# Patient Record
Sex: FEMALE | Race: WHITE | NOT HISPANIC OR LATINO | Employment: FULL TIME | URBAN - METROPOLITAN AREA
[De-identification: names, ages, dates, MRNs, and addresses within clinical notes are randomized per-mention and may not be internally consistent; named-entity substitution may affect disease eponyms.]

---

## 2017-01-20 DIAGNOSIS — I10 ESSENTIAL HYPERTENSION: Chronic | ICD-10-CM

## 2017-01-20 RX ORDER — HYDROCHLOROTHIAZIDE 25 MG/1
25 TABLET ORAL
Qty: 90 TAB | Refills: 0 | OUTPATIENT
Start: 2017-01-20

## 2017-01-23 DIAGNOSIS — I10 ESSENTIAL HYPERTENSION: Chronic | ICD-10-CM

## 2017-01-23 RX ORDER — HYDROCHLOROTHIAZIDE 25 MG/1
25 TABLET ORAL
Qty: 90 TAB | Refills: 1 | Status: SHIPPED | OUTPATIENT
Start: 2017-01-23 | End: 2017-03-21 | Stop reason: SDUPTHER

## 2017-01-24 NOTE — TELEPHONE ENCOUNTER
Received a refill request for hydrochlorothiazide. Patient is taking his medication for hypertension and is tolerating medication well. Refill sent to pharmacy  Dr. Mota

## 2017-02-23 ENCOUNTER — OFFICE VISIT (OUTPATIENT)
Dept: MEDICAL GROUP | Facility: PHYSICIAN GROUP | Age: 62
End: 2017-02-23
Payer: COMMERCIAL

## 2017-02-23 VITALS
BODY MASS INDEX: 29.82 KG/M2 | DIASTOLIC BLOOD PRESSURE: 60 MMHG | RESPIRATION RATE: 14 BRPM | HEART RATE: 86 BPM | OXYGEN SATURATION: 95 % | HEIGHT: 65 IN | SYSTOLIC BLOOD PRESSURE: 112 MMHG | TEMPERATURE: 97.2 F | WEIGHT: 179 LBS

## 2017-02-23 DIAGNOSIS — I10 ESSENTIAL HYPERTENSION: Chronic | ICD-10-CM

## 2017-02-23 DIAGNOSIS — Z01.419 ENCOUNTER FOR GYNECOLOGICAL EXAMINATION: ICD-10-CM

## 2017-02-23 LAB
HBA1C MFR BLD: NORMAL % (ref ?–5.8)
INT CON NEG: NEGATIVE
INT CON POS: POSITIVE

## 2017-02-23 PROCEDURE — 83036 HEMOGLOBIN GLYCOSYLATED A1C: CPT | Performed by: INTERNAL MEDICINE

## 2017-02-23 PROCEDURE — 99214 OFFICE O/P EST MOD 30 MIN: CPT | Performed by: INTERNAL MEDICINE

## 2017-02-23 NOTE — ASSESSMENT & PLAN NOTE
Pt has long standing diabetes mellitus. He is currently on glipizide 5 mg BID, januvia 50 mg daily, and lantus 34 units QHS. Pt checks blood sugars at home and they have been in the 145-278 range.     Pt denies neuropathy, blurry vision, increased urinary frequency, or dysuria. Denies symptoms indicative of hypoglycemic episodes (sweating, shaking, loss of consciousness).     Pt is following with ophthalmologist but does not remember his name. Pt is on baby aspirin, statin, and enalapril.

## 2017-02-23 NOTE — ASSESSMENT & PLAN NOTE
Pt in on HCTZ 25 mg daily, spironolactone 50 mg daily and enalapril 5 mg daily for HTN. She reports she is compliant with medication. BP is at goal per JNC 8 critieria today.     Denies chest pain, shortness of breath, headache, blurry vision

## 2017-02-24 NOTE — PROGRESS NOTES
Subjective:   Tara Cueva is a 61 y.o. female here today for uncontrolled diabetes mellitus, hypertension    Uncontrolled type 2 diabetes mellitus without complication, with long-term current use of insulin (CMS-HCC)  Pt has long standing diabetes mellitus. He is currently on glipizide 5 mg BID, januvia 50 mg daily, and lantus 34 units QHS. Pt checks blood sugars at home and they have been in the 145-278 range.     Pt denies neuropathy, blurry vision, increased urinary frequency, or dysuria. Denies symptoms indicative of hypoglycemic episodes (sweating, shaking, loss of consciousness).     Pt is following with ophthalmologist but does not remember his name. Pt is on baby aspirin, statin, and enalapril.    Hypertension  Pt in on HCTZ 25 mg daily, spironolactone 50 mg daily and enalapril 5 mg daily for HTN. She reports she is compliant with medication. BP is at goal per JNC 8 critieria today.     Denies chest pain, shortness of breath, headache, blurry vision       Current medicines (including changes today)  Current Outpatient Prescriptions   Medication Sig Dispense Refill   • sitagliptin (JANUVIA) 50 MG Tab Take 2 Tabs by mouth every day. 180 Tab 1   • hydrochlorothiazide (HYDRODIURIL) 25 MG Tab Take 1 Tab by mouth every day. 90 Tab 1   • glipiZIDE (GLUCOTROL) 5 MG Tab Take 1 Tab by mouth 2 times a day. 60 Tab 2   • insulin glargine (LANTUS SOLOSTAR) 100 UNIT/ML Solution Pen-injector injection Inject 10 Units as instructed every evening. 15 mL 3   • Blood Glucose Monitoring Suppl SUPPLIES Misc Test strips order: one touch ultra strips. Sig: use dialy and prn ssx high or low sugar #100 RF x 3 100 Each 3   • enalapril (VASOTEC) 5 MG Tab Take 1 Tab by mouth every day. 90 Tab 2   • spironolactone (ALDACTONE) 50 MG Tab Take 1 Tab by mouth every day. 90 Tab 1   • omeprazole (PRILOSEC) 20 MG delayed-release capsule Take 1 Cap by mouth every day. 90 Cap 1   • atorvastatin (LIPITOR) 80 MG tablet Take 1 Tab by mouth every  "evening. 30 Tab 3   • Insulin Pen Needle 31G X 5 MM Misc 1 Application by Does not apply route 4 times a day as needed (for glucose checks). 120 Each 5   • Cholecalciferol (VITAMIN D) 400 UNIT TABS Take 400 Units by mouth every day.     • Blood Glucose Monitoring Suppl SUPPLIES MISC Test strips order: Test strips for glucometer. Sig: use it once daily and prn ssx high or low sugar #100 RF x 3 100 Each 3   • aspirin EC (ECOTRIN) 81 MG TBEC Take 81 mg by mouth every day.       No current facility-administered medications for this visit.     She  has a past medical history of Diabetes (5/12/2011); Hyperlipidemia (5/12/2011); Vitamin d deficiency (5/12/2011); Elevated liver enzymes (5/12/2011); Edema (5/12/2011); Hypertension (5/12/2011); GERD (gastroesophageal reflux disease) (5/12/2011); Skin lesion of face (5/12/2011); and Gall bladder polyp (8/12/2011).    ROS   No chest pain, no shortness of breath, no headache, no blurred vision       Objective:     Blood pressure 112/60, pulse 86, temperature 36.2 °C (97.2 °F), resp. rate 14, height 1.651 m (5' 5\"), weight 81.194 kg (179 lb), last menstrual period 12/16/2006, SpO2 95 %. Body mass index is 29.79 kg/(m^2).   Physical Exam:  Constitutional: Alert & oriented, no acute distress  Eye: Conjunctiva clear, lids normal, no discharge  ENMT: Lips without lesions, normal external nose and ears  Neck: Trachea midline, no masses, no thyromegaly. No cervical or supraclavicular lymphadenopathy  Respiratory: Unlabored respiratory effort, lungs clear to auscultation, no wheezes, no ronchi  Cardiovascular: Normal S1, S2, no murmur, no edema  Skin: Warm, dry, good turgor, no rashes in visible areas  Neuro: No overt focal neurologic deficits, normal gait  Psych: Normal mood and affect    POCT A1C: 10.7      Assessment and Plan:   The following treatment plan was discussed    1. Uncontrolled type 2 diabetes mellitus without complication, with long-term current use of insulin " (CMS-[facility])  Patient's hemoglobin A1c is 10.7 indicating poor control. Given patient has no CPD will increase Januvia from 50 mg to 100 mg daily. Patient counseled that she should continue checking glucose levels and a fasting levels remain above 140 consistently after being on Januvia for 2 weeks she can increase this dose by 2 units at nighttime. She can continue to check glucose levels and if they remain consistently above 140 fasting she can continue to increase Lantus by 2 units every 2 weeks (36 units, 2 weeks later if fasting glucose elevated above 140 increased to 38 units, etc.). Patient counseled about risks of hypoglycemia and that if symptoms develop counseled to let us know immediately. Will have patient follow up in 2 months and assess response to medication change and recheck A1C  - POCT Hemoglobin A1C  - sitagliptin (JANUVIA) 50 MG Tab; Take 2 Tabs by mouth every day.  Dispense: 180 Tab; Refill: 1    2. Essential hypertension  Well-controlled on current regimen of enalapril, spironolactone, hydrochlorothiazide. Continue current medications    3. Encounter for gynecological examination  - REFERRAL TO OB/GYN      Followup: Return in about 2 months (around 4/23/2017).    Please note that this dictation was created using voice recognition software. I have made every reasonable attempt to correct obvious errors, but I expect that there are errors of grammar and possibly content that I did not discover before finalizing the note.

## 2017-03-01 ENCOUNTER — TELEPHONE (OUTPATIENT)
Dept: MEDICAL GROUP | Facility: PHYSICIAN GROUP | Age: 62
End: 2017-03-01

## 2017-03-01 ENCOUNTER — HOSPITAL ENCOUNTER (OUTPATIENT)
Dept: RADIOLOGY | Facility: MEDICAL CENTER | Age: 62
End: 2017-03-01
Attending: INTERNAL MEDICINE
Payer: COMMERCIAL

## 2017-03-01 DIAGNOSIS — Z12.31 ENCOUNTER FOR SCREENING MAMMOGRAM FOR BREAST CANCER: ICD-10-CM

## 2017-03-01 PROCEDURE — G0202 SCR MAMMO BI INCL CAD: HCPCS

## 2017-03-02 RX ORDER — OMEPRAZOLE 20 MG/1
CAPSULE, DELAYED RELEASE ORAL
Qty: 90 CAP | Refills: 3 | Status: SHIPPED | OUTPATIENT
Start: 2017-03-02 | End: 2017-03-21 | Stop reason: SDUPTHER

## 2017-03-02 NOTE — TELEPHONE ENCOUNTER
----- Message from Christian Mota M.D. sent at 3/1/2017  3:20 PM PST -----  Mammogram results reviewed and there is no evidence of malignancy (cancer). It is recommended to repeat testing one year.  Please call patient and inform them of results.Thank you    - Dr. Mota

## 2017-03-02 NOTE — TELEPHONE ENCOUNTER
Received refill request for omeprazole.  Pt was seen in clinic recently and is taking this medication for GERD. Refill sent to pharmacy    Christian Mota M.D.

## 2017-03-14 ENCOUNTER — TELEPHONE (OUTPATIENT)
Dept: MEDICAL GROUP | Facility: PHYSICIAN GROUP | Age: 62
End: 2017-03-14

## 2017-03-14 NOTE — TELEPHONE ENCOUNTER
1. Caller Name: Tara                                          Call Back Number:       Patient approves a detailed voicemail message:     All appropriate forms were filled out for prior auth and supporting notes faxed on Januvia . Medication was still denied through insurance . Patient is out of medication . Can we change to something else ,Please advise

## 2017-03-21 DIAGNOSIS — E78.5 HYPERLIPIDEMIA, UNSPECIFIED HYPERLIPIDEMIA TYPE: Chronic | ICD-10-CM

## 2017-03-21 DIAGNOSIS — I10 ESSENTIAL HYPERTENSION: Chronic | ICD-10-CM

## 2017-03-21 DIAGNOSIS — E13.9 DIABETES 1.5, MANAGED AS TYPE 2 (HCC): ICD-10-CM

## 2017-03-21 RX ORDER — GLIPIZIDE 5 MG/1
5 TABLET ORAL 2 TIMES DAILY
Qty: 180 TAB | Refills: 3 | Status: SHIPPED | OUTPATIENT
Start: 2017-03-21 | End: 2017-04-20

## 2017-03-21 RX ORDER — HYDROCHLOROTHIAZIDE 25 MG/1
25 TABLET ORAL
Qty: 90 TAB | Refills: 3 | Status: SHIPPED | OUTPATIENT
Start: 2017-03-21 | End: 2017-10-24

## 2017-03-21 RX ORDER — ATORVASTATIN CALCIUM 80 MG/1
80 TABLET, FILM COATED ORAL EVERY EVENING
Qty: 90 TAB | Refills: 3 | Status: SHIPPED | OUTPATIENT
Start: 2017-03-21 | End: 2018-04-03 | Stop reason: SDUPTHER

## 2017-03-21 RX ORDER — ENALAPRIL MALEATE 5 MG/1
5 TABLET ORAL DAILY
Qty: 90 TAB | Refills: 3 | Status: SHIPPED | OUTPATIENT
Start: 2017-03-21 | End: 2018-04-03 | Stop reason: SDUPTHER

## 2017-03-21 RX ORDER — SPIRONOLACTONE 50 MG/1
50 TABLET, FILM COATED ORAL
Qty: 90 TAB | Refills: 3 | Status: SHIPPED | OUTPATIENT
Start: 2017-03-21 | End: 2017-07-20

## 2017-03-21 RX ORDER — OMEPRAZOLE 20 MG/1
20 CAPSULE, DELAYED RELEASE ORAL
Qty: 90 CAP | Refills: 3 | Status: SHIPPED | OUTPATIENT
Start: 2017-03-21 | End: 2018-04-03 | Stop reason: SDUPTHER

## 2017-03-21 NOTE — TELEPHONE ENCOUNTER
All refill [facility]     Was the patient seen in the last year in this department? Yes     Does patient have an active prescription for medications requested? Yes     Received Request Via: Pharmacy

## 2017-03-21 NOTE — TELEPHONE ENCOUNTER
Received refill request for glipizide, Lantus, enalapril, omeprazole, hctz, spironolactone, atorvastatin. Pt was seen in clinic recently and is taking medications for DM, HTN, DLD. Refills sent to pharmacy    Christian Mota M.D.

## 2017-03-30 ENCOUNTER — TELEPHONE (OUTPATIENT)
Dept: MEDICAL GROUP | Facility: PHYSICIAN GROUP | Age: 62
End: 2017-03-30

## 2017-04-20 ENCOUNTER — OFFICE VISIT (OUTPATIENT)
Dept: MEDICAL GROUP | Facility: PHYSICIAN GROUP | Age: 62
End: 2017-04-20
Payer: COMMERCIAL

## 2017-04-20 VITALS
WEIGHT: 179 LBS | HEART RATE: 90 BPM | DIASTOLIC BLOOD PRESSURE: 60 MMHG | SYSTOLIC BLOOD PRESSURE: 122 MMHG | TEMPERATURE: 97.9 F | HEIGHT: 65 IN | OXYGEN SATURATION: 95 % | BODY MASS INDEX: 29.82 KG/M2

## 2017-04-20 DIAGNOSIS — E13.9 DIABETES 1.5, MANAGED AS TYPE 2 (HCC): ICD-10-CM

## 2017-04-20 DIAGNOSIS — I10 ESSENTIAL HYPERTENSION: Chronic | ICD-10-CM

## 2017-04-20 PROCEDURE — 99214 OFFICE O/P EST MOD 30 MIN: CPT | Performed by: INTERNAL MEDICINE

## 2017-04-20 RX ORDER — GLIPIZIDE 10 MG/1
10 TABLET ORAL 2 TIMES DAILY
Qty: 180 TAB | Refills: 3 | Status: SHIPPED | OUTPATIENT
Start: 2017-04-20 | End: 2017-04-25

## 2017-04-20 NOTE — PROGRESS NOTES
Subjective:   Tara Cueva is a 61 y.o. female here today for T2DM, HTN    Uncontrolled type 2 diabetes mellitus without complication, with long-term current use of insulin (CMS-HCC)  Pt has long standing diabetes mellitus. She is currently on glipizide 5 mg BID and has been increasing her lantus does to try and get a fasting glucose level below 140 and is now up to 74 units QHS. Pt checks blood sugars at home and they have been in the 150 - 350 range fasting    Pt denies neuropathy, blurry vision, CKD. Denies symptoms indicative of hypoglycemic episodes (sweating, shaking, loss of consciousness).     Pt is following with ophthalmologist. Pt is on baby aspirin, statin, and enalapril.      Hypertension  Pt in on HCTZ 25 mg daily, spironolactone 50 mg daily and enalapril 5 mg daily for HTN. Pt reports compliance with medication. BP is at goal per JNC 8 critieria today.     Denies chest pain, shortness of breath, headache         Current medicines (including changes today)  Current Outpatient Prescriptions   Medication Sig Dispense Refill   • insulin glargine (LANTUS SOLOSTAR) 100 UNIT/ML Solution Pen-injector injection Inject 30 Units as instructed 2 times a day. 15 PEN 4   • glipiZIDE (GLUCOTROL) 10 MG Tab Take 1 Tab by mouth 2 times a day. 180 Tab 3   • Blood Glucose Monitoring Suppl SUPPLIES Misc Test strips order: one touch ultra strips. Sig: use dialy and prn ssx high or low sugar #100 RF x 3 100 Each 3   • Insulin Pen Needle 31G X 5 MM Misc 1 Application by Does not apply route 4 times a day as needed (for glucose checks). 120 Each 5   • Cholecalciferol (VITAMIN D) 400 UNIT TABS Take 400 Units by mouth every day.     • Blood Glucose Monitoring Suppl SUPPLIES MISC Test strips order: Test strips for glucometer. Sig: use it once daily and prn ssx high or low sugar #100 RF x 3 100 Each 3   • aspirin EC (ECOTRIN) 81 MG TBEC Take 81 mg by mouth every day.     • enalapril (VASOTEC) 5 MG Tab Take 1 Tab by mouth every  "day. 90 Tab 3   • omeprazole (PRILOSEC) 20 MG delayed-release capsule Take 1 Cap by mouth every day. TAKE 1 CAP BY MOUTH EVERY DAY. 90 Cap 3   • hydrochlorothiazide (HYDRODIURIL) 25 MG Tab Take 1 Tab by mouth every day. 90 Tab 3   • spironolactone (ALDACTONE) 50 MG Tab Take 1 Tab by mouth every day. 90 Tab 3   • atorvastatin (LIPITOR) 80 MG tablet Take 1 Tab by mouth every evening. 90 Tab 3   • sitagliptin (JANUVIA) 50 MG Tab Take 2 Tabs by mouth every day. 180 Tab 1     No current facility-administered medications for this visit.     She  has a past medical history of Diabetes (5/12/2011); Hyperlipidemia (5/12/2011); Vitamin d deficiency (5/12/2011); Elevated liver enzymes (5/12/2011); Edema (5/12/2011); Hypertension (5/12/2011); GERD (gastroesophageal reflux disease) (5/12/2011); Skin lesion of face (5/12/2011); and Gall bladder polyp (8/12/2011).    ROS   No chest pain, no shortness of breath, no headache       Objective:     Blood pressure 122/60, pulse 90, temperature 36.6 °C (97.9 °F), height 1.651 m (5' 5\"), weight 81.194 kg (179 lb), last menstrual period 12/16/2006, SpO2 95 %. Body mass index is 29.79 kg/(m^2).   Physical Exam:  Constitutional: Alert & oriented, no acute distress  Eye: Conjunctiva clear, lids normal, no discharge  ENMT: Lips without lesions, normal external nose and ears  Neck: Trachea midline, no masses, no thyromegaly. No cervical or supraclavicular lymphadenopathy  Respiratory: Unlabored respiratory effort, lungs clear to auscultation, no wheezes, no ronchi  Cardiovascular: Normal S1, S2, no murmur, no edema  Skin: Warm, dry, good turgor, no rashes in visible areas  Neuro: No overt focal neurologic deficits, normal gait  Psych: Normal mood and affect      Assessment and Plan:   The following treatment plan was discussed    1. Uncontrolled type 2 diabetes mellitus without complication, with long-term current use of insulin (CMS-[facility])  Poorly controlled with fasting glucose levels in the " 150-350 range despite increasing doses of Lantus. We'll change Lantus to 30 mg twice a day and increase glipizide to 10 mg twice a day. Will follow-up in 3 months with repeat labs to assess diabetes mellitus. Will also refer to endocrinology given patient's very high level of insulin dosing and concern for insulin resistance  - insulin glargine (LANTUS SOLOSTAR) 100 UNIT/ML Solution Pen-injector injection; Inject 30 Units as instructed 2 times a day.  Dispense: 15 PEN; Refill: 4  - glipiZIDE (GLUCOTROL) 10 MG Tab; Take 1 Tab by mouth 2 times a day.  Dispense: 180 Tab; Refill: 3  - REFERRAL TO ENDOCRINOLOGY  - LIPID PROFILE; Future  - COMP METABOLIC PANEL; Future  - HEMOGLOBIN A1C; Future    3. Essential hypertension  Well-controlled on current medications. Continue current regimen  - CBC WITH DIFFERENTIAL; Future      Followup: Return in about 3 months (around 7/20/2017).    Please note that this dictation was created using voice recognition software. I have made every reasonable attempt to correct obvious errors, but I expect that there are errors of grammar and possibly content that I did not discover before finalizing the note.

## 2017-04-20 NOTE — MR AVS SNAPSHOT
"        Tara Cueva   2017 3:40 PM   Office Visit   MRN: [id number]    Department:  [facility]   Dept Phone:  [phone]    Description:  Female : 1955   Provider:  Christian Mota M.D.           Reason for Visit     Follow-Up           Allergies as of 2017     Allergen Noted Reactions    Sulfa Drugs 2012   Vomiting      You were diagnosed with     Diabetes 1.5, managed as type 2 (CMS-HCC)   [023022]       Uncontrolled type 2 diabetes mellitus without complication, with long-term current use of insulin (CMS-HCC)   [1020773]       Essential hypertension   [2175708]         Vital Signs     Blood Pressure Pulse Temperature Height Weight Body Mass Index    122/60 mmHg 90 36.6 °C (97.9 °F) 1.651 m (5' 5\") 81.194 kg (179 lb) 29.79 kg/m2    Oxygen Saturation Last Menstrual Period Smoking Status             95% 2006 Former Smoker         Basic Information     Date Of Birth Sex Race Ethnicity Preferred Language    1955 Female White Non- English      Your appointments     2017  3:20 PM   Established Patient with Christian Mota M.D.   [facility] ([place]    [address] [place] [id number] [phone]           You will be receiving a confirmation call a few days before your appointment from our automated call confirmation system.              Problem List              ICD-10-CM Priority Class Noted - Resolved    Hyperlipidemia (Chronic) E78.5   2011 - Present    Vitamin D deficiency E55.9   2011 - Present    Hypertension (Chronic) I10   2011 - Present    GERD (gastroesophageal reflux disease) K21.9   2011 - Present    Gall bladder polyp K82.4   2011 - Present    Uncontrolled type 2 diabetes mellitus without complication, with long-term current use of insulin (CMS-HCC) E11.65, Z79.4   3/30/2012 - Present    Hypokalemia E87.6   1/3/2014 - Present    Thumb pain M79.646   2016 - Present      Health Maintenance        Date Due " Completion Dates    IMM ZOSTER VACCINE 5/9/2014 ---    RETINAL SCREENING 7/10/2015 7/10/2014 (N/S), 4/21/2012 (N/S)    Override on 7/10/2014: (N/S)    Override on 4/21/2012: (N/S)    PAP SMEAR 2/7/2017 2/7/2014, 1/17/2014    A1C SCREENING 8/23/2017 2/23/2017, 12/23/2016, 4/8/2016, 12/18/2015, 9/18/2015, 6/19/2015, 3/6/2015, 12/5/2014, 7/25/2014, 12/27/2013, 6/7/2013, 11/16/2012, 8/10/2012, 5/4/2012, 12/23/2011, 10/7/2011    FASTING LIPID PROFILE 12/23/2017 12/23/2016, 9/17/2015, 2/4/2015, 7/25/2014, 6/7/2013, 8/10/2012, 12/23/2011, 10/7/2011, 5/13/2011    URINE ACR / MICROALBUMIN 12/23/2017 12/23/2016, 2/4/2015, 7/25/2014, 6/7/2013, 12/23/2011, 5/13/2011    SERUM CREATININE 12/23/2017 12/23/2016, 9/17/2015, 2/4/2015, 12/5/2014, 7/25/2014, 2/22/2014, 12/27/2013, 6/7/2013, 11/16/2012, 8/10/2012, 5/4/2012, 12/23/2011, 10/7/2011, 5/13/2011    DIABETES MONOFILAMENT / LE EXAM 12/23/2017 12/23/2016, 7/25/2014 (N/S), 8/11/2011 (N/S)    Override on 7/25/2014: (N/S)    Override on 8/11/2011: (N/S)    MAMMOGRAM 3/1/2018 3/1/2017, 1/24/2014    COLONOSCOPY 11/6/2019 11/6/2014    IMM DTaP/Tdap/Td Vaccine (2 - Td) 7/25/2024 7/25/2014            Current Immunizations     Influenza TIV (IM) 10/4/2013, 11/4/2011    Influenza Vaccine Quad Inj (Pf) 12/23/2016  8:12 AM    Influenza Vaccine Quad Inj (Preserved) 12/18/2015    Pneumococcal polysaccharide vaccine (PPSV-23) 1/3/2014    Tdap Vaccine 7/25/2014      Below and/or attached are the medications your provider expects you to take. Review all of your home medications and newly ordered medications with your provider and/or pharmacist. Follow medication instructions as directed by your provider and/or pharmacist. Please keep your medication list with you and share with your provider. Update the information when medications are discontinued, doses are changed, or new medications (including over-the-counter products) are added; and carry medication information at all times in the event of  emergency situations     Allergies:  SULFA DRUGS - Vomiting               Medications  Valid as of: April 20, 2017 -  3:49 PM    Generic Name Brand Name Tablet Size Instructions for use    Aspirin (Tablet Delayed Response) ECOTRIN 81 MG Take 81 mg by mouth every day.        Atorvastatin Calcium (Tab) LIPITOR 80 MG Take 1 Tab by mouth every evening.        Blood Glucose Monitoring Suppl (Misc) Blood Glucose Monitoring Suppl SUPPLIES Test strips order: Test strips for glucometer. Sig: use it once daily and prn ssx high or low sugar #100 RF x 3        Blood Glucose Monitoring Suppl (Misc) Blood Glucose Monitoring Suppl SUPPLIES Test strips order: one touch ultra strips. Sig: use dialy and prn ssx high or low sugar #100 RF x 3        Cholecalciferol (Tab) VITAMIN D 400 UNIT Take 400 Units by mouth every day.        Enalapril Maleate (Tab) VASOTEC 5 MG Take 1 Tab by mouth every day.        GlipiZIDE (Tab) GLUCOTROL 10 MG Take 1 Tab by mouth 2 times a day.        HydroCHLOROthiazide (Tab) HYDRODIURIL 25 MG Take 1 Tab by mouth every day.        Insulin Glargine (Solution Pen-injector) LANTUS 100 UNIT/ML Inject 30 Units as instructed 2 times a day.        Insulin Pen Needle (Misc) Insulin Pen Needle 31G X 5 MM 1 Application by Does not apply route 4 times a day as needed (for glucose checks).        Omeprazole (CAPSULE DELAYED RELEASE) PRILOSEC 20 MG Take 1 Cap by mouth every day. TAKE 1 CAP BY MOUTH EVERY DAY.        SITagliptin Phosphate (Tab) JANUVIA 50 MG Take 2 Tabs by mouth every day.        Spironolactone (Tab) ALDACTONE 50 MG Take 1 Tab by mouth every day.        .                 Medicines prescribed today were sent to:     [id number] - [place] - [address]    [address] [place] [address]    Phone: [phone] Fax: [phone]    Open 24 Hours?: No    [facility] - [place], [place] - [address]    [address] [address]    Phone: [phone] Fax:  [phone]    Open 24 Hours?: No      Medication refill instructions:       If your prescription bottle indicates you have medication refills left, it is not necessary to call your provider’s office. Please contact your pharmacy and they will refill your medication.    If your prescription bottle indicates you do not have any refills left, you may request refills at any time through one of the following ways: The online [employer] system (except Urgent Care), by calling your provider’s office, or by asking your pharmacy to contact your provider’s office with a refill request. Medication refills are processed only during regular business hours and may not be available until the next business day. Your provider may request additional information or to have a follow-up visit with you prior to refilling your medication.   *Please Note: Medication refills are assigned a new Rx number when refilled electronically. Your pharmacy may indicate that no refills were authorized even though a new prescription for the same medication is available at the pharmacy. Please request the medicine by name with the pharmacy before contacting your provider for a refill.        Your To Do List     Future Labs/Procedures Complete By Expires    CBC WITH DIFFERENTIAL  As directed 4/20/2018    COMP METABOLIC PANEL  As directed 4/20/2018    HEMOGLOBIN A1C  As directed 4/20/2018    LIPID PROFILE  As directed 4/20/2018      Referral     A referral request has been sent to our patient care coordination department. Please allow 3-5 business days for us to process this request and contact you either by phone or mail. If you do not hear from us by the 5th business day, please call us at [phone].           [employer] Access Code: Activation code not generated  Current [employer] Status: Active

## 2017-04-20 NOTE — ASSESSMENT & PLAN NOTE
Pt has long standing diabetes mellitus. She is currently on glipizide 5 mg BID and has been increasing her lantus does to try and get a fasting glucose level below 140 and is now up to 74 units QHS. Pt checks blood sugars at home and they have been in the 150 - 350 range fasting    Pt denies neuropathy, blurry vision, CKD. Denies symptoms indicative of hypoglycemic episodes (sweating, shaking, loss of consciousness).     Pt is following with ophthalmologist. Pt is on baby aspirin, statin, and enalapril.

## 2017-04-20 NOTE — ASSESSMENT & PLAN NOTE
Pt in on HCTZ 25 mg daily, spironolactone 50 mg daily and enalapril 5 mg daily for HTN. Pt reports compliance with medication. BP is at goal per JNC 8 critieria today.     Denies chest pain, shortness of breath, headache

## 2017-04-25 ENCOUNTER — OFFICE VISIT (OUTPATIENT)
Dept: ENDOCRINOLOGY | Facility: MEDICAL CENTER | Age: 62
End: 2017-04-25
Payer: COMMERCIAL

## 2017-04-25 ENCOUNTER — HOSPITAL ENCOUNTER (OUTPATIENT)
Dept: LAB | Facility: MEDICAL CENTER | Age: 62
End: 2017-04-25
Attending: PHYSICIAN ASSISTANT
Payer: COMMERCIAL

## 2017-04-25 VITALS
HEART RATE: 78 BPM | SYSTOLIC BLOOD PRESSURE: 104 MMHG | OXYGEN SATURATION: 95 % | WEIGHT: 179 LBS | HEIGHT: 65 IN | DIASTOLIC BLOOD PRESSURE: 72 MMHG | BODY MASS INDEX: 29.82 KG/M2

## 2017-04-25 DIAGNOSIS — Z79.4 ENCOUNTER FOR LONG-TERM (CURRENT) USE OF INSULIN (HCC): ICD-10-CM

## 2017-04-25 LAB
ALBUMIN SERPL BCP-MCNC: 4.5 G/DL (ref 3.2–4.9)
ALBUMIN/GLOB SERPL: 1.2 G/DL
ALP SERPL-CCNC: 73 U/L (ref 30–99)
ALT SERPL-CCNC: 23 U/L (ref 2–50)
ANION GAP SERPL CALC-SCNC: 8 MMOL/L (ref 0–11.9)
AST SERPL-CCNC: 19 U/L (ref 12–45)
BILIRUB SERPL-MCNC: 0.5 MG/DL (ref 0.1–1.5)
BUN SERPL-MCNC: 23 MG/DL (ref 8–22)
CALCIUM SERPL-MCNC: 10.8 MG/DL (ref 8.5–10.5)
CHLORIDE SERPL-SCNC: 99 MMOL/L (ref 96–112)
CO2 SERPL-SCNC: 29 MMOL/L (ref 20–33)
CREAT SERPL-MCNC: 1.03 MG/DL (ref 0.5–1.4)
CREAT UR-MCNC: 96.3 MG/DL
GFR SERPL CREATININE-BSD FRML MDRD: 54 ML/MIN/1.73 M 2
GLOBULIN SER CALC-MCNC: 3.7 G/DL (ref 1.9–3.5)
GLUCOSE SERPL-MCNC: 277 MG/DL (ref 65–99)
MICROALBUMIN UR-MCNC: <0.7 MG/DL
MICROALBUMIN/CREAT UR: NORMAL MG/G (ref 0–30)
POTASSIUM SERPL-SCNC: 4 MMOL/L (ref 3.6–5.5)
PROT SERPL-MCNC: 8.2 G/DL (ref 6–8.2)
SODIUM SERPL-SCNC: 136 MMOL/L (ref 135–145)

## 2017-04-25 PROCEDURE — 86341 ISLET CELL ANTIBODY: CPT

## 2017-04-25 PROCEDURE — 36415 COLL VENOUS BLD VENIPUNCTURE: CPT

## 2017-04-25 PROCEDURE — 80053 COMPREHEN METABOLIC PANEL: CPT

## 2017-04-25 PROCEDURE — 99214 OFFICE O/P EST MOD 30 MIN: CPT | Performed by: PHYSICIAN ASSISTANT

## 2017-04-25 PROCEDURE — 82043 UR ALBUMIN QUANTITATIVE: CPT

## 2017-04-25 PROCEDURE — 84681 ASSAY OF C-PEPTIDE: CPT

## 2017-04-25 PROCEDURE — 83516 IMMUNOASSAY NONANTIBODY: CPT

## 2017-04-25 PROCEDURE — 86337 INSULIN ANTIBODIES: CPT

## 2017-04-25 PROCEDURE — 82570 ASSAY OF URINE CREATININE: CPT

## 2017-04-25 RX ORDER — LIRAGLUTIDE 6 MG/ML
1.8 INJECTION SUBCUTANEOUS DAILY
Qty: 3 PEN | Refills: 6 | Status: SHIPPED | OUTPATIENT
Start: 2017-04-25 | End: 2017-05-09 | Stop reason: SDUPTHER

## 2017-04-25 RX ORDER — PIOGLITAZONEHYDROCHLORIDE 15 MG/1
15 TABLET ORAL DAILY
Qty: 30 TAB | Refills: 11 | Status: SHIPPED | OUTPATIENT
Start: 2017-04-25 | End: 2017-05-02

## 2017-04-25 RX ORDER — DAPAGLIFLOZIN 10 MG/1
1 TABLET, FILM COATED ORAL DAILY
Qty: 30 TAB | Refills: 11 | Status: SHIPPED | OUTPATIENT
Start: 2017-04-25 | End: 2017-05-01 | Stop reason: CLARIF

## 2017-04-25 NOTE — PROGRESS NOTES
New Patient Consult Note  Referred by: Christian Mota M.D.    Reason for consult: Diabetes Management Type 2    HPI:  Tara Cueva is a 61 y.o. old patient who is seeing us today for diabetes care.  This is a pleasant patient with diabetes and I appreciate the opportunity to participate in the care of this patient.  This is a new patient with me today.    Labs of 2/23/17 HbA1c is 10.7, GFR >60,   In 12/2016 HbA1c was 10.2    BG Diary:4/25/2017  In the AM: 203, 226, 224, 213, 179, 205, 147, 143, 161, 357, 193  Just before meal:  Just before Bed:    Has been Diabetic over 20 years.  Has a Glucagon pen at home: no  Has a meter at home    1. Uncontrolled type 2 diabetes mellitus without complication, with long-term current use of insulin (CMS-HCC)  This is a new patient. She is on  Lantus up to 74units. (but now on Lantus 30UNITS)  Januvia 50 (does not take states it did not work)  Glipizide 10mg BID    (Metformin Makes her nausea)    Start:  Farxiga 10mg one a day  Actos 15 mg one a day.  ( at pharmacy)  Victoza 0.6 at night  Lantus at night 20units.        2. Encounter for long-term (current) use of insulin (CMS-HCC)  Is on a high risk medication Insulin and we will continue to follow no hypoglycemic events since last visit          ROS:   Constitutional: No change in weight , No fatigue, No night sweats.  HEENT: No Headache.  Eyes:  No blurred vision, No visual changes.  Cardiac: No chest pain, No palpitations.  Resp: No shortness of breath, No cough,   Gastro: No nausea or vomiting, No diarrhea.  Neuro: Denies numbness or tinging in bilateral feet or hands, and no loss of sensation.  Endo: No heat or cold intolerance.  : No polyuria, No polydipsia, No chronic UTI's.  Lower extremities: No lower leg edema bilateral.  All other systems were reviewed and were negative.    Past Medical History:  Patient Active Problem List    Diagnosis Date Noted   • Encounter for long-term (current) use of insulin (CMS-HCC)  04/25/2017   • Thumb pain 12/23/2016   • Hypokalemia 01/03/2014   • Uncontrolled type 2 diabetes mellitus without complication, with long-term current use of insulin (CMS-HCC) 03/30/2012   • Gall bladder polyp 08/12/2011   • Hyperlipidemia 05/12/2011   • Vitamin D deficiency 05/12/2011   • Hypertension 05/12/2011   • GERD (gastroesophageal reflux disease) 05/12/2011       Past Surgical History:  Past Surgical History   Procedure Laterality Date   • Primary c section     • Other orthopedic surgery       right arm ORIF   • Tubal coagulation laparoscopic bilateral         Allergies:  Sulfa drugs    Social History:  Social History     Social History   • Marital Status:      Spouse Name: N/A   • Number of Children: N/A   • Years of Education: N/A     Occupational History   • Not on file.     Social History Main Topics   • Smoking status: Former Smoker -- 1.00 packs/day for 40 years     Types: Cigarettes     Quit date: 01/01/1990   • Smokeless tobacco: Never Used   • Alcohol Use: No   • Drug Use: No   • Sexual Activity: Not on file     Other Topics Concern   • Not on file     Social History Narrative       Family History:  Family History   Problem Relation Age of Onset   • Cancer Maternal Grandmother      lung    • Cancer Mother      breast       Medications:    Current outpatient prescriptions:   •  insulin glargine (LANTUS SOLOSTAR) 100 UNIT/ML Solution Pen-injector injection, Inject 30 Units as instructed 2 times a day., Disp: 15 PEN, Rfl: 4  •  glipiZIDE (GLUCOTROL) 10 MG Tab, Take 1 Tab by mouth 2 times a day., Disp: 180 Tab, Rfl: 3  •  enalapril (VASOTEC) 5 MG Tab, Take 1 Tab by mouth every day., Disp: 90 Tab, Rfl: 3  •  omeprazole (PRILOSEC) 20 MG delayed-release capsule, Take 1 Cap by mouth every day. TAKE 1 CAP BY MOUTH EVERY DAY., Disp: 90 Cap, Rfl: 3  •  hydrochlorothiazide (HYDRODIURIL) 25 MG Tab, Take 1 Tab by mouth every day., Disp: 90 Tab, Rfl: 3  •  spironolactone (ALDACTONE) 50 MG Tab, Take 1 Tab  "by mouth every day., Disp: 90 Tab, Rfl: 3  •  atorvastatin (LIPITOR) 80 MG tablet, Take 1 Tab by mouth every evening., Disp: 90 Tab, Rfl: 3  •  Insulin Pen Needle 31G X 5 MM Misc, 1 Application by Does not apply route 4 times a day as needed (for glucose checks)., Disp: 120 Each, Rfl: 5  •  Cholecalciferol (VITAMIN D) 400 UNIT TABS, Take 400 Units by mouth every day., Disp: , Rfl:   •  Blood Glucose Monitoring Suppl SUPPLIES MISC, Test strips order: Test strips for glucometer. Sig: use it once daily and prn ssx high or low sugar #100 RF x 3, Disp: 100 Each, Rfl: 3  •  aspirin EC (ECOTRIN) 81 MG TBEC, Take 81 mg by mouth every day., Disp: , Rfl:   •  sitagliptin (JANUVIA) 50 MG Tab, Take 2 Tabs by mouth every day., Disp: 180 Tab, Rfl: 1      Physical Examination:   Vital signs: /72 mmHg  Pulse 78  Ht 1.651 m (5' 5\")  Wt 81.194 kg (179 lb)  BMI 29.79 kg/m2  SpO2 95%  LMP 12/16/2006  General: No distress, cooperative, well dressed and well nourished.   Eyes: No scleral icterus or discharge, No hyposphagma  ENMT: Normal on external inspection of nose, lips, No nasal drainage   Neck: No abnormal masses on inspection  Resp: Normal effort, Bilateral clear to auscultation, No wheezing, No rales  CVS: Regular rate and rhythm, S1 S2 normal, No murmur. No gallop  Extremities: No edema bilateral extremities  Neuro: Alert and oriented  Skin: No rash, No Ulcers  Psych: Normal mood and affect      Assessment and Plan:    1. Uncontrolled type 2 diabetes mellitus without complication, with long-term current use of insulin (CMS-HCC)  I will have her STOP  Januvia  Glipizide      Start:  Farxiga 10mg one a day  Actos 15 mg one a day.  ( at pharmacy)  Victoza 0.6 at night  Lantus at night 20units. DECREASED from 30 BID        2. Encounter for long-term (current) use of insulin (CMS-[facility])  The total time spent seeing this patient today face to face in consultation, and formulating an action plan for this visit was " greater than 25 minutes. > Than 50% of this time was spent counseling, discussing problems documented above and below, coordinating care and answering questions by the physician assistant.  We developed a diabetes care plan for this patient today.        Return in about 1 week (around 5/2/2017).    Blood glucose log: Check BG in the morning when wake up, before lunch or dinner and before bed.  So three times a day.  Always bring BG diary to the next office visit.     This patient during there office visit was started on new medication victoza, actos, Farxiga.  Side effects of new medications were discussed with the patient today in the office. The patient was supplied paperwork on this new medication.    Thank you kindly for allowing me to participate in the diabetes care plan for this patient.    Edy Lee PA-C, BC-ADM  04/25/2017    CC:   Christian Mota M.D.

## 2017-04-25 NOTE — MR AVS SNAPSHOT
"        Tara Cueva   2017 8:30 AM   Office Visit   MRN: [id number]    Department:  Endocrinology [facility]   Dept Phone:  [phone]    Description:  Female : 1955   Provider:  Anson Lee PA-C           Reason for Visit     New Patient           Allergies as of 2017     Allergen Noted Reactions    Sulfa Drugs 2012   Vomiting      You were diagnosed with     Uncontrolled type 2 diabetes mellitus without complication, with long-term current use of insulin (CMS-HCC)   [2771996]       Encounter for long-term (current) use of insulin (CMS-HCC)   [V58.67.ICD-9-CM]         Vital Signs     Blood Pressure Pulse Height Weight Body Mass Index Oxygen Saturation    104/72 mmHg 78 1.651 m (5' 5\") 81.194 kg (179 lb) 29.79 kg/m2 95%    Last Menstrual Period Smoking Status                2006 Former Smoker          Basic Information     Date Of Birth Sex Race Ethnicity Preferred Language    1955 Female White Non- English      Your appointments     May 02, 2017  8:10 AM   Established Patient with Anson Lee PA-C   [facility]    [address], [address]  [facility] [address] [phone]           You will be receiving a confirmation call a few days before your appointment from our automated call confirmation system.            2017  3:20 PM   Established Patient with Christian Mota M.D.   [facility]    [address] [address] [phone]           You will be receiving a confirmation call a few days before your appointment from our automated call confirmation system.              Problem List              ICD-10-CM Priority Class Noted - Resolved    Hyperlipidemia (Chronic) E78.5   2011 - Present    Vitamin D deficiency E55.9   2011 - Present    Hypertension (Chronic) I10   2011 - Present    GERD (gastroesophageal reflux disease) K21.9   2011 - Present    Gall " bladder polyp K82.4   8/12/2011 - Present    Uncontrolled type 2 diabetes mellitus without complication, with long-term current use of insulin (CMS-HCC) E11.65, Z79.4   3/30/2012 - Present    Hypokalemia E87.6   1/3/2014 - Present    Thumb pain M79.646   12/23/2016 - Present    Encounter for long-term (current) use of insulin (CMS-HCC) Z79.4   4/25/2017 - Present      Health Maintenance        Date Due Completion Dates    IMM ZOSTER VACCINE 5/9/2014 ---    RETINAL SCREENING 7/10/2015 7/10/2014 (N/S), 4/21/2012 (N/S)    Override on 7/10/2014: (N/S)    Override on 4/21/2012: (N/S)    PAP SMEAR 2/7/2017 2/7/2014, 1/17/2014    A1C SCREENING 8/23/2017 2/23/2017, 12/23/2016, 4/8/2016, 12/18/2015, 9/18/2015, 6/19/2015, 3/6/2015, 12/5/2014, 7/25/2014, 12/27/2013, 6/7/2013, 11/16/2012, 8/10/2012, 5/4/2012, 12/23/2011, 10/7/2011    FASTING LIPID PROFILE 12/23/2017 12/23/2016, 9/17/2015, 2/4/2015, 7/25/2014, 6/7/2013, 8/10/2012, 12/23/2011, 10/7/2011, 5/13/2011    URINE ACR / MICROALBUMIN 12/23/2017 12/23/2016, 2/4/2015, 7/25/2014, 6/7/2013, 12/23/2011, 5/13/2011    SERUM CREATININE 12/23/2017 12/23/2016, 9/17/2015, 2/4/2015, 12/5/2014, 7/25/2014, 2/22/2014, 12/27/2013, 6/7/2013, 11/16/2012, 8/10/2012, 5/4/2012, 12/23/2011, 10/7/2011, 5/13/2011    DIABETES MONOFILAMENT / LE EXAM 12/23/2017 12/23/2016, 7/25/2014 (N/S), 8/11/2011 (N/S)    Override on 7/25/2014: (N/S)    Override on 8/11/2011: (N/S)    MAMMOGRAM 3/1/2018 3/1/2017, 1/24/2014    COLONOSCOPY 11/6/2019 11/6/2014    IMM DTaP/Tdap/Td Vaccine (2 - Td) 7/25/2024 7/25/2014            Current Immunizations     Influenza TIV (IM) 10/4/2013, 11/4/2011    Influenza Vaccine Quad Inj (Pf) 12/23/2016  8:12 AM    Influenza Vaccine Quad Inj (Preserved) 12/18/2015    Pneumococcal polysaccharide vaccine (PPSV-23) 1/3/2014    Tdap Vaccine 7/25/2014      Below and/or attached are the medications your provider expects you to take. Review all of your home medications and newly ordered  medications with your provider and/or pharmacist. Follow medication instructions as directed by your provider and/or pharmacist. Please keep your medication list with you and share with your provider. Update the information when medications are discontinued, doses are changed, or new medications (including over-the-counter products) are added; and carry medication information at all times in the event of emergency situations     Allergies:  SULFA DRUGS - Vomiting               Medications  Valid as of: April 25, 2017 -  8:56 AM    Generic Name Brand Name Tablet Size Instructions for use    Aspirin (Tablet Delayed Response) ECOTRIN 81 MG Take 81 mg by mouth every day.        Atorvastatin Calcium (Tab) LIPITOR 80 MG Take 1 Tab by mouth every evening.        Blood Glucose Monitoring Suppl (Misc) Blood Glucose Monitoring Suppl SUPPLIES Test strips order: Test strips for glucometer. Sig: use it once daily and prn ssx high or low sugar #100 RF x 3        Cholecalciferol (Tab) VITAMIN D 400 UNIT Take 400 Units by mouth every day.        Enalapril Maleate (Tab) VASOTEC 5 MG Take 1 Tab by mouth every day.        GlipiZIDE (Tab) GLUCOTROL 10 MG Take 1 Tab by mouth 2 times a day.        HydroCHLOROthiazide (Tab) HYDRODIURIL 25 MG Take 1 Tab by mouth every day.        Insulin Glargine (Solution Pen-injector) LANTUS 100 UNIT/ML Inject 30 Units as instructed 2 times a day.        Insulin Pen Needle (Misc) Insulin Pen Needle 31G X 5 MM 1 Application by Does not apply route 4 times a day as needed (for glucose checks).        Omeprazole (CAPSULE DELAYED RELEASE) PRILOSEC 20 MG Take 1 Cap by mouth every day. TAKE 1 CAP BY MOUTH EVERY DAY.        SITagliptin Phosphate (Tab) JANUVIA 50 MG Take 2 Tabs by mouth every day.        Spironolactone (Tab) ALDACTONE 50 MG Take 1 Tab by mouth every day.        .                 Medicines prescribed today were sent to:     [id number] - [place], [place] - [address] [place] [address]    [address] [place] [address]  [place] [address]    Phone: [phone] Fax: [phone]    Open 24 Hours?: No    [facility] - [place], [place] - [address]    [address] [address]    Phone: [phone] Fax: [phone]    Open 24 Hours?: No      Medication refill instructions:       If your prescription bottle indicates you have medication refills left, it is not necessary to call your provider’s office. Please contact your pharmacy and they will refill your medication.    If your prescription bottle indicates you do not have any refills left, you may request refills at any time through one of the following ways: The online [employer] system (except Urgent Care), by calling your provider’s office, or by asking your pharmacy to contact your provider’s office with a refill request. Medication refills are processed only during regular business hours and may not be available until the next business day. Your provider may request additional information or to have a follow-up visit with you prior to refilling your medication.   *Please Note: Medication refills are assigned a new Rx number when refilled electronically. Your pharmacy may indicate that no refills were authorized even though a new prescription for the same medication is available at the pharmacy. Please request the medicine by name with the pharmacy before contacting your provider for a refill.        Your To Do List     Future Labs/Procedures Complete By Expires    ANTIPANCREATIC ISLET  As directed 4/25/2018    C-PEPTIDE  As directed 4/25/2018    COMP METABOLIC PANEL  As directed 4/25/2018    CALVIN-65  As directed 4/25/2018    INSULIN ANTIBODIES  As directed 4/25/2018    MICROALBUMIN CREAT RATIO URINE  As directed 4/25/2018      Instructions      Start:  Farxiga 10mg one a day  Actos 15 mg one a day.  ( at pharmacy)  Victoza 0.6 at night  Lantus at night 20units.      STOP  Glipizide  Januvia    Blood glucose log: Check BG in the morning  when wake up, before lunch or dinner and before bed.  So three times a day.  Always bring BG diary to the next office visit.             [employer] Access Code: Activation code not generated  Current [employer] Interactive Status: Active

## 2017-04-25 NOTE — PATIENT INSTRUCTIONS
Start:  Farxiga 10mg one a day  Actos 15 mg one a day.  ( at pharmacy)  Victoza 0.6 at night  Lantus at night 20units.      STOP  Glipizide  Januvia    Blood glucose log: Check BG in the morning when wake up, before lunch or dinner and before bed.  So three times a day.  Always bring BG diary to the next office visit.

## 2017-04-26 LAB
C PEPTIDE SERPL-MCNC: 8.5 NG/ML (ref 0.8–3.5)
GAD65 AB SER IA-ACNC: 9.8 IU/ML (ref 0–5)
PANC ISLET CELL AB TITR SER: NORMAL {TITER}

## 2017-04-27 LAB — INSULIN HUMAN AB SER-ACNC: <0.4 U/ML (ref 0–0.4)

## 2017-05-01 RX ORDER — EMPAGLIFLOZIN 25 MG/1
1 TABLET, FILM COATED ORAL DAILY
Qty: 30 TAB | Refills: 3 | Status: SHIPPED | OUTPATIENT
Start: 2017-05-01 | End: 2017-05-02

## 2017-05-02 ENCOUNTER — OFFICE VISIT (OUTPATIENT)
Dept: ENDOCRINOLOGY | Facility: MEDICAL CENTER | Age: 62
End: 2017-05-02
Payer: COMMERCIAL

## 2017-05-02 VITALS
WEIGHT: 175 LBS | SYSTOLIC BLOOD PRESSURE: 116 MMHG | HEART RATE: 83 BPM | DIASTOLIC BLOOD PRESSURE: 64 MMHG | OXYGEN SATURATION: 95 % | BODY MASS INDEX: 29.16 KG/M2 | HEIGHT: 65 IN

## 2017-05-02 DIAGNOSIS — Z79.4 ENCOUNTER FOR LONG-TERM (CURRENT) USE OF INSULIN (HCC): ICD-10-CM

## 2017-05-02 PROCEDURE — 99214 OFFICE O/P EST MOD 30 MIN: CPT | Performed by: PHYSICIAN ASSISTANT

## 2017-05-02 RX ORDER — PIOGLITAZONEHYDROCHLORIDE 30 MG/1
30 TABLET ORAL DAILY
Qty: 30 TAB | Refills: 11 | Status: SHIPPED | OUTPATIENT
Start: 2017-05-02 | End: 2018-02-22 | Stop reason: SDUPTHER

## 2017-05-02 RX ORDER — EMPAGLIFLOZIN 25 MG/1
1 TABLET, FILM COATED ORAL DAILY
Qty: 30 TAB | Refills: 11 | Status: SHIPPED | OUTPATIENT
Start: 2017-05-02 | End: 2017-11-21

## 2017-05-02 NOTE — PROGRESS NOTES
Return to office Patient Consult Note  Referred by: Christian Mota M.D.    Reason for consult: Diabetes Management Type 2    HPI:  Tara Cueva is a 61 y.o. old patient who is seeing us today for diabetes care.  This is a pleasant patient with diabetes and I appreciate the opportunity to participate in the care of this patient.    4/25/17 labs:  C-peptide 8.5, CALVIN-65 is at 9.8 (this is a little elevated), Islet cell and Insulin antibodies are negative. Her BMI is 29 and her C-peptide is very high.  She is a T2 diabetic and not a T1.5.      BG Diary:5/2/2017  In the AM: 221, 207, 232, 211, 176, 176, 190, 153  Before meal: 214, 336, 179, 227   Before Bed: 322, 332, 268, 241, 270, 347, 243     Labs of 2/23/17 HbA1c is 10.7, GFR >60,    In 12/2016 HbA1c was 10.2    BG Diary:4/25/2017  In the AM: 203, 226, 224, 213, 179, 205, 147, 143, 161, 357, 193  Just before meal:  Just before Bed:    Weight: on 4/25/17 her weight was 179pounds, today it is 175pounds      1. Uncontrolled type 2 diabetes mellitus without complication, with long-term current use of insulin (CMS-HCC)  This was a new patient on 4/25/17. She was on  Lantus up to 74units.   Januvia 50 (does not take states it did not work)  Glipizide 10mg BID    (Metformin Makes her nausea)    I had her start:  Farxiga 10mg one a day  Actos 15 mg one a day.   Victoza 0.6 at night  Lantus at night 20units.      2. Encounter for long-term (current) use of insulin (CMS-HCC)  Is on a high risk medication Insulin and we will continue to follow no hypoglycemic events since last visit    ROS:   Constitutional: No night sweats.  Eyes:  No visual changes.  Cardiac: No chest pain, No palpitations or racing heart rate.  Resp: No shortness of breath, No cough,   Gi: No Diarrhea    All other systems were reviewed and were/are negative.  The ROS was revised/revisited during this office visit from the patients first office visit with me on 4/25/17. Please review the full ROS during the  first office visit.    Past Medical History:  Patient Active Problem List    Diagnosis Date Noted   • Encounter for long-term (current) use of insulin (CMS-HCC) 04/25/2017   • Thumb pain 12/23/2016   • Hypokalemia 01/03/2014   • Uncontrolled type 2 diabetes mellitus without complication, with long-term current use of insulin (CMS-HCC) 03/30/2012   • Gall bladder polyp 08/12/2011   • Hyperlipidemia 05/12/2011   • Vitamin D deficiency 05/12/2011   • Hypertension 05/12/2011   • GERD (gastroesophageal reflux disease) 05/12/2011       Past Surgical History:  Past Surgical History   Procedure Laterality Date   • Primary c section     • Other orthopedic surgery       right arm ORIF   • Tubal coagulation laparoscopic bilateral         Allergies:  Sulfa drugs    Social History:  Social History     Social History   • Marital Status:      Spouse Name: N/A   • Number of Children: N/A   • Years of Education: N/A     Occupational History   • Not on file.     Social History Main Topics   • Smoking status: Former Smoker -- 1.00 packs/day for 40 years     Types: Cigarettes     Quit date: 01/01/1990   • Smokeless tobacco: Never Used   • Alcohol Use: No   • Drug Use: No   • Sexual Activity: Not on file     Other Topics Concern   • Not on file     Social History Narrative       Family History:  Family History   Problem Relation Age of Onset   • Cancer Maternal Grandmother      lung    • Cancer Mother      breast       Medications:    Current outpatient prescriptions:   •  pioglitazone (ACTOS) 30 MG Tab, Take 1 Tab by mouth every day., Disp: 30 Tab, Rfl: 11  •  Canagliflozin (INVOKANA) 300 MG Tab, Take 1 Tab by mouth every day., Disp: 30 Tab, Rfl: 11  •  VICTOZA 18 MG/3ML Solution Pen-injector injection, Inject 0.3 mL as instructed every day., Disp: 3 PEN, Rfl: 6  •  Insulin Pen Needle (NOVOFINE PLUS) 32G X 4 MM Misc, 1 Applicator by Does not apply route every day. Using one needle tip with victoza per day, Disp: 100 Each, Rfl:  "3  •  insulin glargine (LANTUS SOLOSTAR) 100 UNIT/ML Solution Pen-injector injection, Inject 30 Units as instructed 2 times a day., Disp: 15 PEN, Rfl: 4  •  enalapril (VASOTEC) 5 MG Tab, Take 1 Tab by mouth every day., Disp: 90 Tab, Rfl: 3  •  omeprazole (PRILOSEC) 20 MG delayed-release capsule, Take 1 Cap by mouth every day. TAKE 1 CAP BY MOUTH EVERY DAY., Disp: 90 Cap, Rfl: 3  •  hydrochlorothiazide (HYDRODIURIL) 25 MG Tab, Take 1 Tab by mouth every day., Disp: 90 Tab, Rfl: 3  •  spironolactone (ALDACTONE) 50 MG Tab, Take 1 Tab by mouth every day., Disp: 90 Tab, Rfl: 3  •  atorvastatin (LIPITOR) 80 MG tablet, Take 1 Tab by mouth every evening., Disp: 90 Tab, Rfl: 3  •  Cholecalciferol (VITAMIN D) 400 UNIT TABS, Take 400 Units by mouth every day., Disp: , Rfl:   •  Blood Glucose Monitoring Suppl SUPPLIES MISC, Test strips order: Test strips for glucometer. Sig: use it once daily and prn ssx high or low sugar #100 RF x 3, Disp: 100 Each, Rfl: 3  •  aspirin EC (ECOTRIN) 81 MG TBEC, Take 81 mg by mouth every day., Disp: , Rfl:         Physical Examination:   Vital signs: /64 mmHg  Pulse 83  Ht 1.651 m (5' 5\")  Wt 79.379 kg (175 lb)  BMI 29.12 kg/m2  SpO2 95%  LMP 12/16/2006  General: No distress, cooperative, well dressed and well nourished.   Eyes: No scleral icterus or discharge, No hyposphagma  ENMT: Normal on external inspection of nose, lips, No nasal drainage   Neck: No abnormal masses on inspection  Resp: Normal effort, Bilateral clear to auscultation, No wheezing, No rales  CVS: Regular rate and rhythm, S1 S2 normal, No murmur. No gallop  Extremities: No edema bilateral extremities  Neuro: Alert and oriented  Skin: No rash, No Ulcers  Psych: Normal mood and affect      Assessment and Plan:    1. Uncontrolled type 2 diabetes mellitus without complication, with long-term current use of insulin (CMS-HCC)    She has lost 5 pounds in one week.    This was a new patient on 4/25/17. She was on  Lantus up " to 74units a night.   Januvia 50 (does not take states it did not work)  Glipizide 10mg BID    (Metformin Makes her have nausea)    I had her start:  Farxiga 10mg one a day (switch to Jardiance 25 because of insurance)  Actos 15 mg one a day. INCREASE to 30mg  Victoza 0.6 at night INCREASE to 1.2  Lantus at night 20units.  DECREASE to 10    I may re-start Metformin XR 500mg once in the AM next week depending on her numbers. I will also be removing the Lantus if possible.     She is also being treated for chronic yeast infections.  I asked her to wipe with the unstented wet wipes after urination.  Since I have her on an SGLT2 this can increase yeast infections she needs to make sure she stays hydrated and clear out any urine. With better treatment of her BG numbers this should in turn decrease the yeast infections.     I am increasing the Actos because her C-peptide is 8.5 and this indicates she makes plenty of Insulin.  She is just not able to use it efficiently.  By increasing the Actos the C-peptide should come down and helph use her own Insulin better.   She does have a small amount of CALVIN-65 antibodies and this should not be a problem over time but I will recheck this number yearly to see if it increases.       2. Encounter for long-term (current) use of insulin (CMS-HCC)  Is on a high risk medication Insulin and we will continue to follow no hypoglycemic events since last visit    The total time spent seeing this patient today face to face in consultation, and formulating an action plan for this visit was greater than 25 minutes. > Than 50% of this time was spent counseling, discussing problems documented above and below, coordinating care and answering questions by the physician assistant.  We developed a diabetes care plan for this patient today.      Return in about 1 week (around 5/9/2017).    Blood glucose log: Check BG in the morning when wake up, before lunch or dinner and before bed.  So three times a  day.  Always bring BG diary to the next office visit.     Thank you kindly for allowing me to participate in the diabetes care plan for this patient.    Edy Lee PA-C, BC-ADM  05/02/2017    CC:   EDINSON Lyon PA-C at Dr. Hassan's office

## 2017-05-02 NOTE — MR AVS SNAPSHOT
"        Tara Cueva   2017 8:10 AM   Office Visit   MRN: [id number]    Department:  Endocrinology [facility]   Dept Phone:  [phone]    Description:  Female : 1955   Provider:  Anson Lee PA-C           Reason for Visit     Follow-Up           Allergies as of 2017     Allergen Noted Reactions    Sulfa Drugs 2012   Vomiting      You were diagnosed with     Uncontrolled type 2 diabetes mellitus without complication, with long-term current use of insulin (CMS-HCC)   [5292393]       Encounter for long-term (current) use of insulin (CMS-HCC)   [V58.67.ICD-9-CM]         Vital Signs     Blood Pressure Pulse Height Weight Body Mass Index Oxygen Saturation    116/64 mmHg 83 1.651 m (5' 5\") 79.379 kg (175 lb) 29.12 kg/m2 95%    Last Menstrual Period Smoking Status                2006 Former Smoker          Basic Information     Date Of Birth Sex Race Ethnicity Preferred Language    1955 Female White Non- English      Your appointments     May 09, 2017  7:50 AM   Established Patient with Anson Lee PA-C   [facility]    [address], [address]  [place] [address] [phone]           You will be receiving a confirmation call a few days before your appointment from our automated call confirmation system.            2017  3:20 PM   Established Patient with Christian Mota M.D.   [facility] ([place])    [address] [address] [phone]           You will be receiving a confirmation call a few days before your appointment from our automated call confirmation system.              Problem List              ICD-10-CM Priority Class Noted - Resolved    Hyperlipidemia (Chronic) E78.5   2011 - Present    Vitamin D deficiency E55.9   2011 - Present    Hypertension (Chronic) I10   2011 - Present    GERD (gastroesophageal reflux disease) K21.9   2011 - Present    Gall " bladder polyp K82.4   8/12/2011 - Present    Uncontrolled type 2 diabetes mellitus without complication, with long-term current use of insulin (CMS-HCC) E11.65, Z79.4   3/30/2012 - Present    Hypokalemia E87.6   1/3/2014 - Present    Thumb pain M79.646   12/23/2016 - Present    Encounter for long-term (current) use of insulin (CMS-HCC) Z79.4   4/25/2017 - Present      Health Maintenance        Date Due Completion Dates    IMM ZOSTER VACCINE 5/9/2014 ---    RETINAL SCREENING 7/10/2015 7/10/2014 (N/S), 4/21/2012 (N/S)    Override on 7/10/2014: (N/S)    Override on 4/21/2012: (N/S)    PAP SMEAR 2/7/2017 2/7/2014, 1/17/2014    A1C SCREENING 8/23/2017 2/23/2017, 12/23/2016, 4/8/2016, 12/18/2015, 9/18/2015, 6/19/2015, 3/6/2015, 12/5/2014, 7/25/2014, 12/27/2013, 6/7/2013, 11/16/2012, 8/10/2012, 5/4/2012, 12/23/2011, 10/7/2011    FASTING LIPID PROFILE 12/23/2017 12/23/2016, 9/17/2015, 2/4/2015, 7/25/2014, 6/7/2013, 8/10/2012, 12/23/2011, 10/7/2011, 5/13/2011    DIABETES MONOFILAMENT / LE EXAM 12/23/2017 12/23/2016, 7/25/2014 (N/S), 8/11/2011 (N/S)    Override on 7/25/2014: (N/S)    Override on 8/11/2011: (N/S)    MAMMOGRAM 3/1/2018 3/1/2017, 1/24/2014    URINE ACR / MICROALBUMIN 4/25/2018 4/25/2017, 12/23/2016, 2/4/2015, 7/25/2014, 6/7/2013, 12/23/2011, 5/13/2011    SERUM CREATININE 4/25/2018 4/25/2017, 12/23/2016, 9/17/2015, 2/4/2015, 12/5/2014, 7/25/2014, 2/22/2014, 12/27/2013, 6/7/2013, 11/16/2012, 8/10/2012, 5/4/2012, 12/23/2011, 10/7/2011, 5/13/2011    COLONOSCOPY 11/6/2019 11/6/2014    IMM DTaP/Tdap/Td Vaccine (2 - Td) 7/25/2024 7/25/2014            Current Immunizations     Influenza TIV (IM) 10/4/2013, 11/4/2011    Influenza Vaccine Quad Inj (Pf) 12/23/2016  8:12 AM    Influenza Vaccine Quad Inj (Preserved) 12/18/2015    Pneumococcal polysaccharide vaccine (PPSV-23) 1/3/2014    Tdap Vaccine 7/25/2014      Below and/or attached are the medications your provider expects you to take. Review all of your home medications  and newly ordered medications with your provider and/or pharmacist. Follow medication instructions as directed by your provider and/or pharmacist. Please keep your medication list with you and share with your provider. Update the information when medications are discontinued, doses are changed, or new medications (including over-the-counter products) are added; and carry medication information at all times in the event of emergency situations     Allergies:  SULFA DRUGS - Vomiting               Medications  Valid as of: May 02, 2017 -  8:29 AM    Generic Name Brand Name Tablet Size Instructions for use    Aspirin (Tablet Delayed Response) ECOTRIN 81 MG Take 81 mg by mouth every day.        Atorvastatin Calcium (Tab) LIPITOR 80 MG Take 1 Tab by mouth every evening.        Blood Glucose Monitoring Suppl (Misc) Blood Glucose Monitoring Suppl SUPPLIES Test strips order: Test strips for glucometer. Sig: use it once daily and prn ssx high or low sugar #100 RF x 3        Canagliflozin (Tab) Canagliflozin 300 MG Take 1 Tab by mouth every day.        Cholecalciferol (Tab) VITAMIN D 400 UNIT Take 400 Units by mouth every day.        Enalapril Maleate (Tab) VASOTEC 5 MG Take 1 Tab by mouth every day.        HydroCHLOROthiazide (Tab) HYDRODIURIL 25 MG Take 1 Tab by mouth every day.        Insulin Glargine (Solution Pen-injector) LANTUS 100 UNIT/ML Inject 30 Units as instructed 2 times a day.        Insulin Pen Needle (Misc) Insulin Pen Needle 32G X 4 MM 1 Applicator by Does not apply route every day. Using one needle tip with victoza per day        Liraglutide (Solution Pen-injector) VICTOZA 18 MG/3ML Inject 0.3 mL as instructed every day.        Omeprazole (CAPSULE DELAYED RELEASE) PRILOSEC 20 MG Take 1 Cap by mouth every day. TAKE 1 CAP BY MOUTH EVERY DAY.        Pioglitazone HCl (Tab) ACTOS 30 MG Take 1 Tab by mouth every day.        Spironolactone (Tab) ALDACTONE 50 MG Take 1 Tab by mouth every day.        .                    Medicines prescribed today were sent to:     [id number] - [place], [place] - [address] [place] [address]    [address] [place] [address] [place] [address]    Phone: [phone] Fax: [phone]    Open 24 Hours?: No    [facility] - [place], [place] - [address]    [address] [address]    Phone: [phone] Fax: [phone]    Open 24 Hours?: No      Medication refill instructions:       If your prescription bottle indicates you have medication refills left, it is not necessary to call your provider’s office. Please contact your pharmacy and they will refill your medication.    If your prescription bottle indicates you do not have any refills left, you may request refills at any time through one of the following ways: The online [employer] system (except Urgent Care), by calling your provider’s office, or by asking your pharmacy to contact your provider’s office with a refill request. Medication refills are processed only during regular business hours and may not be available until the next business day. Your provider may request additional information or to have a follow-up visit with you prior to refilling your medication.   *Please Note: Medication refills are assigned a new Rx number when refilled electronically. Your pharmacy may indicate that no refills were authorized even though a new prescription for the same medication is available at the pharmacy. Please request the medicine by name with the pharmacy before contacting your provider for a refill.        Instructions      I had her start:  Farxiga 10mg one a day(switch to Invokana 300 because of insurance)  Actos 15 mg one a day. INCREASE to 30mg  Victoza 0.6 at night INCREASE to 1.2  Lantus at night 20units.  DECREASE to 10              MyChart Access Code: Activation code not generated  Current [employer] Status: Active

## 2017-05-02 NOTE — PATIENT INSTRUCTIONS
I had her start:  Farxiga 10mg one a day(switch to Invokana 300 because of insurance)  Actos 15 mg one a day. INCREASE to 30mg  Victoza 0.6 at night INCREASE to 1.2  Lantus at night 20units.  DECREASE to 10

## 2017-05-09 ENCOUNTER — OFFICE VISIT (OUTPATIENT)
Dept: ENDOCRINOLOGY | Facility: MEDICAL CENTER | Age: 62
End: 2017-05-09
Payer: COMMERCIAL

## 2017-05-09 VITALS
SYSTOLIC BLOOD PRESSURE: 126 MMHG | DIASTOLIC BLOOD PRESSURE: 80 MMHG | OXYGEN SATURATION: 93 % | HEART RATE: 82 BPM | BODY MASS INDEX: 28.66 KG/M2 | WEIGHT: 172 LBS | HEIGHT: 65 IN

## 2017-05-09 DIAGNOSIS — Z79.4 ENCOUNTER FOR LONG-TERM (CURRENT) USE OF INSULIN (HCC): ICD-10-CM

## 2017-05-09 PROCEDURE — 99214 OFFICE O/P EST MOD 30 MIN: CPT | Performed by: PHYSICIAN ASSISTANT

## 2017-05-09 RX ORDER — LIRAGLUTIDE 6 MG/ML
1.8 INJECTION SUBCUTANEOUS DAILY
Qty: 3 PEN | Refills: 6 | Status: SHIPPED | OUTPATIENT
Start: 2017-05-09 | End: 2018-02-22 | Stop reason: SDUPTHER

## 2017-05-09 NOTE — PROGRESS NOTES
Return to office Patient Consult Note  Referred by: Christian Mota M.D.    Reason for consult: Diabetes Management Type 2    HPI:  Tara Cueva is a 62 y.o. old patient who is seeing us today for diabetes care.  This is a pleasant patient with diabetes and I appreciate the opportunity to participate in the care of this patient.    BG Diary:5/9/2017  In the AM: 153, 162, 199, 150, 142, 122, 150, 139  Before meal:  Before Bed: 185, 242, 174, 206, 158, 162     4/25/17 labs:  C-peptide 8.5, CALVIN-65 is at 9.8 (this is a little elevated), Islet cell and Insulin antibodies are negative. Her BMI is 29 and her C-peptide is very high.  She is a T2 diabetic and not a T1.5.      BG Diary:5/2/2017  In the AM: 221, 207, 232, 211, 176, 176, 190, 153  Before meal: 214, 336, 179, 227    Before Bed: 322, 332, 268, 241, 270, 347, 243     Labs of 2/23/17 HbA1c is 10.7, GFR >60,    In 12/2016 HbA1c was 10.2    BG Diary:4/25/2017  In the AM: 203, 226, 224, 213, 179, 205, 147, 143, 161, 357, 193  Just before meal:  Just before Bed:    Weight: on 4/25/17 her weight was 179pounds, today it is 172pounds      1. Uncontrolled type 2 diabetes mellitus without complication, with long-term current use of insulin (CMS-HCC)    This was a new patient on 4/25/17. She was on  Lantus up to 74units.    Januvia 50 (does not take states it did not work)  Glipizide 10mg BID    (Metformin Makes her nausea)    I had her start:  Jardiance 25mg one a day  Actos 30 mg one a day.   Victoza 1.2 at night  Lantus at night 10units.      2. Encounter for long-term (current) use of insulin (CMS-HCC)  Is on a high risk medication Insulin and we will continue to follow no hypoglycemic events since last visit    ROS:   Constitutional: No night sweats.  Eyes:  No visual changes.  Cardiac: No chest pain, No palpitations or racing heart rate.  Resp: No shortness of breath, No cough,   Gi: No Diarrhea    All other systems were reviewed and were/are negative.  The ROS was  revised/revisited during this office visit from the patients first office visit with me on 5/2/17 Please review the full ROS during the first office visit.    Past Medical History:  Patient Active Problem List    Diagnosis Date Noted   • Encounter for long-term (current) use of insulin (CMS-HCC) 04/25/2017   • Thumb pain 12/23/2016   • Hypokalemia 01/03/2014   • Uncontrolled type 2 diabetes mellitus without complication, with long-term current use of insulin (CMS-HCC) 03/30/2012   • Gall bladder polyp 08/12/2011   • Hyperlipidemia 05/12/2011   • Vitamin D deficiency 05/12/2011   • Hypertension 05/12/2011   • GERD (gastroesophageal reflux disease) 05/12/2011       Past Surgical History:  Past Surgical History   Procedure Laterality Date   • Primary c section     • Other orthopedic surgery       right arm ORIF   • Tubal coagulation laparoscopic bilateral         Allergies:  Sulfa drugs    Social History:  Social History     Social History   • Marital Status:      Spouse Name: N/A   • Number of Children: N/A   • Years of Education: N/A     Occupational History   • Not on file.     Social History Main Topics   • Smoking status: Former Smoker -- 1.00 packs/day for 40 years     Types: Cigarettes     Quit date: 01/01/1990   • Smokeless tobacco: Never Used   • Alcohol Use: No   • Drug Use: No   • Sexual Activity: Not on file     Other Topics Concern   • Not on file     Social History Narrative       Family History:  Family History   Problem Relation Age of Onset   • Cancer Maternal Grandmother      lung    • Cancer Mother      breast       Medications:    Current outpatient prescriptions:   •  VICTOZA 18 MG/3ML Solution Pen-injector injection, Inject 0.3 mL as instructed every day., Disp: 3 PEN, Rfl: 6  •  pioglitazone (ACTOS) 30 MG Tab, Take 1 Tab by mouth every day., Disp: 30 Tab, Rfl: 11  •  JARDIANCE 25 MG Tab, Take 1 tablet by mouth every day., Disp: 30 Tab, Rfl: 11  •  Insulin Pen Needle (NOVOFINE PLUS) 32G X 4  "MM Misc, 1 Applicator by Does not apply route every day. Using one needle tip with victoza per day, Disp: 100 Each, Rfl: 3  •  insulin glargine (LANTUS SOLOSTAR) 100 UNIT/ML Solution Pen-injector injection, Inject 30 Units as instructed 2 times a day., Disp: 15 PEN, Rfl: 4  •  enalapril (VASOTEC) 5 MG Tab, Take 1 Tab by mouth every day., Disp: 90 Tab, Rfl: 3  •  omeprazole (PRILOSEC) 20 MG delayed-release capsule, Take 1 Cap by mouth every day. TAKE 1 CAP BY MOUTH EVERY DAY., Disp: 90 Cap, Rfl: 3  •  hydrochlorothiazide (HYDRODIURIL) 25 MG Tab, Take 1 Tab by mouth every day., Disp: 90 Tab, Rfl: 3  •  spironolactone (ALDACTONE) 50 MG Tab, Take 1 Tab by mouth every day., Disp: 90 Tab, Rfl: 3  •  atorvastatin (LIPITOR) 80 MG tablet, Take 1 Tab by mouth every evening., Disp: 90 Tab, Rfl: 3  •  Cholecalciferol (VITAMIN D) 400 UNIT TABS, Take 400 Units by mouth every day., Disp: , Rfl:   •  Blood Glucose Monitoring Suppl SUPPLIES MISC, Test strips order: Test strips for glucometer. Sig: use it once daily and prn ssx high or low sugar #100 RF x 3, Disp: 100 Each, Rfl: 3  •  aspirin EC (ECOTRIN) 81 MG TBEC, Take 81 mg by mouth every day., Disp: , Rfl:         Physical Examination:   Vital signs: /80 mmHg  Pulse 82  Ht 1.651 m (5' 5\")  Wt 78.019 kg (172 lb)  BMI 28.62 kg/m2  SpO2 93%  LMP 12/16/2006  General: No distress, cooperative, well dressed and well nourished.   Eyes: No scleral icterus or discharge, No hyposphagma  ENMT: Normal on external inspection of nose, lips, No nasal drainage   Neck: No abnormal masses on inspection  Resp: Normal effort, Bilateral clear to auscultation, No wheezing, No rales  CVS: Regular rate and rhythm, S1 S2 normal, No murmur. No gallop  Extremities: No edema bilateral extremities  Neuro: Alert and oriented  Skin: No rash, No Ulcers  Psych: Normal mood and affect      Assessment and Plan:    1. Uncontrolled type 2 diabetes mellitus without complication, with long-term current " use of insulin (CMS-HCC)  This was a new patient on 4/25/17. She was on  Lantus up to 74units.    Januvia 50 (does not take states it did not work)  Glipizide 10mg BID    (Metformin Makes her nausea)    I had her start:  Jardiance 25mg one a day  Actos 30 mg one a day.   Victoza 1.2 at night (INCREASE to 1.8)  Lantus at night 10units.        2. Encounter for long-term (current) use of insulin (CMS-HCC)  The total time spent seeing this patient today face to face in consultation, and formulating an action plan for this visit was greater than 25 minutes. > Than 50% of this time was spent counseling, discussing problems documented above and below, coordinating care and answering questions by the physician assistant.  We developed a diabetes care plan for this patient today.        Return in about 2 weeks (around 5/23/2017).    Blood glucose log: Check BG in the morning when wake up, before lunch or dinner and before bed.  So three times a day.  Always bring BG diary to the next office visit.     Thank you kindly for allowing me to participate in the diabetes care plan for this patient.    Edy Lee PA-C, BC-ADM  05/09/2017    CC:   Christian Mota M.D.

## 2017-05-09 NOTE — PATIENT INSTRUCTIONS
I had her start:  Jardiance 25mg one a day  Actos 30 mg one a day.   Victoza 1.2 at night (INCREASE to 1.8)  Lantus at night 10units.    If morning numbers are  in the morning stop Lantus

## 2017-05-23 ENCOUNTER — OFFICE VISIT (OUTPATIENT)
Dept: ENDOCRINOLOGY | Facility: MEDICAL CENTER | Age: 62
End: 2017-05-23
Payer: COMMERCIAL

## 2017-05-23 VITALS
WEIGHT: 169 LBS | HEART RATE: 99 BPM | SYSTOLIC BLOOD PRESSURE: 100 MMHG | OXYGEN SATURATION: 92 % | BODY MASS INDEX: 28.16 KG/M2 | DIASTOLIC BLOOD PRESSURE: 62 MMHG | HEIGHT: 65 IN

## 2017-05-23 DIAGNOSIS — Z79.4 ENCOUNTER FOR LONG-TERM (CURRENT) USE OF INSULIN (HCC): ICD-10-CM

## 2017-05-23 DIAGNOSIS — I10 ESSENTIAL HYPERTENSION: Chronic | ICD-10-CM

## 2017-05-23 PROCEDURE — 99214 OFFICE O/P EST MOD 30 MIN: CPT | Performed by: PHYSICIAN ASSISTANT

## 2017-05-23 NOTE — PROGRESS NOTES
Return to office Patient Consult Note  Referred by: Christian Mota M.D.    Reason for consult: Diabetes Management Type 2    HPI:  Tara Cueva is a 62 y.o. old patient who is seeing us today for diabetes care.  This is a pleasant patient with diabetes and I appreciate the opportunity to participate in the care of this patient.    BG Diary:5/23/2017  In the AM: 169, 156, 153, 187, 135, 128, 150  Before meal:  Before Bed: 180, 187, 136, 181, 193, 180, 165     BG Diary:5/9/2017  In the AM: 153, 162, 199, 150, 142, 122, 150, 139  Before meal:  Before Bed: 185, 242, 174, 206, 158, 162     4/25/17 labs:  C-peptide 8.5, CALVIN-65 is at 9.8 (this is a little elevated), Islet cell and Insulin antibodies are negative. Her BMI is 29 and her C-peptide is very high.  She is a T2 diabetic and not a T1.5.      BG Diary:5/2/2017  In the AM: 221, 207, 232, 211, 176, 176, 190, 153  Before meal: 214, 336, 179, 227    Before Bed: 322, 332, 268, 241, 270, 347, 243     Labs of 2/23/17 HbA1c is 10.7, GFR >60,    In 12/2016 HbA1c was 10.2    BG Diary:4/25/2017  In the AM: 203, 226, 224, 213, 179, 205, 147, 143, 161, 357, 193  Just before meal:  Just before Bed:    Weight: on 4/25/17 her weight was 179pounds, today it is 169pounds      1. Uncontrolled type 2 diabetes mellitus without complication, with long-term current use of insulin (CMS-HCC)  This was a new patient on 4/25/17. She was on  Lantus up to 74units.    Januvia 50 (does not take states it did not work)  Glipizide 10mg BID    (Metformin Makes her nausea)    I had her start:  Jardiance 25mg one a day  Actos 30 mg one a day.   Victoza 1.8 at night  Lantus at night 10units.      2. Encounter for long-term (current) use of insulin (CMS-HCC)      3. Essential hypertension  This is stable        ROS:   Constitutional: No night sweats.  Eyes:  No visual changes.  Cardiac: No chest pain, No palpitations or racing heart rate.  Resp: No shortness of breath, No cough,   Gi: No Diarrhea    All  other systems were reviewed and were/are negative.  The ROS was revised/revisited during this office visit from the patients first office visit with me on 4/25/17 Please review the full ROS during the first office visit.    Past Medical History:  Patient Active Problem List    Diagnosis Date Noted   • Encounter for long-term (current) use of insulin (CMS-HCC) 04/25/2017   • Thumb pain 12/23/2016   • Hypokalemia 01/03/2014   • Uncontrolled type 2 diabetes mellitus without complication, with long-term current use of insulin (CMS-HCC) 03/30/2012   • Gall bladder polyp 08/12/2011   • Hyperlipidemia 05/12/2011   • Vitamin D deficiency 05/12/2011   • Hypertension 05/12/2011   • GERD (gastroesophageal reflux disease) 05/12/2011       Past Surgical History:  Past Surgical History   Procedure Laterality Date   • Primary c section     • Other orthopedic surgery       right arm ORIF   • Tubal coagulation laparoscopic bilateral         Allergies:  Sulfa drugs    Social History:  Social History     Social History   • Marital Status:      Spouse Name: N/A   • Number of Children: N/A   • Years of Education: N/A     Occupational History   • Not on file.     Social History Main Topics   • Smoking status: Former Smoker -- 1.00 packs/day for 40 years     Types: Cigarettes     Quit date: 01/01/1990   • Smokeless tobacco: Never Used   • Alcohol Use: No   • Drug Use: No   • Sexual Activity: Not on file     Other Topics Concern   • Not on file     Social History Narrative       Family History:  Family History   Problem Relation Age of Onset   • Cancer Maternal Grandmother      lung    • Cancer Mother      breast       Medications:    Current outpatient prescriptions:   •  VICTOZA 18 MG/3ML Solution Pen-injector injection, Inject 0.3 mL as instructed every day., Disp: 3 PEN, Rfl: 6  •  pioglitazone (ACTOS) 30 MG Tab, Take 1 Tab by mouth every day., Disp: 30 Tab, Rfl: 11  •  JARDIANCE 25 MG Tab, Take 1 tablet by mouth every day.,  "Disp: 30 Tab, Rfl: 11  •  Insulin Pen Needle (NOVOFINE PLUS) 32G X 4 MM Misc, 1 Applicator by Does not apply route every day. Using one needle tip with victoza per day, Disp: 100 Each, Rfl: 3  •  insulin glargine (LANTUS SOLOSTAR) 100 UNIT/ML Solution Pen-injector injection, Inject 30 Units as instructed 2 times a day., Disp: 15 PEN, Rfl: 4  •  enalapril (VASOTEC) 5 MG Tab, Take 1 Tab by mouth every day., Disp: 90 Tab, Rfl: 3  •  omeprazole (PRILOSEC) 20 MG delayed-release capsule, Take 1 Cap by mouth every day. TAKE 1 CAP BY MOUTH EVERY DAY., Disp: 90 Cap, Rfl: 3  •  hydrochlorothiazide (HYDRODIURIL) 25 MG Tab, Take 1 Tab by mouth every day., Disp: 90 Tab, Rfl: 3  •  spironolactone (ALDACTONE) 50 MG Tab, Take 1 Tab by mouth every day., Disp: 90 Tab, Rfl: 3  •  atorvastatin (LIPITOR) 80 MG tablet, Take 1 Tab by mouth every evening., Disp: 90 Tab, Rfl: 3  •  Cholecalciferol (VITAMIN D) 400 UNIT TABS, Take 400 Units by mouth every day., Disp: , Rfl:   •  Blood Glucose Monitoring Suppl SUPPLIES MISC, Test strips order: Test strips for glucometer. Sig: use it once daily and prn ssx high or low sugar #100 RF x 3, Disp: 100 Each, Rfl: 3  •  aspirin EC (ECOTRIN) 81 MG TBEC, Take 81 mg by mouth every day., Disp: , Rfl:         Physical Examination:   Vital signs: /62 mmHg  Pulse 99  Ht 1.651 m (5' 5\")  Wt 76.658 kg (169 lb)  BMI 28.12 kg/m2  SpO2 92%  LMP 12/16/2006  General: No distress, cooperative, well dressed and well nourished.   Eyes: No scleral icterus or discharge, No hyposphagma  ENMT: Normal on external inspection of nose, lips, No nasal drainage   Neck: No abnormal masses on inspection  Resp: Normal effort, Bilateral clear to auscultation, No wheezing, No rales  CVS: Regular rate and rhythm, S1 S2 normal, No murmur. No gallop  Extremities: No edema bilateral extremities  Neuro: Alert and oriented  Skin: No rash, No Ulcers  Psych: Normal mood and affect      Assessment and Plan:    1. Uncontrolled " type 2 diabetes mellitus without complication, with long-term current use of insulin (CMS-HCC)    This was a new patient on 4/25/17. She was on  Lantus up to 74units.    Januvia 50 (does not take states it did not work)  Glipizide 10mg BID    (Metformin Makes her nausea)    I had her start:  Jardiance 25mg one a day  Actos 30 mg one a day.   Victoza 1.8 at night  Lantus at night 10units.  (Increase to 14 units)    I'd rather not start her on Novolog with dinner.  But this is a consideration    2. Encounter for long-term (current) use of insulin (CMS-HCC)  Is on a high risk medication Insulin and we will continue to follow no hypoglycemic events since last visit      3. Essential hypertension  This is stable no changes made    The total time spent seeing this patient today face to face in consultation, and formulating an action plan for this visit was greater than 25 minutes. > Than 50% of this time was spent counseling, discussing problems documented above and below, coordinating care and answering questions by the physician assistant.  We developed a diabetes care plan for this patient today.        Return in about 1 month (around 6/23/2017).    Blood glucose log: Check BG in the morning when wake up, before lunch or dinner and before bed.  So three times a day.  Always bring BG diary to the next office visit.     Thank you kindly for allowing me to participate in the diabetes care plan for this patient.    Edy Lee PA-C, BC-ADM  05/23/2017    CC:   Christian Mota M.D.

## 2017-05-23 NOTE — PATIENT INSTRUCTIONS
Jardiance 25mg one a day  Actos 30 mg one a day.   Victoza 1.8 at night  Lantus at night 10units.  (Increase to 14 units)

## 2017-05-23 NOTE — MR AVS SNAPSHOT
"        Tara Cueva   2017 8:10 AM   Office Visit   MRN: [id number]    Department:  Endocrinology [facility]   Dept Phone:  [phone]    Description:  Female : 1955   Provider:  Anson Lee PA-C           Reason for Visit     Follow-Up           Allergies as of 2017     Allergen Noted Reactions    Sulfa Drugs 2012   Vomiting      You were diagnosed with     Uncontrolled type 2 diabetes mellitus without complication, with long-term current use of insulin (CMS-HCC)   [9040487]       Encounter for long-term (current) use of insulin (CMS-HCC)   [V58.67.ICD-9-CM]       Essential hypertension   [8811781]         Vital Signs     Blood Pressure Pulse Height Weight Body Mass Index Oxygen Saturation    100/62 mmHg 99 1.651 m (5' 5\") 76.658 kg (169 lb) 28.12 kg/m2 92%    Last Menstrual Period Smoking Status                2006 Former Smoker          Basic Information     Date Of Birth Sex Race Ethnicity Preferred Language    1955 Female White Non- English      Your appointments     May 23, 2017  8:10 AM   Established Patient with Anson Lee PA-C   [facility] ([facility])    [address], [address]  [place] [address] [phone]           You will be receiving a confirmation call a few days before your appointment from our automated call confirmation system.            2017  4:00 PM   Established Patient with Anson Lee PA-C   [facility] ([facility])    [address]  [place] [address] [phone]           You will be receiving a confirmation call a few days before your appointment from our automated call confirmation system.            2017  3:20 PM   Established Patient with Christian Mota M.D.   [facility] [place]    [address] [address] [phone]           You will be receiving a confirmation call a few days before " your appointment from our automated call confirmation system.              Problem List              ICD-10-CM Priority Class Noted - Resolved    Hyperlipidemia (Chronic) E78.5   5/12/2011 - Present    Vitamin D deficiency E55.9   5/12/2011 - Present    Hypertension (Chronic) I10   5/12/2011 - Present    GERD (gastroesophageal reflux disease) K21.9   5/12/2011 - Present    Gall bladder polyp K82.4   8/12/2011 - Present    Uncontrolled type 2 diabetes mellitus without complication, with long-term current use of insulin (CMS-HCC) E11.65, Z79.4   3/30/2012 - Present    Hypokalemia E87.6   1/3/2014 - Present    Thumb pain M79.646   12/23/2016 - Present    Encounter for long-term (current) use of insulin (CMS-HCC) Z79.4   4/25/2017 - Present      Health Maintenance        Date Due Completion Dates    IMM ZOSTER VACCINE 5/9/2014 ---    RETINAL SCREENING 7/10/2015 7/10/2014 (N/S), 4/21/2012 (N/S)    Override on 7/10/2014: (N/S)    Override on 4/21/2012: (N/S)    PAP SMEAR 2/7/2017 2/7/2014, 1/17/2014    A1C SCREENING 8/23/2017 2/23/2017, 12/23/2016, 4/8/2016, 12/18/2015, 9/18/2015, 6/19/2015, 3/6/2015, 12/5/2014, 7/25/2014, 12/27/2013, 6/7/2013, 11/16/2012, 8/10/2012, 5/4/2012, 12/23/2011, 10/7/2011    FASTING LIPID PROFILE 12/23/2017 12/23/2016, 9/17/2015, 2/4/2015, 7/25/2014, 6/7/2013, 8/10/2012, 12/23/2011, 10/7/2011, 5/13/2011    DIABETES MONOFILAMENT / LE EXAM 12/23/2017 12/23/2016, 7/25/2014 (N/S), 8/11/2011 (N/S)    Override on 7/25/2014: (N/S)    Override on 8/11/2011: (N/S)    MAMMOGRAM 3/1/2018 3/1/2017, 1/24/2014    URINE ACR / MICROALBUMIN 4/25/2018 4/25/2017, 12/23/2016, 2/4/2015, 7/25/2014, 6/7/2013, 12/23/2011, 5/13/2011    SERUM CREATININE 4/25/2018 4/25/2017, 12/23/2016, 9/17/2015, 2/4/2015, 12/5/2014, 7/25/2014, 2/22/2014, 12/27/2013, 6/7/2013, 11/16/2012, 8/10/2012, 5/4/2012, 12/23/2011, 10/7/2011, 5/13/2011    COLONOSCOPY 11/6/2019 11/6/2014    IMM DTaP/Tdap/Td Vaccine (2 - Td) 7/25/2024 7/25/2014             Current Immunizations     Influenza TIV (IM) 10/4/2013, 11/4/2011    Influenza Vaccine Quad Inj (Pf) 12/23/2016  8:12 AM    Influenza Vaccine Quad Inj (Preserved) 12/18/2015    Pneumococcal polysaccharide vaccine (PPSV-23) 1/3/2014    Tdap Vaccine 7/25/2014      Below and/or attached are the medications your provider expects you to take. Review all of your home medications and newly ordered medications with your provider and/or pharmacist. Follow medication instructions as directed by your provider and/or pharmacist. Please keep your medication list with you and share with your provider. Update the information when medications are discontinued, doses are changed, or new medications (including over-the-counter products) are added; and carry medication information at all times in the event of emergency situations     Allergies:  SULFA DRUGS - Vomiting               Medications  Valid as of: May 23, 2017 -  8:00 AM    Generic Name Brand Name Tablet Size Instructions for use    Aspirin (Tablet Delayed Response) ECOTRIN 81 MG Take 81 mg by mouth every day.        Atorvastatin Calcium (Tab) LIPITOR 80 MG Take 1 Tab by mouth every evening.        Blood Glucose Monitoring Suppl (Misc) Blood Glucose Monitoring Suppl SUPPLIES Test strips order: Test strips for glucometer. Sig: use it once daily and prn ssx high or low sugar #100 RF x 3        Cholecalciferol (Tab) VITAMIN D 400 UNIT Take 400 Units by mouth every day.        Empagliflozin (Tab) JARDIANCE 25 MG Take 1 tablet by mouth every day.        Enalapril Maleate (Tab) VASOTEC 5 MG Take 1 Tab by mouth every day.        HydroCHLOROthiazide (Tab) HYDRODIURIL 25 MG Take 1 Tab by mouth every day.        Insulin Glargine (Solution Pen-injector) LANTUS 100 UNIT/ML Inject 30 Units as instructed 2 times a day.        Insulin Pen Needle (Misc) Insulin Pen Needle 32G X 4 MM 1 Applicator by Does not apply route every day. Using one needle tip with victoza per day         Liraglutide (Solution Pen-injector) VICTOZA 18 MG/3ML Inject 0.3 mL as instructed every day.        Omeprazole (CAPSULE DELAYED RELEASE) PRILOSEC 20 MG Take 1 Cap by mouth every day. TAKE 1 CAP BY MOUTH EVERY DAY.        Pioglitazone HCl (Tab) ACTOS 30 MG Take 1 Tab by mouth every day.        Spironolactone (Tab) ALDACTONE 50 MG Take 1 Tab by mouth every day.        .                 Medicines prescribed today were sent to:     [facility]/PHARMACY #[id number] - [place], [place] - [address]    [address] [place] [address]    Phone: [phone] Fax: [phone]    Open 24 Hours?: No    [facility] - [place], [place] - [address] [place] [address]    Phone: [phone] Fax: [phone]    Open 24 Hours?: No      Medication refill instructions:       If your prescription bottle indicates you have medication refills left, it is not necessary to call your provider’s office. Please contact your pharmacy and they will refill your medication.    If your prescription bottle indicates you do not have any refills left, you may request refills at any time through one of the following ways: The online [employer] system (except Urgent Care), by calling your provider’s office, or by asking your pharmacy to contact your provider’s office with a refill request. Medication refills are processed only during regular business hours and may not be available until the next business day. Your provider may request additional information or to have a follow-up visit with you prior to refilling your medication.   *Please Note: Medication refills are assigned a new Rx number when refilled electronically. Your pharmacy may indicate that no refills were authorized even though a new prescription for the same medication is available at the pharmacy. Please request the medicine by name with the pharmacy before contacting your provider for a refill.        Instructions    Jardiance 25mg one a day  Actos  30 mg one a day.   Victoza 1.8 at night  Lantus at night 10units.  (Increase to 14 units)            MyChart Access Code: Activation code not generated  Current [employer] Status: Active

## 2017-06-26 ENCOUNTER — OFFICE VISIT (OUTPATIENT)
Dept: ENDOCRINOLOGY | Facility: MEDICAL CENTER | Age: 62
End: 2017-06-26
Payer: COMMERCIAL

## 2017-06-26 VITALS
HEIGHT: 66 IN | DIASTOLIC BLOOD PRESSURE: 62 MMHG | OXYGEN SATURATION: 94 % | BODY MASS INDEX: 26.52 KG/M2 | HEART RATE: 90 BPM | SYSTOLIC BLOOD PRESSURE: 104 MMHG | WEIGHT: 165 LBS

## 2017-06-26 DIAGNOSIS — Z79.4 ENCOUNTER FOR LONG-TERM (CURRENT) USE OF INSULIN (HCC): ICD-10-CM

## 2017-06-26 PROCEDURE — 99214 OFFICE O/P EST MOD 30 MIN: CPT | Performed by: PHYSICIAN ASSISTANT

## 2017-06-26 NOTE — MR AVS SNAPSHOT
"        Tara Cueva   2017 4:00 PM   Office Visit   MRN: [id number]    Department:  Endocrinology Med [facility]   Dept Phone:  [phone]    Description:  Female : 1955   Provider:  Anson Lee PA-C           Allergies as of 2017     Allergen Noted Reactions    Sulfa Drugs 2012   Vomiting      You were diagnosed with     Uncontrolled type 2 diabetes mellitus without complication, with long-term current use of insulin (CMS-HCC)   [1190572]       Encounter for long-term (current) use of insulin (CMS-HCC)   [V58.67.ICD-9-CM]         Vital Signs     Blood Pressure Pulse Height Weight Body Mass Index Oxygen Saturation    104/62 mmHg 90 1.676 m (5' 5.98\") 74.844 kg (165 lb) 26.64 kg/m2 94%    Last Menstrual Period Smoking Status                2006 Former Smoker          Basic Information     Date Of Birth Sex Race Ethnicity Preferred Language    1955 Female White Non- English      Your appointments     2017  3:20 PM   Established Patient with Christian Mota M.D.   [facility] [place]    [address] [address] [phone]           You will be receiving a confirmation call a few days before your appointment from our automated call confirmation system.            2017  4:00 PM   Established Patient with Anson Lee PA-C   [facility]    [address], [address]  [facility] [address] [phone]           You will be receiving a confirmation call a few days before your appointment from our automated call confirmation system.              Problem List              ICD-10-CM Priority Class Noted - Resolved    Hyperlipidemia (Chronic) E78.5   2011 - Present    Vitamin D deficiency E55.9   2011 - Present    Hypertension (Chronic) I10   2011 - Present    GERD (gastroesophageal reflux disease) K21.9   2011 - Present    Gall bladder polyp K82.4   2011 - Present  "    Uncontrolled type 2 diabetes mellitus without complication, with long-term current use of insulin (CMS-HCC) E11.65, Z79.4   3/30/2012 - Present    Hypokalemia E87.6   1/3/2014 - Present    Thumb pain M79.646   12/23/2016 - Present    Encounter for long-term (current) use of insulin (CMS-HCC) Z79.4   4/25/2017 - Present      Health Maintenance        Date Due Completion Dates    IMM ZOSTER VACCINE 5/9/2014 ---    RETINAL SCREENING 7/10/2015 7/10/2014 (N/S), 4/21/2012 (N/S)    Override on 7/10/2014: (N/S)    Override on 4/21/2012: (N/S)    PAP SMEAR 2/7/2017 2/7/2014, 1/17/2014    A1C SCREENING 8/23/2017 2/23/2017, 12/23/2016, 4/8/2016, 12/18/2015, 9/18/2015, 6/19/2015, 3/6/2015, 12/5/2014, 7/25/2014, 12/27/2013, 6/7/2013, 11/16/2012, 8/10/2012, 5/4/2012, 12/23/2011, 10/7/2011    FASTING LIPID PROFILE 12/23/2017 12/23/2016, 9/17/2015, 2/4/2015, 7/25/2014, 6/7/2013, 8/10/2012, 12/23/2011, 10/7/2011, 5/13/2011    DIABETES MONOFILAMENT / LE EXAM 12/23/2017 12/23/2016, 7/25/2014 (N/S), 8/11/2011 (N/S)    Override on 7/25/2014: (N/S)    Override on 8/11/2011: (N/S)    MAMMOGRAM 3/1/2018 3/1/2017, 1/24/2014    URINE ACR / MICROALBUMIN 4/25/2018 4/25/2017, 12/23/2016, 2/4/2015, 7/25/2014, 6/7/2013, 12/23/2011, 5/13/2011    SERUM CREATININE 4/25/2018 4/25/2017, 12/23/2016, 9/17/2015, 2/4/2015, 12/5/2014, 7/25/2014, 2/22/2014, 12/27/2013, 6/7/2013, 11/16/2012, 8/10/2012, 5/4/2012, 12/23/2011, 10/7/2011, 5/13/2011    COLONOSCOPY 11/6/2019 11/6/2014    IMM DTaP/Tdap/Td Vaccine (2 - Td) 7/25/2024 7/25/2014            Current Immunizations     Influenza TIV (IM) 10/4/2013, 11/4/2011    Influenza Vaccine Quad Inj (Pf) 12/23/2016  8:12 AM    Influenza Vaccine Quad Inj (Preserved) 12/18/2015    Pneumococcal polysaccharide vaccine (PPSV-23) 1/3/2014    Tdap Vaccine 7/25/2014      Below and/or attached are the medications your provider expects you to take. Review all of your home medications and newly ordered medications with your  provider and/or pharmacist. Follow medication instructions as directed by your provider and/or pharmacist. Please keep your medication list with you and share with your provider. Update the information when medications are discontinued, doses are changed, or new medications (including over-the-counter products) are added; and carry medication information at all times in the event of emergency situations     Allergies:  SULFA DRUGS - Vomiting               Medications  Valid as of: June 26, 2017 -  4:03 PM    Generic Name Brand Name Tablet Size Instructions for use    Aspirin (Tablet Delayed Response) ECOTRIN 81 MG Take 81 mg by mouth every day.        Atorvastatin Calcium (Tab) LIPITOR 80 MG Take 1 Tab by mouth every evening.        Blood Glucose Monitoring Suppl (Misc) Blood Glucose Monitoring Suppl SUPPLIES Test strips order: Test strips for glucometer. Sig: use it once daily and prn ssx high or low sugar #100 RF x 3        Cholecalciferol (Tab) VITAMIN D 400 UNIT Take 400 Units by mouth every day.        Empagliflozin (Tab) JARDIANCE 25 MG Take 1 tablet by mouth every day.        Enalapril Maleate (Tab) VASOTEC 5 MG Take 1 Tab by mouth every day.        HydroCHLOROthiazide (Tab) HYDRODIURIL 25 MG Take 1 Tab by mouth every day.        Insulin Glargine (Solution Pen-injector) LANTUS 100 UNIT/ML Inject 30 Units as instructed 2 times a day.        Insulin Pen Needle (Misc) Insulin Pen Needle 32G X 4 MM 1 Applicator by Does not apply route every day. Using one needle tip with victoza per day        Liraglutide (Solution Pen-injector) VICTOZA 18 MG/3ML Inject 0.3 mL as instructed every day.        Omeprazole (CAPSULE DELAYED RELEASE) PRILOSEC 20 MG Take 1 Cap by mouth every day. TAKE 1 CAP BY MOUTH EVERY DAY.        Pioglitazone HCl (Tab) ACTOS 30 MG Take 1 Tab by mouth every day.        Spironolactone (Tab) ALDACTONE 50 MG Take 1 Tab by mouth every day.        .                 Medicines prescribed today were sent to:      [facility]/PHARMACY #[id number] - [place], [place] - [address] [address]    [address] [place] [address] [place] [address]    Phone: [phone] Fax: [phone]    Open 24 Hours?: No    [facility] - [place], [place] - [address]    [address] [address]    Phone: [phone] Fax: [phone]    Open 24 Hours?: No      Medication refill instructions:       If your prescription bottle indicates you have medication refills left, it is not necessary to call your provider’s office. Please contact your pharmacy and they will refill your medication.    If your prescription bottle indicates you do not have any refills left, you may request refills at any time through one of the following ways: The online [employer] PBC system (except Urgent Care), by calling your provider’s office, or by asking your pharmacy to contact your provider’s office with a refill request. Medication refills are processed only during regular business hours and may not be available until the next business day. Your provider may request additional information or to have a follow-up visit with you prior to refilling your medication.   *Please Note: Medication refills are assigned a new Rx number when refilled electronically. Your pharmacy may indicate that no refills were authorized even though a new prescription for the same medication is available at the pharmacy. Please request the medicine by name with the pharmacy before contacting your provider for a refill.           HandUp PBC Access Code: Activation code not generated  Current HandUp PBC Status: Active

## 2017-06-26 NOTE — PROGRESS NOTES
Return to office Patient Consult Note  Referred by: Christian Mota M.D.    Reason for consult: Diabetes Management Type 2    HPI:  Tara Cueva is a 62 y.o. old patient who is seeing us today for diabetes care.  This is a pleasant patient with diabetes and I appreciate the opportunity to participate in the care of this patient.    BG Diary:6/26/2017  In the AM: 142, 139, 137, 134, 141, 138, 138, 125  Before meal:  Before Bed: 256, 202, 164, 202, 178, 253, 184,   (she is eating snacks between dinner and bed sugar)      BG Diary:5/9/2017  In the AM: 153, 162, 199, 150, 142, 122, 150, 139  Before meal:  Before Bed: 185, 242, 174, 206, 158, 162     4/25/17 labs:  C-peptide 8.5, CALVIN-65 is at 9.8 (this is a little elevated), Islet cell and Insulin antibodies are negative. Her BMI is 29 and her C-peptide is very high.  She is a T2 diabetic and not a T1.5.      BG Diary:5/2/2017  In the AM: 221, 207, 232, 211, 176, 176, 190, 153  Before meal: 214, 336, 179, 227    Before Bed: 322, 332, 268, 241, 270, 347, 243     Labs of 2/23/17 HbA1c is 10.7, GFR >60,    In 12/2016 HbA1c was 10.2    BG Diary:4/25/2017  In the AM: 203, 226, 224, 213, 179, 205, 147, 143, 161, 357, 193  Just before meal:  Just before Bed:    Weight: on 4/25/17 her weight was 179pounds, today it is 172pounds      1. Uncontrolled type 2 diabetes mellitus without complication, with long-term current use of insulin (CMS-HCC)  This was a new patient on 4/25/17. She was on  Lantus up to 74units.    Januvia 50 (does not take states it did not work)  Glipizide 10mg BID    (Metformin Makes her nausea)    I had her start:  Jardiance 25mg one a day  Actos 30 mg one a day.   Victoza 1.8  Lantus at night 14 units.      2. Encounter for long-term (current) use of insulin (CMS-HCC)  Is on a high risk medication Insulin and we will continue to follow no hypoglycemic events since last visit      ROS:   Constitutional: No night sweats.  Eyes:  No visual changes.  Cardiac: No  chest pain, No palpitations or racing heart rate.  Resp: No shortness of breath, No cough,   Gi: No Diarrhea    All other systems were reviewed and were/are negative.  The ROS was revised/revisited during this office visit from the patients first office visit with me on 4/25/17  Please review the full ROS during the first office visit.    Past Medical History:  Patient Active Problem List    Diagnosis Date Noted   • Encounter for long-term (current) use of insulin (CMS-HCC) 04/25/2017   • Thumb pain 12/23/2016   • Hypokalemia 01/03/2014   • Uncontrolled type 2 diabetes mellitus without complication, with long-term current use of insulin (CMS-HCC) 03/30/2012   • Gall bladder polyp 08/12/2011   • Hyperlipidemia 05/12/2011   • Vitamin D deficiency 05/12/2011   • Hypertension 05/12/2011   • GERD (gastroesophageal reflux disease) 05/12/2011       Past Surgical History:  Past Surgical History   Procedure Laterality Date   • Primary c section     • Other orthopedic surgery       right arm ORIF   • Tubal coagulation laparoscopic bilateral         Allergies:  Sulfa drugs    Social History:  Social History     Social History   • Marital Status:      Spouse Name: N/A   • Number of Children: N/A   • Years of Education: N/A     Occupational History   • Not on file.     Social History Main Topics   • Smoking status: Former Smoker -- 1.00 packs/day for 40 years     Types: Cigarettes     Quit date: 01/01/1990   • Smokeless tobacco: Never Used   • Alcohol Use: No   • Drug Use: No   • Sexual Activity: Not on file     Other Topics Concern   • Not on file     Social History Narrative       Family History:  Family History   Problem Relation Age of Onset   • Cancer Maternal Grandmother      lung    • Cancer Mother      breast       Medications:    Current outpatient prescriptions:   •  VICTOZA 18 MG/3ML Solution Pen-injector injection, Inject 0.3 mL as instructed every day., Disp: 3 PEN, Rfl: 6  •  pioglitazone (ACTOS) 30 MG Tab,  "Take 1 Tab by mouth every day., Disp: 30 Tab, Rfl: 11  •  JARDIANCE 25 MG Tab, Take 1 tablet by mouth every day., Disp: 30 Tab, Rfl: 11  •  Insulin Pen Needle (NOVOFINE PLUS) 32G X 4 MM Misc, 1 Applicator by Does not apply route every day. Using one needle tip with victoza per day, Disp: 100 Each, Rfl: 3  •  insulin glargine (LANTUS SOLOSTAR) 100 UNIT/ML Solution Pen-injector injection, Inject 30 Units as instructed 2 times a day., Disp: 15 PEN, Rfl: 4  •  enalapril (VASOTEC) 5 MG Tab, Take 1 Tab by mouth every day., Disp: 90 Tab, Rfl: 3  •  omeprazole (PRILOSEC) 20 MG delayed-release capsule, Take 1 Cap by mouth every day. TAKE 1 CAP BY MOUTH EVERY DAY., Disp: 90 Cap, Rfl: 3  •  hydrochlorothiazide (HYDRODIURIL) 25 MG Tab, Take 1 Tab by mouth every day., Disp: 90 Tab, Rfl: 3  •  spironolactone (ALDACTONE) 50 MG Tab, Take 1 Tab by mouth every day., Disp: 90 Tab, Rfl: 3  •  atorvastatin (LIPITOR) 80 MG tablet, Take 1 Tab by mouth every evening., Disp: 90 Tab, Rfl: 3  •  Cholecalciferol (VITAMIN D) 400 UNIT TABS, Take 400 Units by mouth every day., Disp: , Rfl:   •  Blood Glucose Monitoring Suppl SUPPLIES MISC, Test strips order: Test strips for glucometer. Sig: use it once daily and prn ssx high or low sugar #100 RF x 3, Disp: 100 Each, Rfl: 3  •  aspirin EC (ECOTRIN) 81 MG TBEC, Take 81 mg by mouth every day., Disp: , Rfl:         Physical Examination:   Vital signs: /62 mmHg  Pulse 90  Ht 1.676 m (5' 5.98\")  Wt 74.844 kg (165 lb)  BMI 26.64 kg/m2  SpO2 94%  LMP 12/16/2006  General: No distress, cooperative, well dressed and well nourished.   Eyes: No scleral icterus or discharge, No hyposphagma  ENMT: Normal on external inspection of nose, lips, No nasal drainage   Neck: No abnormal masses on inspection  Resp: Normal effort, Bilateral clear to auscultation, No wheezing, No rales  CVS: Regular rate and rhythm, S1 S2 normal, No murmur. No gallop  Extremities: No edema bilateral extremities  Neuro: Alert " and oriented  Skin: No rash, No Ulcers  Psych: Normal mood and affect      Assessment and Plan:    1. Uncontrolled type 2 diabetes mellitus without complication, with long-term current use of insulin (CMS-HCC)  This was a new patient on 4/25/17. She was on  Lantus up to 74units.    Januvia 50 (does not take states it did not work)  Glipizide 10mg BID    (Metformin Makes her nausea)    I had her start:  Jardiance 25mg one a day  Actos 30 mg one a day.   Victoza 1.8  Lantus at night 14 units.      2. Encounter for long-term (current) use of insulin (CMS-HCC)  Is on a high risk medication Insulin and we will continue to follow no hypoglycemic events since last visit    Return in about 4 weeks (around 7/24/2017).    Blood glucose log: Check BG in the morning when wake up, before lunch or dinner and before bed.  So three times a day.  Always bring BG diary to the next office visit.     Thank you kindly for allowing me to participate in the diabetes care plan for this patient.    Edy Lee PA-C, BC-ADM  06/26/2017    CC:   Christian Mota M.D.

## 2017-07-20 ENCOUNTER — OFFICE VISIT (OUTPATIENT)
Dept: MEDICAL GROUP | Facility: PHYSICIAN GROUP | Age: 62
End: 2017-07-20
Payer: COMMERCIAL

## 2017-07-20 VITALS
BODY MASS INDEX: 27.49 KG/M2 | SYSTOLIC BLOOD PRESSURE: 100 MMHG | WEIGHT: 165 LBS | TEMPERATURE: 97.9 F | HEIGHT: 65 IN | HEART RATE: 76 BPM | DIASTOLIC BLOOD PRESSURE: 68 MMHG | OXYGEN SATURATION: 98 %

## 2017-07-20 DIAGNOSIS — R53.83 FATIGUE, UNSPECIFIED TYPE: ICD-10-CM

## 2017-07-20 DIAGNOSIS — I10 ESSENTIAL HYPERTENSION: Chronic | ICD-10-CM

## 2017-07-20 PROCEDURE — 99214 OFFICE O/P EST MOD 30 MIN: CPT | Performed by: INTERNAL MEDICINE

## 2017-07-20 NOTE — MR AVS SNAPSHOT
"        Tara Cueva   2017 3:20 PM   Office Visit   MRN: [id number]    Department:  [facility]   Dept Phone:  [phone]    Description:  Female : 1955   Provider:  Christian Mota M.D.           Reason for Visit     Follow-Up           Allergies as of 2017     Allergen Noted Reactions    Sulfa Drugs 2012   Vomiting      You were diagnosed with     Uncontrolled type 2 diabetes mellitus without complication, with long-term current use of insulin (CMS-[facility])   [5855667]       Essential hypertension   [4115660]       Fatigue, unspecified type   [6698310]         Vital Signs     Blood Pressure Pulse Temperature Height Weight Body Mass Index    100/68 mmHg 76 36.6 °C (97.9 °F) 1.651 m (5' 5\") 74.844 kg (165 lb) 27.46 kg/m2    Oxygen Saturation Last Menstrual Period Smoking Status             98% 2006 Former Smoker         Basic Information     Date Of Birth Sex Race Ethnicity Preferred Language    1955 Female White Non- English      Your appointments     2017  4:00 PM   Established Patient with Anson Lee PA-C   [facility] Endocrin[facility])    [address], [address]  [place] [address] [phone]           You will be receiving a confirmation call a few days before your appointment from our automated call confirmation system.            Aug 24, 2017  3:20 PM   Established Patient with Christian Mota M.D.   [facility] ([place])    [address] [address] [phone]           You will be receiving a confirmation call a few days before your appointment from our automated call confirmation system.              Problem List              ICD-10-CM Priority Class Noted - Resolved    Hyperlipidemia (Chronic) E78.5   2011 - Present    Vitamin D deficiency E55.9   2011 - Present    Hypertension (Chronic) I10   2011 - Present    GERD (gastroesophageal reflux disease) K21.9   2011 - " Present    Gall bladder polyp K82.4   8/12/2011 - Present    Uncontrolled type 2 diabetes mellitus without complication, with long-term current use of insulin (CMS-HCC) E11.65, Z79.4   3/30/2012 - Present    Hypokalemia E87.6   1/3/2014 - Present    Thumb pain M79.646   12/23/2016 - Present    Encounter for long-term (current) use of insulin (CMS-HCC) Z79.4   4/25/2017 - Present    Fatigue R53.83   7/20/2017 - Present      Health Maintenance        Date Due Completion Dates    IMM ZOSTER VACCINE 5/9/2014 ---    RETINAL SCREENING 7/10/2015 7/10/2014 (N/S), 4/21/2012 (N/S)    Override on 7/10/2014: (N/S)    Override on 4/21/2012: (N/S)    PAP SMEAR 2/7/2017 2/7/2014, 1/17/2014    A1C SCREENING 8/23/2017 2/23/2017, 12/23/2016, 4/8/2016, 12/18/2015, 9/18/2015, 6/19/2015, 3/6/2015, 12/5/2014, 7/25/2014, 12/27/2013, 6/7/2013, 11/16/2012, 8/10/2012, 5/4/2012, 12/23/2011, 10/7/2011    IMM INFLUENZA (1) 9/1/2017 12/23/2016, 12/18/2015, 10/4/2013, 11/4/2011    FASTING LIPID PROFILE 12/23/2017 12/23/2016, 9/17/2015, 2/4/2015, 7/25/2014, 6/7/2013, 8/10/2012, 12/23/2011, 10/7/2011, 5/13/2011    DIABETES MONOFILAMENT / LE EXAM 12/23/2017 12/23/2016, 7/25/2014 (N/S), 8/11/2011 (N/S)    Override on 7/25/2014: (N/S)    Override on 8/11/2011: (N/S)    MAMMOGRAM 3/1/2018 3/1/2017, 1/24/2014    URINE ACR / MICROALBUMIN 4/25/2018 4/25/2017, 12/23/2016, 2/4/2015, 7/25/2014, 6/7/2013, 12/23/2011, 5/13/2011    SERUM CREATININE 4/25/2018 4/25/2017, 12/23/2016, 9/17/2015, 2/4/2015, 12/5/2014, 7/25/2014, 2/22/2014, 12/27/2013, 6/7/2013, 11/16/2012, 8/10/2012, 5/4/2012, 12/23/2011, 10/7/2011, 5/13/2011    COLONOSCOPY 11/6/2019 11/6/2014    IMM DTaP/Tdap/Td Vaccine (2 - Td) 7/25/2024 7/25/2014            Current Immunizations     Influenza TIV (IM) 10/4/2013, 11/4/2011    Influenza Vaccine Quad Inj (Pf) 12/23/2016  8:12 AM    Influenza Vaccine Quad Inj (Preserved) 12/18/2015    Pneumococcal polysaccharide vaccine (PPSV-23) 1/3/2014    Tdap  Vaccine 7/25/2014      Below and/or attached are the medications your provider expects you to take. Review all of your home medications and newly ordered medications with your provider and/or pharmacist. Follow medication instructions as directed by your provider and/or pharmacist. Please keep your medication list with you and share with your provider. Update the information when medications are discontinued, doses are changed, or new medications (including over-the-counter products) are added; and carry medication information at all times in the event of emergency situations     Allergies:  SULFA DRUGS - Vomiting               Medications  Valid as of: July 20, 2017 -  3:41 PM    Generic Name Brand Name Tablet Size Instructions for use    Aspirin (Tablet Delayed Response) ECOTRIN 81 MG Take 81 mg by mouth every day.        Atorvastatin Calcium (Tab) LIPITOR 80 MG Take 1 Tab by mouth every evening.        Blood Glucose Monitoring Suppl (Misc) Blood Glucose Monitoring Suppl SUPPLIES Test strips order: Test strips for glucometer. Sig: use it once daily and prn ssx high or low sugar #100 RF x 3        Cholecalciferol (Tab) VITAMIN D 400 UNIT Take 400 Units by mouth every day.        Empagliflozin (Tab) JARDIANCE 25 MG Take 1 tablet by mouth every day.        Enalapril Maleate (Tab) VASOTEC 5 MG Take 1 Tab by mouth every day.        HydroCHLOROthiazide (Tab) HYDRODIURIL 25 MG Take 1 Tab by mouth every day.        Insulin Glargine (Solution Pen-injector) LANTUS 100 UNIT/ML Inject 30 Units as instructed 2 times a day.        Insulin Pen Needle (Misc) Insulin Pen Needle 32G X 4 MM 1 Applicator by Does not apply route every day. Using one needle tip with victoza per day        Liraglutide (Solution Pen-injector) VICTOZA 18 MG/3ML Inject 0.3 mL as instructed every day.        Omeprazole (CAPSULE DELAYED RELEASE) PRILOSEC 20 MG Take 1 Cap by mouth every day. TAKE 1 CAP BY MOUTH EVERY DAY.        Pioglitazone HCl (Tab) ACTOS  30 MG Take 1 Tab by mouth every day.        .                 Medicines prescribed today were sent to:     [facility]/PHARMACY #[id number] - [place], [place] - [address] [place]    [address] [place] [address] [place] [address]    Phone: [phone] Fax: [phone]    Open 24 Hours?: No    [facility] - [place], [place] - [address]    [address] [address]    Phone: [phone] Fax: [phone]    Open 24 Hours?: No      Medication refill instructions:       If your prescription bottle indicates you have medication refills left, it is not necessary to call your provider’s office. Please contact your pharmacy and they will refill your medication.    If your prescription bottle indicates you do not have any refills left, you may request refills at any time through one of the following ways: The online [employer] system (except Urgent Care), by calling your provider’s office, or by asking your pharmacy to contact your provider’s office with a refill request. Medication refills are processed only during regular business hours and may not be available until the next business day. Your provider may request additional information or to have a follow-up visit with you prior to refilling your medication.   *Please Note: Medication refills are assigned a new Rx number when refilled electronically. Your pharmacy may indicate that no refills were authorized even though a new prescription for the same medication is available at the pharmacy. Please request the medicine by name with the pharmacy before contacting your provider for a refill.        Your To Do List     Future Labs/Procedures Complete By Expires    CBC WITH DIFFERENTIAL  As directed 7/20/2018    COMP METABOLIC PANEL  As directed 7/20/2018    TSH WITH REFLEX TO FT4  As directed 7/20/2018    VITAMIN B12  As directed 7/20/2018    VITAMIN D,25 HYDROXY  As directed 7/20/2018         [employer] Access Code: Activation code not generated  Current [employer]  Status: Active

## 2017-07-20 NOTE — PROGRESS NOTES
Subjective:   Tara Cueva is a 62 y.o. female here today for T2DM, HTN, fatigue    Uncontrolled type 2 diabetes mellitus without complication, with long-term current use of insulin (CMS-HCC)  Pt is now following with endocrinology for this. She is on lantus 14 untis QHS, victoza, actos, and jardiance. Her sugars are improving with this regimen and she is being followed closely by endocrine.     Hypertension  Pt in on HCTZ 25 mg daily, spironolactone 50 mg daily, and enalapril 5 mg daily for HTN. Pt reports compliance with medication. BP is at goal per JNC 8 critieria today.     Her blood pressure has been in the low-normal range for BP consistently over the past few visits. She reports fatigue and light headedness at times. Denies chest pain, shortness of breath, headache      Fatigue  Pt reports fatigue that affects her more at the later part of the day. It has been present for a couple of months. It is unchanged in severity since onset. She feels like when she gets home from work she is very fatigued. She feels that she gets about 7-7.5 hours of sleep. She denies waking up feeling unrefreshed or having morning headaches. She has not been told that she stops breathing when she sleeps.        Current medicines (including changes today)  Current Outpatient Prescriptions   Medication Sig Dispense Refill   • VICTOZA 18 MG/3ML Solution Pen-injector injection Inject 0.3 mL as instructed every day. 3 PEN 6   • pioglitazone (ACTOS) 30 MG Tab Take 1 Tab by mouth every day. 30 Tab 11   • JARDIANCE 25 MG Tab Take 1 tablet by mouth every day. 30 Tab 11   • Insulin Pen Needle (NOVOFINE PLUS) 32G X 4 MM Misc 1 Applicator by Does not apply route every day. Using one needle tip with victoza per day 100 Each 3   • insulin glargine (LANTUS SOLOSTAR) 100 UNIT/ML Solution Pen-injector injection Inject 30 Units as instructed 2 times a day. 15 PEN 4   • enalapril (VASOTEC) 5 MG Tab Take 1 Tab by mouth every day. 90 Tab 3   • omeprazole  "(PRILOSEC) 20 MG delayed-release capsule Take 1 Cap by mouth every day. TAKE 1 CAP BY MOUTH EVERY DAY. 90 Cap 3   • hydrochlorothiazide (HYDRODIURIL) 25 MG Tab Take 1 Tab by mouth every day. 90 Tab 3   • atorvastatin (LIPITOR) 80 MG tablet Take 1 Tab by mouth every evening. 90 Tab 3   • Cholecalciferol (VITAMIN D) 400 UNIT TABS Take 400 Units by mouth every day.     • Blood Glucose Monitoring Suppl SUPPLIES MISC Test strips order: Test strips for glucometer. Sig: use it once daily and prn ssx high or low sugar #100 RF x 3 100 Each 3   • aspirin EC (ECOTRIN) 81 MG TBEC Take 81 mg by mouth every day.       No current facility-administered medications for this visit.     She  has a past medical history of Diabetes (5/12/2011); Hyperlipidemia (5/12/2011); Vitamin d deficiency (5/12/2011); Elevated liver enzymes (5/12/2011); Edema (5/12/2011); Hypertension (5/12/2011); GERD (gastroesophageal reflux disease) (5/12/2011); Skin lesion of face (5/12/2011); and Gall bladder polyp (8/12/2011).    ROS   No chest pain, no shortness of breath, no headache     Objective:     Blood pressure 100/68, pulse 76, temperature 36.6 °C (97.9 °F), height 1.651 m (5' 5\"), weight 74.844 kg (165 lb), last menstrual period 12/16/2006, SpO2 98 %. Body mass index is 27.46 kg/(m^2).   Physical Exam:  Constitutional: Alert & oriented, no acute distress  Eye: Conjunctiva clear, lids normal, no discharge  ENMT: Lips without lesions, normal external nose and ears  Neck: Trachea midline, no masses, no thyromegaly. No cervical or supraclavicular lymphadenopathy  Respiratory: Unlabored respiratory effort, lungs clear to auscultation, no wheezes, no ronchi  Cardiovascular: Normal S1, S2, no murmur, no edema  Skin: Warm, dry, good turgor, no rashes in visible areas  Neuro: No overt focal neurologic deficits, normal gait  Psych: Normal mood and affect    Assessment and Plan:   The following treatment plan was discussed    1. Uncontrolled type 2 diabetes " mellitus without complication, with long-term current use of insulin (CMS-HCC)  Continue treatment per endocrinology  - CBC WITH DIFFERENTIAL; Future  - COMP METABOLIC PANEL; Future    2. Essential hypertension  Will d/c spironolactone given low-normal BP. Follow up in 1 month to assess response to medication change    3. Fatigue, unspecified type  Will order blood tests including CBC, vitamin B12 and D, TSH to further assess. Will go over lab results at follow up in 1 month.   - VITAMIN D,25 HYDROXY; Future  - VITAMIN B12; Future  - TSH WITH REFLEX TO FT4; Future      Followup: Return in about 4 weeks (around 8/17/2017).    Please note that this dictation was created using voice recognition software. I have made every reasonable attempt to correct obvious errors, but I expect that there are errors of grammar and possibly content that I did not discover before finalizing the note.

## 2017-07-20 NOTE — ASSESSMENT & PLAN NOTE
Pt is now following with endocrinology for this. She is on lantus 14 untis QHS, victoza, actos, and jardiance. Her sugars are improving with this regimen and she is being followed closely by endocrine.

## 2017-07-20 NOTE — ASSESSMENT & PLAN NOTE
Pt reports fatigue that affects her more at the later part of the day. It has been present for a couple of months. It is unchanged in severity since onset. She feels like when she gets home from work she is very fatigued. She feels that she gets about 7-7.5 hours of sleep. She denies waking up feeling unrefreshed or having morning headaches. She has not been told that she stops breathing when she sleeps.

## 2017-07-20 NOTE — ASSESSMENT & PLAN NOTE
Pt in on HCTZ 25 mg daily, spironolactone 50 mg daily, and enalapril 5 mg daily for HTN. Pt reports compliance with medication. BP is at goal per JNC 8 critieria today.     Her blood pressure has been in the low-normal range for BP consistently over the past few visits. She reports fatigue and light headedness at times. Denies chest pain, shortness of breath, headache

## 2017-07-21 ENCOUNTER — HOSPITAL ENCOUNTER (OUTPATIENT)
Dept: LAB | Facility: MEDICAL CENTER | Age: 62
End: 2017-07-21
Attending: INTERNAL MEDICINE
Payer: COMMERCIAL

## 2017-07-21 DIAGNOSIS — E11.8 CONTROLLED TYPE 2 DIABETES MELLITUS WITH COMPLICATION, WITH LONG-TERM CURRENT USE OF INSULIN (HCC): Chronic | ICD-10-CM

## 2017-07-21 DIAGNOSIS — I10 ESSENTIAL HYPERTENSION: Chronic | ICD-10-CM

## 2017-07-21 DIAGNOSIS — R53.83 FATIGUE, UNSPECIFIED TYPE: ICD-10-CM

## 2017-07-21 DIAGNOSIS — Z79.4 CONTROLLED TYPE 2 DIABETES MELLITUS WITH COMPLICATION, WITH LONG-TERM CURRENT USE OF INSULIN (HCC): Chronic | ICD-10-CM

## 2017-07-21 DIAGNOSIS — E13.9 DIABETES 1.5, MANAGED AS TYPE 2 (HCC): ICD-10-CM

## 2017-07-21 LAB
25(OH)D3 SERPL-MCNC: 32 NG/ML (ref 30–100)
ALBUMIN SERPL BCP-MCNC: 4.4 G/DL (ref 3.2–4.9)
ALBUMIN/GLOB SERPL: 1.2 G/DL
ALP SERPL-CCNC: 65 U/L (ref 30–99)
ALT SERPL-CCNC: 21 U/L (ref 2–50)
ANION GAP SERPL CALC-SCNC: 10 MMOL/L (ref 0–11.9)
AST SERPL-CCNC: 20 U/L (ref 12–45)
BASOPHILS # BLD AUTO: 0.4 % (ref 0–1.8)
BASOPHILS # BLD: 0.03 K/UL (ref 0–0.12)
BILIRUB SERPL-MCNC: 0.6 MG/DL (ref 0.1–1.5)
BUN SERPL-MCNC: 20 MG/DL (ref 8–22)
CALCIUM SERPL-MCNC: 10.5 MG/DL (ref 8.5–10.5)
CHLORIDE SERPL-SCNC: 100 MMOL/L (ref 96–112)
CHOLEST SERPL-MCNC: 153 MG/DL (ref 100–199)
CO2 SERPL-SCNC: 28 MMOL/L (ref 20–33)
CREAT SERPL-MCNC: 1.23 MG/DL (ref 0.5–1.4)
EOSINOPHIL # BLD AUTO: 0.03 K/UL (ref 0–0.51)
EOSINOPHIL NFR BLD: 0.4 % (ref 0–6.9)
ERYTHROCYTE [DISTWIDTH] IN BLOOD BY AUTOMATED COUNT: 43.5 FL (ref 35.9–50)
EST. AVERAGE GLUCOSE BLD GHB EST-MCNC: 183 MG/DL
GFR SERPL CREATININE-BSD FRML MDRD: 44 ML/MIN/1.73 M 2
GLOBULIN SER CALC-MCNC: 3.7 G/DL (ref 1.9–3.5)
GLUCOSE SERPL-MCNC: 122 MG/DL (ref 65–99)
HBA1C MFR BLD: 8 % (ref 0–5.6)
HCT VFR BLD AUTO: 45.9 % (ref 37–47)
HDLC SERPL-MCNC: 55 MG/DL
HGB BLD-MCNC: 14.7 G/DL (ref 12–16)
IMM GRANULOCYTES # BLD AUTO: 0.03 K/UL (ref 0–0.11)
IMM GRANULOCYTES NFR BLD AUTO: 0.4 % (ref 0–0.9)
LDLC SERPL CALC-MCNC: 66 MG/DL
LYMPHOCYTES # BLD AUTO: 3.02 K/UL (ref 1–4.8)
LYMPHOCYTES NFR BLD: 36.5 % (ref 22–41)
MCH RBC QN AUTO: 27.7 PG (ref 27–33)
MCHC RBC AUTO-ENTMCNC: 32 G/DL (ref 33.6–35)
MCV RBC AUTO: 86.6 FL (ref 81.4–97.8)
MONOCYTES # BLD AUTO: 0.59 K/UL (ref 0–0.85)
MONOCYTES NFR BLD AUTO: 7.1 % (ref 0–13.4)
NEUTROPHILS # BLD AUTO: 4.58 K/UL (ref 2–7.15)
NEUTROPHILS NFR BLD: 55.2 % (ref 44–72)
NRBC # BLD AUTO: 0 K/UL
NRBC BLD AUTO-RTO: 0 /100 WBC
PLATELET # BLD AUTO: 392 K/UL (ref 164–446)
PMV BLD AUTO: 9.4 FL (ref 9–12.9)
POTASSIUM SERPL-SCNC: 4.2 MMOL/L (ref 3.6–5.5)
PROT SERPL-MCNC: 8.1 G/DL (ref 6–8.2)
RBC # BLD AUTO: 5.3 M/UL (ref 4.2–5.4)
SODIUM SERPL-SCNC: 138 MMOL/L (ref 135–145)
TRIGL SERPL-MCNC: 162 MG/DL (ref 0–149)
TSH SERPL DL<=0.005 MIU/L-ACNC: 1.21 UIU/ML (ref 0.3–3.7)
VIT B12 SERPL-MCNC: 193 PG/ML (ref 211–911)
WBC # BLD AUTO: 8.3 K/UL (ref 4.8–10.8)

## 2017-07-21 PROCEDURE — 36415 COLL VENOUS BLD VENIPUNCTURE: CPT

## 2017-07-21 PROCEDURE — 82306 VITAMIN D 25 HYDROXY: CPT

## 2017-07-21 PROCEDURE — 82607 VITAMIN B-12: CPT

## 2017-07-21 PROCEDURE — 85025 COMPLETE CBC W/AUTO DIFF WBC: CPT

## 2017-07-21 PROCEDURE — 80061 LIPID PANEL: CPT

## 2017-07-21 PROCEDURE — 84443 ASSAY THYROID STIM HORMONE: CPT

## 2017-07-21 PROCEDURE — 83036 HEMOGLOBIN GLYCOSYLATED A1C: CPT

## 2017-07-21 PROCEDURE — 80053 COMPREHEN METABOLIC PANEL: CPT

## 2017-07-24 ENCOUNTER — OFFICE VISIT (OUTPATIENT)
Dept: ENDOCRINOLOGY | Facility: MEDICAL CENTER | Age: 62
End: 2017-07-24
Payer: COMMERCIAL

## 2017-07-24 VITALS
HEIGHT: 65 IN | SYSTOLIC BLOOD PRESSURE: 122 MMHG | DIASTOLIC BLOOD PRESSURE: 76 MMHG | OXYGEN SATURATION: 99 % | WEIGHT: 165 LBS | HEART RATE: 81 BPM | BODY MASS INDEX: 27.49 KG/M2

## 2017-07-24 DIAGNOSIS — Z79.4 ENCOUNTER FOR LONG-TERM (CURRENT) USE OF INSULIN (HCC): ICD-10-CM

## 2017-07-24 PROCEDURE — 99214 OFFICE O/P EST MOD 30 MIN: CPT | Performed by: PHYSICIAN ASSISTANT

## 2017-07-24 NOTE — MR AVS SNAPSHOT
"        Tara Cueva   2017 4:00 PM   Office Visit   MRN: [id number]    Department:  Endocrinology [facility]   Dept Phone:  [phone]    Description:  Female : 1955   Provider:  Anson Lee PA-C           Reason for Visit     Follow-Up           Allergies as of 2017     Allergen Noted Reactions    Sulfa Drugs 2012   Vomiting      You were diagnosed with     Uncontrolled type 2 diabetes mellitus without complication, with long-term current use of insulin (CMS-HCC)   [2806522]       Encounter for long-term (current) use of insulin (CMS-HCC)   [V58.67.ICD-9-CM]         Vital Signs     Blood Pressure Pulse Height Weight Body Mass Index Oxygen Saturation    122/76 mmHg 81 1.651 m (5' 5\") 74.844 kg (165 lb) 27.46 kg/m2 99%    Last Menstrual Period Smoking Status                2006 Former Smoker          Basic Information     Date Of Birth Sex Race Ethnicity Preferred Language    1955 Female White Non- English      Your appointments     Aug 24, 2017  3:20 PM   Established Patient with Christian Mota M.D.   [facility] ([place])    [address] [address] [phone]           You will be receiving a confirmation call a few days before your appointment from our automated call confirmation system.            Aug 25, 2017  4:00 PM   Established Patient with Anson Lee PA-C   [facility]    [address], [address]  [place] [address] [phone]           You will be receiving a confirmation call a few days before your appointment from our automated call confirmation system.              Problem List              ICD-10-CM Priority Class Noted - Resolved    Hyperlipidemia (Chronic) E78.5   2011 - Present    Vitamin D deficiency E55.9   2011 - Present    Hypertension (Chronic) I10   2011 - Present    GERD (gastroesophageal reflux disease) K21.9   2011 - Present    Gall " bladder polyp K82.4   8/12/2011 - Present    Uncontrolled type 2 diabetes mellitus without complication, with long-term current use of insulin (CMS-HCC) E11.65, Z79.4   3/30/2012 - Present    Hypokalemia E87.6   1/3/2014 - Present    Thumb pain M79.646   12/23/2016 - Present    Encounter for long-term (current) use of insulin (CMS-HCC) Z79.4   4/25/2017 - Present    Fatigue R53.83   7/20/2017 - Present      Health Maintenance        Date Due Completion Dates    IMM ZOSTER VACCINE 5/9/2014 ---    RETINAL SCREENING 7/10/2015 7/10/2014 (N/S), 4/21/2012 (N/S)    Override on 7/10/2014: (N/S)    Override on 4/21/2012: (N/S)    PAP SMEAR 2/7/2017 2/7/2014, 1/17/2014    IMM INFLUENZA (1) 9/1/2017 12/23/2016, 12/18/2015, 10/4/2013, 11/4/2011    DIABETES MONOFILAMENT / LE EXAM 12/23/2017 12/23/2016, 7/25/2014 (N/S), 8/11/2011 (N/S)    Override on 7/25/2014: (N/S)    Override on 8/11/2011: (N/S)    A1C SCREENING 1/21/2018 7/21/2017, 2/23/2017, 12/23/2016, 4/8/2016, 12/18/2015, 9/18/2015, 6/19/2015, 3/6/2015, 12/5/2014, 7/25/2014, 12/27/2013, 6/7/2013, 11/16/2012, 8/10/2012, 5/4/2012, 12/23/2011, 10/7/2011    MAMMOGRAM 3/1/2018 3/1/2017, 1/24/2014    URINE ACR / MICROALBUMIN 4/25/2018 4/25/2017, 12/23/2016, 2/4/2015, 7/25/2014, 6/7/2013, 12/23/2011, 5/13/2011    FASTING LIPID PROFILE 7/21/2018 7/21/2017, 12/23/2016, 9/17/2015, 2/4/2015, 7/25/2014, 6/7/2013, 8/10/2012, 12/23/2011, 10/7/2011, 5/13/2011    SERUM CREATININE 7/21/2018 7/21/2017, 4/25/2017, 12/23/2016, 9/17/2015, 2/4/2015, 12/5/2014, 7/25/2014, 2/22/2014, 12/27/2013, 6/7/2013, 11/16/2012, 8/10/2012, 5/4/2012, 12/23/2011, 10/7/2011, 5/13/2011    COLONOSCOPY 11/6/2019 11/6/2014    IMM DTaP/Tdap/Td Vaccine (2 - Td) 7/25/2024 7/25/2014            Current Immunizations     Influenza TIV (IM) 10/4/2013, 11/4/2011    Influenza Vaccine Quad Inj (Pf) 12/23/2016  8:12 AM    Influenza Vaccine Quad Inj (Preserved) 12/18/2015    Pneumococcal polysaccharide vaccine (PPSV-23)  1/3/2014    Tdap Vaccine 7/25/2014      Below and/or attached are the medications your provider expects you to take. Review all of your home medications and newly ordered medications with your provider and/or pharmacist. Follow medication instructions as directed by your provider and/or pharmacist. Please keep your medication list with you and share with your provider. Update the information when medications are discontinued, doses are changed, or new medications (including over-the-counter products) are added; and carry medication information at all times in the event of emergency situations     Allergies:  SULFA DRUGS - Vomiting               Medications  Valid as of: July 24, 2017 -  4:06 PM    Generic Name Brand Name Tablet Size Instructions for use    Aspirin (Tablet Delayed Response) ECOTRIN 81 MG Take 81 mg by mouth every day.        Atorvastatin Calcium (Tab) LIPITOR 80 MG Take 1 Tab by mouth every evening.        Blood Glucose Monitoring Suppl (Misc) Blood Glucose Monitoring Suppl SUPPLIES Test strips order: Test strips for glucometer. Sig: use it once daily and prn ssx high or low sugar #100 RF x 3        Cholecalciferol (Tab) VITAMIN D 400 UNIT Take 400 Units by mouth every day.        Empagliflozin (Tab) JARDIANCE 25 MG Take 1 tablet by mouth every day.        Enalapril Maleate (Tab) VASOTEC 5 MG Take 1 Tab by mouth every day.        HydroCHLOROthiazide (Tab) HYDRODIURIL 25 MG Take 1 Tab by mouth every day.        Insulin Glargine (Solution Pen-injector) LANTUS 100 UNIT/ML Inject 30 Units as instructed 2 times a day.        Insulin Pen Needle (Misc) Insulin Pen Needle 32G X 4 MM 1 Applicator by Does not apply route every day. Using one needle tip with victoza per day        Liraglutide (Solution Pen-injector) VICTOZA 18 MG/3ML Inject 0.3 mL as instructed every day.        Omeprazole (CAPSULE DELAYED RELEASE) PRILOSEC 20 MG Take 1 Cap by mouth every day. TAKE 1 CAP BY MOUTH EVERY DAY.        Pioglitazone  HCl (Tab) ACTOS 30 MG Take 1 Tab by mouth every day.        .                 Medicines prescribed today were sent to:     [facility]/PHARMACY #[id number] - [place], [place] - [address] [place] [address]    [address] [place] [address]    Phone: [phone] Fax: [phone]    Open 24 Hours?: No    [facility] - [place], [place] - [address]    [address] [address]    Phone: [phone] Fax: [phone]    Open 24 Hours?: No      Medication refill instructions:       If your prescription bottle indicates you have medication refills left, it is not necessary to call your provider’s office. Please contact your pharmacy and they will refill your medication.    If your prescription bottle indicates you do not have any refills left, you may request refills at any time through one of the following ways: The online [employer] system (except Urgent Care), by calling your provider’s office, or by asking your pharmacy to contact your provider’s office with a refill request. Medication refills are processed only during regular business hours and may not be available until the next business day. Your provider may request additional information or to have a follow-up visit with you prior to refilling your medication.   *Please Note: Medication refills are assigned a new Rx number when refilled electronically. Your pharmacy may indicate that no refills were authorized even though a new prescription for the same medication is available at the pharmacy. Please request the medicine by name with the pharmacy before contacting your provider for a refill.        Instructions      Jardiance 25mg one a day  Actos 30 mg one a day.   Victoza 1.8  Lantus at night 14 units.  (INCREASE to 18)  (failed metformin)              [employer] Access Code: Activation code not generated  Current [employer] Status: Active

## 2017-07-24 NOTE — PROGRESS NOTES
Return to office Patient Consult Note  Referred by: Christian Mota M.D.    Reason for consult: Diabetes Management Type 2    HPI:  Tara Cueva is a 62 y.o. old patient who is seeing us today for diabetes care.  This is a pleasant patient with diabetes and I appreciate the opportunity to participate in the care of this patient.      Labs of 2/23/17 HbA1c is 10.7, GFR >60,    In 12/2016 HbA1c was 10.2  HbA1c on 7/21/17 was 8.0    4/25/17 labs:  C-peptide 8.5, CALVIN-65 is at 9.8 (this is a little elevated), Islet cell and Insulin antibodies are negative. Her BMI is 29 and her C-peptide is very high.  She is a T2 diabetic and not a T1.5.    BG Diary:7/24/2017  In the AM: 150, 179, 135, 156, 142, 120, 144  Before meal:  Before Bed: 162, 142, 179, 162, 220, 206, 213     BG Diary:6/26/2017  In the AM: 142, 139, 137, 134, 141, 138, 138, 125  Before meal:  Before Bed: 256, 202, 164, 202, 178, 253, 184,    (she is eating snacks between dinner and bed sugar)      BG Diary:5/9/2017  In the AM: 153, 162, 199, 150, 142, 122, 150, 139  Before meal:  Before Bed: 185, 242, 174, 206, 158, 162     BG Diary:5/2/2017  In the AM: 221, 207, 232, 211, 176, 176, 190, 153  Before meal: 214, 336, 179, 227    Before Bed: 322, 332, 268, 241, 270, 347, 243     BG Diary:4/25/2017  In the AM: 203, 226, 224, 213, 179, 205, 147, 143, 161, 357, 193  Just before meal:  Just before Bed:    Weight: on 4/25/17 her weight was 179pounds, today it is 165pounds      1. Uncontrolled type 2 diabetes mellitus without complication, with long-term current use of insulin (CMS-HCC)  2. Encounter for long-term (current) use of insulin (CMS-HCC)  This was a new patient on 4/25/17. She was on  Lantus up to 74units.    Januvia 50 (does not take states it did not work)  Glipizide 10mg BID    (Metformin Makes her nausea)    She is now on:  Jardiance 25mg one a day  Actos 30 mg one a day.   Victoza 1.8  Lantus at night 14 units.    (failed metformin)      ROS:    Constitutional: No night sweats.  Eyes:  No visual changes.  Cardiac: No chest pain, No palpitations or racing heart rate.  Resp: No shortness of breath, No cough,   Gi: No Diarrhea    All other systems were reviewed and were/are negative.  The ROS was revised/revisited during this office visit from the patients first office visit with me on  4/25/17 Please review the full ROS during the first office visit  Past Medical History:  Patient Active Problem List    Diagnosis Date Noted   • Fatigue 07/20/2017   • Encounter for long-term (current) use of insulin (CMS-HCC) 04/25/2017   • Thumb pain 12/23/2016   • Hypokalemia 01/03/2014   • Uncontrolled type 2 diabetes mellitus without complication, with long-term current use of insulin (CMS-HCC) 03/30/2012   • Gall bladder polyp 08/12/2011   • Hyperlipidemia 05/12/2011   • Vitamin D deficiency 05/12/2011   • Hypertension 05/12/2011   • GERD (gastroesophageal reflux disease) 05/12/2011       Past Surgical History:  Past Surgical History   Procedure Laterality Date   • Primary c section     • Other orthopedic surgery       right arm ORIF   • Tubal coagulation laparoscopic bilateral         Allergies:  Sulfa drugs    Social History:  Social History     Social History   • Marital Status:      Spouse Name: N/A   • Number of Children: N/A   • Years of Education: N/A     Occupational History   • Not on file.     Social History Main Topics   • Smoking status: Former Smoker -- 1.00 packs/day for 40 years     Types: Cigarettes     Quit date: 01/01/1990   • Smokeless tobacco: Never Used   • Alcohol Use: No   • Drug Use: No   • Sexual Activity: Not on file     Other Topics Concern   • Not on file     Social History Narrative       Family History:  Family History   Problem Relation Age of Onset   • Cancer Maternal Grandmother      lung    • Cancer Mother      breast       Medications:    Current outpatient prescriptions:   •  VICTOZA 18 MG/3ML Solution Pen-injector injection,  "Inject 0.3 mL as instructed every day., Disp: 3 PEN, Rfl: 6  •  pioglitazone (ACTOS) 30 MG Tab, Take 1 Tab by mouth every day., Disp: 30 Tab, Rfl: 11  •  JARDIANCE 25 MG Tab, Take 1 tablet by mouth every day., Disp: 30 Tab, Rfl: 11  •  Insulin Pen Needle (NOVOFINE PLUS) 32G X 4 MM Misc, 1 Applicator by Does not apply route every day. Using one needle tip with victoza per day, Disp: 100 Each, Rfl: 3  •  insulin glargine (LANTUS SOLOSTAR) 100 UNIT/ML Solution Pen-injector injection, Inject 30 Units as instructed 2 times a day., Disp: 15 PEN, Rfl: 4  •  enalapril (VASOTEC) 5 MG Tab, Take 1 Tab by mouth every day., Disp: 90 Tab, Rfl: 3  •  omeprazole (PRILOSEC) 20 MG delayed-release capsule, Take 1 Cap by mouth every day. TAKE 1 CAP BY MOUTH EVERY DAY., Disp: 90 Cap, Rfl: 3  •  hydrochlorothiazide (HYDRODIURIL) 25 MG Tab, Take 1 Tab by mouth every day., Disp: 90 Tab, Rfl: 3  •  atorvastatin (LIPITOR) 80 MG tablet, Take 1 Tab by mouth every evening., Disp: 90 Tab, Rfl: 3  •  Cholecalciferol (VITAMIN D) 400 UNIT TABS, Take 400 Units by mouth every day., Disp: , Rfl:   •  Blood Glucose Monitoring Suppl SUPPLIES MISC, Test strips order: Test strips for glucometer. Sig: use it once daily and prn ssx high or low sugar #100 RF x 3, Disp: 100 Each, Rfl: 3  •  aspirin EC (ECOTRIN) 81 MG TBEC, Take 81 mg by mouth every day., Disp: , Rfl:         Physical Examination:  Vital signs: /76 mmHg  Pulse 81  Ht 1.651 m (5' 5\")  Wt 74.844 kg (165 lb)  BMI 27.46 kg/m2  SpO2 99%  LMP 12/16/2006  General: No distress, cooperative, well dressed and well nourished.   Eyes: No scleral icterus or discharge, No hyposphagma  ENMT: Normal on external inspection of nose, lips, No nasal drainage   Neck: No abnormal masses on inspection  Resp: Normal effort, Bilateral clear to auscultation, No wheezing, No rales  CVS: Regular rate and rhythm, S1 S2 normal, No murmur. No gallop  Extremities: No edema bilateral extremities  Neuro: Alert and " oriented  Skin: No rash, No Ulcers  Psych: Normal mood and affect      Assessment and Plan:    1. Uncontrolled type 2 diabetes mellitus without complication, with long-term current use of insulin (CMS-HCC)    Jardiance 25mg one a day  Actos 30 mg one a day.   Victoza 1.8  Lantus at night 14 units.  (INCREASE to 18)  (failed metformin)    2. Encounter for long-term (current) use of insulin (CMS-HCC)  Is on a high risk medication Insulin and we will continue to follow no hypoglycemic events since last visit    Return in about 4 weeks (around 8/21/2017).    Blood glucose log: Check BG in the morning when wake up, before lunch or dinner and before bed.  So three times a day.  Always bring BG diary to the next office visit.       Thank you kindly for allowing me to participate in the diabetes care plan for this patient.    Edy Lee PA-C, BC-ADM  Board Certified - [employer] [facility]  07/24/2017    CC:   Christian Mota M.D.

## 2017-07-24 NOTE — PATIENT INSTRUCTIONS
Jardiance 25mg one a day  Actos 30 mg one a day.   Victoza 1.8  Lantus at night 14 units.  (INCREASE to 18)  (failed metformin)

## 2017-08-24 ENCOUNTER — OFFICE VISIT (OUTPATIENT)
Dept: MEDICAL GROUP | Facility: PHYSICIAN GROUP | Age: 62
End: 2017-08-24
Payer: COMMERCIAL

## 2017-08-24 VITALS
HEART RATE: 70 BPM | SYSTOLIC BLOOD PRESSURE: 108 MMHG | OXYGEN SATURATION: 94 % | DIASTOLIC BLOOD PRESSURE: 66 MMHG | TEMPERATURE: 98.1 F | WEIGHT: 165 LBS | HEIGHT: 65 IN | RESPIRATION RATE: 16 BRPM | BODY MASS INDEX: 27.49 KG/M2

## 2017-08-24 DIAGNOSIS — E53.8 VITAMIN B12 DEFICIENCY: ICD-10-CM

## 2017-08-24 DIAGNOSIS — I10 ESSENTIAL HYPERTENSION: Chronic | ICD-10-CM

## 2017-08-24 DIAGNOSIS — R79.89 ELEVATED SERUM CREATININE: ICD-10-CM

## 2017-08-24 PROCEDURE — 99214 OFFICE O/P EST MOD 30 MIN: CPT | Performed by: INTERNAL MEDICINE

## 2017-08-24 NOTE — MR AVS SNAPSHOT
"        Tara Cueva   2017 3:20 PM   Office Visit   MRN: [id number]    Department:  [facility]   Dept Phone:  [phone]    Description:  Female : 1955   Provider:  Christian Mota M.D.           Reason for Visit     Follow-Up           Allergies as of 2017     Allergen Noted Reactions    Sulfa Drugs 2012   Vomiting      You were diagnosed with     Essential hypertension   [5248279]       Vitamin B12 deficiency   [195724]       Elevated serum creatinine   [586194]         Vital Signs     Blood Pressure Pulse Temperature Respirations Height Weight    108/66 mmHg 70 36.7 °C (98.1 °F) 16 1.651 m (5' 5\") 74.844 kg (165 lb)    Body Mass Index Oxygen Saturation Last Menstrual Period Smoking Status          27.46 kg/m2 94% 2006 Former Smoker        Basic Information     Date Of Birth Sex Race Ethnicity Preferred Language    1955 Female White Non- English      Your appointments     Aug 25, 2017  4:00 PM   Established Patient with Anson Lee PA-C   [facility]    [address], [address]  [place] [address] [phone]           You will be receiving a confirmation call a few days before your appointment from our automated call confirmation system.            Oct 24, 2017  8:20 AM   Established Patient with Christian Mota M.D.   [facility]    [address] [address] [phone]           You will be receiving a confirmation call a few days before your appointment from our automated call confirmation system.              Problem List              ICD-10-CM Priority Class Noted - Resolved    Hyperlipidemia (Chronic) E78.5   2011 - Present    Vitamin D deficiency E55.9   2011 - Present    Hypertension (Chronic) I10   2011 - Present    GERD (gastroesophageal reflux disease) K21.9   2011 - Present    Gall bladder polyp K82.4   2011 - Present    Uncontrolled type 2 " diabetes mellitus without complication, with long-term current use of insulin (CMS-HCC) E11.65, Z79.4   3/30/2012 - Present    Hypokalemia E87.6   1/3/2014 - Present    Thumb pain M79.646   12/23/2016 - Present    Encounter for long-term (current) use of insulin (CMS-HCC) Z79.4   4/25/2017 - Present    Fatigue R53.83   7/20/2017 - Present    Vitamin B12 deficiency E53.8   8/24/2017 - Present    Elevated serum creatinine R79.89   8/24/2017 - Present      Health Maintenance        Date Due Completion Dates    IMM ZOSTER VACCINE 5/9/2014 ---    RETINAL SCREENING 7/10/2015 7/10/2014 (N/S), 4/21/2012 (N/S)    Override on 7/10/2014: (N/S)    Override on 4/21/2012: (N/S)    PAP SMEAR 2/7/2017 2/7/2014, 1/17/2014    IMM INFLUENZA (1) 9/1/2017 12/23/2016, 12/18/2015, 10/4/2013, 11/4/2011    DIABETES MONOFILAMENT / LE EXAM 12/23/2017 12/23/2016, 7/25/2014 (N/S), 8/11/2011 (N/S)    Override on 7/25/2014: (N/S)    Override on 8/11/2011: (N/S)    A1C SCREENING 1/21/2018 7/21/2017, 2/23/2017, 12/23/2016, 4/8/2016, 12/18/2015, 9/18/2015, 6/19/2015, 3/6/2015, 12/5/2014, 7/25/2014, 12/27/2013, 6/7/2013, 11/16/2012, 8/10/2012, 5/4/2012, 12/23/2011, 10/7/2011    MAMMOGRAM 3/1/2018 3/1/2017, 1/24/2014    URINE ACR / MICROALBUMIN 4/25/2018 4/25/2017, 12/23/2016, 2/4/2015, 7/25/2014, 6/7/2013, 12/23/2011, 5/13/2011    FASTING LIPID PROFILE 7/21/2018 7/21/2017, 12/23/2016, 9/17/2015, 2/4/2015, 7/25/2014, 6/7/2013, 8/10/2012, 12/23/2011, 10/7/2011, 5/13/2011    SERUM CREATININE 7/21/2018 7/21/2017, 4/25/2017, 12/23/2016, 9/17/2015, 2/4/2015, 12/5/2014, 7/25/2014, 2/22/2014, 12/27/2013, 6/7/2013, 11/16/2012, 8/10/2012, 5/4/2012, 12/23/2011, 10/7/2011, 5/13/2011    COLONOSCOPY 11/6/2019 11/6/2014    IMM DTaP/Tdap/Td Vaccine (2 - Td) 7/25/2024 7/25/2014            Current Immunizations     Influenza TIV (IM) 10/4/2013, 11/4/2011    Influenza Vaccine Quad Inj (Pf) 12/23/2016  8:12 AM    Influenza Vaccine Quad Inj (Preserved) 12/18/2015     Pneumococcal polysaccharide vaccine (PPSV-23) 1/3/2014    Tdap Vaccine 7/25/2014      Below and/or attached are the medications your provider expects you to take. Review all of your home medications and newly ordered medications with your provider and/or pharmacist. Follow medication instructions as directed by your provider and/or pharmacist. Please keep your medication list with you and share with your provider. Update the information when medications are discontinued, doses are changed, or new medications (including over-the-counter products) are added; and carry medication information at all times in the event of emergency situations     Allergies:  SULFA DRUGS - Vomiting               Medications  Valid as of: August 24, 2017 -  3:50 PM    Generic Name Brand Name Tablet Size Instructions for use    Aspirin (Tablet Delayed Response) ECOTRIN 81 MG Take 81 mg by mouth every day.        Atorvastatin Calcium (Tab) LIPITOR 80 MG Take 1 Tab by mouth every evening.        Blood Glucose Monitoring Suppl (Misc) Blood Glucose Monitoring Suppl Supplies Test strips order: Test strips for glucometer. Sig: use it once daily and prn ssx high or low sugar #100 RF x 3        Cholecalciferol (Tab) VITAMIN D 400 UNIT Take 400 Units by mouth every day.        Empagliflozin (Tab) JARDIANCE 25 MG Take 1 tablet by mouth every day.        Enalapril Maleate (Tab) VASOTEC 5 MG Take 1 Tab by mouth every day.        HydroCHLOROthiazide (Tab) HYDRODIURIL 25 MG Take 1 Tab by mouth every day.        Insulin Glargine (Solution Pen-injector) LANTUS 100 UNIT/ML Inject 30 Units as instructed 2 times a day.        Insulin Pen Needle (Misc) Insulin Pen Needle 32G X 4 MM 1 Applicator by Does not apply route every day. Using one needle tip with victoza per day        Liraglutide (Solution Pen-injector) VICTOZA 18 MG/3ML Inject 0.3 mL as instructed every day.        Omeprazole (CAPSULE DELAYED RELEASE) PRILOSEC 20 MG Take 1 Cap by mouth every day.  TAKE 1 CAP BY MOUTH EVERY DAY.        Pioglitazone HCl (Tab) ACTOS 30 MG Take 1 Tab by mouth every day.        .                 Medicines prescribed today were sent to:     [id number] - [place], [place] - [address] [place] [address]    [address] [place] [address]    Phone: [phone] Fax: [phone]    Open 24 Hours?: No    [facility] - [address]    [address] [address]    Phone: [phone] Fax: [phone]    Open 24 Hours?: No      Medication refill instructions:       If your prescription bottle indicates you have medication refills left, it is not necessary to call your provider’s office. Please contact your pharmacy and they will refill your medication.    If your prescription bottle indicates you do not have any refills left, you may request refills at any time through one of the following ways: The online [employer] system (except Urgent Care), by calling your provider’s office, or by asking your pharmacy to contact your provider’s office with a refill request. Medication refills are processed only during regular business hours and may not be available until the next business day. Your provider may request additional information or to have a follow-up visit with you prior to refilling your medication.   *Please Note: Medication refills are assigned a new Rx number when refilled electronically. Your pharmacy may indicate that no refills were authorized even though a new prescription for the same medication is available at the pharmacy. Please request the medicine by name with the pharmacy before contacting your provider for a refill.        Your To Do List     Future Labs/Procedures Complete By Expires    BASIC METABOLIC PANEL  As directed 8/24/2018    VITAMIN B12  As directed 8/24/2018         [employer] Access Code: Activation code not generated  Current [employer] Status: Active

## 2017-08-24 NOTE — PROGRESS NOTES
Subjective:   Tara Cueva is a 62 y.o. female here today for HTN, vitamin B12 deficiency, increased creatinine    Hypertension  Pt in on HCTZ 25 mg dialy, enalapril 5 mg daily for HTN. Spironolactone was discontinued at previous visit. Pt reports compliance with medication. BP is at goal per JNC 8 critieria today.     Denies chest pain, shortness of breath, headache, blurry vision    Vitamin B12 deficiency  Seen on labs with vitamin B12 of 193. Pt continues to have some fatigue.     Elevated serum creatinine  Pt had elevated creatinine seen on labs. Her eGFR was lower than baseline.        Current medicines (including changes today)  Current Outpatient Prescriptions   Medication Sig Dispense Refill   • VICTOZA 18 MG/3ML Solution Pen-injector injection Inject 0.3 mL as instructed every day. 3 PEN 6   • pioglitazone (ACTOS) 30 MG Tab Take 1 Tab by mouth every day. 30 Tab 11   • JARDIANCE 25 MG Tab Take 1 tablet by mouth every day. 30 Tab 11   • Insulin Pen Needle (NOVOFINE PLUS) 32G X 4 MM Misc 1 Applicator by Does not apply route every day. Using one needle tip with victoza per day 100 Each 3   • insulin glargine (LANTUS SOLOSTAR) 100 UNIT/ML Solution Pen-injector injection Inject 30 Units as instructed 2 times a day. 15 PEN 4   • enalapril (VASOTEC) 5 MG Tab Take 1 Tab by mouth every day. 90 Tab 3   • omeprazole (PRILOSEC) 20 MG delayed-release capsule Take 1 Cap by mouth every day. TAKE 1 CAP BY MOUTH EVERY DAY. 90 Cap 3   • hydrochlorothiazide (HYDRODIURIL) 25 MG Tab Take 1 Tab by mouth every day. 90 Tab 3   • atorvastatin (LIPITOR) 80 MG tablet Take 1 Tab by mouth every evening. 90 Tab 3   • Cholecalciferol (VITAMIN D) 400 UNIT TABS Take 400 Units by mouth every day.     • Blood Glucose Monitoring Suppl SUPPLIES MISC Test strips order: Test strips for glucometer. Sig: use it once daily and prn ssx high or low sugar #100 RF x 3 100 Each 3   • aspirin EC (ECOTRIN) 81 MG TBEC Take 81 mg by mouth every day.       No  "current facility-administered medications for this visit.     She  has a past medical history of Diabetes (5/12/2011); Hyperlipidemia (5/12/2011); Vitamin d deficiency (5/12/2011); Elevated liver enzymes (5/12/2011); Edema (5/12/2011); Hypertension (5/12/2011); GERD (gastroesophageal reflux disease) (5/12/2011); Skin lesion of face (5/12/2011); and Gall bladder polyp (8/12/2011).    ROS   No chest pain, no shortness of breath, no headache     Objective:     Blood pressure 108/66, pulse 70, temperature 36.7 °C (98.1 °F), resp. rate 16, height 1.651 m (5' 5\"), weight 74.844 kg (165 lb), last menstrual period 12/16/2006, SpO2 94 %. Body mass index is 27.46 kg/(m^2).   Physical Exam:  Constitutional: Alert & oriented, no acute distress  Eye: Conjunctiva clear, lids normal, no discharge  ENMT: Lips without lesions, normal external nose and ears  Neck: Trachea midline, no masses, no thyromegaly. No cervical or supraclavicular lymphadenopathy  Respiratory: Unlabored respiratory effort, lungs clear to auscultation, no wheezes, no ronchi  Cardiovascular: Normal S1, S2, no murmur,  Skin: Warm, dry, good turgor, no rashes in visible areas  Neuro: No overt focal neurologic deficits, normal gait  Psych: Normal mood and affect      Assessment and Plan:   The following treatment plan was discussed    1. Essential hypertension  Well controlled on current regimen and off of spironolactone. Pt counseled to take BP at home and bring in BP log to upcoming visit    2. Vitamin B12 deficiency  Pt will take OTC vitamin B12 at 1000 mcg daily. Will recheck lab in 2 months and follow up results  - VITAMIN B12; Future    3. Elevated serum creatinine  Seen on labs. Will recheck lab prior to 2 month follow up and if GFR remains low will consider further evaluation  - BASIC METABOLIC PANEL; Future      Followup: Return in about 3 months (around 11/24/2017).    Please note that this dictation was created using voice recognition software. I have " made every reasonable attempt to correct obvious errors, but I expect that there are errors of grammar and possibly content that I did not discover before finalizing the note.

## 2017-08-24 NOTE — ASSESSMENT & PLAN NOTE
Pt in on HCTZ 25 mg dialy, enalapril 5 mg daily for HTN. Spironolactone was discontinued at previous visit. Pt reports compliance with medication. BP is at goal per JNC 8 critieria today.     Denies chest pain, shortness of breath, headache, blurry vision

## 2017-08-25 ENCOUNTER — APPOINTMENT (OUTPATIENT)
Dept: ENDOCRINOLOGY | Facility: MEDICAL CENTER | Age: 62
End: 2017-08-25
Payer: COMMERCIAL

## 2017-09-08 ENCOUNTER — OFFICE VISIT (OUTPATIENT)
Dept: ENDOCRINOLOGY | Facility: MEDICAL CENTER | Age: 62
End: 2017-09-08
Payer: COMMERCIAL

## 2017-09-08 VITALS
HEIGHT: 65 IN | BODY MASS INDEX: 26.99 KG/M2 | HEART RATE: 77 BPM | OXYGEN SATURATION: 96 % | SYSTOLIC BLOOD PRESSURE: 100 MMHG | DIASTOLIC BLOOD PRESSURE: 72 MMHG | WEIGHT: 162 LBS

## 2017-09-08 DIAGNOSIS — Z79.4 ENCOUNTER FOR LONG-TERM (CURRENT) USE OF INSULIN (HCC): ICD-10-CM

## 2017-09-08 PROCEDURE — 99214 OFFICE O/P EST MOD 30 MIN: CPT | Performed by: PHYSICIAN ASSISTANT

## 2017-09-08 NOTE — PATIENT INSTRUCTIONS
Now on:  Jardiance 25mg one a day  Actos 30 mg one a day.   Victoza 1.8  Lantus at night 18 units. (INCREASE to 20)  (failed metformin)  Start Humalog 4 units before dinner

## 2017-09-08 NOTE — PROGRESS NOTES
Return to office Patient Consult Note  Referred by: Christian Mota M.D.    Reason for consult: Diabetes Management Type 2    HPI:  Tara Cueva is a 62 y.o. old patient who is seeing us today for diabetes care.  This is a pleasant patient with diabetes and I appreciate the opportunity to participate in the care of this patient.    BG Diary:9/8/2017  In the AM:  138, 118, 149, 138, 123  Before meal:  Before Bed: 173, 140, 153, 143, 171     Labs of 2/23/17 HbA1c is 10.7, GFR >60,    In 12/2016 HbA1c was 10.2  HbA1c on 7/21/17 was 8.0     4/25/17 labs:  C-peptide 8.5, CALVIN-65 is at 9.8 (this is a little elevated), Islet cell and Insulin antibodies are negative. Her BMI is 29 and her C-peptide is very high.  She is a T2 diabetic and not a T1.5.     BG Diary:7/24/2017  In the AM: 150, 179, 135, 156, 142, 120, 144  Before meal:  Before Bed: 162, 142, 179, 162, 220, 206, 213      BG Diary:6/26/2017  In the AM: 142, 139, 137, 134, 141, 138, 138, 125  Before meal:  Before Bed: 256, 202, 164, 202, 178, 253, 184,    (she is eating snacks between dinner and bed sugar)        BG Diary:5/9/2017  In the AM: 153, 162, 199, 150, 142, 122, 150, 139  Before meal:  Before Bed: 185, 242, 174, 206, 158, 162      BG Diary:5/2/2017  In the AM: 221, 207, 232, 211, 176, 176, 190, 153  Before meal: 214, 336, 179, 227    Before Bed: 322, 332, 268, 241, 270, 347, 243      BG Diary:4/25/2017  In the AM: 203, 226, 224, 213, 179, 205, 147, 143, 161, 357, 193  Just before meal:  Just before Bed:     Weight: on 4/25/17 her weight was 179pounds, today it is 162pounds      1. Uncontrolled type 2 diabetes mellitus without complication, with long-term current use of insulin (CMS-[facility])    Now on:  Jardiance 25mg one a day  Actos 30 mg one a day.   Victoza 1.8  Lantus at night 18 units.  (failed metformin)    2. Encounter for long-term (current) use of insulin (CMS-[facility])      ROS:   Constitutional: No night sweats.  Eyes:  No visual changes.  Cardiac: No chest  pain, No palpitations or racing heart rate.  Resp: No shortness of breath, No cough,   Gi: No Diarrhea    All other systems were reviewed and were/are negative.  The ROS was revised/revisited during this office visit from the patients first office visit with me on 4/25/17 Please review the full ROS during the first office visit.    Past Medical History:  Patient Active Problem List    Diagnosis Date Noted   • Vitamin B12 deficiency 08/24/2017   • Elevated serum creatinine 08/24/2017   • Fatigue 07/20/2017   • Encounter for long-term (current) use of insulin (CMS-HCC) 04/25/2017   • Thumb pain 12/23/2016   • Hypokalemia 01/03/2014   • Uncontrolled type 2 diabetes mellitus without complication, with long-term current use of insulin (CMS-HCC) 03/30/2012   • Gall bladder polyp 08/12/2011   • Hyperlipidemia 05/12/2011   • Vitamin D deficiency 05/12/2011   • Hypertension 05/12/2011   • GERD (gastroesophageal reflux disease) 05/12/2011       Past Surgical History:  Past Surgical History:   Procedure Laterality Date   • OTHER ORTHOPEDIC SURGERY      right arm ORIF   • PRIMARY C SECTION     • TUBAL COAGULATION LAPAROSCOPIC BILATERAL         Allergies:  Sulfa drugs    Social History:  Social History     Social History   • Marital status:      Spouse name: N/A   • Number of children: N/A   • Years of education: N/A     Occupational History   • Not on file.     Social History Main Topics   • Smoking status: Former Smoker     Packs/day: 1.00     Years: 40.00     Types: Cigarettes     Quit date: 1/1/1990   • Smokeless tobacco: Never Used   • Alcohol use No   • Drug use: No   • Sexual activity: Not on file     Other Topics Concern   • Not on file     Social History Narrative   • No narrative on file       Family History:  Family History   Problem Relation Age of Onset   • Cancer Maternal Grandmother      lung    • Cancer Mother      breast       Medications:    Current Outpatient Prescriptions:   •  VICTOZA 18 MG/3ML  "Solution Pen-injector injection, Inject 0.3 mL as instructed every day., Disp: 3 PEN, Rfl: 6  •  pioglitazone (ACTOS) 30 MG Tab, Take 1 Tab by mouth every day., Disp: 30 Tab, Rfl: 11  •  JARDIANCE 25 MG Tab, Take 1 tablet by mouth every day., Disp: 30 Tab, Rfl: 11  •  Insulin Pen Needle (NOVOFINE PLUS) 32G X 4 MM Misc, 1 Applicator by Does not apply route every day. Using one needle tip with victoza per day, Disp: 100 Each, Rfl: 3  •  insulin glargine (LANTUS SOLOSTAR) 100 UNIT/ML Solution Pen-injector injection, Inject 30 Units as instructed 2 times a day., Disp: 15 PEN, Rfl: 4  •  enalapril (VASOTEC) 5 MG Tab, Take 1 Tab by mouth every day., Disp: 90 Tab, Rfl: 3  •  omeprazole (PRILOSEC) 20 MG delayed-release capsule, Take 1 Cap by mouth every day. TAKE 1 CAP BY MOUTH EVERY DAY., Disp: 90 Cap, Rfl: 3  •  hydrochlorothiazide (HYDRODIURIL) 25 MG Tab, Take 1 Tab by mouth every day., Disp: 90 Tab, Rfl: 3  •  atorvastatin (LIPITOR) 80 MG tablet, Take 1 Tab by mouth every evening., Disp: 90 Tab, Rfl: 3  •  Cholecalciferol (VITAMIN D) 400 UNIT TABS, Take 400 Units by mouth every day., Disp: , Rfl:   •  Blood Glucose Monitoring Suppl SUPPLIES MISC, Test strips order: Test strips for glucometer. Sig: use it once daily and prn ssx high or low sugar #100 RF x 3, Disp: 100 Each, Rfl: 3  •  aspirin EC (ECOTRIN) 81 MG TBEC, Take 81 mg by mouth every day., Disp: , Rfl:       Physical Examination:   Vital signs: /72   Pulse 77   Ht 1.651 m (5' 5\")   Wt 73.5 kg (162 lb)   LMP 12/16/2006   SpO2 96%   BMI 26.96 kg/m²   General: No distress, cooperative, well dressed and well nourished.   Eyes: No scleral icterus or discharge, No hyposphagma  ENMT: Normal on external inspection of nose, lips, No nasal drainage   Neck: No abnormal masses on inspection  Resp: Normal effort, Bilateral clear to auscultation, No wheezing, No rales  CVS: Regular rate and rhythm, S1 S2 normal, No murmur. No gallop  Extremities: No edema " bilateral extremities  Neuro: Alert and oriented  Skin: No rash, No Ulcers  Psych: Normal mood and affect      Assessment and Plan:    1. Uncontrolled type 2 diabetes mellitus without complication, with long-term current use of insulin (CMS-HCC)    Now on:  Jardiance 25mg one a day  Actos 30 mg one a day.   Victoza 1.8  Lantus at night 18 units. (INCREASE to 20)  (failed metformin)  Start Humalog 4 units before dinner    2. Encounter for long-term (current) use of insulin (CMS-HCC)  Is on a high risk medication Insulin and we will continue to follow no hypoglycemic events since last visit    Return in about 2 weeks (around 9/22/2017).    Blood glucose log: Check BG in the morning when wake up, before lunch or dinner and before bed.  So three times a day.  Always bring BG diary to the next office visit.     Thank you kindly for allowing me to participate in the diabetes care plan for this patient.    Edy Lee PA-C, BC-ADM  Board Certified - Advanced Diabetes Management  09/08/17    CC:   Christian Mota M.D.

## 2017-09-21 ENCOUNTER — OFFICE VISIT (OUTPATIENT)
Dept: ENDOCRINOLOGY | Facility: MEDICAL CENTER | Age: 62
End: 2017-09-21
Payer: COMMERCIAL

## 2017-09-21 VITALS
HEIGHT: 65 IN | SYSTOLIC BLOOD PRESSURE: 100 MMHG | WEIGHT: 165 LBS | BODY MASS INDEX: 27.49 KG/M2 | OXYGEN SATURATION: 99 % | HEART RATE: 93 BPM | DIASTOLIC BLOOD PRESSURE: 76 MMHG

## 2017-09-21 DIAGNOSIS — Z79.4 ENCOUNTER FOR LONG-TERM (CURRENT) USE OF INSULIN (HCC): ICD-10-CM

## 2017-09-21 PROCEDURE — 99214 OFFICE O/P EST MOD 30 MIN: CPT | Performed by: PHYSICIAN ASSISTANT

## 2017-09-21 NOTE — PROGRESS NOTES
Return to office Patient Consult Note  Referred by: Christian Mota M.D.    Reason for consult: Diabetes Management Type 2    HPI:  Tara Cueva is a 62 y.o. old patient who is seeing us today for diabetes care.  This is a pleasant patient with diabetes and I appreciate the opportunity to participate in the care of this patient.    BG Diary:9/21/2017  In the AM: 147, 113, 101, 105, 132, 107, 114, 170  Before meal:  Before Bed: 124, 149, 161, 140, 117, 177, 151     BG Diary:9/8/2017  In the AM:  138, 118, 149, 138, 123  Before meal:  Before Bed: 173, 140, 153, 143, 171     Labs of 2/23/17 HbA1c is 10.7, GFR >60,    In 12/2016 HbA1c was 10.2  HbA1c on 7/21/17 was 8.0     4/25/17 labs:  C-peptide 8.5, CALVIN-65 is at 9.8 (this is a little elevated), Islet cell and Insulin antibodies are negative. Her BMI is 29 and her C-peptide is very high.  She is a T2 diabetic and not a T1.5.     BG Diary:7/24/2017  In the AM: 150, 179, 135, 156, 142, 120, 144  Before meal:  Before Bed: 162, 142, 179, 162, 220, 206, 213      BG Diary:6/26/2017  In the AM: 142, 139, 137, 134, 141, 138, 138, 125  Before meal:  Before Bed: 256, 202, 164, 202, 178, 253, 184,    (she is eating snacks between dinner and bed sugar)        BG Diary:5/9/2017  In the AM: 153, 162, 199, 150, 142, 122, 150, 139  Before meal:  Before Bed: 185, 242, 174, 206, 158, 162      BG Diary:5/2/2017  In the AM: 221, 207, 232, 211, 176, 176, 190, 153  Before meal: 214, 336, 179, 227    Before Bed: 322, 332, 268, 241, 270, 347, 243      BG Diary:4/25/2017  In the AM: 203, 226, 224, 213, 179, 205, 147, 143, 161, 357, 193  Just before meal:  Just before Bed:     Weight: on 4/25/17 her weight was 179pounds, today it is 162pounds      1. Uncontrolled type 2 diabetes mellitus without complication, with long-term current use of insulin (CMS-[facility])    Now on:  Jardiance 25mg one a day  Actos 30 mg one a day.   Victoza 1.8  Lantus at night 20 units.  (failed metformin)  Humalog 4 units  before dinner    2. Encounter for long-term (current) use of insulin (CMS-HCC)  Is on a high risk medication Insulin and we will continue to follow no hypoglycemic events since last visit          ROS:   Constitutional: No night sweats.  Eyes:  No visual changes.  Cardiac: No chest pain, No palpitations or racing heart rate.  Resp: No shortness of breath, No cough,   Gi: No Diarrhea    All other systems were reviewed and were/are negative.  The ROS was revised/revisited during this office visit from the patients first office visit with me on 4/25/17 Please review the full ROS during the first office visit.    Past Medical History:  Patient Active Problem List    Diagnosis Date Noted   • Vitamin B12 deficiency 08/24/2017   • Elevated serum creatinine 08/24/2017   • Fatigue 07/20/2017   • Encounter for long-term (current) use of insulin (CMS-HCC) 04/25/2017   • Thumb pain 12/23/2016   • Hypokalemia 01/03/2014   • Uncontrolled type 2 diabetes mellitus without complication, with long-term current use of insulin (CMS-HCC) 03/30/2012   • Gall bladder polyp 08/12/2011   • Hyperlipidemia 05/12/2011   • Vitamin D deficiency 05/12/2011   • Hypertension 05/12/2011   • GERD (gastroesophageal reflux disease) 05/12/2011       Past Surgical History:  Past Surgical History:   Procedure Laterality Date   • OTHER ORTHOPEDIC SURGERY      right arm ORIF   • PRIMARY C SECTION     • TUBAL COAGULATION LAPAROSCOPIC BILATERAL         Allergies:  Sulfa drugs    Social History:  Social History     Social History   • Marital status:      Spouse name: N/A   • Number of children: N/A   • Years of education: N/A     Occupational History   • Not on file.     Social History Main Topics   • Smoking status: Former Smoker     Packs/day: 1.00     Years: 40.00     Types: Cigarettes     Quit date: 1/1/1990   • Smokeless tobacco: Never Used   • Alcohol use No   • Drug use: No   • Sexual activity: Not on file     Other Topics Concern   • Not on file  "    Social History Narrative   • No narrative on file       Family History:  Family History   Problem Relation Age of Onset   • Cancer Maternal Grandmother      lung    • Cancer Mother      breast       Medications:    Current Outpatient Prescriptions:   •  VICTOZA 18 MG/3ML Solution Pen-injector injection, Inject 0.3 mL as instructed every day., Disp: 3 PEN, Rfl: 6  •  pioglitazone (ACTOS) 30 MG Tab, Take 1 Tab by mouth every day., Disp: 30 Tab, Rfl: 11  •  JARDIANCE 25 MG Tab, Take 1 tablet by mouth every day., Disp: 30 Tab, Rfl: 11  •  Insulin Pen Needle (NOVOFINE PLUS) 32G X 4 MM Misc, 1 Applicator by Does not apply route every day. Using one needle tip with victoza per day, Disp: 100 Each, Rfl: 3  •  insulin glargine (LANTUS SOLOSTAR) 100 UNIT/ML Solution Pen-injector injection, Inject 30 Units as instructed 2 times a day., Disp: 15 PEN, Rfl: 4  •  enalapril (VASOTEC) 5 MG Tab, Take 1 Tab by mouth every day., Disp: 90 Tab, Rfl: 3  •  omeprazole (PRILOSEC) 20 MG delayed-release capsule, Take 1 Cap by mouth every day. TAKE 1 CAP BY MOUTH EVERY DAY., Disp: 90 Cap, Rfl: 3  •  hydrochlorothiazide (HYDRODIURIL) 25 MG Tab, Take 1 Tab by mouth every day., Disp: 90 Tab, Rfl: 3  •  atorvastatin (LIPITOR) 80 MG tablet, Take 1 Tab by mouth every evening., Disp: 90 Tab, Rfl: 3  •  Cholecalciferol (VITAMIN D) 400 UNIT TABS, Take 400 Units by mouth every day., Disp: , Rfl:   •  Blood Glucose Monitoring Suppl SUPPLIES MISC, Test strips order: Test strips for glucometer. Sig: use it once daily and prn ssx high or low sugar #100 RF x 3, Disp: 100 Each, Rfl: 3  •  aspirin EC (ECOTRIN) 81 MG TBEC, Take 81 mg by mouth every day., Disp: , Rfl:         Physical Examination:   Vital signs: /76   Pulse 93   Ht 1.651 m (5' 5\")   Wt 74.8 kg (165 lb)   LMP 12/16/2006   SpO2 99%   BMI 27.46 kg/m²   General: No distress, cooperative, well dressed and well nourished.   Eyes: No scleral icterus or discharge, No hyposphagma  ENMT: " Normal on external inspection of nose, lips, No nasal drainage   Neck: No abnormal masses on inspection  Resp: Normal effort, Bilateral clear to auscultation, No wheezing, No rales  CVS: Regular rate and rhythm, S1 S2 normal, No murmur. No gallop  Extremities: No edema bilateral extremities  Neuro: Alert and oriented  Skin: No rash, No Ulcers  Psych: Normal mood and affect      Assessment and Plan:    1. Uncontrolled type 2 diabetes mellitus without complication, with long-term current use of insulin (CMS-HCC)    Now on:  Jardiance 25mg one a day  Actos 30 mg one a day.   Victoza 1.8  Lantus at night 20 units.  (failed metformin)  Humalog 4 units before dinner     The total time spent seeing this patient today face to face in consultation, and formulating an action plan for this visit was greater than 25 minutes. > Than 50% of this time was spent counseling, discussing problems documented above and below, coordinating care and answering questions by the physician assistant.  We developed a diabetes care plan for this patient today.    She is doing very well      2. Encounter for long-term (current) use of insulin (CMS-HCC)  Is on a high risk medication Insulin and we will continue to follow no hypoglycemic events since last visit    Return in about 2 months (around 11/21/2017).    Blood glucose log: Check BG in the morning when wake up, before lunch or dinner and before bed.  So three times a day.  Always bring BG diary to the next office visit.     Thank you kindly for allowing me to participate in the diabetes care plan for this patient.    Edy Lee PA-C, BC-ADM  Board Certified - Advanced Diabetes Management  09/21/17    CC:   Christian Mota M.D.

## 2017-10-19 ENCOUNTER — HOSPITAL ENCOUNTER (OUTPATIENT)
Dept: LAB | Facility: MEDICAL CENTER | Age: 62
End: 2017-10-19
Attending: INTERNAL MEDICINE
Payer: COMMERCIAL

## 2017-10-19 DIAGNOSIS — E53.8 VITAMIN B12 DEFICIENCY: ICD-10-CM

## 2017-10-19 DIAGNOSIS — R79.89 ELEVATED SERUM CREATININE: ICD-10-CM

## 2017-10-19 LAB
ANION GAP SERPL CALC-SCNC: 9 MMOL/L (ref 0–11.9)
BUN SERPL-MCNC: 18 MG/DL (ref 8–22)
CALCIUM SERPL-MCNC: 9.9 MG/DL (ref 8.5–10.5)
CHLORIDE SERPL-SCNC: 99 MMOL/L (ref 96–112)
CO2 SERPL-SCNC: 29 MMOL/L (ref 20–33)
CREAT SERPL-MCNC: 1.46 MG/DL (ref 0.5–1.4)
GFR SERPL CREATININE-BSD FRML MDRD: 36 ML/MIN/1.73 M 2
GLUCOSE SERPL-MCNC: 104 MG/DL (ref 65–99)
POTASSIUM SERPL-SCNC: 3.4 MMOL/L (ref 3.6–5.5)
SODIUM SERPL-SCNC: 137 MMOL/L (ref 135–145)
VIT B12 SERPL-MCNC: 746 PG/ML (ref 211–911)

## 2017-10-19 PROCEDURE — 80048 BASIC METABOLIC PNL TOTAL CA: CPT

## 2017-10-19 PROCEDURE — 82607 VITAMIN B-12: CPT

## 2017-10-19 PROCEDURE — 36415 COLL VENOUS BLD VENIPUNCTURE: CPT

## 2017-10-24 ENCOUNTER — OFFICE VISIT (OUTPATIENT)
Dept: MEDICAL GROUP | Facility: PHYSICIAN GROUP | Age: 62
End: 2017-10-24
Payer: COMMERCIAL

## 2017-10-24 VITALS
SYSTOLIC BLOOD PRESSURE: 100 MMHG | RESPIRATION RATE: 12 BRPM | BODY MASS INDEX: 27.32 KG/M2 | DIASTOLIC BLOOD PRESSURE: 80 MMHG | WEIGHT: 164 LBS | TEMPERATURE: 97.3 F | OXYGEN SATURATION: 97 % | HEART RATE: 80 BPM | HEIGHT: 65 IN

## 2017-10-24 DIAGNOSIS — Z23 NEED FOR VACCINATION: ICD-10-CM

## 2017-10-24 DIAGNOSIS — I10 ESSENTIAL HYPERTENSION: Chronic | ICD-10-CM

## 2017-10-24 DIAGNOSIS — N18.30 STAGE 3 CHRONIC KIDNEY DISEASE (HCC): ICD-10-CM

## 2017-10-24 DIAGNOSIS — E87.6 HYPOKALEMIA: ICD-10-CM

## 2017-10-24 PROCEDURE — 90471 IMMUNIZATION ADMIN: CPT | Performed by: INTERNAL MEDICINE

## 2017-10-24 PROCEDURE — 90686 IIV4 VACC NO PRSV 0.5 ML IM: CPT | Performed by: INTERNAL MEDICINE

## 2017-10-24 PROCEDURE — 99214 OFFICE O/P EST MOD 30 MIN: CPT | Mod: 25 | Performed by: INTERNAL MEDICINE

## 2017-10-24 RX ORDER — HYDROCHLOROTHIAZIDE 12.5 MG/1
12.5 TABLET ORAL DAILY
Qty: 30 TAB | Refills: 1 | Status: SHIPPED | OUTPATIENT
Start: 2017-10-24 | End: 2017-11-27 | Stop reason: SDUPTHER

## 2017-10-24 NOTE — ASSESSMENT & PLAN NOTE
Pt in on HCTZ 25 mg daily and enalapril 5 mg daily for HTN. Pt reports compliance with medication. BP is at goal per JNC 8 critieria today. She does not check her BP at home.     Denies chest pain, shortness of breath, headache, blurry vision

## 2017-10-24 NOTE — PROGRESS NOTES
Subjective:   Tara Cueva is a 62 y.o. female here today for HTN, CKD, hypokalemia    Hypertension  Pt in on HCTZ 25 mg daily and enalapril 5 mg daily for HTN. Pt reports compliance with medication. BP is at goal per JNC 8 critieria today. She does not check her BP at home.     Denies chest pain, shortness of breath, headache, blurry vision      Stage 3 chronic kidney disease  Pt has stage 3 CKD seen on labs. She denies symptoms of decreased urine output, fevers/chills, dysuria.     Hypokalemia  Seen on labs.        Current medicines (including changes today)  Current Outpatient Prescriptions   Medication Sig Dispense Refill   • hydrochlorothiazide (HYDRODIURIL) 12.5 MG tablet Take 1 Tab by mouth every day. 30 Tab 1   • VICTOZA 18 MG/3ML Solution Pen-injector injection Inject 0.3 mL as instructed every day. 3 PEN 6   • pioglitazone (ACTOS) 30 MG Tab Take 1 Tab by mouth every day. 30 Tab 11   • JARDIANCE 25 MG Tab Take 1 tablet by mouth every day. 30 Tab 11   • Insulin Pen Needle (NOVOFINE PLUS) 32G X 4 MM Misc 1 Applicator by Does not apply route every day. Using one needle tip with victoza per day 100 Each 3   • insulin glargine (LANTUS SOLOSTAR) 100 UNIT/ML Solution Pen-injector injection Inject 30 Units as instructed 2 times a day. 15 PEN 4   • enalapril (VASOTEC) 5 MG Tab Take 1 Tab by mouth every day. 90 Tab 3   • omeprazole (PRILOSEC) 20 MG delayed-release capsule Take 1 Cap by mouth every day. TAKE 1 CAP BY MOUTH EVERY DAY. 90 Cap 3   • atorvastatin (LIPITOR) 80 MG tablet Take 1 Tab by mouth every evening. 90 Tab 3   • Cholecalciferol (VITAMIN D) 400 UNIT TABS Take 400 Units by mouth every day.     • Blood Glucose Monitoring Suppl SUPPLIES MISC Test strips order: Test strips for glucometer. Sig: use it once daily and prn ssx high or low sugar #100 RF x 3 100 Each 3   • aspirin EC (ECOTRIN) 81 MG TBEC Take 81 mg by mouth every day.       No current facility-administered medications for this visit.      She   "has a past medical history of Diabetes (5/12/2011); Edema (5/12/2011); Elevated liver enzymes (5/12/2011); Gall bladder polyp (8/12/2011); GERD (gastroesophageal reflux disease) (5/12/2011); Hyperlipidemia (5/12/2011); Hypertension (5/12/2011); Skin lesion of face (5/12/2011); and Vitamin d deficiency (5/12/2011).    ROS   No chest pain, no shortness of breath, no headache       Objective:     Blood pressure 100/80, pulse 80, temperature 36.3 °C (97.3 °F), resp. rate 12, height 1.651 m (5' 5\"), weight 74.4 kg (164 lb), last menstrual period 12/16/2006, SpO2 97 %. Body mass index is 27.29 kg/m².   Physical Exam:  Constitutional: Alert & oriented, no acute distress  Eye: Conjunctiva clear, lids normal, no discharge  ENMT: Lips without lesions, normal external nose and ears  Neck: Trachea midline, no masses, no thyromegaly. No cervical or supraclavicular lymphadenopathy  Respiratory: Unlabored respiratory effort, lungs clear to auscultation, no wheezes, no ronchi  Cardiovascular: Normal S1, S2, no murmur  Skin: Warm, dry, good turgor, no rashes in visible areas  Neuro: No overt focal neurologic deficits  Psych: Normal mood and affect      Assessment and Plan:   The following treatment plan was discussed    1. Stage 3 chronic kidney disease  Seen on labs. Etiology possibly due to DM but this has been better controlled. Will refer to nephrology for further evaluation  - BASIC METABOLIC PANEL; Future  - REFERRAL TO NEPHROLOGY    2. Essential hypertension  Blood pressure remains very well controlled. Given this and the patient had potassium of 3.4 we'll decrease hydrochlorothiazide to 12.5 mg daily. Will recheck BMP in 2-4 weeks to assess potassium response to medication change and outpatient follow-up in 6-8 weeks to recheck blood pressure.  - hydrochlorothiazide (HYDRODIURIL) 12.5 MG tablet; Take 1 Tab by mouth every day.  Dispense: 30 Tab; Refill: 1    3. Hypokalemia  As above    4. Need for vaccination  - INFLUENZA " VACCINE QUAD INJ >3Y(PF)      Followup: Return in about 6 weeks (around 12/5/2017).    Please note that this dictation was created using voice recognition software. I have made every reasonable attempt to correct obvious errors, but I expect that there are errors of grammar and possibly content that I did not discover before finalizing the note.

## 2017-10-24 NOTE — ASSESSMENT & PLAN NOTE
Pt has stage 3 CKD seen on labs. She denies symptoms of decreased urine output, fevers/chills, dysuria.

## 2017-11-21 ENCOUNTER — OFFICE VISIT (OUTPATIENT)
Dept: ENDOCRINOLOGY | Facility: MEDICAL CENTER | Age: 62
End: 2017-11-21
Payer: COMMERCIAL

## 2017-11-21 VITALS
SYSTOLIC BLOOD PRESSURE: 96 MMHG | DIASTOLIC BLOOD PRESSURE: 62 MMHG | HEART RATE: 78 BPM | HEIGHT: 65 IN | BODY MASS INDEX: 27.82 KG/M2 | OXYGEN SATURATION: 97 % | WEIGHT: 167 LBS

## 2017-11-21 LAB
HBA1C MFR BLD: 6.4 % (ref ?–5.8)
INT CON NEG: NORMAL
INT CON POS: NORMAL

## 2017-11-21 PROCEDURE — 99213 OFFICE O/P EST LOW 20 MIN: CPT | Performed by: PHYSICIAN ASSISTANT

## 2017-11-21 PROCEDURE — 83036 HEMOGLOBIN GLYCOSYLATED A1C: CPT | Performed by: PHYSICIAN ASSISTANT

## 2017-11-22 ENCOUNTER — HOSPITAL ENCOUNTER (OUTPATIENT)
Dept: LAB | Facility: MEDICAL CENTER | Age: 62
End: 2017-11-22
Attending: INTERNAL MEDICINE
Payer: COMMERCIAL

## 2017-11-22 DIAGNOSIS — N18.30 STAGE 3 CHRONIC KIDNEY DISEASE (HCC): ICD-10-CM

## 2017-11-22 LAB
ANION GAP SERPL CALC-SCNC: 9 MMOL/L (ref 0–11.9)
BUN SERPL-MCNC: 18 MG/DL (ref 8–22)
CALCIUM SERPL-MCNC: 10 MG/DL (ref 8.5–10.5)
CHLORIDE SERPL-SCNC: 102 MMOL/L (ref 96–112)
CO2 SERPL-SCNC: 27 MMOL/L (ref 20–33)
CREAT SERPL-MCNC: 0.86 MG/DL (ref 0.5–1.4)
GFR SERPL CREATININE-BSD FRML MDRD: >60 ML/MIN/1.73 M 2
GLUCOSE SERPL-MCNC: 94 MG/DL (ref 65–99)
POTASSIUM SERPL-SCNC: 3.6 MMOL/L (ref 3.6–5.5)
SODIUM SERPL-SCNC: 138 MMOL/L (ref 135–145)

## 2017-11-22 PROCEDURE — 80048 BASIC METABOLIC PNL TOTAL CA: CPT

## 2017-11-22 PROCEDURE — 36415 COLL VENOUS BLD VENIPUNCTURE: CPT

## 2017-11-22 NOTE — PROGRESS NOTES
Return to office Patient Consult Note  Referred by: Christian Mota M.D.    Reason for consult: Diabetes Management Type 2    HPI:  Tara Cueva is a 62 y.o. old patient who is seeing us today for diabetes care.  This is a pleasant patient with diabetes and I appreciate the opportunity to participate in the care of this patient.    BG Diary:11/21/2017  In the AM: 110, 103, 120, 112, 106, 95, 141, 123  Before meal:  Before Bed: 125, 107, 135, 148, 112, 163, 168      BG Diary:9/21/2017  In the AM: 147, 113, 101, 105, 132, 107, 114, 170  Before meal:  Before Bed: 124, 149, 161, 140, 117, 177, 151     BG Diary:9/8/2017  In the AM:  138, 118, 149, 138, 123  Before meal:  Before Bed: 173, 140, 153, 143, 171     HbA1c on 11/21/17    GFR 36  Labs of 2/23/17 HbA1c is 10.7, GFR >60,    In 12/2016 HbA1c was 10.2  HbA1c on 7/21/17 was 8.0     4/25/17 labs:  C-peptide 8.5, CALVIN-65 is at 9.8 (this is a little elevated), Islet cell and Insulin antibodies are negative. Her BMI is 29 and her C-peptide is very high.  She is a T2 diabetic and not a T1.5.     BG Diary:7/24/2017  In the AM: 150, 179, 135, 156, 142, 120, 144  Before meal:  Before Bed: 162, 142, 179, 162, 220, 206, 213      BG Diary:6/26/2017  In the AM: 142, 139, 137, 134, 141, 138, 138, 125  Before meal:  Before Bed: 256, 202, 164, 202, 178, 253, 184,    (she is eating snacks between dinner and bed sugar)    Weight: on 4/25/17 her weight was 179pounds, today it is 162pounds      1. Uncontrolled type 2 diabetes mellitus without complication, with long-term current use of insulin (CMS-[facility])    Now on:  Jardiance 25mg one a day  Actos 30 mg one a day.   Victoza 1.8  Lantus at night 20 units.  (failed metformin)  Humalog 4 units before dinner    ROS:   Constitutional: No night sweats.  Eyes:  No visual changes.  Cardiac: No chest pain, No palpitations or racing heart rate.  Resp: No shortness of breath, No cough,   Gi: No Diarrhea    All other systems were reviewed and were/are  negative.  The ROS was revised/revisited during this office visit from the patients first office visit with me on 4/25/17Please review the full ROS during the first office visit.    Past Medical History:  Patient Active Problem List    Diagnosis Date Noted   • Stage 3 chronic kidney disease 10/24/2017   • Vitamin B12 deficiency 08/24/2017   • Elevated serum creatinine 08/24/2017   • Fatigue 07/20/2017   • Encounter for long-term (current) use of insulin (CMS-HCC) 04/25/2017   • Thumb pain 12/23/2016   • Hypokalemia 01/03/2014   • Uncontrolled type 2 diabetes mellitus without complication, with long-term current use of insulin (CMS-HCC) 03/30/2012   • Gall bladder polyp 08/12/2011   • Hyperlipidemia 05/12/2011   • Vitamin D deficiency 05/12/2011   • Hypertension 05/12/2011   • GERD (gastroesophageal reflux disease) 05/12/2011       Past Surgical History:  Past Surgical History:   Procedure Laterality Date   • OTHER ORTHOPEDIC SURGERY      right arm ORIF   • PRIMARY C SECTION     • TUBAL COAGULATION LAPAROSCOPIC BILATERAL         Allergies:  Sulfa drugs    Social History:  Social History     Social History   • Marital status:      Spouse name: N/A   • Number of children: N/A   • Years of education: N/A     Occupational History   • Not on file.     Social History Main Topics   • Smoking status: Former Smoker     Packs/day: 1.00     Years: 40.00     Types: Cigarettes     Quit date: 1/1/1990   • Smokeless tobacco: Never Used   • Alcohol use No   • Drug use: No   • Sexual activity: Not on file     Other Topics Concern   • Not on file     Social History Narrative   • No narrative on file       Family History:  Family History   Problem Relation Age of Onset   • Cancer Maternal Grandmother      lung    • Cancer Mother      breast       Medications:    Current Outpatient Prescriptions:   •  Empagliflozin 10 MG Tab, Take 1 tablet by mouth every morning with breakfast., Disp: 30 Tab, Rfl: 1  •  hydrochlorothiazide  "(HYDRODIURIL) 12.5 MG tablet, Take 1 Tab by mouth every day., Disp: 30 Tab, Rfl: 1  •  VICTOZA 18 MG/3ML Solution Pen-injector injection, Inject 0.3 mL as instructed every day., Disp: 3 PEN, Rfl: 6  •  pioglitazone (ACTOS) 30 MG Tab, Take 1 Tab by mouth every day., Disp: 30 Tab, Rfl: 11  •  Insulin Pen Needle (NOVOFINE PLUS) 32G X 4 MM Misc, 1 Applicator by Does not apply route every day. Using one needle tip with victoza per day, Disp: 100 Each, Rfl: 3  •  insulin glargine (LANTUS SOLOSTAR) 100 UNIT/ML Solution Pen-injector injection, Inject 30 Units as instructed 2 times a day., Disp: 15 PEN, Rfl: 4  •  enalapril (VASOTEC) 5 MG Tab, Take 1 Tab by mouth every day., Disp: 90 Tab, Rfl: 3  •  omeprazole (PRILOSEC) 20 MG delayed-release capsule, Take 1 Cap by mouth every day. TAKE 1 CAP BY MOUTH EVERY DAY., Disp: 90 Cap, Rfl: 3  •  atorvastatin (LIPITOR) 80 MG tablet, Take 1 Tab by mouth every evening., Disp: 90 Tab, Rfl: 3  •  Cholecalciferol (VITAMIN D) 400 UNIT TABS, Take 400 Units by mouth every day., Disp: , Rfl:   •  Blood Glucose Monitoring Suppl SUPPLIES MISC, Test strips order: Test strips for glucometer. Sig: use it once daily and prn ssx high or low sugar #100 RF x 3, Disp: 100 Each, Rfl: 3  •  aspirin EC (ECOTRIN) 81 MG TBEC, Take 81 mg by mouth every day., Disp: , Rfl:         Physical Examination:   Vital signs: BP (!) 96/62   Pulse 78   Ht 1.651 m (5' 5\")   Wt 75.8 kg (167 lb)   LMP 12/16/2006   SpO2 97%   BMI 27.79 kg/m²   General: No distress, cooperative, well dressed and well nourished.   Eyes: No scleral icterus or discharge, No hyposphagma  ENMT: Normal on external inspection of nose, lips, No nasal drainage   Neck: No abnormal masses on inspection  Resp: Normal effort, Bilateral clear to auscultation, No wheezing, No rales  CVS: Regular rate and rhythm, S1 S2 normal, No murmur. No gallop  Extremities: No edema bilateral extremities  Neuro: Alert and oriented  Skin: No rash, No Ulcers  Psych: " Normal mood and affect      Assessment and Plan:    1. Uncontrolled type 2 diabetes mellitus without complication, with long-term current use of insulin (CMS-[facility])    Now on:  Jardiance 25mg one a day (DECREASE to 10)  Actos 30 mg one a day.   Victoza 1.8  Lantus at night 20 units.  (failed metformin)  Humalog 4 units before dinner only on big meals    Return in about 3 months (around 2/21/2018).    Blood glucose log: Check BG in the morning when wake up, before lunch or dinner and before bed.  So three times a day.  Always bring BG diary to the next office visit.     Thank you kindly for allowing me to participate in the diabetes care plan for this patient.    Edy Lee PA-C, BC-ADM  Board Certified - Advanced Diabetes Management  11/21/17    CC:   Christian Mota M.D.

## 2017-11-22 NOTE — PATIENT INSTRUCTIONS
Now on:  Jardiance 25mg one a day (DECREASE to 10)  Actos 30 mg one a day.   Victoza 1.8  Lantus at night 20 units.  (failed metformin)  Humalog 4 units before dinner only on big meals a day

## 2017-11-27 DIAGNOSIS — I10 ESSENTIAL HYPERTENSION: Chronic | ICD-10-CM

## 2017-11-27 RX ORDER — HYDROCHLOROTHIAZIDE 12.5 MG/1
12.5 TABLET ORAL DAILY
Qty: 30 TAB | Refills: 4 | Status: SHIPPED | OUTPATIENT
Start: 2017-11-27 | End: 2018-02-02

## 2017-11-27 NOTE — TELEPHONE ENCOUNTER
Received refill request for HCTZ.  Pt was seen in clinic recently and is taking this medication for HTN. Refill sent to pharmacy    Christian Mota M.D.

## 2017-12-05 ENCOUNTER — OFFICE VISIT (OUTPATIENT)
Dept: NEPHROLOGY | Facility: MEDICAL CENTER | Age: 62
End: 2017-12-05
Payer: COMMERCIAL

## 2017-12-05 VITALS
WEIGHT: 168 LBS | DIASTOLIC BLOOD PRESSURE: 64 MMHG | HEIGHT: 65 IN | RESPIRATION RATE: 12 BRPM | HEART RATE: 80 BPM | BODY MASS INDEX: 27.99 KG/M2 | TEMPERATURE: 98 F | OXYGEN SATURATION: 95 % | SYSTOLIC BLOOD PRESSURE: 106 MMHG

## 2017-12-05 DIAGNOSIS — E11.9 TYPE 2 DIABETES MELLITUS WITHOUT COMPLICATION, WITH LONG-TERM CURRENT USE OF INSULIN (HCC): ICD-10-CM

## 2017-12-05 DIAGNOSIS — N17.9 AKI (ACUTE KIDNEY INJURY) (HCC): ICD-10-CM

## 2017-12-05 DIAGNOSIS — Z79.4 TYPE 2 DIABETES MELLITUS WITHOUT COMPLICATION, WITH LONG-TERM CURRENT USE OF INSULIN (HCC): ICD-10-CM

## 2017-12-05 DIAGNOSIS — I10 ESSENTIAL HYPERTENSION: ICD-10-CM

## 2017-12-05 PROCEDURE — 99203 OFFICE O/P NEW LOW 30 MIN: CPT | Performed by: INTERNAL MEDICINE

## 2017-12-05 RX ORDER — EMPAGLIFLOZIN 25 MG/1
10 TABLET, FILM COATED ORAL DAILY
COMMUNITY
Start: 2017-11-11 | End: 2018-02-22

## 2017-12-05 RX ORDER — CHOLECALCIFEROL (VITAMIN D3) 25 MCG
1000 TABLET,CHEWABLE ORAL EVERY EVENING
COMMUNITY
End: 2022-02-24

## 2017-12-05 ASSESSMENT — ENCOUNTER SYMPTOMS
VOMITING: 0
HYPERTENSION: 1
NAUSEA: 0
SHORTNESS OF BREATH: 0

## 2017-12-05 NOTE — PROGRESS NOTES
"Subjective:      Tara Cueva is a 62 y.o. female who presents with Hypertension and Chronic Kidney Disease            The patient has a history of long-standing hypertension, her blood pressure was on the lower side lately, she developed acute kidney injury, creatinine has improved after decreasing the dose of antihypertensive medication.      Hypertension   This is a chronic problem. The current episode started more than 1 year ago. The problem has been waxing and waning since onset. The problem is controlled. Pertinent negatives include no chest pain, peripheral edema or shortness of breath. There are no associated agents to hypertension. Risk factors for coronary artery disease include post-menopausal state and diabetes mellitus. Past treatments include ACE inhibitors and diuretics. The current treatment provides significant improvement. There are no compliance problems.  Hypertensive end-organ damage includes kidney disease. Identifiable causes of hypertension include chronic renal disease.   Chronic Kidney Disease   This is a new problem. The current episode started more than 1 month ago. The problem has been gradually improving. Pertinent negatives include no chest pain, nausea, urinary symptoms or vomiting.       Review of Systems   Respiratory: Negative for shortness of breath.    Cardiovascular: Negative for chest pain and leg swelling.   Gastrointestinal: Negative for nausea and vomiting.   Genitourinary: Negative for dysuria and urgency.          Objective:     /64   Pulse 80   Temp 36.7 °C (98 °F)   Resp 12   Ht 1.651 m (5' 5\")   Wt 76.2 kg (168 lb)   LMP 12/16/2006   SpO2 95%   BMI 27.96 kg/m²      Physical Exam   Constitutional: She is oriented to person, place, and time. She appears well-developed and well-nourished. No distress.   HENT:   Head: Atraumatic.   Nose: Nose normal.   Eyes: No scleral icterus.   Cardiovascular: Normal rate and regular rhythm.    Pulmonary/Chest: Effort " normal and breath sounds normal. No respiratory distress.   Musculoskeletal: She exhibits no edema.   Neurological: She is alert and oriented to person, place, and time.   Skin: Skin is warm. She is not diaphoretic.   Psychiatric: She has a normal mood and affect. Her behavior is normal.   Nursing note and vitals reviewed.              Assessment/Plan:     1. Essential hypertension  Blood pressure is better controlled  Continue low-sodium diet  Consider tapering off diuretics  Patient was advised to check her blood pressure at home regularly    2. TITA (acute kidney injury) (CMS-HCC)  Secondary to hypotension  Patient is asymptomatic  No acute need for dialysis  Creatinine is better  Renal dose all medication  Avoid nephrotoxins    3. Type 2 diabetes mellitus without complication, with long-term current use of insulin (CMS-HCC)  Continue to optimize diabetes control  Over 50% of this 30 minute visit was spent on counseling and coordination of care regarding diet, side effects, and complication.

## 2017-12-11 DIAGNOSIS — E11.8 CONTROLLED TYPE 2 DIABETES MELLITUS WITH COMPLICATION, WITH LONG-TERM CURRENT USE OF INSULIN (HCC): Chronic | ICD-10-CM

## 2017-12-11 DIAGNOSIS — Z79.4 CONTROLLED TYPE 2 DIABETES MELLITUS WITH COMPLICATION, WITH LONG-TERM CURRENT USE OF INSULIN (HCC): Chronic | ICD-10-CM

## 2017-12-11 NOTE — TELEPHONE ENCOUNTER
Received refill request for glucose test strips.  Refill sent to pharmacy    Christian Mota M.D.

## 2017-12-14 ENCOUNTER — TELEPHONE (OUTPATIENT)
Dept: ENDOCRINOLOGY | Facility: MEDICAL CENTER | Age: 62
End: 2017-12-14

## 2017-12-14 DIAGNOSIS — Z79.4 CONTROLLED TYPE 2 DIABETES MELLITUS WITH COMPLICATION, WITH LONG-TERM CURRENT USE OF INSULIN (HCC): Chronic | ICD-10-CM

## 2017-12-14 DIAGNOSIS — E11.8 CONTROLLED TYPE 2 DIABETES MELLITUS WITH COMPLICATION, WITH LONG-TERM CURRENT USE OF INSULIN (HCC): Chronic | ICD-10-CM

## 2017-12-14 NOTE — TELEPHONE ENCOUNTER
Pt requesting one touch test strips Qty 100.   She states she went to get the test strips they only gave her 25.   Please send new Rx

## 2018-02-02 ENCOUNTER — OFFICE VISIT (OUTPATIENT)
Dept: MEDICAL GROUP | Facility: PHYSICIAN GROUP | Age: 63
End: 2018-02-02
Payer: COMMERCIAL

## 2018-02-02 VITALS
DIASTOLIC BLOOD PRESSURE: 72 MMHG | HEIGHT: 65 IN | TEMPERATURE: 98.1 F | HEART RATE: 85 BPM | OXYGEN SATURATION: 95 % | SYSTOLIC BLOOD PRESSURE: 128 MMHG | BODY MASS INDEX: 28.32 KG/M2 | WEIGHT: 170 LBS

## 2018-02-02 DIAGNOSIS — I10 ESSENTIAL HYPERTENSION: Chronic | ICD-10-CM

## 2018-02-02 DIAGNOSIS — E11.9 TYPE 2 DIABETES MELLITUS WITHOUT COMPLICATION, WITH LONG-TERM CURRENT USE OF INSULIN (HCC): ICD-10-CM

## 2018-02-02 DIAGNOSIS — E78.2 MIXED HYPERLIPIDEMIA: Chronic | ICD-10-CM

## 2018-02-02 DIAGNOSIS — Z79.4 TYPE 2 DIABETES MELLITUS WITHOUT COMPLICATION, WITH LONG-TERM CURRENT USE OF INSULIN (HCC): ICD-10-CM

## 2018-02-02 PROBLEM — R79.89 ELEVATED SERUM CREATININE: Status: RESOLVED | Noted: 2017-08-24 | Resolved: 2018-02-02

## 2018-02-02 PROBLEM — N18.30 STAGE 3 CHRONIC KIDNEY DISEASE: Status: RESOLVED | Noted: 2017-10-24 | Resolved: 2018-02-02

## 2018-02-02 PROCEDURE — 99214 OFFICE O/P EST MOD 30 MIN: CPT | Performed by: FAMILY MEDICINE

## 2018-02-02 NOTE — PROGRESS NOTES
Subjective:   Tara Cueva is a 62 y.o. female here today for elevated cholesterol, hypertension, diabetes    Hyperlipidemia  Ongoing issue. Patient reports compliance with Lipitor 80 mg daily; she denies any issues with muscle cramps or weakness. Review of her chart shows that a lipid panel completed a months ago was within the recommended range.    Hypertension  Ongoing issue. Patient currently is on enalapril 5 mg daily. She currently denies any issues with headache, dizziness, chest pain. Previously she had also been on hydrochlorothiazide but was taken off due to decreasing kidney function. Blood pressure and heart rate both today are within recommended range    Type 2 diabetes mellitus without complication (CMS-HCC)  Ongoing issue. Patient reports that she is followed by endocrinology for this issue. She is currently on lantus, jardiance, actos, and victoza. She states that since being on this medication and her blood sugars are now in a normal range and review of her chart shows her last hemoglobin A1c 2 months ago was 6.4.     Patient is here today to establish care    Current medicines (including changes today)  Current Outpatient Prescriptions   Medication Sig Dispense Refill   • glucose blood (ONE TOUCH ULTRA TEST) strip 1 Strip by Other route 3 times a day. 100 Strip 3   • Cyanocobalamin (B-12) 1000 MCG Cap Take  by mouth.     • JARDIANCE 25 MG Tab Take 10 mg by mouth every day.     • VICTOZA 18 MG/3ML Solution Pen-injector injection Inject 0.3 mL as instructed every day. 3 PEN 6   • pioglitazone (ACTOS) 30 MG Tab Take 1 Tab by mouth every day. 30 Tab 11   • insulin glargine (LANTUS SOLOSTAR) 100 UNIT/ML Solution Pen-injector injection Inject 30 Units as instructed 2 times a day. 15 PEN 4   • enalapril (VASOTEC) 5 MG Tab Take 1 Tab by mouth every day. 90 Tab 3   • omeprazole (PRILOSEC) 20 MG delayed-release capsule Take 1 Cap by mouth every day. TAKE 1 CAP BY MOUTH EVERY DAY. 90 Cap 3   • atorvastatin  "(LIPITOR) 80 MG tablet Take 1 Tab by mouth every evening. 90 Tab 3   • Cholecalciferol (VITAMIN D) 400 UNIT TABS Take 400 Units by mouth every day.     • Blood Glucose Monitoring Suppl SUPPLIES MISC Test strips order: Test strips for glucometer. Sig: use it once daily and prn ssx high or low sugar #100 RF x 3 100 Each 3   • aspirin EC (ECOTRIN) 81 MG TBEC Take 81 mg by mouth every day.     • Empagliflozin 10 MG Tab Take 1 tablet by mouth every morning with breakfast. 30 Tab 1   • Insulin Pen Needle (NOVOFINE PLUS) 32G X 4 MM Misc 1 Applicator by Does not apply route every day. Using one needle tip with victoza per day 100 Each 3     No current facility-administered medications for this visit.      She  has a past medical history of Diabetes (5/12/2011); Edema (5/12/2011); Elevated liver enzymes (5/12/2011); Gall bladder polyp (8/12/2011); GERD (gastroesophageal reflux disease) (5/12/2011); Hyperlipidemia (5/12/2011); Hypertension (5/12/2011); Skin lesion of face (5/12/2011); and Vitamin d deficiency (5/12/2011).    ROS   No chest pain, no shortness of breath, no abdominal pain       Objective:     Blood pressure 128/72, pulse 85, temperature 36.7 °C (98.1 °F), height 1.651 m (5' 5\"), weight 77.1 kg (170 lb), last menstrual period 12/16/2006, SpO2 95 %. Body mass index is 28.29 kg/m².   Physical Exam:  Alert, oriented in no acute distress.  Eye contact is good, speech goal directed, affect calm  HEENT: conjunctiva non-injected, sclera non-icteric.  Pinna normal. TM pearly gray.   Oral mucous membranes pink and moist with no lesions.  Neck No adenopathy or masses in the neck or supraclavicular regions.  Lungs: clear to auscultation bilaterally with good excursion.  CV: regular rate and rhythm.  Abdomen: soft, nontender, No CVAT  Ext: no edema, color normal, vascularity normal, temperature normal        Assessment and Plan:   The following treatment plan was discussed     1. Type 2 diabetes mellitus without " complication, with long-term current use of insulin (CMS-[facility])  COMP METABOLIC PANEL    HEMOGLOBIN A1C    MICROALBUMIN CREAT RATIO URINE    VITAMIN D,25 HYDROXY    Stable. Continue current medications; continue follow-up with endocrinology as directed; labs ordered   2. Essential hypertension      Stable. Continue current medication of enalapril; continue to hold hydrochlorothiazide; monitor   3. Mixed hyperlipidemia  LIPID PROFILE    Stable. Continue current medications; labs ordered for review       Followup: Return in about 6 months (around 8/2/2018) for lab review, Short.

## 2018-02-02 NOTE — ASSESSMENT & PLAN NOTE
Ongoing issue. Patient currently is on enalapril 5 mg daily. She currently denies any issues with headache, dizziness, chest pain. Previously she had also been on hydrochlorothiazide but was taken off due to decreasing kidney function. Blood pressure and heart rate both today are within recommended range

## 2018-02-02 NOTE — ASSESSMENT & PLAN NOTE
Ongoing issue. Patient reports compliance with Lipitor 80 mg daily; she denies any issues with muscle cramps or weakness. Review of her chart shows that a lipid panel completed a months ago was within the recommended range.

## 2018-02-02 NOTE — ASSESSMENT & PLAN NOTE
Ongoing issue. Patient reports that she is followed by endocrinology for this issue. She is currently on lantus, jardiance, actos, and victoza. She states that since being on this medication and her blood sugars are now in a normal range and review of her chart shows her last hemoglobin A1c 2 months ago was 6.4.

## 2018-02-08 RX ORDER — EMPAGLIFLOZIN 10 MG/1
1 TABLET, FILM COATED ORAL
Qty: 30 TAB | Refills: 4 | Status: SHIPPED | OUTPATIENT
Start: 2018-02-08 | End: 2018-02-22 | Stop reason: SDUPTHER

## 2018-02-22 ENCOUNTER — OFFICE VISIT (OUTPATIENT)
Dept: ENDOCRINOLOGY | Facility: MEDICAL CENTER | Age: 63
End: 2018-02-22
Payer: COMMERCIAL

## 2018-02-22 VITALS
DIASTOLIC BLOOD PRESSURE: 70 MMHG | BODY MASS INDEX: 28.22 KG/M2 | SYSTOLIC BLOOD PRESSURE: 120 MMHG | WEIGHT: 175.6 LBS | OXYGEN SATURATION: 97 % | HEART RATE: 91 BPM | HEIGHT: 66 IN

## 2018-02-22 DIAGNOSIS — E13.9 DIABETES 1.5, MANAGED AS TYPE 2 (HCC): ICD-10-CM

## 2018-02-22 DIAGNOSIS — E11.9 TYPE 2 DIABETES MELLITUS WITHOUT COMPLICATION, WITHOUT LONG-TERM CURRENT USE OF INSULIN (HCC): ICD-10-CM

## 2018-02-22 LAB
HBA1C MFR BLD: 6.4 % (ref ?–5.8)
INT CON NEG: NEGATIVE
INT CON POS: POSITIVE

## 2018-02-22 PROCEDURE — 83036 HEMOGLOBIN GLYCOSYLATED A1C: CPT | Performed by: PHYSICIAN ASSISTANT

## 2018-02-22 PROCEDURE — 99213 OFFICE O/P EST LOW 20 MIN: CPT | Performed by: PHYSICIAN ASSISTANT

## 2018-02-22 RX ORDER — PIOGLITAZONEHYDROCHLORIDE 30 MG/1
30 TABLET ORAL DAILY
Qty: 30 TAB | Refills: 11 | Status: SHIPPED | OUTPATIENT
Start: 2018-02-22 | End: 2019-04-03 | Stop reason: SDUPTHER

## 2018-02-22 RX ORDER — LIRAGLUTIDE 6 MG/ML
1.8 INJECTION SUBCUTANEOUS DAILY
Qty: 3 PEN | Refills: 6 | Status: SHIPPED | OUTPATIENT
Start: 2018-02-22 | End: 2018-10-16 | Stop reason: SDUPTHER

## 2018-02-22 NOTE — PROGRESS NOTES
Return to office Patient Consult Note  Referred by: Allyson Clay D.O.    Reason for consult: Diabetes Management Type 2    HPI:  Tara Cueva is a 62 y.o. old patient who is seeing us today for diabetes care.  This is a pleasant patient with diabetes and I appreciate the opportunity to participate in the care of this patient.    BG Diary:2/22/2018  In the AM: 112, 108, 91, 124, 1:30, 110, 114, 127  Before Bed: 138, 141, 133, 219, 129, 160     BG Diary:11/21/2017  In the AM: 110, 103, 120, 112, 106, 95, 141, 123  Before Bed: 125, 107, 135, 148, 112, 163, 168       BG Diary:9/21/2017  In the AM: 147, 113, 101, 105, 132, 107, 114, 170  Before Bed: 124, 149, 161, 140, 117, 177, 151     BG Diary:9/8/2017  In the AM:  138, 118, 149, 138, 123  Before Bed: 173, 140, 153, 143, 171     Labs of 2/22/18 HbA1c 6.4  HbA1c on 11/21/17    GFR 36  Labs of 2/23/17 HbA1c is 10.7, GFR >60,    In 12/2016 HbA1c was 10.2  HbA1c on 7/21/17 was 8.0     4/25/17 labs:  C-peptide 8.5, CALVIN-65 is at 9.8 (this is a little elevated), Islet cell and Insulin antibodies are negative. Her BMI is 29 and her C-peptide is very high.  She is a T2 diabetic and not a T1.5.     BG Diary:7/24/2017  In the AM: 150, 179, 135, 156, 142, 120, 144  Before Bed: 162, 142, 179, 162, 220, 206, 213      BG Diary:6/26/2017  In the AM: 142, 139, 137, 134, 141, 138, 138, 125  Before Bed: 256, 202, 164, 202, 178, 253, 184,    (she is eating snacks between dinner and bed sugar)     Weight: on 4/25/17 her weight was 179pounds, today it is 162pounds         1. Type 2 diabetes mellitus without complication, without long-term current use of insulin (CMS-[facility])    Now on:  Jardiance 10mg one a day  Actos 30 mg one a day.   Victoza 1.8  Lantus at night 20 units.  (failed metformin)  Humalog 4 units before dinner (Stopped 3 months ago)      ROS:   Constitutional: No night sweats.  Eyes:  No visual changes.  Cardiac: No chest pain, No palpitations or racing heart rate.  Resp:  No shortness of breath, No cough,   Gi: No Diarrhea    All other systems were reviewed and were/are negative.  The ROS was revised/revisited during this office visit from the patients first office visit with me on 4/25/17. Please review the full ROS during the first office visit.    Past Medical History:  Patient Active Problem List    Diagnosis Date Noted   • Type 2 diabetes mellitus without complication (CMS-[facility]) 02/02/2018   • Vitamin B12 deficiency 08/24/2017   • Fatigue 07/20/2017   • Thumb pain 12/23/2016   • Hypokalemia 01/03/2014   • Gall bladder polyp 08/12/2011   • Hyperlipidemia 05/12/2011   • Vitamin D deficiency 05/12/2011   • Hypertension 05/12/2011   • GERD (gastroesophageal reflux disease) 05/12/2011       Past Surgical History:  Past Surgical History:   Procedure Laterality Date   • OTHER ORTHOPEDIC SURGERY      right arm ORIF   • PRIMARY C SECTION     • TUBAL COAGULATION LAPAROSCOPIC BILATERAL         Allergies:  Sulfa drugs    Social History:  Social History     Social History   • Marital status:      Spouse name: N/A   • Number of children: N/A   • Years of education: N/A     Occupational History   • Not on file.     Social History Main Topics   • Smoking status: Former Smoker     Packs/day: 1.00     Years: 40.00     Types: Cigarettes     Quit date: 1/1/1990   • Smokeless tobacco: Never Used   • Alcohol use Yes      Comment: holidays   • Drug use: No   • Sexual activity: Not Currently     Other Topics Concern   • Not on file     Social History Narrative   • No narrative on file       Family History:  Family History   Problem Relation Age of Onset   • Cancer Maternal Grandmother      lung    • Cancer Mother      breast   • Other Father      urinary issues   • GI Sister    • Heart Disease Brother        Medications:    Current Outpatient Prescriptions:   •  JARDIANCE 10 MG Tab, TAKE 1 TABLET BY MOUTH EVERY MORNING WITH BREAKFAST., Disp: 30 Tab, Rfl: 4  •  glucose blood (ONE TOUCH ULTRA TEST)  "strip, 1 Strip by Other route 3 times a day., Disp: 100 Strip, Rfl: 3  •  Cyanocobalamin (B-12) 1000 MCG Cap, Take  by mouth., Disp: , Rfl:   •  JARDIANCE 25 MG Tab, Take 10 mg by mouth every day., Disp: , Rfl:   •  VICTOZA 18 MG/3ML Solution Pen-injector injection, Inject 0.3 mL as instructed every day., Disp: 3 PEN, Rfl: 6  •  pioglitazone (ACTOS) 30 MG Tab, Take 1 Tab by mouth every day., Disp: 30 Tab, Rfl: 11  •  Insulin Pen Needle (NOVOFINE PLUS) 32G X 4 MM Misc, 1 Applicator by Does not apply route every day. Using one needle tip with victoza per day, Disp: 100 Each, Rfl: 3  •  insulin glargine (LANTUS SOLOSTAR) 100 UNIT/ML Solution Pen-injector injection, Inject 30 Units as instructed 2 times a day., Disp: 15 PEN, Rfl: 4  •  enalapril (VASOTEC) 5 MG Tab, Take 1 Tab by mouth every day., Disp: 90 Tab, Rfl: 3  •  omeprazole (PRILOSEC) 20 MG delayed-release capsule, Take 1 Cap by mouth every day. TAKE 1 CAP BY MOUTH EVERY DAY., Disp: 90 Cap, Rfl: 3  •  atorvastatin (LIPITOR) 80 MG tablet, Take 1 Tab by mouth every evening., Disp: 90 Tab, Rfl: 3  •  Cholecalciferol (VITAMIN D) 400 UNIT TABS, Take 400 Units by mouth every day., Disp: , Rfl:   •  Blood Glucose Monitoring Suppl SUPPLIES MISC, Test strips order: Test strips for glucometer. Sig: use it once daily and prn ssx high or low sugar #100 RF x 3, Disp: 100 Each, Rfl: 3  •  aspirin EC (ECOTRIN) 81 MG TBEC, Take 81 mg by mouth every day., Disp: , Rfl:         Physical Examination:   Vital signs: /70   Pulse 91   Ht 1.676 m (5' 6\")   Wt 79.7 kg (175 lb 9.6 oz)   LMP 12/16/2006   SpO2 97%   BMI 28.34 kg/m²   General: No distress, cooperative, well dressed and well nourished.   Eyes: No scleral icterus or discharge, No hyposphagma  ENMT: Normal on external inspection of nose, lips, No nasal drainage   Neck: No abnormal masses on inspection  Resp: Normal effort, Bilateral clear to auscultation, No wheezing, No rales  CVS: Regular rate and rhythm, S1 S2 " normal, No murmur. No gallop  Extremities: No edema bilateral extremities  Neuro: Alert and oriented  Skin: No rash, No Ulcers  Psych: Normal mood and affect      Assessment and Plan:    1. Type 2 diabetes mellitus without complication, without long-term current use of insulin (CMS-[facility])    Now on:  Jardiance 10mg one a day  Actos 30 mg one a day.   Victoza 1.8  Lantus at night 20 units.  (failed metformin)  Humalog 4 units before dinner (Not taking)    Return in about 6 months (around 8/22/2018).    Blood glucose log: Check BG in the morning when wake up, before lunch or dinner and before bed.  So three times a day.  Always bring BG diary to the next office visit.     Thank you kindly for allowing me to participate in the diabetes care plan for this patient.    Edy Lee PA-C, BC-ADM  Board Certified - Advanced Diabetes Management  02/22/18    CC:   Allyson Clay D.O.

## 2018-03-28 ENCOUNTER — OFFICE VISIT (OUTPATIENT)
Dept: URGENT CARE | Facility: PHYSICIAN GROUP | Age: 63
End: 2018-03-28
Payer: COMMERCIAL

## 2018-03-28 VITALS
DIASTOLIC BLOOD PRESSURE: 66 MMHG | SYSTOLIC BLOOD PRESSURE: 110 MMHG | HEART RATE: 84 BPM | TEMPERATURE: 97.8 F | WEIGHT: 172 LBS | RESPIRATION RATE: 16 BRPM | HEIGHT: 66 IN | OXYGEN SATURATION: 92 % | BODY MASS INDEX: 27.64 KG/M2

## 2018-03-28 DIAGNOSIS — L03.012 PARONYCHIA OF LEFT MIDDLE FINGER: ICD-10-CM

## 2018-03-28 DIAGNOSIS — L98.9 SKIN LESION OF HAND: ICD-10-CM

## 2018-03-28 PROCEDURE — 99214 OFFICE O/P EST MOD 30 MIN: CPT | Performed by: PHYSICIAN ASSISTANT

## 2018-03-28 RX ORDER — MUPIROCIN CALCIUM 20 MG/G
CREAM TOPICAL
Qty: 1 TUBE | Refills: 1 | Status: SHIPPED | OUTPATIENT
Start: 2018-03-28 | End: 2018-05-04

## 2018-03-28 ASSESSMENT — ENCOUNTER SYMPTOMS
TINGLING: 0
FOCAL WEAKNESS: 0
SHORTNESS OF BREATH: 0
FEVER: 0
WHEEZING: 0
DIARRHEA: 0
VOMITING: 0
SENSORY CHANGE: 0
CHILLS: 0
NAUSEA: 0

## 2018-03-28 NOTE — PROGRESS NOTES
Subjective:     Tara Cueva is a 62 y.o. female who presents for Finger Pain (Sore on thumb x 6 weeks )       Pt notes skin lesion to thumb x last ~6wks,     Patient describes last 6 weeks of waxing and waning swelling, erythema and discomfort to the proximal nail fold of left middle finger, she denies fevers, chills, nausea, vomiting. She reports normal sensation. She notes this did resolve initially after use of over-the-counter antibiotic topical ointment. She does have her nails done every other week. States she's been very careful with contamination. While doing this. She is a diabetic, insulin-dependent. States her blood sugars up and in a good range throughout. She denies history of peripheral neuropathy.    Past Medical History:   Diagnosis Date   • Diabetes 5/12/2011   • Edema 5/12/2011   • Elevated liver enzymes 5/12/2011   • Gall bladder polyp 8/12/2011   • GERD (gastroesophageal reflux disease) 5/12/2011   • Hyperlipidemia 5/12/2011   • Hypertension 5/12/2011   • Skin lesion of face 5/12/2011   • Vitamin d deficiency 5/12/2011     Past Surgical History:   Procedure Laterality Date   • OTHER ORTHOPEDIC SURGERY      right arm ORIF   • PRIMARY C SECTION     • TUBAL COAGULATION LAPAROSCOPIC BILATERAL       Social History     Social History   • Marital status:      Spouse name: N/A   • Number of children: N/A   • Years of education: N/A     Occupational History   • Not on file.     Social History Main Topics   • Smoking status: Former Smoker     Packs/day: 1.00     Years: 40.00     Types: Cigarettes     Quit date: 1/1/1990   • Smokeless tobacco: Never Used   • Alcohol use No      Comment: holidays   • Drug use: No   • Sexual activity: Not Currently     Other Topics Concern   • Not on file     Social History Narrative   • No narrative on file      Family History   Problem Relation Age of Onset   • Cancer Maternal Grandmother      lung    • Cancer Mother      breast   • Other Father      urinary  "issues   • GI Sister    • Heart Disease Brother     Review of Systems   Constitutional: Negative for chills and fever.   Respiratory: Negative for shortness of breath and wheezing.    Gastrointestinal: Negative for diarrhea, nausea and vomiting.   Skin: Negative for rash.        Pos for erythema and edema to prox nail fold, left MF     Neurological: Negative for tingling, sensory change and focal weakness.     Allergies   Allergen Reactions   • Sulfa Drugs Vomiting      Objective:   /66   Pulse 84   Temp 36.6 °C (97.8 °F)   Resp 16   Ht 1.676 m (5' 6\")   Wt 78 kg (172 lb)   LMP 12/16/2006   SpO2 92%   Breastfeeding? No   BMI 27.76 kg/m²   Physical Exam   Constitutional: She is oriented to person, place, and time. She appears well-developed and well-nourished. No distress.   HENT:   Head: Normocephalic and atraumatic.   Right Ear: External ear normal.   Left Ear: External ear normal.   Nose: Nose normal.   Eyes: Conjunctivae and lids are normal. Right eye exhibits no discharge. Left eye exhibits no discharge. No scleral icterus.   Neck: Neck supple.   Pulmonary/Chest: Effort normal. No respiratory distress.   Musculoskeletal: Normal range of motion.        Left hand: She exhibits swelling. Normal sensation noted. Normal strength noted.        Hands:  Evident paronychia with trace erythema, edema, nonfluctuant, non-pointing proximal nail fold left middle finger/third digit   Neurological: She is alert and oriented to person, place, and time. She is not disoriented.   Skin: Skin is warm and dry. She is not diaphoretic. No erythema. No pallor.   Psychiatric: Her speech is normal and behavior is normal.   Nursing note and vitals reviewed.        Assessment/Plan:   Assessment    1. Paronychia of left middle finger  Epsom salt soaks, bactroban cream  Return to clinic with lack of resolution or progression of symptoms.    - mupirocin calcium (BACTROBAN) 2 % Cream; Apply to affected area BID  Dispense: 1 Tube; " Refill: 1    2. Skin lesion of hand    Differential diagnosis, natural history, supportive care, and indications for immediate follow-up discussed.

## 2018-04-03 DIAGNOSIS — E78.5 HYPERLIPIDEMIA, UNSPECIFIED HYPERLIPIDEMIA TYPE: Chronic | ICD-10-CM

## 2018-04-03 DIAGNOSIS — I10 ESSENTIAL HYPERTENSION: Chronic | ICD-10-CM

## 2018-04-03 RX ORDER — ATORVASTATIN CALCIUM 80 MG/1
80 TABLET, FILM COATED ORAL EVERY EVENING
Qty: 90 TAB | Refills: 3 | Status: SHIPPED | OUTPATIENT
Start: 2018-04-03 | End: 2019-05-07 | Stop reason: SDUPTHER

## 2018-04-03 RX ORDER — ENALAPRIL MALEATE 5 MG/1
5 TABLET ORAL DAILY
Qty: 90 TAB | Refills: 3 | Status: SHIPPED | OUTPATIENT
Start: 2018-04-03 | End: 2019-05-07

## 2018-04-03 RX ORDER — OMEPRAZOLE 20 MG/1
20 CAPSULE, DELAYED RELEASE ORAL
Qty: 90 CAP | Refills: 3 | Status: SHIPPED | OUTPATIENT
Start: 2018-04-03 | End: 2019-05-07 | Stop reason: SDUPTHER

## 2018-05-03 ENCOUNTER — HOSPITAL ENCOUNTER (OUTPATIENT)
Dept: LAB | Facility: MEDICAL CENTER | Age: 63
End: 2018-05-03
Attending: FAMILY MEDICINE
Payer: COMMERCIAL

## 2018-05-03 DIAGNOSIS — E11.9 TYPE 2 DIABETES MELLITUS WITHOUT COMPLICATION, WITH LONG-TERM CURRENT USE OF INSULIN (HCC): ICD-10-CM

## 2018-05-03 DIAGNOSIS — E78.2 MIXED HYPERLIPIDEMIA: Chronic | ICD-10-CM

## 2018-05-03 DIAGNOSIS — Z79.4 TYPE 2 DIABETES MELLITUS WITHOUT COMPLICATION, WITH LONG-TERM CURRENT USE OF INSULIN (HCC): ICD-10-CM

## 2018-05-03 LAB
25(OH)D3 SERPL-MCNC: 36 NG/ML (ref 30–100)
ALBUMIN SERPL BCP-MCNC: 4.1 G/DL (ref 3.2–4.9)
ALBUMIN/GLOB SERPL: 1.3 G/DL
ALP SERPL-CCNC: 82 U/L (ref 30–99)
ALT SERPL-CCNC: 19 U/L (ref 2–50)
ANION GAP SERPL CALC-SCNC: 6 MMOL/L (ref 0–11.9)
AST SERPL-CCNC: 17 U/L (ref 12–45)
BILIRUB SERPL-MCNC: 0.6 MG/DL (ref 0.1–1.5)
BUN SERPL-MCNC: 16 MG/DL (ref 8–22)
CALCIUM SERPL-MCNC: 9.6 MG/DL (ref 8.5–10.5)
CHLORIDE SERPL-SCNC: 110 MMOL/L (ref 96–112)
CHOLEST SERPL-MCNC: 128 MG/DL (ref 100–199)
CO2 SERPL-SCNC: 28 MMOL/L (ref 20–33)
CREAT SERPL-MCNC: 0.83 MG/DL (ref 0.5–1.4)
CREAT UR-MCNC: 63.1 MG/DL
EST. AVERAGE GLUCOSE BLD GHB EST-MCNC: 154 MG/DL
GLOBULIN SER CALC-MCNC: 3.1 G/DL (ref 1.9–3.5)
GLUCOSE SERPL-MCNC: 86 MG/DL (ref 65–99)
HBA1C MFR BLD: 7 % (ref 0–5.6)
HDLC SERPL-MCNC: 55 MG/DL
LDLC SERPL CALC-MCNC: 47 MG/DL
MICROALBUMIN UR-MCNC: 2 MG/DL
MICROALBUMIN/CREAT UR: 32 MG/G (ref 0–30)
POTASSIUM SERPL-SCNC: 3.4 MMOL/L (ref 3.6–5.5)
PROT SERPL-MCNC: 7.2 G/DL (ref 6–8.2)
SODIUM SERPL-SCNC: 144 MMOL/L (ref 135–145)
TRIGL SERPL-MCNC: 129 MG/DL (ref 0–149)

## 2018-05-03 PROCEDURE — 36415 COLL VENOUS BLD VENIPUNCTURE: CPT

## 2018-05-03 PROCEDURE — 82043 UR ALBUMIN QUANTITATIVE: CPT

## 2018-05-03 PROCEDURE — 80053 COMPREHEN METABOLIC PANEL: CPT

## 2018-05-03 PROCEDURE — 80061 LIPID PANEL: CPT

## 2018-05-03 PROCEDURE — 82306 VITAMIN D 25 HYDROXY: CPT

## 2018-05-03 PROCEDURE — 83036 HEMOGLOBIN GLYCOSYLATED A1C: CPT

## 2018-05-03 PROCEDURE — 82570 ASSAY OF URINE CREATININE: CPT

## 2018-05-04 ENCOUNTER — OFFICE VISIT (OUTPATIENT)
Dept: MEDICAL GROUP | Facility: PHYSICIAN GROUP | Age: 63
End: 2018-05-04
Payer: COMMERCIAL

## 2018-05-04 VITALS
WEIGHT: 179 LBS | SYSTOLIC BLOOD PRESSURE: 124 MMHG | OXYGEN SATURATION: 96 % | TEMPERATURE: 98.4 F | BODY MASS INDEX: 28.77 KG/M2 | HEART RATE: 77 BPM | DIASTOLIC BLOOD PRESSURE: 76 MMHG | HEIGHT: 66 IN

## 2018-05-04 DIAGNOSIS — E11.9 TYPE 2 DIABETES MELLITUS WITHOUT COMPLICATION, WITHOUT LONG-TERM CURRENT USE OF INSULIN (HCC): ICD-10-CM

## 2018-05-04 DIAGNOSIS — E55.9 VITAMIN D DEFICIENCY: ICD-10-CM

## 2018-05-04 DIAGNOSIS — I10 ESSENTIAL HYPERTENSION: Chronic | ICD-10-CM

## 2018-05-04 PROCEDURE — 99214 OFFICE O/P EST MOD 30 MIN: CPT | Performed by: FAMILY MEDICINE

## 2018-05-04 ASSESSMENT — PATIENT HEALTH QUESTIONNAIRE - PHQ9: CLINICAL INTERPRETATION OF PHQ2 SCORE: 0

## 2018-05-04 NOTE — ASSESSMENT & PLAN NOTE
Ongoing issues; patient reports compliance with enalapril 5 mg; currently denies any headache, dizziness, chest pain; her blood pressure and heart rate both are within recommended range

## 2018-05-04 NOTE — ASSESSMENT & PLAN NOTE
Ongoing issues; patient reports she is taking supplemental vitamin D 1000 international units daily; chart review shows that her vitamin D is suboptimal at 36.

## 2018-05-04 NOTE — ASSESSMENT & PLAN NOTE
Ongoing issue; patient reports compliance with jardiance 10 mg, Lantus 30 units, Actos 30 mg; patient currently denies any side effects; recent labs of interview in detail with the patient today that shows her hemoglobin A1c is 7.0. I explained to the patient that this is borderline. She reports that she has not been very compliant with her diet over the last couple months.

## 2018-06-11 ENCOUNTER — HOSPITAL ENCOUNTER (OUTPATIENT)
Dept: LAB | Facility: MEDICAL CENTER | Age: 63
End: 2018-06-11
Attending: INTERNAL MEDICINE
Payer: COMMERCIAL

## 2018-06-11 DIAGNOSIS — Z79.4 TYPE 2 DIABETES MELLITUS WITHOUT COMPLICATION, WITH LONG-TERM CURRENT USE OF INSULIN (HCC): ICD-10-CM

## 2018-06-11 DIAGNOSIS — E11.9 TYPE 2 DIABETES MELLITUS WITHOUT COMPLICATION, WITH LONG-TERM CURRENT USE OF INSULIN (HCC): ICD-10-CM

## 2018-06-11 DIAGNOSIS — N17.9 AKI (ACUTE KIDNEY INJURY) (HCC): ICD-10-CM

## 2018-06-11 DIAGNOSIS — I10 ESSENTIAL HYPERTENSION: ICD-10-CM

## 2018-06-11 LAB
ANION GAP SERPL CALC-SCNC: 12 MMOL/L (ref 0–11.9)
BUN SERPL-MCNC: 19 MG/DL (ref 8–22)
CALCIUM SERPL-MCNC: 10.2 MG/DL (ref 8.5–10.5)
CHLORIDE SERPL-SCNC: 104 MMOL/L (ref 96–112)
CO2 SERPL-SCNC: 25 MMOL/L (ref 20–33)
CREAT SERPL-MCNC: 0.81 MG/DL (ref 0.5–1.4)
CREAT UR-MCNC: 92.7 MG/DL
GLUCOSE SERPL-MCNC: 95 MG/DL (ref 65–99)
MICROALBUMIN UR-MCNC: 1.7 MG/DL
MICROALBUMIN/CREAT UR: 18 MG/G (ref 0–30)
POTASSIUM SERPL-SCNC: 3.7 MMOL/L (ref 3.6–5.5)
SODIUM SERPL-SCNC: 141 MMOL/L (ref 135–145)

## 2018-06-11 PROCEDURE — 82570 ASSAY OF URINE CREATININE: CPT

## 2018-06-11 PROCEDURE — 80048 BASIC METABOLIC PNL TOTAL CA: CPT

## 2018-06-11 PROCEDURE — 36415 COLL VENOUS BLD VENIPUNCTURE: CPT

## 2018-06-11 PROCEDURE — 82043 UR ALBUMIN QUANTITATIVE: CPT

## 2018-06-12 ENCOUNTER — OFFICE VISIT (OUTPATIENT)
Dept: NEPHROLOGY | Facility: MEDICAL CENTER | Age: 63
End: 2018-06-12
Payer: COMMERCIAL

## 2018-06-12 VITALS
DIASTOLIC BLOOD PRESSURE: 70 MMHG | OXYGEN SATURATION: 97 % | WEIGHT: 180 LBS | BODY MASS INDEX: 28.93 KG/M2 | SYSTOLIC BLOOD PRESSURE: 124 MMHG | HEIGHT: 66 IN | RESPIRATION RATE: 14 BRPM | TEMPERATURE: 98 F | HEART RATE: 74 BPM

## 2018-06-12 DIAGNOSIS — R80.9 TYPE 2 DIABETES MELLITUS WITH MICROALBUMINURIA, WITH LONG-TERM CURRENT USE OF INSULIN (HCC): ICD-10-CM

## 2018-06-12 DIAGNOSIS — E11.29 TYPE 2 DIABETES MELLITUS WITH MICROALBUMINURIA, WITH LONG-TERM CURRENT USE OF INSULIN (HCC): ICD-10-CM

## 2018-06-12 DIAGNOSIS — Z79.4 TYPE 2 DIABETES MELLITUS WITH MICROALBUMINURIA, WITH LONG-TERM CURRENT USE OF INSULIN (HCC): ICD-10-CM

## 2018-06-12 DIAGNOSIS — I10 ESSENTIAL HYPERTENSION: ICD-10-CM

## 2018-06-12 DIAGNOSIS — N17.9 AKI (ACUTE KIDNEY INJURY) (HCC): ICD-10-CM

## 2018-06-12 DIAGNOSIS — R80.9 PROTEINURIA, UNSPECIFIED TYPE: ICD-10-CM

## 2018-06-12 PROCEDURE — 99214 OFFICE O/P EST MOD 30 MIN: CPT | Performed by: INTERNAL MEDICINE

## 2018-06-12 ASSESSMENT — ENCOUNTER SYMPTOMS
VOMITING: 0
HYPERTENSION: 1
SHORTNESS OF BREATH: 0
NAUSEA: 0

## 2018-06-12 NOTE — PROGRESS NOTES
"Subjective:      Tara Cueva is a 63 y.o. female who presents with Hypertension and Chronic Kidney Disease            Hypertension   This is a chronic problem. The current episode started more than 1 year ago. The problem is unchanged. The problem is controlled. Pertinent negatives include no chest pain, peripheral edema or shortness of breath. Risk factors for coronary artery disease include post-menopausal state, dyslipidemia and diabetes mellitus. Past treatments include ACE inhibitors. The current treatment provides significant improvement. There are no compliance problems.  Hypertensive end-organ damage includes kidney disease. Identifiable causes of hypertension include chronic renal disease.   Chronic Kidney Disease   This is a chronic problem. The current episode started more than 1 year ago. The problem occurs constantly. The problem has been rapidly improving. Pertinent negatives include no chest pain, nausea, urinary symptoms or vomiting.       Review of Systems   Respiratory: Negative for shortness of breath.    Cardiovascular: Negative for chest pain and leg swelling.   Gastrointestinal: Negative for nausea and vomiting.   Genitourinary: Negative for dysuria and urgency.          Objective:     /70   Pulse 74   Temp 36.7 °C (98 °F)   Resp 14   Ht 1.676 m (5' 6\")   Wt 81.6 kg (180 lb)   LMP 12/16/2006   SpO2 97%   BMI 29.05 kg/m²      Physical Exam   Constitutional: She is oriented to person, place, and time.   HENT:   Head: Normocephalic and atraumatic.   Nose: Nose normal.   Mouth/Throat: Oropharynx is clear and moist.   Eyes: Conjunctivae are normal. Right eye exhibits no discharge. Left eye exhibits no discharge. No scleral icterus.   Cardiovascular: Normal rate and regular rhythm.    Pulmonary/Chest: Effort normal and breath sounds normal.   Musculoskeletal: She exhibits no edema.   Neurological: She is alert and oriented to person, place, and time.   Skin: Skin is warm. "   Psychiatric: She has a normal mood and affect. Her behavior is normal.   Nursing note and vitals reviewed.              Assessment/Plan:     1. TITA (acute kidney injury) (HCC)  Secondary to hypotension  Creatinine improved    2. Essential hypertension  Controlled    3. Type 2 diabetes mellitus with microalbuminuria, with long-term current use of insulin ([facility])  Continue to optimize diabetes control    4. Proteinuria, unspecified type  Continue ACE inhibitor

## 2018-07-03 ENCOUNTER — OFFICE VISIT (OUTPATIENT)
Dept: MEDICAL GROUP | Facility: PHYSICIAN GROUP | Age: 63
End: 2018-07-03
Payer: COMMERCIAL

## 2018-07-03 VITALS
WEIGHT: 180 LBS | HEIGHT: 66 IN | SYSTOLIC BLOOD PRESSURE: 114 MMHG | OXYGEN SATURATION: 98 % | DIASTOLIC BLOOD PRESSURE: 72 MMHG | TEMPERATURE: 97 F | HEART RATE: 69 BPM | BODY MASS INDEX: 28.93 KG/M2

## 2018-07-03 DIAGNOSIS — M70.21 OLECRANON BURSITIS OF RIGHT ELBOW: ICD-10-CM

## 2018-07-03 PROCEDURE — 99213 OFFICE O/P EST LOW 20 MIN: CPT | Performed by: FAMILY MEDICINE

## 2018-07-03 RX ORDER — AMINO ACIDS/MV,IRON,MIN
1 TABLET ORAL EVERY EVENING
COMMUNITY
End: 2020-03-31

## 2018-07-03 NOTE — ASSESSMENT & PLAN NOTE
This problem is new to me.  Patient reports that she has had swelling along with pain in the right elbow for several months.  She states that over the last 2 months it is started to feel better.  She states that the pain was worse when she was bowling on a regular basis.  She states that since she stopped bowling in the last 2 months the pain is decreased along with the swelling.  She denies any numbness or tingling; denies any decreased active range of motion; denies any joint dislocation.  She has not had this evaluated previously.  She states the pain occurs primarily when she is bowling is in turning her arm to throw the ball down the megan.

## 2018-07-03 NOTE — PROGRESS NOTES
Subjective:   Tara Cueva is a 63 y.o. female here today for pain in the right elbow    Olecranon bursitis of right elbow  This problem is new to me.  Patient reports that she has had swelling along with pain in the right elbow for several months.  She states that over the last 2 months it is started to feel better.  She states that the pain was worse when she was bowling on a regular basis.  She states that since she stopped bowling in the last 2 months the pain is decreased along with the swelling.  She denies any numbness or tingling; denies any decreased active range of motion; denies any joint dislocation.  She has not had this evaluated previously.  She states the pain occurs primarily when she is bowling is in turning her arm to throw the ball down the megan.         Current medicines (including changes today)  Current Outpatient Prescriptions   Medication Sig Dispense Refill   • Multiple Vitamins-Minerals (OCUVITE EXTRA PO) Take  by mouth.     • atorvastatin (LIPITOR) 80 MG tablet Take 1 Tab by mouth every evening. 90 Tab 3   • omeprazole (PRILOSEC) 20 MG delayed-release capsule Take 1 Cap by mouth every day. TAKE 1 CAP BY MOUTH EVERY DAY. 90 Cap 3   • enalapril (VASOTEC) 5 MG Tab Take 1 Tab by mouth every day. 90 Tab 3   • insulin glargine (LANTUS SOLOSTAR) 100 UNIT/ML Solution Pen-injector injection Inject 30 Units as instructed 2 times a day. 15 PEN 4   • Insulin Pen Needle (NOVOFINE PLUS) 32G X 4 MM Misc 1 Applicator by Does not apply route every day. Using one needle tip with victoza per day 100 Each 3   • Empagliflozin (JARDIANCE) 10 MG Tab Take 1 tablet by mouth every morning with breakfast. 30 Tab 4   • pioglitazone (ACTOS) 30 MG Tab Take 1 Tab by mouth every day. 30 Tab 11   • VICTOZA 18 MG/3ML Solution Pen-injector injection Inject 0.3 mL as instructed every day. 3 PEN 6   • glucose blood (ONE TOUCH ULTRA TEST) strip 1 Strip by Other route 3 times a day. 100 Strip 3   • Cyanocobalamin (B-12) 1000  "MCG Cap Take  by mouth.     • Cholecalciferol (VITAMIN D) 400 UNIT TABS Take 400 Units by mouth every day.     • Blood Glucose Monitoring Suppl SUPPLIES MISC Test strips order: Test strips for glucometer. Sig: use it once daily and prn ssx high or low sugar #100 RF x 3 100 Each 3   • aspirin EC (ECOTRIN) 81 MG TBEC Take 81 mg by mouth every day.       No current facility-administered medications for this visit.      She  has a past medical history of Diabetes (5/12/2011); Edema (5/12/2011); Elevated liver enzymes (5/12/2011); Gall bladder polyp (8/12/2011); GERD (gastroesophageal reflux disease) (5/12/2011); Hyperlipidemia (5/12/2011); Hypertension (5/12/2011); Skin lesion of face (5/12/2011); and Vitamin d deficiency (5/12/2011).    ROS   No chest pain, no shortness of breath, no abdominal pain  + Pain over the right elbow     Objective:     Blood pressure 114/72, pulse 69, temperature 36.1 °C (97 °F), height 1.676 m (5' 6\"), weight 81.6 kg (180 lb), last menstrual period 12/16/2006, SpO2 98 %. Body mass index is 29.05 kg/m².   Physical Exam:  Alert, oriented in no acute distress.  Eye contact is good, speech goal directed, affect calm  HEENT: conjunctiva non-injected, sclera non-icteric.  Pinna normal. Oral mucous membranes pink and moist with no lesions.  Neck No adenopathy or masses in the neck or supraclavicular regions.  Lungs: clear to auscultation bilaterally with good excursion.  CV: regular rate and rhythm.  Abdomen: soft, nontender, No CVAT  Ext: no edema, color normal, vascularity normal, temperature normal  MSK: Right elbow- swelling approximately 3 cm in diameter over the olecranon, no tenderness to palpation, no erythema, no pain with flexion or extension at the elbow joint      Assessment and Plan:   The following treatment plan was discussed     1. Olecranon bursitis of right elbow      Improved.  Offered oral steroids today as a trial for inflammation the patient declined; trial of OTC ibuprofen; " reevaluate at next appointment       Followup: Return if symptoms worsen or fail to improve.

## 2018-08-23 ENCOUNTER — OFFICE VISIT (OUTPATIENT)
Dept: ENDOCRINOLOGY | Facility: MEDICAL CENTER | Age: 63
End: 2018-08-23
Payer: COMMERCIAL

## 2018-08-23 VITALS
BODY MASS INDEX: 28.83 KG/M2 | DIASTOLIC BLOOD PRESSURE: 68 MMHG | HEART RATE: 83 BPM | WEIGHT: 179.4 LBS | HEIGHT: 66 IN | SYSTOLIC BLOOD PRESSURE: 110 MMHG | OXYGEN SATURATION: 97 %

## 2018-08-23 DIAGNOSIS — E11.9 TYPE 2 DIABETES MELLITUS WITHOUT COMPLICATION, WITHOUT LONG-TERM CURRENT USE OF INSULIN (HCC): ICD-10-CM

## 2018-08-23 DIAGNOSIS — E78.00 PURE HYPERCHOLESTEROLEMIA: Chronic | ICD-10-CM

## 2018-08-23 LAB
HBA1C MFR BLD: 6.5 % (ref ?–5.8)
INT CON NEG: NORMAL
INT CON POS: NORMAL

## 2018-08-23 PROCEDURE — 99213 OFFICE O/P EST LOW 20 MIN: CPT | Performed by: PHYSICIAN ASSISTANT

## 2018-08-23 PROCEDURE — 83036 HEMOGLOBIN GLYCOSYLATED A1C: CPT | Performed by: PHYSICIAN ASSISTANT

## 2018-08-23 NOTE — PROGRESS NOTES
Return to office Patient Consult Note  Referred by: Allyson Clay D.O.    Reason for consult: Diabetes Management Type 2    HPI:  Tara Cueva is a 63 y.o. old patient who is seeing us today for diabetes care.  This is a pleasant patient with diabetes and I appreciate the opportunity to participate in the care of this patient.    Labs of 8/23/18 HbA1c is 6.5  Labs of 2/22/18 HbA1c 6.4  HbA1c on 11/21/17    GFR 36  Labs of 2/23/17 HbA1c is 10.7, GFR >60,    In 12/2016 HbA1c was 10.2  HbA1c on 7/21/17 was 8.0     4/25/17 labs:  C-peptide 8.5, CALVIN-65 is at 9.8    BG Diary:8/23/2018  In the AM: 134, 135, 126, 129, 154  Before Bed: 135, 154, 143, 184, 202     BG Diary:2/22/2018  In the AM: 112, 108, 91, 124, 1:30, 110, 114, 127  Before Bed: 138, 141, 133, 219, 129, 160     Weight: on 4/25/17 her weight was 179pounds, today it is 179pounds      1. Type 2 diabetes mellitus without complication, without long-term current use of insulin (HCC)    Now on:  Jardiance 10mg one a day  Actos 30 mg one a day.   Victoza 1.8  Lantus at night 20 units.  (failed metformin)  Humalog (no longer taking)      ROS:   Constitutional: No night sweats.  Eyes:  No visual changes.  Cardiac: No chest pain, No palpitations or racing heart rate.  Resp: No shortness of breath, No cough,   Gi: No Diarrhea    All other systems were reviewed and were/are negative.  The ROS was revised/revisited during this office visit from the patients first office visit with me on 4/24/17. Please review the full ROS during the first office visit.    Past Medical History:  Patient Active Problem List    Diagnosis Date Noted   • Olecranon bursitis of right elbow 07/03/2018   • Type 2 diabetes mellitus without complication (HCC) 02/02/2018   • Vitamin B12 deficiency 08/24/2017   • Fatigue 07/20/2017   • Thumb pain 12/23/2016   • Hypokalemia 01/03/2014   • Gall bladder polyp 08/12/2011   • Hyperlipidemia 05/12/2011   • Vitamin D deficiency 05/12/2011   •  Hypertension 05/12/2011   • GERD (gastroesophageal reflux disease) 05/12/2011       Past Surgical History:  Past Surgical History:   Procedure Laterality Date   • OTHER ORTHOPEDIC SURGERY      right arm ORIF   • PRIMARY C SECTION     • TUBAL COAGULATION LAPAROSCOPIC BILATERAL         Allergies:  Sulfa drugs    Social History:  Social History     Social History   • Marital status:      Spouse name: N/A   • Number of children: N/A   • Years of education: N/A     Occupational History   • Not on file.     Social History Main Topics   • Smoking status: Former Smoker     Packs/day: 1.00     Years: 40.00     Types: Cigarettes     Quit date: 1/1/1990   • Smokeless tobacco: Never Used   • Alcohol use No      Comment: holidays   • Drug use: No   • Sexual activity: Not Currently     Other Topics Concern   • Not on file     Social History Narrative   • No narrative on file       Family History:  Family History   Problem Relation Age of Onset   • Cancer Maternal Grandmother         lung    • Cancer Mother         breast   • Other Father         urinary issues   • GI Sister    • Heart Disease Brother        Medications:    Current Outpatient Prescriptions:   •  Multiple Vitamins-Minerals (OCUVITE EXTRA PO), Take  by mouth., Disp: , Rfl:   •  atorvastatin (LIPITOR) 80 MG tablet, Take 1 Tab by mouth every evening., Disp: 90 Tab, Rfl: 3  •  omeprazole (PRILOSEC) 20 MG delayed-release capsule, Take 1 Cap by mouth every day. TAKE 1 CAP BY MOUTH EVERY DAY., Disp: 90 Cap, Rfl: 3  •  enalapril (VASOTEC) 5 MG Tab, Take 1 Tab by mouth every day., Disp: 90 Tab, Rfl: 3  •  insulin glargine (LANTUS SOLOSTAR) 100 UNIT/ML Solution Pen-injector injection, Inject 30 Units as instructed 2 times a day., Disp: 15 PEN, Rfl: 4  •  Insulin Pen Needle (NOVOFINE PLUS) 32G X 4 MM Misc, 1 Applicator by Does not apply route every day. Using one needle tip with victoza per day, Disp: 100 Each, Rfl: 3  •  Empagliflozin (JARDIANCE) 10 MG Tab, Take 1  "tablet by mouth every morning with breakfast., Disp: 30 Tab, Rfl: 4  •  pioglitazone (ACTOS) 30 MG Tab, Take 1 Tab by mouth every day., Disp: 30 Tab, Rfl: 11  •  VICTOZA 18 MG/3ML Solution Pen-injector injection, Inject 0.3 mL as instructed every day., Disp: 3 PEN, Rfl: 6  •  glucose blood (ONE TOUCH ULTRA TEST) strip, 1 Strip by Other route 3 times a day., Disp: 100 Strip, Rfl: 3  •  Cyanocobalamin (B-12) 1000 MCG Cap, Take  by mouth., Disp: , Rfl:   •  Cholecalciferol (VITAMIN D) 400 UNIT TABS, Take 400 Units by mouth every day., Disp: , Rfl:   •  Blood Glucose Monitoring Suppl SUPPLIES MISC, Test strips order: Test strips for glucometer. Sig: use it once daily and prn ssx high or low sugar #100 RF x 3, Disp: 100 Each, Rfl: 3  •  aspirin EC (ECOTRIN) 81 MG TBEC, Take 81 mg by mouth every day., Disp: , Rfl:         Physical Examination:   Vital signs: /68   Pulse 83   Ht 1.676 m (5' 5.98\")   Wt 81.4 kg (179 lb 6.4 oz)   LMP 12/16/2006   SpO2 97%   BMI 28.97 kg/m²   General: No distress, cooperative, well dressed and well nourished.   Eyes: No scleral icterus or discharge, No hyposphagma  ENMT: Normal on external inspection of nose, lips, No nasal drainage   Neck: No abnormal masses on inspection  Resp: Normal effort, Bilateral clear to auscultation, No wheezing, No rales  CVS: Regular rate and rhythm, S1 S2 normal, No murmur. No gallop  Extremities: No edema bilateral extremities  Neuro: Alert and oriented  Skin: No rash, No Ulcers  Psych: Normal mood and affect      Assessment and Plan:    1. Type 2 diabetes mellitus without complication, without long-term current use of insulin (HCC)    Now on:  Jardiance 10mg one a day  Actos 30 mg one a day.   Victoza 1.8  Lantus at night 20 units.  (failed metformin)  Humalog (no longer taking)      Return in about 6 months (around 2/23/2019).    Blood glucose log: Check BG in the morning when wake up, before lunch or dinner and before bed.  So three times a day.  " Always bring BG diary to the next office visit.       Thank you kindly for allowing me to participate in the diabetes care plan for this patient.    Edy Lee PA-C, [facility]  Board Certified - Advanced Diabetes Management  08/23/18    CC:   Allyson Clay D.O.

## 2018-08-27 RX ORDER — EMPAGLIFLOZIN 10 MG/1
1 TABLET, FILM COATED ORAL
Qty: 30 TAB | Refills: 4 | Status: SHIPPED | OUTPATIENT
Start: 2018-08-27 | End: 2019-02-26 | Stop reason: SDUPTHER

## 2018-08-27 NOTE — TELEPHONE ENCOUNTER
Was the patient seen in the last year in this department? Yes    Does patient have an active prescription for medications requested? No     Received Request Via: Pharmacy     JARDIANCE 10 MG Tab  Take 1 tablet by mouth every morning with breakfast.

## 2018-08-28 ENCOUNTER — OFFICE VISIT (OUTPATIENT)
Dept: URGENT CARE | Facility: PHYSICIAN GROUP | Age: 63
End: 2018-08-28
Payer: COMMERCIAL

## 2018-08-28 VITALS
TEMPERATURE: 98.2 F | DIASTOLIC BLOOD PRESSURE: 62 MMHG | HEIGHT: 66 IN | HEART RATE: 80 BPM | SYSTOLIC BLOOD PRESSURE: 106 MMHG | WEIGHT: 176 LBS | BODY MASS INDEX: 28.28 KG/M2 | OXYGEN SATURATION: 92 % | RESPIRATION RATE: 12 BRPM

## 2018-08-28 DIAGNOSIS — J06.9 VIRAL URI WITH COUGH: ICD-10-CM

## 2018-08-28 PROCEDURE — 99214 OFFICE O/P EST MOD 30 MIN: CPT | Performed by: FAMILY MEDICINE

## 2018-08-28 RX ORDER — BENZONATATE 100 MG/1
100 CAPSULE ORAL 3 TIMES DAILY PRN
Qty: 30 CAP | Refills: 0 | Status: SHIPPED | OUTPATIENT
Start: 2018-08-28 | End: 2018-09-07

## 2018-08-28 ASSESSMENT — ENCOUNTER SYMPTOMS
WHEEZING: 0
MYALGIAS: 0
COUGH: 1
SPUTUM PRODUCTION: 1
ABDOMINAL PAIN: 0
NAUSEA: 0
HEMOPTYSIS: 0
DIARRHEA: 0
CHILLS: 0
SORE THROAT: 0
WEIGHT LOSS: 0
VOMITING: 0
FEVER: 0
SHORTNESS OF BREATH: 0
HEADACHES: 0

## 2018-08-28 ASSESSMENT — PAIN SCALES - GENERAL: PAINLEVEL: NO PAIN

## 2018-08-28 NOTE — PROGRESS NOTES
"Subjective:   Tara Cueva is a 63 y.o. female who presents for Cough ([id number] )     HPI   Cough x 10 days   Stable, not improving or worsening   Cough is productive with yellow sputum   Minimal rhinorrhea and congestion   Cough worse at night and keeps her awake   No wheezing/SOB  No headache   No ear pain   No sick contacts (lives with her mother)   No fevers   No N/V/D    Review of Systems   Constitutional: Negative for chills, fever and weight loss.   HENT: Positive for congestion. Negative for ear pain and sore throat.    Respiratory: Positive for cough and sputum production. Negative for hemoptysis, shortness of breath and wheezing.    Cardiovascular: Negative for chest pain.   Gastrointestinal: Negative for abdominal pain, diarrhea, nausea and vomiting.   Musculoskeletal: Negative for myalgias.   Skin: Negative for rash.   Neurological: Negative for headaches.      Objective:   /62   Pulse 80   Temp 36.8 °C (98.2 °F)   Resp 12   Ht 1.676 m (5' 6\")   Wt 79.8 kg (176 lb)   LMP 12/16/2006   SpO2 92%   BMI 28.41 kg/m²   Physical Exam   Constitutional: She appears well-developed and well-nourished. No distress.   HENT:   Head: Normocephalic and atraumatic.   Right Ear: Tympanic membrane, external ear and ear canal normal.   Left Ear: Tympanic membrane, external ear and ear canal normal.   Nose: Rhinorrhea present.   Mouth/Throat: Uvula is midline, oropharynx is clear and moist and mucous membranes are normal.   Eyes: Pupils are equal, round, and reactive to light. Conjunctivae and EOM are normal. Right eye exhibits no discharge. Left eye exhibits no discharge. No scleral icterus.   Neck: Normal range of motion.   Cardiovascular: Normal rate, regular rhythm and normal heart sounds.  Exam reveals no gallop and no friction rub.    No murmur heard.  Pulmonary/Chest: Effort normal and breath sounds normal. No respiratory distress. She has no wheezes. She has no rales.   Lymphadenopathy:     She has no " cervical adenopathy.   Neurological: She is alert.   Skin: Skin is warm and dry. No rash noted. She is not diaphoretic.   Psychiatric: She has a normal mood and affect.        Assessment/Plan:   1. Viral URI with cough  Pt with a viral URI for the past 10 days.  Advised continued conservative management and supportive care.  Will try Tessalon to see if it can help her get better sleep.    - benzonatate (TESSALON) 100 MG Cap; Take 1 Cap by mouth 3 times a day as needed for Cough.  Dispense: 30 Cap; Refill: 0    F/U PRN

## 2018-08-31 NOTE — PROGRESS NOTES
I have reviewed and agree with history, assessment and plan for office encounter on 8/28/2018.   Suggested changes to plan or follow-up: none   Franco May M.D.

## 2018-09-07 ENCOUNTER — OFFICE VISIT (OUTPATIENT)
Dept: MEDICAL GROUP | Facility: PHYSICIAN GROUP | Age: 63
End: 2018-09-07
Payer: COMMERCIAL

## 2018-09-07 VITALS
DIASTOLIC BLOOD PRESSURE: 62 MMHG | TEMPERATURE: 98.4 F | OXYGEN SATURATION: 95 % | BODY MASS INDEX: 27.97 KG/M2 | HEART RATE: 74 BPM | SYSTOLIC BLOOD PRESSURE: 118 MMHG | HEIGHT: 66 IN | WEIGHT: 174 LBS

## 2018-09-07 DIAGNOSIS — I10 ESSENTIAL HYPERTENSION: Chronic | ICD-10-CM

## 2018-09-07 DIAGNOSIS — E11.9 TYPE 2 DIABETES MELLITUS WITHOUT COMPLICATION, WITHOUT LONG-TERM CURRENT USE OF INSULIN (HCC): ICD-10-CM

## 2018-09-07 DIAGNOSIS — M70.21 OLECRANON BURSITIS OF RIGHT ELBOW: ICD-10-CM

## 2018-09-07 PROCEDURE — 99214 OFFICE O/P EST MOD 30 MIN: CPT | Performed by: FAMILY MEDICINE

## 2018-09-07 NOTE — PROGRESS NOTES
Subjective:   Tara Cueva is a 63 y.o. female here today for diabetes, hypertension, olecranon bursitis    Hypertension  Ongoing issues; patient compliant with enalapril 5 mg; denies any headache, dizziness, chest pain.  Blood pressure and heart rate both are in a recommended range.  Previous blood work showed normal kidney function.    Type 2 diabetes mellitus without complication (CMS-HCC) (HCC)  Ongoing issue.  Patient follows endocrinology for this issue.  All medications listed in the chart are up-to-date and current.  Most recent hemoglobin A1c is now improved to 6.5.  This is due to medication changes.  Patient reports that she is feeling well and not having any side effects of her medications.    Olecranon bursitis of right elbow  Ongoing issue.  At our previous appointment patient was reporting pain and swelling at the right olecranon.  Patient declined either drainage or steroid injection.  She tried over-the-counter ibuprofen which has been mildly beneficial.  She continues to have pain off and on but does not have any decrease in mobility.         Current medicines (including changes today)  Current Outpatient Prescriptions   Medication Sig Dispense Refill   • JARDIANCE 10 MG Tab TAKE 1 TABLET BY MOUTH EVERY MORNING WITH BREAKFAST. 30 Tab 4   • Multiple Vitamins-Minerals (OCUVITE EXTRA PO) Take  by mouth.     • atorvastatin (LIPITOR) 80 MG tablet Take 1 Tab by mouth every evening. 90 Tab 3   • omeprazole (PRILOSEC) 20 MG delayed-release capsule Take 1 Cap by mouth every day. TAKE 1 CAP BY MOUTH EVERY DAY. 90 Cap 3   • enalapril (VASOTEC) 5 MG Tab Take 1 Tab by mouth every day. 90 Tab 3   • insulin glargine (LANTUS SOLOSTAR) 100 UNIT/ML Solution Pen-injector injection Inject 30 Units as instructed 2 times a day. 15 PEN 4   • Insulin Pen Needle (NOVOFINE PLUS) 32G X 4 MM Misc 1 Applicator by Does not apply route every day. Using one needle tip with victoza per day 100 Each 3   • pioglitazone (ACTOS) 30 MG  "Tab Take 1 Tab by mouth every day. 30 Tab 11   • VICTOZA 18 MG/3ML Solution Pen-injector injection Inject 0.3 mL as instructed every day. 3 PEN 6   • glucose blood (ONE TOUCH ULTRA TEST) strip 1 Strip by Other route 3 times a day. 100 Strip 3   • Cyanocobalamin (B-12) 1000 MCG Cap Take  by mouth.     • Cholecalciferol (VITAMIN D) 400 UNIT TABS Take 400 Units by mouth every day.     • Blood Glucose Monitoring Suppl SUPPLIES MISC Test strips order: Test strips for glucometer. Sig: use it once daily and prn ssx high or low sugar #100 RF x 3 100 Each 3   • aspirin EC (ECOTRIN) 81 MG TBEC Take 81 mg by mouth every day.       No current facility-administered medications for this visit.      She  has a past medical history of Diabetes (5/12/2011); Edema (5/12/2011); Elevated liver enzymes (5/12/2011); Gall bladder polyp (8/12/2011); GERD (gastroesophageal reflux disease) (5/12/2011); Hyperlipidemia (5/12/2011); Hypertension (5/12/2011); Skin lesion of face (5/12/2011); and Vitamin d deficiency (5/12/2011).    ROS   No chest pain, no shortness of breath, no abdominal pain  + Pain in the right elbow     Objective:     Blood pressure 118/62, pulse 74, temperature 36.9 °C (98.4 °F), height 1.676 m (5' 6\"), weight 78.9 kg (174 lb), last menstrual period 12/16/2006, SpO2 95 %. Body mass index is 28.08 kg/m².   Physical Exam:  Alert, oriented in no acute distress.  Eye contact is good, speech goal directed, affect calm  HEENT: conjunctiva non-injected, sclera non-icteric.  Pinna normal. TM pearly gray.   Oral mucous membranes pink and moist with no lesions.  Neck No adenopathy or masses in the neck or supraclavicular regions.  Lungs: clear to auscultation bilaterally with good excursion.  CV: regular rate and rhythm.  Abdomen: soft, nontender, No CVAT  Ext: no edema, color normal, vascularity normal, temperature normal  MSK: Right elbow.  Swelling and tenderness to palpation at the olecranon process, no erythema, normal active " range of motion.      Assessment and Plan:   The following treatment plan was discussed     1. Olecranon bursitis of right elbow      Uncontrolled.  Patient declined drainage and/or steroid injection; monitor   2. Type 2 diabetes mellitus without complication, without long-term current use of insulin (HCC)      Improved.  Continue current medication; continue follow-up with endocrinology as directed   3. Essential hypertension      Stable.  Continue current medication; recheck labs for kidney function in 6 months       Followup: Return in about 6 months (around 3/7/2019) for medication review - need labs, Short.

## 2018-09-07 NOTE — ASSESSMENT & PLAN NOTE
Ongoing issue.  Patient follows endocrinology for this issue.  All medications listed in the chart are up-to-date and current.  Most recent hemoglobin A1c is now improved to 6.5.  This is due to medication changes.  Patient reports that she is feeling well and not having any side effects of her medications.

## 2018-09-07 NOTE — ASSESSMENT & PLAN NOTE
Ongoing issues; patient compliant with enalapril 5 mg; denies any headache, dizziness, chest pain.  Blood pressure and heart rate both are in a recommended range.  Previous blood work showed normal kidney function.

## 2018-09-07 NOTE — ASSESSMENT & PLAN NOTE
Ongoing issue.  At our previous appointment patient was reporting pain and swelling at the right olecranon.  Patient declined either drainage or steroid injection.  She tried over-the-counter ibuprofen which has been mildly beneficial.  She continues to have pain off and on but does not have any decrease in mobility.

## 2018-10-16 RX ORDER — LIRAGLUTIDE 6 MG/ML
1.8 INJECTION SUBCUTANEOUS DAILY
Qty: 3 PEN | Refills: 6 | Status: SHIPPED | OUTPATIENT
Start: 2018-10-16 | End: 2019-05-07 | Stop reason: SDUPTHER

## 2018-10-16 NOTE — TELEPHONE ENCOUNTER
Was the patient seen in the last year in this department? Yes    Does patient have an active prescription for medications requested? No     Received Request Via: Pharmacy   VICTOZA 18 MG/3ML Solution Pen-injector injection  Inject 0.3 mL as instructed

## 2018-12-10 ENCOUNTER — OFFICE VISIT (OUTPATIENT)
Dept: URGENT CARE | Facility: PHYSICIAN GROUP | Age: 63
End: 2018-12-10
Payer: COMMERCIAL

## 2018-12-10 VITALS
DIASTOLIC BLOOD PRESSURE: 72 MMHG | RESPIRATION RATE: 18 BRPM | WEIGHT: 175 LBS | OXYGEN SATURATION: 97 % | HEIGHT: 66 IN | TEMPERATURE: 98.2 F | BODY MASS INDEX: 28.12 KG/M2 | HEART RATE: 81 BPM | SYSTOLIC BLOOD PRESSURE: 118 MMHG

## 2018-12-10 DIAGNOSIS — J22 LRTI (LOWER RESPIRATORY TRACT INFECTION): ICD-10-CM

## 2018-12-10 PROCEDURE — 99213 OFFICE O/P EST LOW 20 MIN: CPT | Performed by: FAMILY MEDICINE

## 2018-12-10 ASSESSMENT — ENCOUNTER SYMPTOMS
SHORTNESS OF BREATH: 0
CHILLS: 0
WHEEZING: 0
FEVER: 0
COUGH: 1

## 2018-12-10 ASSESSMENT — PAIN SCALES - GENERAL: PAINLEVEL: 4=SLIGHT-MODERATE PAIN

## 2018-12-10 NOTE — PROGRESS NOTES
"Subjective:   Tara Cueva is a 63 y.o. female who presents for Cough (x 5 days, pain under right breast since wednesday night)     Cough   This is a new problem. The current episode started in the past 7 days. The problem has been gradually worsening. The problem occurs every few minutes. The cough is productive of sputum. Pertinent negatives include no chills, fever, nasal congestion, postnasal drip, shortness of breath or wheezing.     Review of Systems   Constitutional: Negative for chills and fever.   HENT: Negative for postnasal drip.    Respiratory: Positive for cough. Negative for shortness of breath and wheezing.       Objective:   /72   Pulse 81   Temp 36.8 °C (98.2 °F) (Temporal)   Resp 18   Ht 1.676 m (5' 6\")   Wt 79.4 kg (175 lb)   LMP 12/16/2006   SpO2 97%   BMI 28.25 kg/m²   Physical Exam   Constitutional: She is oriented to person, place, and time. She appears well-developed and well-nourished. No distress.   Eyes: Pupils are equal, round, and reactive to light. EOM are normal.   Cardiovascular: Normal rate, regular rhythm, normal heart sounds and intact distal pulses.    Pulmonary/Chest: Effort normal and breath sounds normal. No respiratory distress. She exhibits tenderness.   Abdominal: Soft. Bowel sounds are normal. She exhibits no distension.   Musculoskeletal: Normal range of motion.   Neurological: She is alert and oriented to person, place, and time. She has normal reflexes.   Skin: Skin is warm and dry.   Psychiatric: She has a normal mood and affect. Her behavior is normal.        Assessment/Plan:   1. LRTI (lower respiratory tract infection)  Use over-the-counter pain reliever, such as acetaminophen (Tylenol), ibuprofen (Advil, Motrin) or naproxen (Aleve) as needed; follow package directions for dosing.   Differential diagnosis, natural history, supportive care, and indications for immediate follow-up discussed.    "

## 2019-01-21 DIAGNOSIS — E13.9 DIABETES 1.5, MANAGED AS TYPE 2 (HCC): ICD-10-CM

## 2019-01-21 NOTE — TELEPHONE ENCOUNTER
Was the patient seen in the last year in this department? Yes  **LOV 08/23/18**    Does patient have an active prescription for medications requested? Yes    Received Request Via: Pharmacy  **90 day supply**

## 2019-02-26 RX ORDER — EMPAGLIFLOZIN 10 MG/1
1 TABLET, FILM COATED ORAL
Qty: 30 TAB | Refills: 4 | Status: SHIPPED | OUTPATIENT
Start: 2019-02-26 | End: 2019-05-07 | Stop reason: SDUPTHER

## 2019-02-26 NOTE — TELEPHONE ENCOUNTER
Was the patient seen in the last year in this department? Yes  LOV 8/23/18 Edy  Next appt 3/1/19 Edy    Does patient have an active prescription for medications requested? Yes    Received Request Via: Pharmacy

## 2019-03-01 ENCOUNTER — OFFICE VISIT (OUTPATIENT)
Dept: ENDOCRINOLOGY | Facility: MEDICAL CENTER | Age: 64
End: 2019-03-01
Payer: COMMERCIAL

## 2019-03-01 VITALS
OXYGEN SATURATION: 98 % | BODY MASS INDEX: 29.63 KG/M2 | SYSTOLIC BLOOD PRESSURE: 108 MMHG | HEART RATE: 85 BPM | DIASTOLIC BLOOD PRESSURE: 60 MMHG | WEIGHT: 184.4 LBS | HEIGHT: 66 IN

## 2019-03-01 DIAGNOSIS — I10 ESSENTIAL HYPERTENSION: Chronic | ICD-10-CM

## 2019-03-01 DIAGNOSIS — E11.9 TYPE 2 DIABETES MELLITUS WITHOUT COMPLICATION, UNSPECIFIED WHETHER LONG TERM INSULIN USE (HCC): ICD-10-CM

## 2019-03-01 LAB
HBA1C MFR BLD: 6.9 % (ref ?–5.8)
INT CON NEG: NEGATIVE
INT CON POS: POSITIVE

## 2019-03-01 PROCEDURE — 99214 OFFICE O/P EST MOD 30 MIN: CPT | Performed by: PHYSICIAN ASSISTANT

## 2019-03-01 PROCEDURE — 83036 HEMOGLOBIN GLYCOSYLATED A1C: CPT | Performed by: PHYSICIAN ASSISTANT

## 2019-03-01 NOTE — PROGRESS NOTES
Return to office Patient Consult Note  Referred by: Pcp Pt States None    Reason for consult: Diabetes Management Type 2    HPI:  Tara Cueva is a 63 y.o. old patient who is seeing us today for diabetes care.  This is a pleasant patient with diabetes and I appreciate the opportunity to participate in the care of this patient.    Labs of 3/1/19 HbA1c is 6.9  Labs of 8/23/18 HbA1c is 6.5  Labs of 2/22/18 HbA1c 6.4  HbA1c on 11/21/17    GFR 36  Labs of 2/23/17 HbA1c is 10.7, GFR >60,    In 12/2016 HbA1c was 10.2  HbA1c on 7/21/17 was 8.0     4/25/17 labs:  C-peptide 8.5, CALVIN-65 is at 9.8       BG Diary:3/1/2019  In the AM:  No log    Weight: on 4/25/17 her weight was 179pounds, today it is 179pounds    1. Type 2 diabetes mellitus without complication, without long-term current use of insulin ([facility])     Now on:  Jardiance 10mg one a day  Actos 30 mg one a day.   Victoza 1.8  Lantus at night 20 units.  (failed metformin)  Humalog (no longer taking)      ROS:   Constitutional: No night sweats.  Eyes:  No visual changes.  Cardiac: No chest pain, No palpitations or racing heart rate.  Resp: No shortness of breath, No cough,   Gi: No Diarrhea    All other systems were reviewed and were/are negative.  The ROS was revised/revisited during this office visit from the patients first office visit with me on 4/25/17  Please review the full ROS during the first office visit.    Past Medical History:  Patient Active Problem List    Diagnosis Date Noted   • Olecranon bursitis of right elbow 07/03/2018   • Type 2 diabetes mellitus without complication (HCC) 02/02/2018   • Vitamin B12 deficiency 08/24/2017   • Fatigue 07/20/2017   • Thumb pain 12/23/2016   • Hypokalemia 01/03/2014   • Gall bladder polyp 08/12/2011   • Hyperlipidemia 05/12/2011   • Vitamin D deficiency 05/12/2011   • Hypertension 05/12/2011   • GERD (gastroesophageal reflux disease) 05/12/2011       Past Surgical History:  Past Surgical History:   Procedure Laterality  Date   • OTHER ORTHOPEDIC SURGERY      right arm ORIF   • PRIMARY C SECTION     • TUBAL COAGULATION LAPAROSCOPIC BILATERAL         Allergies:  Sulfa drugs    Social History:  Social History     Social History   • Marital status:      Spouse name: N/A   • Number of children: N/A   • Years of education: N/A     Occupational History   • Not on file.     Social History Main Topics   • Smoking status: Former Smoker     Packs/day: 1.00     Years: 40.00     Types: Cigarettes     Quit date: 1/1/1990   • Smokeless tobacco: Never Used   • Alcohol use No      Comment: holidays   • Drug use: No   • Sexual activity: Not Currently     Other Topics Concern   • Not on file     Social History Narrative   • No narrative on file       Family History:  Family History   Problem Relation Age of Onset   • Cancer Maternal Grandmother         lung    • Cancer Mother         breast   • Other Father         urinary issues   • GI Sister    • Heart Disease Brother        Medications:    Current Outpatient Prescriptions:   •  JARDIANCE 10 MG Tab, TAKE 1 TABLET BY MOUTH EVERY MORNING WITH BREAKFAST., Disp: 30 Tab, Rfl: 4  •  insulin glargine (LANTUS SOLOSTAR) 100 UNIT/ML Solution Pen-injector injection, Inject 30 Units as instructed 2 times a day., Disp: 45 PEN, Rfl: 1  •  VICTOZA 18 MG/3ML Solution Pen-injector injection, INJECT 0.3 ML AS INSTRUCTED EVERY DAY., Disp: 3 PEN, Rfl: 6  •  Multiple Vitamins-Minerals (OCUVITE EXTRA PO), Take  by mouth., Disp: , Rfl:   •  atorvastatin (LIPITOR) 80 MG tablet, Take 1 Tab by mouth every evening., Disp: 90 Tab, Rfl: 3  •  omeprazole (PRILOSEC) 20 MG delayed-release capsule, Take 1 Cap by mouth every day. TAKE 1 CAP BY MOUTH EVERY DAY., Disp: 90 Cap, Rfl: 3  •  enalapril (VASOTEC) 5 MG Tab, Take 1 Tab by mouth every day., Disp: 90 Tab, Rfl: 3  •  Insulin Pen Needle (NOVOFINE PLUS) 32G X 4 MM Misc, 1 Applicator by Does not apply route every day. Using one needle tip with victoza per day, Disp: 100  "Each, Rfl: 3  •  pioglitazone (ACTOS) 30 MG Tab, Take 1 Tab by mouth every day., Disp: 30 Tab, Rfl: 11  •  glucose blood (ONE TOUCH ULTRA TEST) strip, 1 Strip by Other route 3 times a day., Disp: 100 Strip, Rfl: 3  •  Cyanocobalamin (B-12) 1000 MCG Cap, Take  by mouth., Disp: , Rfl:   •  Cholecalciferol (VITAMIN D) 400 UNIT TABS, Take 400 Units by mouth every day., Disp: , Rfl:   •  Blood Glucose Monitoring Suppl SUPPLIES MISC, Test strips order: Test strips for glucometer. Sig: use it once daily and prn ssx high or low sugar #100 RF x 3, Disp: 100 Each, Rfl: 3  •  aspirin EC (ECOTRIN) 81 MG TBEC, Take 81 mg by mouth every day., Disp: , Rfl:         Physical Examination:   Vital signs: /60 (BP Location: Left arm, Patient Position: Sitting)   Pulse 85   Ht 1.676 m (5' 6\")   Wt 83.6 kg (184 lb 6.4 oz)   LMP 12/16/2006   SpO2 98%   BMI 29.76 kg/m²   General: No distress, cooperative, well dressed and well nourished.   Eyes: No scleral icterus or discharge, No hyposphagma  ENMT: Normal on external inspection of nose, lips, No nasal drainage   Neck: No abnormal masses on inspection  Resp: Normal effort, Bilateral clear to auscultation, No wheezing, No rales  CVS: Regular rate and rhythm, S1 S2 normal, No murmur. No gallop  Extremities: No edema bilateral extremities  Neuro: Alert and oriented  Skin: No rash, No Ulcers  Psych: Normal mood and affect      Assessment and Plan:    1. Type 2 diabetes mellitus without complication, without long-term current use of insulin (HCC)     Now on:  Jardiance 10mg one a day  Actos 30 mg one a day.   Victoza 1.8  Lantus at night 20 units.  (failed metformin)  Humalog (no longer taking)    Blood glucose log: Check BG in the morning when wake up, before lunch or dinner and before bed.  So three times a day.  Always bring BG diary to the next office visit.     Thank you kindly for allowing me to participate in the diabetes care plan for this patient.    Edy Lee PA-C, " BC-ADM  Board Certified - Advanced Diabetes Management  03/01/19    CC:   Pcp Pt States None

## 2019-04-04 RX ORDER — PIOGLITAZONEHYDROCHLORIDE 30 MG/1
30 TABLET ORAL DAILY
Qty: 30 TAB | Refills: 10 | Status: SHIPPED | OUTPATIENT
Start: 2019-04-04 | End: 2019-05-07 | Stop reason: SDUPTHER

## 2019-04-04 NOTE — TELEPHONE ENCOUNTER
Was the patient seen in the last year in this department? Yes 3/1/19    Does patient have an active prescription for medications requested? No     Received Request Via: Pharmacy     pioglitazone (ACTOS) 30 MG Tab  Take 1 Tab by mouth every day.

## 2019-05-07 ENCOUNTER — OFFICE VISIT (OUTPATIENT)
Dept: MEDICAL GROUP | Facility: PHYSICIAN GROUP | Age: 64
End: 2019-05-07
Payer: MEDICARE

## 2019-05-07 ENCOUNTER — PATIENT OUTREACH (OUTPATIENT)
Dept: HEALTH INFORMATION MANAGEMENT | Facility: OTHER | Age: 64
End: 2019-05-07

## 2019-05-07 VITALS
TEMPERATURE: 97.3 F | DIASTOLIC BLOOD PRESSURE: 66 MMHG | HEART RATE: 88 BPM | RESPIRATION RATE: 16 BRPM | SYSTOLIC BLOOD PRESSURE: 118 MMHG | BODY MASS INDEX: 29.41 KG/M2 | HEIGHT: 66 IN | OXYGEN SATURATION: 95 % | WEIGHT: 183 LBS

## 2019-05-07 DIAGNOSIS — E11.9 TYPE 2 DIABETES MELLITUS WITHOUT COMPLICATION, UNSPECIFIED WHETHER LONG TERM INSULIN USE (HCC): ICD-10-CM

## 2019-05-07 DIAGNOSIS — Z12.39 SCREENING FOR BREAST CANCER: ICD-10-CM

## 2019-05-07 DIAGNOSIS — Z23 NEED FOR VACCINATION: ICD-10-CM

## 2019-05-07 DIAGNOSIS — I10 ESSENTIAL HYPERTENSION: Chronic | ICD-10-CM

## 2019-05-07 DIAGNOSIS — E78.5 HYPERLIPIDEMIA, UNSPECIFIED HYPERLIPIDEMIA TYPE: Chronic | ICD-10-CM

## 2019-05-07 DIAGNOSIS — E13.9 DIABETES 1.5, MANAGED AS TYPE 2 (HCC): ICD-10-CM

## 2019-05-07 PROCEDURE — 99214 OFFICE O/P EST MOD 30 MIN: CPT | Performed by: FAMILY MEDICINE

## 2019-05-07 RX ORDER — PIOGLITAZONEHYDROCHLORIDE 30 MG/1
30 TABLET ORAL DAILY
Qty: 90 TAB | Refills: 3 | Status: SHIPPED
Start: 2019-05-07 | End: 2020-03-13

## 2019-05-07 RX ORDER — OMEPRAZOLE 20 MG/1
20 CAPSULE, DELAYED RELEASE ORAL
Qty: 90 CAP | Refills: 3 | Status: SHIPPED
Start: 2019-05-07 | End: 2020-03-13

## 2019-05-07 RX ORDER — ATORVASTATIN CALCIUM 80 MG/1
80 TABLET, FILM COATED ORAL EVERY EVENING
Qty: 90 TAB | Refills: 3 | Status: SHIPPED
Start: 2019-05-07 | End: 2020-03-13

## 2019-05-07 ASSESSMENT — PATIENT HEALTH QUESTIONNAIRE - PHQ9: CLINICAL INTERPRETATION OF PHQ2 SCORE: 0

## 2019-05-07 NOTE — ASSESSMENT & PLAN NOTE
Chronic stable medical condition.  Currently well controlled off of enalapril.  Recently seen by nephrology who determined that lab abnormalities and kidney function related to too low of a blood pressure and was recently discontinued from her enalapril.

## 2019-05-07 NOTE — ASSESSMENT & PLAN NOTE
Chronic ongoing medical issue.  Currently follows with Edy Lee of endocrinology.  Most recent A1c 6.9% on March, 2 months ago.  Currently well controlled on Actos 30 mg daily, Victoza 1.8 mg daily, Lantus 30 units twice daily, and Jardiance 10 mg daily.

## 2019-05-07 NOTE — PROGRESS NOTES
Outcome: Left Message    Please transfer to Patient Outreach Team at [phone] when patient returns call.    WebIZ Checked & Epic Updated:  no    [facility] Verified: yes    Attempt # 1

## 2019-05-07 NOTE — PROGRESS NOTES
CC:Diagnoses of Hyperlipidemia, unspecified hyperlipidemia type, Diabetes 1.5, managed as type 2 (HCC), Type 2 diabetes mellitus without complication, unspecified whether long term insulin use (HCC), Essential hypertension, Need for vaccination, and Screening for breast cancer were pertinent to this visit.      HISTORY OF PRESENT ILLNESS: Patient is a 63 y.o. female established patient who presents today to establish new PCP.  Previously seen by Dr. Allyson Clay.    Health Maintenance: Completed    Type 2 diabetes mellitus without complication (CMS-HCC) (HCC)  Chronic ongoing medical issue.  Currently follows with Edy Lee of endocrinology.  Most recent A1c 6.9% on March, 2 months ago.  Currently well controlled on Actos 30 mg daily, Victoza 1.8 mg daily, Lantus 30 units twice daily, and Jardiance 10 mg daily.    Hypertension  Chronic stable medical condition.  Currently well controlled off of enalapril.  Recently seen by nephrology who determined that lab abnormalities and kidney function related to too low of a blood pressure and was recently discontinued from her enalapril.    Hyperlipidemia  Chronic ongoing medical condition.  Currently well controlled on atorvastatin 80 mg nightly.  Denies any muscle aches or weakness.      Patient Active Problem List    Diagnosis Date Noted   • Olecranon bursitis of right elbow 07/03/2018   • Type 2 diabetes mellitus without complication (HCC) 02/02/2018   • Vitamin B12 deficiency 08/24/2017   • Fatigue 07/20/2017   • Thumb pain 12/23/2016   • Hypokalemia 01/03/2014   • Gall bladder polyp 08/12/2011   • Hyperlipidemia 05/12/2011   • Vitamin D deficiency 05/12/2011   • Hypertension 05/12/2011   • GERD (gastroesophageal reflux disease) 05/12/2011      Allergies:Sulfa drugs    Current Outpatient Prescriptions   Medication Sig Dispense Refill   • atorvastatin (LIPITOR) 80 MG tablet Take 1 Tab by mouth every evening. 90 Tab 3   • insulin glargine (LANTUS SOLOSTAR) 100  UNIT/ML Solution Pen-injector injection Inject 30 Units as instructed 2 times a day. 45 PEN 1   • Empagliflozin (JARDIANCE) 10 MG Tab Take 1 tablet by mouth every morning with breakfast. 30 Tab 5   • omeprazole (PRILOSEC) 20 MG delayed-release capsule Take 1 Cap by mouth every day. TAKE 1 CAP BY MOUTH EVERY DAY. 90 Cap 3   • pioglitazone (ACTOS) 30 MG Tab Take 1 Tab by mouth every day. 90 Tab 3   • liraglutide (VICTOZA) 18 MG/3ML Solution Pen-injector injection Inject 0.3 mL as instructed every day. 3 PEN 6   • Multiple Vitamins-Minerals (OCUVITE EXTRA PO) Take  by mouth.     • Insulin Pen Needle (NOVOFINE PLUS) 32G X 4 MM Misc 1 Applicator by Does not apply route every day. Using one needle tip with victoza per day 100 Each 3   • glucose blood (ONE TOUCH ULTRA TEST) strip 1 Strip by Other route 3 times a day. 100 Strip 3   • Cyanocobalamin (B-12) 1000 MCG Cap Take  by mouth.     • Cholecalciferol (VITAMIN D) 400 UNIT TABS Take 400 Units by mouth every day.     • Blood Glucose Monitoring Suppl SUPPLIES MISC Test strips order: Test strips for glucometer. Sig: use it once daily and prn ssx high or low sugar #100 RF x 3 100 Each 3   • aspirin EC (ECOTRIN) 81 MG TBEC Take 81 mg by mouth every day.       No current facility-administered medications for this visit.        Social History   Substance Use Topics   • Smoking status: Former Smoker     Packs/day: 1.00     Years: 20.00     Types: Cigarettes     Quit date: 1/1/1990   • Smokeless tobacco: Never Used   • Alcohol use No      Comment: holidays     Social History     Social History Narrative   • No narrative on file       Family History   Problem Relation Age of Onset   • Cancer Maternal Grandmother         lung    • Cancer Mother         breast   • Other Father         urinary issues   • GI Sister    • Heart Disease Brother        Review of Systems:    Constitutional: No Fevers, Chills  Eyes: No vision changes  ENT: No hearing changes  Resp: No Shortness of  "breath  CV: No Chest pain  GI: No Nausea/Vomiting  MSK: No weakness  Skin: No rashes    Exam:    /66 (BP Location: Left arm, Patient Position: Sitting, BP Cuff Size: Adult)   Pulse 88   Temp 36.3 °C (97.3 °F) (Temporal)   Resp 16   Ht 1.676 m (5' 6\")   Wt 83 kg (183 lb)   SpO2 95%  Body mass index is 29.54 kg/m².    GENERAL: No acute distress  HENT: Atraumatic, normocephalic, external auditory canals clear bilaterally, TMs translucent and pearly gray, nares clear without discharge, posterior pharynx clear without erythema or exudates.  EYES: Extraocular movements intact, pupils equal and reactive to light.  NECK: Supple, FROM  CHEST: No deformities, Equal chest expansion, no murmurs, gallops, or rubs.  RESP: Unlabored, no stridor or audible wheeze.  No wheezes, crackles, nor rhonchi  ABD: Soft, Nontender, Non-Distended.  Normal bowel sounds.  No hepatosplenomegaly  Extremities: No Clubbing, Cyanosis, or Edema.  Skin: Warm/dry, without rashes  Neuro: A/O x 4, CN 2-12 Grossly intact, Motor/sensory grossly intact  Psych: Normal behavior, normal affect      Assessment/Plan:  1. Hyperlipidemia, unspecified hyperlipidemia type  Chronic ongoing medical condition.  Continue atorvastatin as below.  Prescription sent to pharmacy.  Labs as below prior to follow-up  - atorvastatin (LIPITOR) 80 MG tablet; Take 1 Tab by mouth every evening.  Dispense: 90 Tab; Refill: 3  - CBC WITH DIFFERENTIAL; Future  - Comp Metabolic Panel; Future  - Lipid Profile; Future    2. Type 2 diabetes mellitus without complication, unspecified whether long term insulin use (HCC)  Chronic ongoing medical condition.  Continue Lantus, Jardiance, Actos, and Victoza as prescribed by Edy Lee.  Will take over at patient's discretion if she no longer wishes to follow with endocrinology.  Otherwise follow-up with endocrinology.  - insulin glargine (LANTUS SOLOSTAR) 100 UNIT/ML Solution Pen-injector injection; Inject 30 Units as instructed 2 " times a day.  Dispense: 45 PEN; Refill: 1  - Diabetic Monofilament LE Exam  - insulin glargine (LANTUS SOLOSTAR) 100 UNIT/ML Solution Pen-injector injection; Inject 30 Units as instructed 2 times a day.  Dispense: 45 PEN; Refill: 1  - Empagliflozin (JARDIANCE) 10 MG Tab; Take 1 tablet by mouth every morning with breakfast.  Dispense: 30 Tab; Refill: 5  - pioglitazone (ACTOS) 30 MG Tab; Take 1 Tab by mouth every day.  Dispense: 90 Tab; Refill: 3  - liraglutide (VICTOZA) 18 MG/3ML Solution Pen-injector injection; Inject 0.3 mL as instructed every day.  Dispense: 3 PEN; Refill: 6  - Diabetic Monofilament LE Exam    3. Essential hypertension  Chronic ongoing medical condition.  Currently stable off of enalapril and will continue to monitor    4. Screening for breast cancer  - MA-SCREENING MAMMO BILAT W/TOMOSYNTHESIS W/CAD; Future      Please note that this dictation was created using voice recognition software. I have worked with consultants from the vendor as well as technical experts from [facility] to optimize the interface. I have made every reasonable attempt to correct obvious errors, but I expect that there are errors of grammar and possibly content that I did not discover before finalizing the note.

## 2019-05-07 NOTE — ASSESSMENT & PLAN NOTE
Chronic ongoing medical condition.  Currently well controlled on atorvastatin 80 mg nightly.  Denies any muscle aches or weakness.

## 2019-05-13 ENCOUNTER — HOSPITAL ENCOUNTER (OUTPATIENT)
Dept: RADIOLOGY | Facility: MEDICAL CENTER | Age: 64
End: 2019-05-13
Attending: FAMILY MEDICINE
Payer: MEDICARE

## 2019-05-13 DIAGNOSIS — Z12.39 SCREENING FOR BREAST CANCER: ICD-10-CM

## 2019-05-13 PROCEDURE — 77063 BREAST TOMOSYNTHESIS BI: CPT

## 2019-05-20 ENCOUNTER — HOSPITAL ENCOUNTER (OUTPATIENT)
Dept: LAB | Facility: MEDICAL CENTER | Age: 64
End: 2019-05-20
Attending: FAMILY MEDICINE
Payer: MEDICARE

## 2019-05-20 DIAGNOSIS — E78.5 HYPERLIPIDEMIA, UNSPECIFIED HYPERLIPIDEMIA TYPE: Chronic | ICD-10-CM

## 2019-05-20 LAB
ALBUMIN SERPL BCP-MCNC: 4.2 G/DL (ref 3.2–4.9)
ALBUMIN/GLOB SERPL: 1.2 G/DL
ALP SERPL-CCNC: 82 U/L (ref 30–99)
ALT SERPL-CCNC: 22 U/L (ref 2–50)
ANION GAP SERPL CALC-SCNC: 10 MMOL/L (ref 0–11.9)
AST SERPL-CCNC: 22 U/L (ref 12–45)
BASOPHILS # BLD AUTO: 0.3 % (ref 0–1.8)
BASOPHILS # BLD: 0.02 K/UL (ref 0–0.12)
BILIRUB SERPL-MCNC: 0.6 MG/DL (ref 0.1–1.5)
BUN SERPL-MCNC: 15 MG/DL (ref 8–22)
CALCIUM SERPL-MCNC: 10 MG/DL (ref 8.5–10.5)
CHLORIDE SERPL-SCNC: 105 MMOL/L (ref 96–112)
CHOLEST SERPL-MCNC: 152 MG/DL (ref 100–199)
CO2 SERPL-SCNC: 27 MMOL/L (ref 20–33)
CREAT SERPL-MCNC: 0.77 MG/DL (ref 0.5–1.4)
EOSINOPHIL # BLD AUTO: 0.15 K/UL (ref 0–0.51)
EOSINOPHIL NFR BLD: 2.2 % (ref 0–6.9)
ERYTHROCYTE [DISTWIDTH] IN BLOOD BY AUTOMATED COUNT: 45.7 FL (ref 35.9–50)
FASTING STATUS PATIENT QL REPORTED: NORMAL
GLOBULIN SER CALC-MCNC: 3.4 G/DL (ref 1.9–3.5)
GLUCOSE SERPL-MCNC: 94 MG/DL (ref 65–99)
HCT VFR BLD AUTO: 47.7 % (ref 37–47)
HDLC SERPL-MCNC: 49 MG/DL
HGB BLD-MCNC: 14.7 G/DL (ref 12–16)
IMM GRANULOCYTES # BLD AUTO: 0.02 K/UL (ref 0–0.11)
IMM GRANULOCYTES NFR BLD AUTO: 0.3 % (ref 0–0.9)
LDLC SERPL CALC-MCNC: 64 MG/DL
LYMPHOCYTES # BLD AUTO: 2.59 K/UL (ref 1–4.8)
LYMPHOCYTES NFR BLD: 38.5 % (ref 22–41)
MCH RBC QN AUTO: 27.4 PG (ref 27–33)
MCHC RBC AUTO-ENTMCNC: 30.8 G/DL (ref 33.6–35)
MCV RBC AUTO: 88.8 FL (ref 81.4–97.8)
MONOCYTES # BLD AUTO: 0.43 K/UL (ref 0–0.85)
MONOCYTES NFR BLD AUTO: 6.4 % (ref 0–13.4)
NEUTROPHILS # BLD AUTO: 3.51 K/UL (ref 2–7.15)
NEUTROPHILS NFR BLD: 52.3 % (ref 44–72)
NRBC # BLD AUTO: 0 K/UL
NRBC BLD-RTO: 0 /100 WBC
PLATELET # BLD AUTO: 321 K/UL (ref 164–446)
PMV BLD AUTO: 9.8 FL (ref 9–12.9)
POTASSIUM SERPL-SCNC: 3.3 MMOL/L (ref 3.6–5.5)
PROT SERPL-MCNC: 7.6 G/DL (ref 6–8.2)
RBC # BLD AUTO: 5.37 M/UL (ref 4.2–5.4)
SODIUM SERPL-SCNC: 142 MMOL/L (ref 135–145)
TRIGL SERPL-MCNC: 196 MG/DL (ref 0–149)
WBC # BLD AUTO: 6.7 K/UL (ref 4.8–10.8)

## 2019-05-20 PROCEDURE — 80061 LIPID PANEL: CPT

## 2019-05-20 PROCEDURE — 85025 COMPLETE CBC W/AUTO DIFF WBC: CPT

## 2019-05-20 PROCEDURE — 36415 COLL VENOUS BLD VENIPUNCTURE: CPT

## 2019-05-20 PROCEDURE — 80053 COMPREHEN METABOLIC PANEL: CPT

## 2019-06-03 ENCOUNTER — OFFICE VISIT (OUTPATIENT)
Dept: ENDOCRINOLOGY | Facility: MEDICAL CENTER | Age: 64
End: 2019-06-03
Payer: MEDICARE

## 2019-06-03 VITALS
DIASTOLIC BLOOD PRESSURE: 64 MMHG | BODY MASS INDEX: 29.25 KG/M2 | OXYGEN SATURATION: 97 % | HEART RATE: 79 BPM | SYSTOLIC BLOOD PRESSURE: 108 MMHG | WEIGHT: 182 LBS | HEIGHT: 66 IN

## 2019-06-03 DIAGNOSIS — E11.9 TYPE 2 DIABETES MELLITUS WITHOUT COMPLICATION, WITH LONG-TERM CURRENT USE OF INSULIN (HCC): ICD-10-CM

## 2019-06-03 DIAGNOSIS — E11.9 TYPE 2 DIABETES MELLITUS WITHOUT COMPLICATION, UNSPECIFIED WHETHER LONG TERM INSULIN USE (HCC): ICD-10-CM

## 2019-06-03 DIAGNOSIS — E13.9 DIABETES 1.5, MANAGED AS TYPE 2 (HCC): ICD-10-CM

## 2019-06-03 DIAGNOSIS — I10 ESSENTIAL HYPERTENSION: Chronic | ICD-10-CM

## 2019-06-03 DIAGNOSIS — Z79.4 TYPE 2 DIABETES MELLITUS WITHOUT COMPLICATION, WITH LONG-TERM CURRENT USE OF INSULIN (HCC): ICD-10-CM

## 2019-06-03 LAB
HBA1C MFR BLD: 6.6 % (ref 0–5.6)
INT CON NEG: NEGATIVE
INT CON POS: POSITIVE

## 2019-06-03 PROCEDURE — 83036 HEMOGLOBIN GLYCOSYLATED A1C: CPT | Performed by: PHYSICIAN ASSISTANT

## 2019-06-03 PROCEDURE — 99214 OFFICE O/P EST MOD 30 MIN: CPT | Performed by: PHYSICIAN ASSISTANT

## 2019-06-03 NOTE — PROGRESS NOTES
Return to office Patient Consult Note  Referred by: Constantin Moon M.D.    Reason for consult: Diabetes Management Type 2    HPI:  Tara Cueva is a 64 y.o. old patient who is seeing us today for diabetes care.  This is a pleasant patient with diabetes and I appreciate the opportunity to participate in the care of this patient.    Labs of 6/3/2019 HbA1c is 6.6  Labs of 8/23/18 HbA1c is 6.5  Labs of 2/22/18 HbA1c 6.4  HbA1c on 11/21/17    GFR 36  Labs of 2/23/17 HbA1c is 10.7, GFR >60,    In 12/2016 HbA1c was 10.2  HbA1c on 7/21/17 was 8.0     4/25/17 labs:  C-peptide 8.5, CALVIN-65 is at 9.8    BG Diary:6/3/2019  In the AM:  No log    BG Diary:2/22/2018  In the AM: 112, 108, 91, 124, 1:30, 110, 114, 127  Before Bed: 138, 141, 133, 219, 129, 160      Weight: on 4/25/17 her weight was 179pounds, today it is 182pounds      1. Type 2 diabetes mellitus without complication, with long-term current use of insulin (HCC)    Now on:  Jardiance 10mg one a day  Actos 30 mg one a day.   Victoza 1.8  Lantus at night 20 units.  (failed metformin)  Humalog (no longer taking)      2. Essential hypertension  This is stable today and no new changes are needed or required in today's visit    ROS:   Constitutional: No night sweats.  Eyes:  No visual changes.  Cardiac: No chest pain, No palpitations or racing heart rate.  Resp: No shortness of breath, No cough,   Gi: No Diarrhea    All other systems were reviewed and were/are negative.        Past Medical History:  Patient Active Problem List    Diagnosis Date Noted   • Olecranon bursitis of right elbow 07/03/2018   • Type 2 diabetes mellitus without complication (HCC) 02/02/2018   • Vitamin B12 deficiency 08/24/2017   • Fatigue 07/20/2017   • Thumb pain 12/23/2016   • Hypokalemia 01/03/2014   • Gall bladder polyp 08/12/2011   • Hyperlipidemia 05/12/2011   • Vitamin D deficiency 05/12/2011   • Hypertension 05/12/2011   • GERD (gastroesophageal reflux disease) 05/12/2011       Past  Surgical History:  Past Surgical History:   Procedure Laterality Date   • OTHER ORTHOPEDIC SURGERY      right arm ORIF   • PRIMARY C SECTION     • TUBAL COAGULATION LAPAROSCOPIC BILATERAL         Allergies:  Sulfa drugs    Social History:  Social History     Social History   • Marital status:      Spouse name: N/A   • Number of children: N/A   • Years of education: N/A     Occupational History   • Not on file.     Social History Main Topics   • Smoking status: Former Smoker     Packs/day: 1.00     Years: 20.00     Types: Cigarettes     Quit date: 1/1/1990   • Smokeless tobacco: Never Used   • Alcohol use No      Comment: holidays   • Drug use: No   • Sexual activity: Not Currently     Other Topics Concern   • Not on file     Social History Narrative   • No narrative on file       Family History:  Family History   Problem Relation Age of Onset   • Cancer Maternal Grandmother         lung    • Cancer Mother         breast   • Other Father         urinary issues   • GI Sister    • Heart Disease Brother        Medications:    Current Outpatient Prescriptions:   •  atorvastatin (LIPITOR) 80 MG tablet, Take 1 Tab by mouth every evening., Disp: 90 Tab, Rfl: 3  •  insulin glargine (LANTUS SOLOSTAR) 100 UNIT/ML Solution Pen-injector injection, Inject 30 Units as instructed 2 times a day., Disp: 45 PEN, Rfl: 1  •  Empagliflozin (JARDIANCE) 10 MG Tab, Take 1 tablet by mouth every morning with breakfast., Disp: 30 Tab, Rfl: 5  •  omeprazole (PRILOSEC) 20 MG delayed-release capsule, Take 1 Cap by mouth every day. TAKE 1 CAP BY MOUTH EVERY DAY., Disp: 90 Cap, Rfl: 3  •  pioglitazone (ACTOS) 30 MG Tab, Take 1 Tab by mouth every day., Disp: 90 Tab, Rfl: 3  •  liraglutide (VICTOZA) 18 MG/3ML Solution Pen-injector injection, Inject 0.3 mL as instructed every day., Disp: 3 PEN, Rfl: 6  •  Multiple Vitamins-Minerals (OCUVITE EXTRA PO), Take  by mouth., Disp: , Rfl:   •  Insulin Pen Needle (NOVOFINE PLUS) 32G X 4 MM Misc, 1  "Applicator by Does not apply route every day. Using one needle tip with victoza per day, Disp: 100 Each, Rfl: 3  •  glucose blood (ONE TOUCH ULTRA TEST) strip, 1 Strip by Other route 3 times a day., Disp: 100 Strip, Rfl: 3  •  Cyanocobalamin (B-12) 1000 MCG Cap, Take  by mouth., Disp: , Rfl:   •  Cholecalciferol (VITAMIN D) 400 UNIT TABS, Take 400 Units by mouth every day., Disp: , Rfl:   •  Blood Glucose Monitoring Suppl SUPPLIES MISC, Test strips order: Test strips for glucometer. Sig: use it once daily and prn ssx high or low sugar #100 RF x 3, Disp: 100 Each, Rfl: 3  •  aspirin EC (ECOTRIN) 81 MG TBEC, Take 81 mg by mouth every day., Disp: , Rfl:         Physical Examination:   Vital signs: /64 (BP Location: Left arm, Patient Position: Sitting)   Pulse 79   Ht 1.676 m (5' 6\")   Wt 82.6 kg (182 lb)   LMP 12/16/2006   SpO2 97%   BMI 29.38 kg/m²   General: No distress, cooperative, well dressed and well nourished.   Eyes: No scleral icterus or discharge, No hyposphagma  ENMT: Normal on external inspection of nose, lips, No nasal drainage   Neck: No abnormal masses on inspection  Resp: Normal effort, Bilateral clear to auscultation, No wheezing, No rales  CVS: Regular rate and rhythm, S1 S2 normal, No murmur. No gallop  Extremities: No edema bilateral extremities  Neuro: Alert and oriented  Skin: No rash, No Ulcers  Psych: Normal mood and affect      Assessment and Plan:    1. Type 2 diabetes mellitus without complication, with long-term current use of insulin (HCC)    Now on:  Jardiance 10mg one a day  Actos 30 mg one a day.   Victoza 1.8 once at night  Lantus at night 20 units.  (failed metformin)  Humalog (no longer taking)    2. Essential hypertension  This is stable today and no new changes are needed or required in today's visit    Return in about 3 months (around 9/3/2019).    Thank you kindly for allowing me to participate in the diabetes care plan for this patient.    Edy Lee PA-C, " BC-ADM  Board Certified - [facility] Diabetes [facility]  06/03/19    CC:   Constantin Moon M.D.

## 2019-06-17 ENCOUNTER — TELEPHONE (OUTPATIENT)
Dept: ENDOCRINOLOGY | Facility: MEDICAL CENTER | Age: 64
End: 2019-06-17

## 2019-06-17 NOTE — TELEPHONE ENCOUNTER
1. Caller Name: Tara                                         Call Back Number: [phone] (home) [phone] (work)        Patient approves a detailed voicemail message: no    Pharmacy called to let us know there is a drug reaction between actos and lantus that causes cardiac problems. Can we change some part of this

## 2019-06-27 ENCOUNTER — HOSPITAL ENCOUNTER (OUTPATIENT)
Facility: MEDICAL CENTER | Age: 64
End: 2019-06-27
Attending: FAMILY MEDICINE
Payer: MEDICARE

## 2019-06-27 ENCOUNTER — TELEPHONE (OUTPATIENT)
Dept: MEDICAL GROUP | Facility: PHYSICIAN GROUP | Age: 64
End: 2019-06-27

## 2019-06-27 ENCOUNTER — OFFICE VISIT (OUTPATIENT)
Dept: MEDICAL GROUP | Facility: PHYSICIAN GROUP | Age: 64
End: 2019-06-27
Payer: MEDICARE

## 2019-06-27 VITALS
HEIGHT: 66 IN | DIASTOLIC BLOOD PRESSURE: 70 MMHG | HEART RATE: 71 BPM | SYSTOLIC BLOOD PRESSURE: 110 MMHG | BODY MASS INDEX: 29.89 KG/M2 | WEIGHT: 186 LBS | TEMPERATURE: 98.4 F | OXYGEN SATURATION: 96 %

## 2019-06-27 DIAGNOSIS — M70.21 OLECRANON BURSITIS OF RIGHT ELBOW: ICD-10-CM

## 2019-06-27 PROCEDURE — 87070 CULTURE OTHR SPECIMN AEROBIC: CPT

## 2019-06-27 PROCEDURE — 20605 DRAIN/INJ JOINT/BURSA W/O US: CPT | Performed by: FAMILY MEDICINE

## 2019-06-27 PROCEDURE — 87186 SC STD MICRODIL/AGAR DIL: CPT

## 2019-06-27 PROCEDURE — 87205 SMEAR GRAM STAIN: CPT

## 2019-06-27 PROCEDURE — 87077 CULTURE AEROBIC IDENTIFY: CPT

## 2019-06-27 ASSESSMENT — PAIN SCALES - GENERAL: PAINLEVEL: 2=MINIMAL-SLIGHT

## 2019-06-27 NOTE — ASSESSMENT & PLAN NOTE
New problem to examiner.  Patient with 3 to 4 days of red, hot, swollen, tender elbow that she states was swollen to the size of a grapefruit.  History of bursitis in the past and currently swelling and inflammation has since improved however she is never had pain and heat associated with bursitis of this elbow in the past.  States that this is usually aggravated by bowling however she has not been bowling recently.  Patient has taken ibuprofen for this in the past but has not taken anything for this current episode.  Progressively improving on its own.

## 2019-06-27 NOTE — PROGRESS NOTES
CC:The encounter diagnosis was Olecranon bursitis of right elbow.      HISTORY OF PRESENT ILLNESS: Patient is a 64 y.o. female established patient who presents today to discuss bursitis of the elbow.      Olecranon bursitis of right elbow  New problem to examiner.  Patient with 3 to 4 days of red, hot, swollen, tender elbow that she states was swollen to the size of a grapefruit.  History of bursitis in the past and currently swelling and inflammation has since improved however she is never had pain and heat associated with bursitis of this elbow in the past.  States that this is usually aggravated by bowling however she has not been bowling recently.  Patient has taken ibuprofen for this in the past but has not taken anything for this current episode.  Progressively improving on its own.      Patient Active Problem List    Diagnosis Date Noted   • Olecranon bursitis of right elbow 07/03/2018   • Type 2 diabetes mellitus without complication (HCC) 02/02/2018   • Vitamin B12 deficiency 08/24/2017   • Fatigue 07/20/2017   • Thumb pain 12/23/2016   • Hypokalemia 01/03/2014   • Gall bladder polyp 08/12/2011   • Hyperlipidemia 05/12/2011   • Vitamin D deficiency 05/12/2011   • Hypertension 05/12/2011   • GERD (gastroesophageal reflux disease) 05/12/2011      Allergies:Sulfa drugs    Current Outpatient Prescriptions   Medication Sig Dispense Refill   • insulin glargine (LANTUS SOLOSTAR) 100 UNIT/ML Solution Pen-injector injection Inject 30 Units as instructed 2 times a day. 5 PEN 9   • atorvastatin (LIPITOR) 80 MG tablet Take 1 Tab by mouth every evening. 90 Tab 3   • Empagliflozin (JARDIANCE) 10 MG Tab Take 1 tablet by mouth every morning with breakfast. 30 Tab 5   • omeprazole (PRILOSEC) 20 MG delayed-release capsule Take 1 Cap by mouth every day. TAKE 1 CAP BY MOUTH EVERY DAY. 90 Cap 3   • pioglitazone (ACTOS) 30 MG Tab Take 1 Tab by mouth every day. 90 Tab 3   • liraglutide (VICTOZA) 18 MG/3ML Solution Pen-injector  "injection Inject 0.3 mL as instructed every day. 3 PEN 6   • Multiple Vitamins-Minerals (OCUVITE EXTRA PO) Take  by mouth.     • Insulin Pen Needle (NOVOFINE PLUS) 32G X 4 MM Misc 1 Applicator by Does not apply route every day. Using one needle tip with victoza per day 100 Each 3   • glucose blood (ONE TOUCH ULTRA TEST) strip 1 Strip by Other route 3 times a day. 100 Strip 3   • Cyanocobalamin (B-12) 1000 MCG Cap Take  by mouth.     • Cholecalciferol (VITAMIN D) 400 UNIT TABS Take 400 Units by mouth every day.     • Blood Glucose Monitoring Suppl SUPPLIES MISC Test strips order: Test strips for glucometer. Sig: use it once daily and prn ssx high or low sugar #100 RF x 3 100 Each 3   • aspirin EC (ECOTRIN) 81 MG TBEC Take 81 mg by mouth every day.       No current facility-administered medications for this visit.        Social History   Substance Use Topics   • Smoking status: Former Smoker     Packs/day: 1.00     Years: 20.00     Types: Cigarettes     Quit date: 1/1/1990   • Smokeless tobacco: Never Used   • Alcohol use No      Comment: holidays     Social History     Social History Narrative   • No narrative on file       Family History   Problem Relation Age of Onset   • Cancer Maternal Grandmother         lung    • Cancer Mother         breast   • Other Father         urinary issues   • GI Sister    • Heart Disease Brother        Review of Systems:    Constitutional: No Fevers, Chills  Eyes: No vision changes  ENT: No hearing changes  Resp: No Shortness of breath  CV: No Chest pain  GI: No Nausea/Vomiting  MSK: No weakness    Exam:    /70 (BP Location: Left arm, Patient Position: Sitting, BP Cuff Size: Adult)   Pulse 71   Temp 36.9 °C (98.4 °F)   Ht 1.676 m (5' 6\")   Wt 84.4 kg (186 lb)   SpO2 96%  Body mass index is 30.02 kg/m².    General:  Well nourished, well developed female in NAD  HENT: Atraumatic, normocephalic  EYES: Extraocular movements intact, pupils equal and reactive to light  NECK: " Supple, FROM  CHEST: No deformities, Equal chest expansion  RESP: Unlabored, no stridor or audible wheeze  ABD: Soft, Nontender, Non-Distended  Extremities: No Clubbing, Cyanosis, or bilateral lower extremity edema.  Right olecranon bursa swollen, fluctuant and nontender.  Approximately the size of a small orange approximately 6 cm x 6 cm in diameter without erythema.  Skin: Warm/dry, without rashes  Neuro: A/O x 4, CN 2-12 Grossly intact, Motor/sensory grossly intact  Psych: Normal behavior, normal affect      Assessment/Plan:  1. Olecranon bursitis of right elbow  New problem to examiner.  Under informed consent patient agrees for bursa aspiration for possible infection.  Area was cleaned and prepped with Betadine x3 in the usual sterile manner.  A 1/2 inch 22-gauge needle was inserted and easily aspirated red-yellow mildly hazy fluid.  Right olecranon bursa aspirated of approximately 12 cc of fluid that is mildly sanguinous with slight red tint.  Patient tolerated well.  Aspirate sent to micro biology for culture.  Counseled on ice, compression wraps, and follow-up antibiotics pending culture results and Gram stain.  - CULTURE WOUND W/ GRAM STAIN; Future      Please note that this dictation was created using voice recognition software. I have worked with consultants from the vendor as well as technical experts from [facility] [employer] to optimize the interface. I have made every reasonable attempt to correct obvious errors, but I expect that there are errors of grammar and possibly content that I did not discover before finalizing the note.

## 2019-06-27 NOTE — TELEPHONE ENCOUNTER
Future Appointments       Provider Department Center    6/27/2019 3:40 PM Constantin Moon M.D. [facility]    12/9/2019 3:00 PM Edy Lee P.A.-C. [facility]        ESTABLISHED PATIENT PRE-VISIT PLANNING     Patient was NOT contacted to complete PVP.      1.  Reviewed notes from the last few office visits within the medical group: Yes 5/7/19    2.  If any orders were placed at last visit or intended to be done for this visit (i.e. 6 mos follow-up), do we have Results/Consult Notes?        •  Labs - Labs ordered, completed on 5/20/19 and results are in chart.       •  Imaging - Imaging ordered, completed and results are in chart.       •  Referrals - No referrals were ordered at last office visit.    3. Is this appointment scheduled as a Hospital Follow-Up? No    4.  Immunizations were updated in [employer] using [employer]?: Yes       •  Web [employer] Recommendations: FLU, PREVNAR (PCV13) , TD, VARICELLA (Chicken Pox)  and SHINGRIX (Shingles)    5.  Patient is due for the following Health Maintenance Topics:   Health Maintenance Due   Topic Date Due   • Annual Wellness Visit  1955   • IMM HEP B VACCINE (1 of 3 - Risk 3-dose series) 05/09/1973   • IMM ZOSTER VACCINES (1 of 2) 05/09/2004   • PAP SMEAR  02/07/2017   • BONE DENSITY  05/09/2019   • IMM PNEUMOCOCCAL 65+ (ADULT) LOW/MEDIUM RISK SERIES (1 of 2 - PCV13) 05/09/2019   • URINE ACR / MICROALBUMIN  06/11/2019     6. Orders for overdue Health Maintenance topics pended in Pre-Charting? NO    7.  AHA (MDX) form printed for Provider? YES    8.  Patient was NOT informed to arrive 15 min prior to their scheduled appointment and bring in their medication bottles.

## 2019-06-27 NOTE — PROGRESS NOTES
Subjective:     Tara Cueva is a 64 y.o. female here today for *** and Annual Health Assessment.    No problem-specific Assessment & Plan notes found for this encounter.   ***    Health Maintenance Summary                Annual Wellness Visit Overdue 1955     IMM HEP B VACCINE Overdue 5/9/1973     IMM ZOSTER VACCINES Overdue 5/9/2004     PAP SMEAR Overdue 2/7/2017      Done 2/7/2014 PAP IG (IMAGE GUIDED)     Patient has more history with this topic...    BONE DENSITY Overdue 5/9/2019     IMM PNEUMOCOCCAL 65+ (ADULT) LOW/MEDIUM RISK SERIES Overdue 5/9/2019      Done 1/3/2014 Imm Admin: Pneumococcal polysaccharide vaccine (PPSV-23)    URINE ACR / MICROALBUMIN Overdue 6/11/2019      Done 6/11/2018 MICROALBUMIN CREAT RATIO URINE     Patient has more history with this topic...    IMM INFLUENZA Next Due 9/1/2019      Done 10/24/2017 Imm Admin: Influenza Vaccine Quad Inj (Pf)     Patient has more history with this topic...    COLONOSCOPY Next Due 11/6/2019      Done 11/6/2014 AMB REFERRAL TO GI FOR COLONOSCOPY    RETINAL SCREENING Next Due 11/20/2019      Done 11/20/2018 REFERRAL FOR RETINAL SCREENING EXAM     Patient has more history with this topic...    A1C SCREENING Next Due 12/3/2019      Done 6/3/2019 POCT A1C      Patient has more history with this topic...    DIABETES MONOFILAMENT / LE EXAM Next Due 5/7/2020      Done 5/7/2019 AMB DIABETIC MONOFILAMENT LOWER EXTREMITY EXAM     Patient has more history with this topic...    MAMMOGRAM Next Due 5/13/2020      Done 5/13/2019 MA-SCREENING MAMMO BILAT W/TOMOSYNTHESIS W/CAD     Patient has more history with this topic...    FASTING LIPID PROFILE Next Due 5/20/2020      Done 5/20/2019 LIPID PROFILE      Patient has more history with this topic...    SERUM CREATININE Next Due 5/20/2020      Done 5/20/2019 COMP METABOLIC PANEL      Patient has more history with this topic...    IMM DTaP/Tdap/Td Vaccine Next Due 7/25/2024      Done 7/25/2014 Imm Admin: Tdap Vaccine        ***    Annual Health Assessment Questions: ***    1.  Are you currently engaging in any exercise or physical activity? Yes    2.  How would you describe your mood or emotional well-being today? good    3.  Have you had any falls in the last year? No    4.  Have you noticed any problems with your balance or had difficulty walking? No    5.  In the last six months have you experienced any leakage of urine? No    6. DPA/Advanced Directive: Patient does not have an Advanced Directive.  A packet and workshop information was given on Advanced Directives.    Current medicines (including changes today)  Current Outpatient Prescriptions   Medication Sig Dispense Refill   • insulin glargine (LANTUS SOLOSTAR) 100 UNIT/ML Solution Pen-injector injection Inject 30 Units as instructed 2 times a day. 5 PEN 9   • atorvastatin (LIPITOR) 80 MG tablet Take 1 Tab by mouth every evening. 90 Tab 3   • Empagliflozin (JARDIANCE) 10 MG Tab Take 1 tablet by mouth every morning with breakfast. 30 Tab 5   • omeprazole (PRILOSEC) 20 MG delayed-release capsule Take 1 Cap by mouth every day. TAKE 1 CAP BY MOUTH EVERY DAY. 90 Cap 3   • pioglitazone (ACTOS) 30 MG Tab Take 1 Tab by mouth every day. 90 Tab 3   • liraglutide (VICTOZA) 18 MG/3ML Solution Pen-injector injection Inject 0.3 mL as instructed every day. 3 PEN 6   • Multiple Vitamins-Minerals (OCUVITE EXTRA PO) Take  by mouth.     • Insulin Pen Needle (NOVOFINE PLUS) 32G X 4 MM Misc 1 Applicator by Does not apply route every day. Using one needle tip with victoza per day 100 Each 3   • glucose blood (ONE TOUCH ULTRA TEST) strip 1 Strip by Other route 3 times a day. 100 Strip 3   • Cyanocobalamin (B-12) 1000 MCG Cap Take  by mouth.     • Cholecalciferol (VITAMIN D) 400 UNIT TABS Take 400 Units by mouth every day.     • Blood Glucose Monitoring Suppl SUPPLIES MISC Test strips order: Test strips for glucometer. Sig: use it once daily and prn ssx high or low sugar #100 RF x 3 100 Each 3   •  "aspirin EC (ECOTRIN) 81 MG TBEC Take 81 mg by mouth every day.       No current facility-administered medications for this visit.        She  has a past medical history of Diabetes (5/12/2011); Edema (5/12/2011); Elevated liver enzymes (5/12/2011); Gall bladder polyp (8/12/2011); GERD (gastroesophageal reflux disease) (5/12/2011); Hyperlipidemia (5/12/2011); Hypertension (5/12/2011); Skin lesion of face (5/12/2011); and Vitamin d deficiency (5/12/2011).    Sulfa drugs    She  reports that she quit smoking about 29 years ago. Her smoking use included Cigarettes. She has a 20.00 pack-year smoking history. She has never used smokeless tobacco. She reports that she does not drink alcohol or use drugs.  Counseling given: Yes      ROS ***  No chest pain, no shortness of breath, no abdominal pain.     Objective:     Physical Exam:  Ht 1.676 m (5' 6\")   Wt 84.4 kg (186 lb)  Body mass index is 30.02 kg/m². ***  Constitutional: Alert, no distress.  Skin: Warm, dry, good turgor, no rashes in visible areas.  Eye: Equal, round and reactive, conjunctiva clear, lids normal.  ENMT: Lips without lesions, good dentition, oropharynx clear.  Neck: Trachea midline, no masses, no thyromegaly. No cervical or supraclavicular lymphadenopathy.  Respiratory: Unlabored respiratory effort, lungs clear to auscultation, no wheezes, no rhonchi.  Cardiovascular: Normal S1, S2, no murmur, no edema.  Abdomen: Soft, non-tender, no masses, no hepatosplenomegaly.  Psych: Alert and oriented x3, normal affect and mood.    Assessment and Plan:     There are no diagnoses linked to this encounter.    Discussion today about general wellness and lifestyle habits:    · Engage in regular physical activity and social activities.  · Prevent falls and reduce trip hazards; using ambulatory aides, hearing and vision testing if appropriate.  · Steps to improve urinary incontinence.  · Advanced care planning.    Follow-Up: No Follow-up on file.         PLEASE NOTE: " This dictation was created using voice recognition software. I have made every reasonable attempt to correct obvious errors, but I expect that there are errors of grammar and possibly content that I did not discover before finalizing the note.

## 2019-06-28 DIAGNOSIS — M70.21 OLECRANON BURSITIS OF RIGHT ELBOW: ICD-10-CM

## 2019-06-28 LAB
AMBIGUOUS DTTM AMBI4: NORMAL
SIGNIFICANT IND 70042: NORMAL
SITE SITE: NORMAL
SOURCE SOURCE: NORMAL

## 2019-06-30 LAB
GRAM STN SPEC: NORMAL
SIGNIFICANT IND 70042: NORMAL
SITE SITE: NORMAL
SOURCE SOURCE: NORMAL

## 2019-07-02 LAB
BACTERIA WND AEROBE CULT: ABNORMAL
BACTERIA WND AEROBE CULT: ABNORMAL
GRAM STN SPEC: ABNORMAL
SIGNIFICANT IND 70042: ABNORMAL
SITE SITE: ABNORMAL
SOURCE SOURCE: ABNORMAL

## 2019-07-30 DIAGNOSIS — E11.9 TYPE 2 DIABETES MELLITUS WITHOUT COMPLICATION, UNSPECIFIED WHETHER LONG TERM INSULIN USE (HCC): ICD-10-CM

## 2019-07-30 NOTE — TELEPHONE ENCOUNTER
Was the patient seen in the last year in this department? Yes LOV 06/27/19    Does patient have an active prescription for medications requested? No     Received Request Via: Pharmacy Insurance [employer] requesting a 100 day Supply

## 2019-10-24 ENCOUNTER — HOSPITAL ENCOUNTER (OUTPATIENT)
Facility: MEDICAL CENTER | Age: 64
End: 2019-10-24
Attending: FAMILY MEDICINE
Payer: MEDICARE

## 2019-10-24 ENCOUNTER — OFFICE VISIT (OUTPATIENT)
Dept: MEDICAL GROUP | Facility: PHYSICIAN GROUP | Age: 64
End: 2019-10-24
Payer: MEDICARE

## 2019-10-24 VITALS
TEMPERATURE: 97.6 F | BODY MASS INDEX: 30.53 KG/M2 | SYSTOLIC BLOOD PRESSURE: 128 MMHG | WEIGHT: 190 LBS | DIASTOLIC BLOOD PRESSURE: 88 MMHG | OXYGEN SATURATION: 96 % | HEART RATE: 74 BPM | HEIGHT: 66 IN

## 2019-10-24 DIAGNOSIS — E11.9 TYPE 2 DIABETES MELLITUS WITHOUT COMPLICATION, UNSPECIFIED WHETHER LONG TERM INSULIN USE (HCC): ICD-10-CM

## 2019-10-24 DIAGNOSIS — E78.00 PURE HYPERCHOLESTEROLEMIA: Chronic | ICD-10-CM

## 2019-10-24 DIAGNOSIS — Z23 NEED FOR VACCINATION: ICD-10-CM

## 2019-10-24 DIAGNOSIS — Z78.0 POSTMENOPAUSAL: ICD-10-CM

## 2019-10-24 DIAGNOSIS — E13.9 DIABETES 1.5, MANAGED AS TYPE 2 (HCC): ICD-10-CM

## 2019-10-24 DIAGNOSIS — Z12.11 SCREEN FOR COLON CANCER: ICD-10-CM

## 2019-10-24 LAB
HBA1C MFR BLD: 6.8 % (ref 0–5.6)
INT CON NEG: NEGATIVE
INT CON POS: POSITIVE

## 2019-10-24 PROCEDURE — 99214 OFFICE O/P EST MOD 30 MIN: CPT | Mod: 25 | Performed by: FAMILY MEDICINE

## 2019-10-24 PROCEDURE — G0009 ADMIN PNEUMOCOCCAL VACCINE: HCPCS | Performed by: FAMILY MEDICINE

## 2019-10-24 PROCEDURE — 90670 PCV13 VACCINE IM: CPT | Performed by: FAMILY MEDICINE

## 2019-10-24 PROCEDURE — 92250 FUNDUS PHOTOGRAPHY W/I&R: CPT | Mod: 26 | Performed by: FAMILY MEDICINE

## 2019-10-24 PROCEDURE — 83036 HEMOGLOBIN GLYCOSYLATED A1C: CPT | Performed by: FAMILY MEDICINE

## 2019-10-24 PROCEDURE — 82570 ASSAY OF URINE CREATININE: CPT

## 2019-10-24 PROCEDURE — 90662 IIV NO PRSV INCREASED AG IM: CPT | Performed by: FAMILY MEDICINE

## 2019-10-24 PROCEDURE — G0008 ADMIN INFLUENZA VIRUS VAC: HCPCS | Performed by: FAMILY MEDICINE

## 2019-10-24 PROCEDURE — 82043 UR ALBUMIN QUANTITATIVE: CPT

## 2019-10-24 ASSESSMENT — PAIN SCALES - GENERAL: PAINLEVEL: 8=MODERATE-SEVERE PAIN

## 2019-10-24 NOTE — ASSESSMENT & PLAN NOTE
Chronic ongoing medical condition.  A1c today 6.8%.  Currently taking Actos 30 mg, Lantus 21 units nightly, Jardiance 10 mg, and Victoza 1.8 mg.  Denies any headaches, vision changes, chest pain, shortness of breath, polyuria, polydipsia, polyphagia.

## 2019-10-24 NOTE — ASSESSMENT & PLAN NOTE
Chronic ongoing medical condition.  Currently taking atorvastatin 80 mg daily.  Cholesterol currently very well controlled.

## 2019-10-24 NOTE — PROGRESS NOTES
CC:Diagnoses of Type 2 diabetes mellitus without complication, unspecified whether long term insulin use (HCC), Postmenopausal, Need for vaccination, Screen for colon cancer, Diabetes 1.5, managed as type 2 (HCC), and Pure hypercholesterolemia were pertinent to this visit.      HISTORY OF PRESENT ILLNESS: Patient is a 64 y.o. female established patient who presents today to follow-up on diabetes and annual health maintenance.  Patient is up-to-date on her preventative wellness examinations and is getting flu shot today and PCV 13 today.    Health Maintenance: Completed    Type 2 diabetes mellitus without complication (CMS-HCC) (HCC)  Chronic ongoing medical condition.  A1c today 6.8%.  Currently taking Actos 30 mg, Lantus 21 units nightly, Jardiance 10 mg, and Victoza 1.8 mg.  Denies any headaches, vision changes, chest pain, shortness of breath, polyuria, polydipsia, polyphagia.    Hyperlipidemia  Chronic ongoing medical condition.  Currently taking atorvastatin 80 mg daily.  Cholesterol currently very well controlled.      Patient Active Problem List    Diagnosis Date Noted   • Olecranon bursitis of right elbow 07/03/2018   • Type 2 diabetes mellitus without complication (HCC) 02/02/2018   • Vitamin B12 deficiency 08/24/2017   • Fatigue 07/20/2017   • Thumb pain 12/23/2016   • Hypokalemia 01/03/2014   • Gall bladder polyp 08/12/2011   • Hyperlipidemia 05/12/2011   • Vitamin D deficiency 05/12/2011   • Hypertension 05/12/2011   • GERD (gastroesophageal reflux disease) 05/12/2011      Allergies:Sulfa drugs    Current Outpatient Medications   Medication Sig Dispense Refill   • Empagliflozin 25 MG Tab Take 25 mg by mouth every day. 90 Tab 3   • insulin glargine (LANTUS SOLOSTAR) 100 UNIT/ML Solution Pen-injector injection Inject 21 Units as instructed every evening. 5 PEN 0   • atorvastatin (LIPITOR) 80 MG tablet Take 1 Tab by mouth every evening. 90 Tab 3   • omeprazole (PRILOSEC) 20 MG delayed-release capsule Take 1  Cap by mouth every day. TAKE 1 CAP BY MOUTH EVERY DAY. 90 Cap 3   • pioglitazone (ACTOS) 30 MG Tab Take 1 Tab by mouth every day. 90 Tab 3   • liraglutide (VICTOZA) 18 MG/3ML Solution Pen-injector injection Inject 0.3 mL as instructed every day. 3 PEN 6   • Multiple Vitamins-Minerals (OCUVITE EXTRA PO) Take  by mouth.     • Insulin Pen Needle (NOVOFINE PLUS) 32G X 4 MM Misc 1 Applicator by Does not apply route every day. Using one needle tip with victoza per day 100 Each 3   • glucose blood (ONE TOUCH ULTRA TEST) strip 1 Strip by Other route 3 times a day. 100 Strip 3   • Cyanocobalamin (B-12) 1000 MCG Cap Take  by mouth.     • Cholecalciferol (VITAMIN D) 400 UNIT TABS Take 400 Units by mouth every day.     • Blood Glucose Monitoring Suppl SUPPLIES MISC Test strips order: Test strips for glucometer. Sig: use it once daily and prn ssx high or low sugar #100 RF x 3 100 Each 3   • aspirin EC (ECOTRIN) 81 MG TBEC Take 81 mg by mouth every day.       No current facility-administered medications for this visit.        Social History     Tobacco Use   • Smoking status: Former Smoker     Packs/day: 1.00     Years: 20.00     Pack years: 20.00     Types: Cigarettes     Last attempt to quit: 1990     Years since quittin.8   • Smokeless tobacco: Never Used   Substance Use Topics   • Alcohol use: No     Comment: holidays   • Drug use: No     Social History     Social History Narrative   • Not on file       Family History   Problem Relation Age of Onset   • Cancer Maternal Grandmother         lung    • Cancer Mother         breast   • Other Father         urinary issues   • GI Disease Sister    • Heart Disease Brother        Review of Systems:    Constitutional: No Fevers, Chills  Eyes: No vision changes  ENT: No hearing changes  Resp: No Shortness of breath  CV: No Chest pain  GI: No Nausea/Vomiting  MSK: No weakness  Skin: No rashes  Neuro: No Headaches  Psych: No Suicidal ideations    All remaining systems reviewed  "and found to be negative, except as stated above.    Exam:    /88 (BP Location: Left arm, Patient Position: Sitting, BP Cuff Size: Adult)   Pulse 74   Temp 36.4 °C (97.6 °F)   Ht 1.676 m (5' 6\")   Wt 86.2 kg (190 lb)   SpO2 96%  Body mass index is 30.67 kg/m².    General:  Well nourished, well developed female in NAD  HENT: Atraumatic, normocephalic  EYES: Extraocular movements intact, pupils equal and reactive to light  NECK: Supple, FROM  CHEST: No deformities, Equal chest expansion  RESP: Unlabored, no stridor or audible wheeze  ABD: Soft, Nontender, Non-Distended  Extremities: No Clubbing, Cyanosis, or Edema  Skin: Warm/dry, without rashes  Neuro: A/O x 4, CN 2-12 Grossly intact, Motor/sensory grossly intact  Psych: Normal behavior, normal affect      LABS: A1c 6.8% today: Results reviewed and discussed with the patient, questions answered.      Assessment/Plan:  1. Type 2 diabetes mellitus without complication, unspecified whether long term insulin use (HCC)  Chronic stable medical condition.  Currently well controlled with Actos, Victoza, Lantus, and Jardiance.  Increasing Jardiance to 25 mg once daily in an effort to get patient off of Lantus.  Labs as below.  - POCT  A1C  - MICROALBUMIN CREAT RATIO URINE; Future  - POCT Retinal Eye Exam  - insulin glargine (LANTUS SOLOSTAR) 100 UNIT/ML Solution Pen-injector injection; Inject 21 Units as instructed every evening.  Dispense: 5 PEN; Refill: 0    2. Postmenopausal  - DS-BONE DENSITY STUDY (DEXA); Future    3. Need for vaccination  - Influenza Vaccine, High Dose (65+ Only)  - Prevnar 13 PCV-13    4. Screen for colon cancer  - REFERRAL TO GI FOR COLONOSCOPY    5. Pure hypercholesterolemia  Chronic stable medical condition.  Continue atorvastatin 80 mg daily.    Follow-up in 4 months for a1C recheck    Please note that this dictation was created using voice recognition software. I have worked with consultants from the vendor as well as technical experts " from [facility] to optimize the interface. I have made every reasonable attempt to correct obvious errors, but I expect that there are errors of grammar and possibly content that I did not discover before finalizing the note.

## 2019-10-28 ENCOUNTER — APPOINTMENT (OUTPATIENT)
Dept: RADIOLOGY | Facility: MEDICAL CENTER | Age: 64
End: 2019-10-28
Attending: FAMILY MEDICINE
Payer: MEDICARE

## 2019-10-30 DIAGNOSIS — E11.9 TYPE 2 DIABETES MELLITUS WITHOUT COMPLICATION, UNSPECIFIED WHETHER LONG TERM INSULIN USE (HCC): ICD-10-CM

## 2019-10-31 ENCOUNTER — HOSPITAL ENCOUNTER (OUTPATIENT)
Dept: RADIOLOGY | Facility: MEDICAL CENTER | Age: 64
End: 2019-10-31
Attending: FAMILY MEDICINE
Payer: MEDICARE

## 2019-10-31 DIAGNOSIS — Z78.0 POSTMENOPAUSAL: ICD-10-CM

## 2019-10-31 LAB
AMBIGUOUS DTTM AMBI4: NORMAL
CREAT UR-MCNC: 75.4 MG/DL
MICROALBUMIN UR-MCNC: 8.5 MG/DL
MICROALBUMIN/CREAT UR: 113 MG/G (ref 0–30)
RETINAL SCREEN: NORMAL

## 2019-10-31 PROCEDURE — 77080 DXA BONE DENSITY AXIAL: CPT

## 2020-02-28 ENCOUNTER — OFFICE VISIT (OUTPATIENT)
Dept: MEDICAL GROUP | Facility: PHYSICIAN GROUP | Age: 65
End: 2020-02-28
Payer: MEDICARE

## 2020-02-28 ENCOUNTER — TELEPHONE (OUTPATIENT)
Dept: MEDICAL GROUP | Facility: PHYSICIAN GROUP | Age: 65
End: 2020-02-28

## 2020-02-28 ENCOUNTER — HOSPITAL ENCOUNTER (OUTPATIENT)
Dept: RADIOLOGY | Facility: MEDICAL CENTER | Age: 65
End: 2020-02-28
Attending: FAMILY MEDICINE
Payer: MEDICARE

## 2020-02-28 ENCOUNTER — HOSPITAL ENCOUNTER (OUTPATIENT)
Dept: LAB | Facility: MEDICAL CENTER | Age: 65
End: 2020-02-28
Attending: FAMILY MEDICINE
Payer: MEDICARE

## 2020-02-28 VITALS
WEIGHT: 189 LBS | TEMPERATURE: 97.5 F | SYSTOLIC BLOOD PRESSURE: 124 MMHG | BODY MASS INDEX: 30.37 KG/M2 | HEIGHT: 66 IN | DIASTOLIC BLOOD PRESSURE: 72 MMHG | OXYGEN SATURATION: 95 % | HEART RATE: 89 BPM

## 2020-02-28 DIAGNOSIS — R80.9 TYPE 2 DIABETES MELLITUS WITH MICROALBUMINURIA, WITH LONG-TERM CURRENT USE OF INSULIN (HCC): ICD-10-CM

## 2020-02-28 DIAGNOSIS — Z79.4 TYPE 2 DIABETES MELLITUS WITH MICROALBUMINURIA, WITH LONG-TERM CURRENT USE OF INSULIN (HCC): ICD-10-CM

## 2020-02-28 DIAGNOSIS — E11.9 TYPE 2 DIABETES MELLITUS WITHOUT COMPLICATION, UNSPECIFIED WHETHER LONG TERM INSULIN USE (HCC): ICD-10-CM

## 2020-02-28 DIAGNOSIS — M25.542 ARTHRALGIA OF LEFT HAND: ICD-10-CM

## 2020-02-28 DIAGNOSIS — E78.00 PURE HYPERCHOLESTEROLEMIA: Chronic | ICD-10-CM

## 2020-02-28 DIAGNOSIS — E11.29 TYPE 2 DIABETES MELLITUS WITH MICROALBUMINURIA, WITH LONG-TERM CURRENT USE OF INSULIN (HCC): ICD-10-CM

## 2020-02-28 LAB
ALBUMIN SERPL BCP-MCNC: 4.4 G/DL (ref 3.2–4.9)
ALBUMIN/GLOB SERPL: 1.2 G/DL
ALP SERPL-CCNC: 92 U/L (ref 30–99)
ALT SERPL-CCNC: 25 U/L (ref 2–50)
ANION GAP SERPL CALC-SCNC: 11 MMOL/L (ref 0–11.9)
AST SERPL-CCNC: 21 U/L (ref 12–45)
BASOPHILS # BLD AUTO: 0.4 % (ref 0–1.8)
BASOPHILS # BLD: 0.03 K/UL (ref 0–0.12)
BILIRUB SERPL-MCNC: 0.6 MG/DL (ref 0.1–1.5)
BUN SERPL-MCNC: 19 MG/DL (ref 8–22)
CALCIUM SERPL-MCNC: 9.6 MG/DL (ref 8.5–10.5)
CHLORIDE SERPL-SCNC: 104 MMOL/L (ref 96–112)
CK SERPL-CCNC: 161 U/L (ref 0–154)
CO2 SERPL-SCNC: 26 MMOL/L (ref 20–33)
CREAT SERPL-MCNC: 0.84 MG/DL (ref 0.5–1.4)
CRP SERPL HS-MCNC: 5.2 MG/L (ref 0–7.5)
EOSINOPHIL # BLD AUTO: 0.07 K/UL (ref 0–0.51)
EOSINOPHIL NFR BLD: 0.9 % (ref 0–6.9)
ERYTHROCYTE [DISTWIDTH] IN BLOOD BY AUTOMATED COUNT: 44.3 FL (ref 35.9–50)
ERYTHROCYTE [SEDIMENTATION RATE] IN BLOOD BY WESTERGREN METHOD: 8 MM/HOUR (ref 0–30)
GLOBULIN SER CALC-MCNC: 3.7 G/DL (ref 1.9–3.5)
GLUCOSE SERPL-MCNC: 153 MG/DL (ref 65–99)
HBA1C MFR BLD: 7.5 % (ref 0–5.6)
HCT VFR BLD AUTO: 48.9 % (ref 37–47)
HGB BLD-MCNC: 15.3 G/DL (ref 12–16)
IMM GRANULOCYTES # BLD AUTO: 0.03 K/UL (ref 0–0.11)
IMM GRANULOCYTES NFR BLD AUTO: 0.4 % (ref 0–0.9)
INT CON NEG: NEGATIVE
INT CON POS: POSITIVE
LYMPHOCYTES # BLD AUTO: 0.79 K/UL (ref 1–4.8)
LYMPHOCYTES NFR BLD: 10.3 % (ref 22–41)
MCH RBC QN AUTO: 27.6 PG (ref 27–33)
MCHC RBC AUTO-ENTMCNC: 31.3 G/DL (ref 33.6–35)
MCV RBC AUTO: 88.3 FL (ref 81.4–97.8)
MONOCYTES # BLD AUTO: 0.35 K/UL (ref 0–0.85)
MONOCYTES NFR BLD AUTO: 4.6 % (ref 0–13.4)
NEUTROPHILS # BLD AUTO: 6.41 K/UL (ref 2–7.15)
NEUTROPHILS NFR BLD: 83.4 % (ref 44–72)
NRBC # BLD AUTO: 0 K/UL
NRBC BLD-RTO: 0 /100 WBC
PLATELET # BLD AUTO: 270 K/UL (ref 164–446)
PMV BLD AUTO: 9.8 FL (ref 9–12.9)
POTASSIUM SERPL-SCNC: 3.7 MMOL/L (ref 3.6–5.5)
PROT SERPL-MCNC: 8.1 G/DL (ref 6–8.2)
RBC # BLD AUTO: 5.54 M/UL (ref 4.2–5.4)
RHEUMATOID FACT SER IA-ACNC: <10 IU/ML (ref 0–14)
SODIUM SERPL-SCNC: 141 MMOL/L (ref 135–145)
WBC # BLD AUTO: 7.7 K/UL (ref 4.8–10.8)

## 2020-02-28 PROCEDURE — 83036 HEMOGLOBIN GLYCOSYLATED A1C: CPT | Performed by: FAMILY MEDICINE

## 2020-02-28 PROCEDURE — 86200 CCP ANTIBODY: CPT

## 2020-02-28 PROCEDURE — 86141 C-REACTIVE PROTEIN HS: CPT

## 2020-02-28 PROCEDURE — 8041 PR SCP AHA: Performed by: FAMILY MEDICINE

## 2020-02-28 PROCEDURE — 99214 OFFICE O/P EST MOD 30 MIN: CPT | Performed by: FAMILY MEDICINE

## 2020-02-28 PROCEDURE — 36415 COLL VENOUS BLD VENIPUNCTURE: CPT

## 2020-02-28 PROCEDURE — 85652 RBC SED RATE AUTOMATED: CPT

## 2020-02-28 PROCEDURE — 80053 COMPREHEN METABOLIC PANEL: CPT

## 2020-02-28 PROCEDURE — 77077 JOINT SURVEY SINGLE VIEW: CPT

## 2020-02-28 PROCEDURE — 86431 RHEUMATOID FACTOR QUANT: CPT

## 2020-02-28 PROCEDURE — 82550 ASSAY OF CK (CPK): CPT

## 2020-02-28 PROCEDURE — 85025 COMPLETE CBC W/AUTO DIFF WBC: CPT

## 2020-02-28 RX ORDER — LANCETS 30 GAUGE
EACH MISCELLANEOUS
Qty: 100 EACH | Refills: 3 | Status: SHIPPED
Start: 2020-02-28 | End: 2020-03-13

## 2020-02-28 ASSESSMENT — FIBROSIS 4 INDEX: FIB4 SCORE: 0.95

## 2020-02-28 ASSESSMENT — PATIENT HEALTH QUESTIONNAIRE - PHQ9: CLINICAL INTERPRETATION OF PHQ2 SCORE: 0

## 2020-02-28 NOTE — ASSESSMENT & PLAN NOTE
Chronic ongoing medical condition.  Patient currently taking atorvastatin 80 mg daily and cholesterol has been very well controlled however she is complaining of muscle aches and joint pain worsening over the last several months since last visit in October.

## 2020-02-28 NOTE — ASSESSMENT & PLAN NOTE
New problem to examiner.  Progressively worsening joint pain and stiffness especially of the left hand first and second MCP.  Stiffness related to pain with flexion but no problems with extension.   strength normal.  Patient denies any radiation of pain or numbness in her hands.  States that the pain is constant and does not seem to be better or worse in the morning or evening.  Intermittently taking NSAIDs which helped slightly.

## 2020-02-28 NOTE — ASSESSMENT & PLAN NOTE
Chronic worsening medical condition.  Hemoglobin A1c 7.5% today.  Currently taking Actos 30 mg daily, Lantus 21 units nightly, Jardiance 25 mg daily, Victoza 1.8 mg daily.  Patient has worsening joint pain however denies any polyuria, polydipsia, polyphagia, headaches, vision changes.

## 2020-02-28 NOTE — PROGRESS NOTES
Annual Health Assessment Questions:    1.  Are you currently engaging in any exercise or physical activity? Yes    2.  How would you describe your mood or emotional well-being today? fair    3.  Have you had any falls in the last year? No    4.  Have you noticed any problems with your balance or had difficulty walking? No    5.  In the last six months have you experienced any leakage of urine? No    6. DPA/Advanced Directive: Patient does not have an Advanced Directive.  A packet and workshop information was given on Advanced Directives.     CC:Diagnoses of Type 2 diabetes mellitus without complication, unspecified whether long term insulin use (HCC), Arthralgia of left hand, Type 2 diabetes mellitus with microalbuminuria, with long-term current use of insulin ([facility]), and Pure hypercholesterolemia were pertinent to this visit.      HISTORY OF PRESENT ILLNESS: Patient is a 64 y.o. female established patient who presents today to follow-up on diabetes.      Type 2 diabetes mellitus with microalbuminuria, with long-term current use of insulin ([facility])  Chronic worsening medical condition.  Hemoglobin A1c 7.5% today.  Currently taking Actos 30 mg daily, Lantus 21 units nightly, Jardiance 25 mg daily, Victoza 1.8 mg daily.  Patient has worsening joint pain however denies any polyuria, polydipsia, polyphagia, headaches, vision changes.    Hyperlipidemia  Chronic ongoing medical condition.  Patient currently taking atorvastatin 80 mg daily and cholesterol has been very well controlled however she is complaining of muscle aches and joint pain worsening over the last several months since last visit in October.    Arthralgia of left hand  New problem to examiner.  Progressively worsening joint pain and stiffness especially of the left hand first and second MCP.  Stiffness related to pain with flexion but no problems with extension.   strength normal.  Patient denies any radiation of pain or numbness in her hands.  States that  the pain is constant and does not seem to be better or worse in the morning or evening.  Intermittently taking NSAIDs which helped slightly.      Patient Active Problem List    Diagnosis Date Noted   • Arthralgia of left hand 02/28/2020   • Olecranon bursitis of right elbow 07/03/2018   • Type 2 diabetes mellitus with microalbuminuria, with long-term current use of insulin ([facility]) 02/02/2018   • Vitamin B12 deficiency 08/24/2017   • Fatigue 07/20/2017   • Thumb pain 12/23/2016   • Hypokalemia 01/03/2014   • Gall bladder polyp 08/12/2011   • Hyperlipidemia 05/12/2011   • Vitamin D deficiency 05/12/2011   • Hypertension 05/12/2011   • GERD (gastroesophageal reflux disease) 05/12/2011      Allergies:Sulfa drugs    Current Outpatient Medications   Medication Sig Dispense Refill   • Blood Glucose Meter Kit Fasting once daily.  Per formulary, blood glucose monitoring kit. 1 Kit 0   • Blood Glucose Test Strips Use one strip to test blood sugar once daily early morning before first meal. 100 Strip 0   • linagliptin (TRADJENTA) 5 MG Tab tablet Take 1 Tab by mouth every day. 90 Tab 4   • Empagliflozin 25 MG Tab Take 25 mg by mouth every day. 90 Tab 3   • insulin glargine (LANTUS SOLOSTAR) 100 UNIT/ML Solution Pen-injector injection Inject 21 Units as instructed every evening. 5 PEN 0   • atorvastatin (LIPITOR) 80 MG tablet Take 1 Tab by mouth every evening. 90 Tab 3   • omeprazole (PRILOSEC) 20 MG delayed-release capsule Take 1 Cap by mouth every day. TAKE 1 CAP BY MOUTH EVERY DAY. 90 Cap 3   • pioglitazone (ACTOS) 30 MG Tab Take 1 Tab by mouth every day. 90 Tab 3   • liraglutide (VICTOZA) 18 MG/3ML Solution Pen-injector injection Inject 0.3 mL as instructed every day. 3 PEN 6   • Multiple Vitamins-Minerals (OCUVITE EXTRA PO) Take  by mouth.     • Insulin Pen Needle (NOVOFINE PLUS) 32G X 4 MM Misc 1 Applicator by Does not apply route every day. Using one needle tip with victoza per day 100 Each 3   • glucose blood (ONE TOUCH  "ULTRA TEST) strip 1 Strip by Other route 3 times a day. 100 Strip 3   • Cyanocobalamin (B-12) 1000 MCG Cap Take  by mouth.     • Cholecalciferol (VITAMIN D) 400 UNIT TABS Take 400 Units by mouth every day.     • aspirin EC (ECOTRIN) 81 MG TBEC Take 81 mg by mouth every day.     • Blood Glucose Monitoring Suppl SUPPLIES MISC Test strips order: Test strips for glucometer. Sig: use it once daily and prn ssx high or low sugar #100 RF x 3 100 Each 3     No current facility-administered medications for this visit.        Social History     Tobacco Use   • Smoking status: Former Smoker     Packs/day: 1.00     Years: 20.00     Pack years: 20.00     Types: Cigarettes     Last attempt to quit: 1990     Years since quittin.1   • Smokeless tobacco: Never Used   Substance Use Topics   • Alcohol use: No     Comment: holidays   • Drug use: No     Social History     Social History Narrative   • Not on file       Family History   Problem Relation Age of Onset   • Cancer Maternal Grandmother         lung    • Cancer Mother         breast   • Other Father         urinary issues   • GI Disease Sister    • Heart Disease Brother        Review of Systems:    Constitutional: No Fevers, Chills  Eyes: No vision changes  ENT: No hearing changes  Resp: No Shortness of breath  CV: No Chest pain  GI: No Nausea/Vomiting  Skin: No rashes  Neuro: No Headaches  Psych: No Suicidal ideations    All remaining systems reviewed and found to be negative, except as stated above.    Exam:    /72 (BP Location: Right arm, Patient Position: Sitting, BP Cuff Size: Large adult)   Pulse 89   Temp 36.4 °C (97.5 °F) (Temporal)   Ht 1.676 m (5' 6\")   Wt 85.7 kg (189 lb)   SpO2 95%  Body mass index is 30.51 kg/m².    General:  Well nourished, well developed female in NAD  HENT: Atraumatic, normocephalic  EYES: Extraocular movements intact, pupils equal and reactive to light  NECK: Supple, FROM  CHEST: No deformities, Equal chest expansion  RESP: " Unlabored, no stridor or audible wheeze  ABD: Soft, Nontender, Non-Distended  Extremities: No Clubbing, Cyanosis, or Edema  Left hand: Significantly decreased  strength on the left secondary to pain, 3/5.  5/5  strength on right.  Skin: Warm/dry, without rashes  Neuro: A/O x 4, CN 2-12 Grossly intact, Motor/sensory grossly intact  Psych: Normal behavior, normal affect    LABS: Hemoglobin A1c 7.5% today: Results reviewed and discussed with the patient, questions answered.    Assessment/Plan:  1. Type 2 diabetes mellitus with microalbuminuria, with long-term current use of insulin (HCC)  Chronic worsening medical condition.  Adding linagliptin.  Continue other medications.  Labs as below.  - CBC WITH DIFFERENTIAL; Future  - Comp Metabolic Panel; Future  - POCT  A1C  - linagliptin (TRADJENTA) 5 MG Tab tablet; Take 1 Tab by mouth every day.  Dispense: 90 Tab; Refill: 4    2. Arthralgia of left hand  New problem to examiner.  Labs as below.  Follow-up for lab review.  - DX-JOINT SURVEY-HANDS SINGLE VIEW; Future  - CREATINE KINASE; Future  - Sed Rate; Future  - CRP HIGH SENSITIVE (CARDIAC); Future  - RHEUMATOID ARTHRITIS FACTOR; Future  - CCP ANTIBODY; Future    3. Pure hypercholesterolemia  Chronic ongoing medical condition.  Continue atorvastatin 80 mg daily.  Labs as above for possible statin induced myalgias.    Follow-up to review labs and Pap smear.    Please note that this dictation was created using voice recognition software. I have worked with consultants from the vendor as well as technical experts from [employer] to optimize the interface. I have made every reasonable attempt to correct obvious errors, but I expect that there are errors of grammar and possibly content that I did not discover before finalizing the note.

## 2020-02-28 NOTE — TELEPHONE ENCOUNTER
DOCUMENTATION OF PAR STATUS:    1. Name of Medication & Dose: Tradjenta     2. Name of Prescription Coverage Company & phone #: [employer]    3. Date Prior Auth Submitted: 02/28/2020    4. What information was given to obtain insurance decision? Dx code tried and failed medications    5. Prior Auth Status? Referance # [id number] Pending    6. Patient Notified: N\A

## 2020-03-01 LAB — CCP IGG SERPL-ACNC: 4 UNITS (ref 0–19)

## 2020-03-03 NOTE — TELEPHONE ENCOUNTER
FINAL PRIOR AUTHORIZATION STATUS:    1.  Name of Medication & Dose: Tradjenta     2. Prior Auth Status: Approved through 02/27/2021 Limited to 30 tab per 30 day supply      3. Action Taken: Pharmacy Notified: N\A Patient Notified: N\A

## 2020-03-13 ENCOUNTER — HOSPITAL ENCOUNTER (INPATIENT)
Facility: MEDICAL CENTER | Age: 65
LOS: 2 days | DRG: 558 | End: 2020-03-15
Attending: EMERGENCY MEDICINE | Admitting: HOSPITALIST
Payer: MEDICARE

## 2020-03-13 ENCOUNTER — OFFICE VISIT (OUTPATIENT)
Dept: URGENT CARE | Facility: PHYSICIAN GROUP | Age: 65
End: 2020-03-13
Payer: MEDICARE

## 2020-03-13 VITALS
TEMPERATURE: 98.5 F | SYSTOLIC BLOOD PRESSURE: 92 MMHG | BODY MASS INDEX: 29.73 KG/M2 | WEIGHT: 185 LBS | HEART RATE: 87 BPM | DIASTOLIC BLOOD PRESSURE: 56 MMHG | HEIGHT: 66 IN | OXYGEN SATURATION: 99 % | RESPIRATION RATE: 20 BRPM

## 2020-03-13 DIAGNOSIS — L03.113 CELLULITIS OF RIGHT ARM: ICD-10-CM

## 2020-03-13 DIAGNOSIS — L03.113 CELLULITIS OF RIGHT ELBOW: Primary | ICD-10-CM

## 2020-03-13 PROBLEM — M71.129 SEPTIC BURSITIS OF ELBOW: Status: ACTIVE | Noted: 2018-07-03

## 2020-03-13 LAB
ALBUMIN SERPL BCP-MCNC: 4.1 G/DL (ref 3.2–4.9)
ALBUMIN/GLOB SERPL: 1.1 G/DL
ALP SERPL-CCNC: 71 U/L (ref 30–99)
ALT SERPL-CCNC: 20 U/L (ref 2–50)
ANION GAP SERPL CALC-SCNC: 10 MMOL/L (ref 7–16)
APPEARANCE FLD: NORMAL
APTT PPP: 21.1 SEC (ref 24.7–36)
AST SERPL-CCNC: 16 U/L (ref 12–45)
BASOPHILS # BLD AUTO: 0.2 % (ref 0–1.8)
BASOPHILS # BLD: 0.02 K/UL (ref 0–0.12)
BILIRUB SERPL-MCNC: 0.8 MG/DL (ref 0.1–1.5)
BODY FLD TYPE: NORMAL
BUN SERPL-MCNC: 15 MG/DL (ref 8–22)
CALCIUM SERPL-MCNC: 10 MG/DL (ref 8.5–10.5)
CHLORIDE SERPL-SCNC: 103 MMOL/L (ref 96–112)
CO2 SERPL-SCNC: 25 MMOL/L (ref 20–33)
COLOR FLD: NORMAL
CREAT SERPL-MCNC: 0.74 MG/DL (ref 0.5–1.4)
CRYSTALS FLD MICRO: NORMAL
EOSINOPHIL # BLD AUTO: 0 K/UL (ref 0–0.51)
EOSINOPHIL NFR BLD: 0 % (ref 0–6.9)
ERYTHROCYTE [DISTWIDTH] IN BLOOD BY AUTOMATED COUNT: 44.7 FL (ref 35.9–50)
ERYTHROCYTE [SEDIMENTATION RATE] IN BLOOD BY WESTERGREN METHOD: 37 MM/HOUR (ref 0–30)
GLOBULIN SER CALC-MCNC: 3.8 G/DL (ref 1.9–3.5)
GLUCOSE BLD-MCNC: 125 MG/DL (ref 65–99)
GLUCOSE BLD-MCNC: 141 MG/DL (ref 70–100)
GLUCOSE SERPL-MCNC: 98 MG/DL (ref 65–99)
GRAM STN SPEC: NORMAL
HCT VFR BLD AUTO: 41.5 % (ref 37–47)
HGB BLD-MCNC: 13.3 G/DL (ref 12–16)
IMM GRANULOCYTES # BLD AUTO: 0.05 K/UL (ref 0–0.11)
IMM GRANULOCYTES NFR BLD AUTO: 0.4 % (ref 0–0.9)
INR PPP: 1.04 (ref 0.87–1.13)
LACTATE BLD-SCNC: 1.2 MMOL/L (ref 0.5–2)
LYMPHOCYTES # BLD AUTO: 2.99 K/UL (ref 1–4.8)
LYMPHOCYTES NFR BLD: 25.4 % (ref 22–41)
MCH RBC QN AUTO: 27.7 PG (ref 27–33)
MCHC RBC AUTO-ENTMCNC: 32 G/DL (ref 33.6–35)
MCV RBC AUTO: 86.3 FL (ref 81.4–97.8)
MONOCYTES # BLD AUTO: 1.13 K/UL (ref 0–0.85)
MONOCYTES NFR BLD AUTO: 9.6 % (ref 0–13.4)
MONONUC CELLS NFR FLD: 12 %
NEUTROPHILS # BLD AUTO: 7.59 K/UL (ref 2–7.15)
NEUTROPHILS NFR BLD: 64.4 % (ref 44–72)
NEUTROPHILS NFR FLD: 88 %
NRBC # BLD AUTO: 0 K/UL
NRBC BLD-RTO: 0 /100 WBC
PLATELET # BLD AUTO: 269 K/UL (ref 164–446)
PMV BLD AUTO: 9.3 FL (ref 9–12.9)
POTASSIUM SERPL-SCNC: 3.1 MMOL/L (ref 3.6–5.5)
PROCALCITONIN SERPL-MCNC: <0.05 NG/ML
PROT SERPL-MCNC: 7.9 G/DL (ref 6–8.2)
PROTHROMBIN TIME: 13.9 SEC (ref 12–14.6)
RBC # BLD AUTO: 4.81 M/UL (ref 4.2–5.4)
RBC # FLD: NORMAL CELLS/UL
SIGNIFICANT IND 70042: NORMAL
SITE SITE: NORMAL
SODIUM SERPL-SCNC: 138 MMOL/L (ref 135–145)
SOURCE SOURCE: NORMAL
WBC # BLD AUTO: 11.8 K/UL (ref 4.8–10.8)
WBC # FLD: NORMAL CELLS/UL

## 2020-03-13 PROCEDURE — 700105 HCHG RX REV CODE 258: Performed by: HOSPITALIST

## 2020-03-13 PROCEDURE — 770006 HCHG ROOM/CARE - MED/SURG/GYN SEMI*

## 2020-03-13 PROCEDURE — 89051 BODY FLUID CELL COUNT: CPT

## 2020-03-13 PROCEDURE — 96366 THER/PROPH/DIAG IV INF ADDON: CPT

## 2020-03-13 PROCEDURE — 85730 THROMBOPLASTIN TIME PARTIAL: CPT

## 2020-03-13 PROCEDURE — 700111 HCHG RX REV CODE 636 W/ 250 OVERRIDE (IP): Performed by: HOSPITALIST

## 2020-03-13 PROCEDURE — 700102 HCHG RX REV CODE 250 W/ 637 OVERRIDE(OP): Performed by: HOSPITALIST

## 2020-03-13 PROCEDURE — 20605 DRAIN/INJ JOINT/BURSA W/O US: CPT | Mod: RT | Performed by: NURSE PRACTITIONER

## 2020-03-13 PROCEDURE — 87070 CULTURE OTHR SPECIMN AEROBIC: CPT

## 2020-03-13 PROCEDURE — 36415 COLL VENOUS BLD VENIPUNCTURE: CPT

## 2020-03-13 PROCEDURE — 84145 PROCALCITONIN (PCT): CPT

## 2020-03-13 PROCEDURE — 85652 RBC SED RATE AUTOMATED: CPT

## 2020-03-13 PROCEDURE — 0M933ZX DRAINAGE OF RIGHT ELBOW BURSA AND LIGAMENT, PERCUTANEOUS APPROACH, DIAGNOSTIC: ICD-10-PCS | Performed by: EMERGENCY MEDICINE

## 2020-03-13 PROCEDURE — 82962 GLUCOSE BLOOD TEST: CPT | Mod: QW | Performed by: NURSE PRACTITIONER

## 2020-03-13 PROCEDURE — 85025 COMPLETE CBC W/AUTO DIFF WBC: CPT

## 2020-03-13 PROCEDURE — 85610 PROTHROMBIN TIME: CPT

## 2020-03-13 PROCEDURE — 99285 EMERGENCY DEPT VISIT HI MDM: CPT

## 2020-03-13 PROCEDURE — 96365 THER/PROPH/DIAG IV INF INIT: CPT

## 2020-03-13 PROCEDURE — 99223 1ST HOSP IP/OBS HIGH 75: CPT | Performed by: HOSPITALIST

## 2020-03-13 PROCEDURE — 99214 OFFICE O/P EST MOD 30 MIN: CPT | Mod: 25 | Performed by: NURSE PRACTITIONER

## 2020-03-13 PROCEDURE — A9270 NON-COVERED ITEM OR SERVICE: HCPCS | Performed by: HOSPITALIST

## 2020-03-13 PROCEDURE — 700111 HCHG RX REV CODE 636 W/ 250 OVERRIDE (IP): Performed by: PHARMACIST

## 2020-03-13 PROCEDURE — 89060 EXAM SYNOVIAL FLUID CRYSTALS: CPT

## 2020-03-13 PROCEDURE — 80053 COMPREHEN METABOLIC PANEL: CPT

## 2020-03-13 PROCEDURE — 87147 CULTURE TYPE IMMUNOLOGIC: CPT

## 2020-03-13 PROCEDURE — 87077 CULTURE AEROBIC IDENTIFY: CPT

## 2020-03-13 PROCEDURE — 87040 BLOOD CULTURE FOR BACTERIA: CPT

## 2020-03-13 PROCEDURE — 700105 HCHG RX REV CODE 258: Performed by: PHARMACIST

## 2020-03-13 PROCEDURE — 82962 GLUCOSE BLOOD TEST: CPT

## 2020-03-13 PROCEDURE — 700101 HCHG RX REV CODE 250: Performed by: EMERGENCY MEDICINE

## 2020-03-13 PROCEDURE — 87205 SMEAR GRAM STAIN: CPT

## 2020-03-13 PROCEDURE — 83605 ASSAY OF LACTIC ACID: CPT

## 2020-03-13 PROCEDURE — 87186 SC STD MICRODIL/AGAR DIL: CPT

## 2020-03-13 RX ORDER — OMEPRAZOLE 20 MG/1
20 CAPSULE, DELAYED RELEASE ORAL EVERY EVENING
COMMUNITY
End: 2020-07-07

## 2020-03-13 RX ORDER — PIOGLITAZONEHYDROCHLORIDE 30 MG/1
30 TABLET ORAL EVERY EVENING
Status: DISCONTINUED | OUTPATIENT
Start: 2020-03-13 | End: 2020-03-15 | Stop reason: HOSPADM

## 2020-03-13 RX ORDER — BISACODYL 10 MG
10 SUPPOSITORY, RECTAL RECTAL
Status: DISCONTINUED | OUTPATIENT
Start: 2020-03-13 | End: 2020-03-15 | Stop reason: HOSPADM

## 2020-03-13 RX ORDER — ONDANSETRON 4 MG/1
4 TABLET, ORALLY DISINTEGRATING ORAL EVERY 4 HOURS PRN
Status: DISCONTINUED | OUTPATIENT
Start: 2020-03-13 | End: 2020-03-15 | Stop reason: HOSPADM

## 2020-03-13 RX ORDER — LIRAGLUTIDE 6 MG/ML
1.8 INJECTION SUBCUTANEOUS EVERY EVENING
COMMUNITY
End: 2020-04-10 | Stop reason: SDUPTHER

## 2020-03-13 RX ORDER — ATORVASTATIN CALCIUM 20 MG/1
80 TABLET, FILM COATED ORAL NIGHTLY
Status: DISCONTINUED | OUTPATIENT
Start: 2020-03-13 | End: 2020-03-15 | Stop reason: HOSPADM

## 2020-03-13 RX ORDER — AMINO ACIDS/MV,IRON,MIN
1 TABLET ORAL EVERY EVENING
Status: DISCONTINUED | OUTPATIENT
Start: 2020-03-13 | End: 2020-03-13

## 2020-03-13 RX ORDER — PIOGLITAZONEHYDROCHLORIDE 30 MG/1
30 TABLET ORAL EVERY EVENING
COMMUNITY
End: 2020-05-05

## 2020-03-13 RX ORDER — POTASSIUM CHLORIDE 20 MEQ/1
40 TABLET, EXTENDED RELEASE ORAL 2 TIMES DAILY
Status: COMPLETED | OUTPATIENT
Start: 2020-03-13 | End: 2020-03-14

## 2020-03-13 RX ORDER — CHOLECALCIFEROL (VITAMIN D3) 125 MCG
1000 CAPSULE ORAL EVERY EVENING
Status: DISCONTINUED | OUTPATIENT
Start: 2020-03-13 | End: 2020-03-15 | Stop reason: HOSPADM

## 2020-03-13 RX ORDER — EMPAGLIFLOZIN 25 MG/1
25 TABLET, FILM COATED ORAL EVERY EVENING
COMMUNITY
End: 2020-06-01

## 2020-03-13 RX ORDER — OMEPRAZOLE 20 MG/1
20 CAPSULE, DELAYED RELEASE ORAL EVERY EVENING
Status: DISCONTINUED | OUTPATIENT
Start: 2020-03-13 | End: 2020-03-15 | Stop reason: HOSPADM

## 2020-03-13 RX ORDER — AMOXICILLIN 250 MG
2 CAPSULE ORAL 2 TIMES DAILY
Status: DISCONTINUED | OUTPATIENT
Start: 2020-03-13 | End: 2020-03-15 | Stop reason: HOSPADM

## 2020-03-13 RX ORDER — ONDANSETRON 2 MG/ML
4 INJECTION INTRAMUSCULAR; INTRAVENOUS EVERY 4 HOURS PRN
Status: DISCONTINUED | OUTPATIENT
Start: 2020-03-13 | End: 2020-03-15 | Stop reason: HOSPADM

## 2020-03-13 RX ORDER — LIDOCAINE HYDROCHLORIDE AND EPINEPHRINE 10; 10 MG/ML; UG/ML
20 INJECTION, SOLUTION INFILTRATION; PERINEURAL ONCE
Status: COMPLETED | OUTPATIENT
Start: 2020-03-13 | End: 2020-03-13

## 2020-03-13 RX ORDER — INSULIN GLARGINE 100 [IU]/ML
21 INJECTION, SOLUTION SUBCUTANEOUS EVERY EVENING
Status: DISCONTINUED | OUTPATIENT
Start: 2020-03-13 | End: 2020-03-15 | Stop reason: HOSPADM

## 2020-03-13 RX ORDER — LINAGLIPTIN 5 MG/1
5 TABLET, FILM COATED ORAL EVERY EVENING
COMMUNITY
End: 2021-01-20

## 2020-03-13 RX ORDER — ACETAMINOPHEN 325 MG/1
650 TABLET ORAL EVERY 6 HOURS PRN
Status: DISCONTINUED | OUTPATIENT
Start: 2020-03-13 | End: 2020-03-15 | Stop reason: HOSPADM

## 2020-03-13 RX ORDER — POLYETHYLENE GLYCOL 3350 17 G/17G
1 POWDER, FOR SOLUTION ORAL
Status: DISCONTINUED | OUTPATIENT
Start: 2020-03-13 | End: 2020-03-15 | Stop reason: HOSPADM

## 2020-03-13 RX ORDER — OXYCODONE HYDROCHLORIDE 5 MG/1
5 TABLET ORAL
Status: DISCONTINUED | OUTPATIENT
Start: 2020-03-13 | End: 2020-03-15 | Stop reason: HOSPADM

## 2020-03-13 RX ORDER — MORPHINE SULFATE 4 MG/ML
4 INJECTION, SOLUTION INTRAMUSCULAR; INTRAVENOUS
Status: DISCONTINUED | OUTPATIENT
Start: 2020-03-13 | End: 2020-03-15 | Stop reason: HOSPADM

## 2020-03-13 RX ORDER — OXYCODONE HYDROCHLORIDE 5 MG/1
10 TABLET ORAL
Status: DISCONTINUED | OUTPATIENT
Start: 2020-03-13 | End: 2020-03-15 | Stop reason: HOSPADM

## 2020-03-13 RX ORDER — ATORVASTATIN CALCIUM 80 MG/1
80 TABLET, FILM COATED ORAL NIGHTLY
COMMUNITY
End: 2020-06-10

## 2020-03-13 RX ADMIN — VANCOMYCIN HYDROCHLORIDE 2100 MG: 500 INJECTION, POWDER, LYOPHILIZED, FOR SOLUTION INTRAVENOUS at 17:03

## 2020-03-13 RX ADMIN — AMPICILLIN SODIUM AND SULBACTAM SODIUM 3 G: 2; 1 INJECTION, POWDER, FOR SOLUTION INTRAMUSCULAR; INTRAVENOUS at 21:17

## 2020-03-13 RX ADMIN — CHOLECALCIFEROL (VITAMIN D3) 10 MCG (400 UNIT) TABLET 400 UNITS: at 20:23

## 2020-03-13 RX ADMIN — ATORVASTATIN CALCIUM 80 MG: 20 TABLET, FILM COATED ORAL at 20:23

## 2020-03-13 RX ADMIN — LIDOCAINE HYDROCHLORIDE,EPINEPHRINE BITARTRATE 20 ML: 10; .01 INJECTION, SOLUTION INFILTRATION; PERINEURAL at 14:30

## 2020-03-13 RX ADMIN — CYANOCOBALAMIN TAB 500 MCG 1000 MCG: 500 TAB at 20:22

## 2020-03-13 RX ADMIN — POTASSIUM CHLORIDE 40 MEQ: 1500 TABLET, EXTENDED RELEASE ORAL at 20:22

## 2020-03-13 RX ADMIN — ASPIRIN 81 MG: 81 TABLET, COATED ORAL at 20:23

## 2020-03-13 RX ADMIN — PIOGLITAZONE 30 MG: 30 TABLET ORAL at 21:16

## 2020-03-13 RX ADMIN — OMEPRAZOLE 20 MG: 20 CAPSULE, DELAYED RELEASE ORAL at 20:22

## 2020-03-13 RX ADMIN — INSULIN GLARGINE 21 UNITS: 100 INJECTION, SOLUTION SUBCUTANEOUS at 21:18

## 2020-03-13 ASSESSMENT — COGNITIVE AND FUNCTIONAL STATUS - GENERAL
DAILY ACTIVITIY SCORE: 24
SUGGESTED CMS G CODE MODIFIER MOBILITY: CH
MOBILITY SCORE: 24
SUGGESTED CMS G CODE MODIFIER DAILY ACTIVITY: CH

## 2020-03-13 ASSESSMENT — LIFESTYLE VARIABLES
AVERAGE NUMBER OF DAYS PER WEEK YOU HAVE A DRINK CONTAINING ALCOHOL: 0
CONSUMPTION TOTAL: NEGATIVE
DOES PATIENT WANT TO STOP DRINKING: NO
EVER FELT BAD OR GUILTY ABOUT YOUR DRINKING: NO
ALCOHOL_USE: NO
EVER HAD A DRINK FIRST THING IN THE MORNING TO STEADY YOUR NERVES TO GET RID OF A HANGOVER: NO
TOTAL SCORE: 0
DO YOU DRINK ALCOHOL: NO
HAVE YOU EVER FELT YOU SHOULD CUT DOWN ON YOUR DRINKING: NO
HOW MANY TIMES IN THE PAST YEAR HAVE YOU HAD 5 OR MORE DRINKS IN A DAY: 0
HAVE PEOPLE ANNOYED YOU BY CRITICIZING YOUR DRINKING: NO
TOTAL SCORE: 0
TOTAL SCORE: 0
ON A TYPICAL DAY WHEN YOU DRINK ALCOHOL HOW MANY DRINKS DO YOU HAVE: 0

## 2020-03-13 ASSESSMENT — ENCOUNTER SYMPTOMS
HEADACHES: 0
SORE THROAT: 0
ABDOMINAL PAIN: 0
BACK PAIN: 0
FEVER: 0
LOSS OF CONSCIOUSNESS: 0
PALPITATIONS: 0
COUGH: 0
BLURRED VISION: 0
VOMITING: 0
DOUBLE VISION: 0
DIARRHEA: 0
NAUSEA: 0
SHORTNESS OF BREATH: 0
CHILLS: 0
DIZZINESS: 0

## 2020-03-13 ASSESSMENT — FIBROSIS 4 INDEX
FIB4 SCORE: 1.01
FIB4 SCORE: 1.01

## 2020-03-13 ASSESSMENT — PAIN SCALES - GENERAL: PAINLEVEL: 3=SLIGHT PAIN

## 2020-03-13 NOTE — ED PROVIDER NOTES
ED Provider Note    CHIEF COMPLAINT  Chief Complaint   Patient presents with   • Elbow Pain     right, red hot and swelling for the last 2 days, h/o bursitis, redness down right FA, pt was UC today and attempted to drain elbow without any discharge/drainage returned. pt report BP was in the 90's at .        HPI  Tara Cueva is a 64 y.o. female who presents to the emergency department complaint of right elbow pain and swelling for last 2 days.  She has had bursitis in the past but not this severe.  She denies fever, shakes, chills, sweats, pain with moving her right elbow.  She was in urgent care earlier today and they tried to do a needle aspiration without success.  She has diabetic and takes insulin as well as oral medications.    REVIEW OF SYSTEMS  Positives as above. Pertinent negatives include fever, shakes, chills, sweats, difficulty moving the right elbow joint   All other review of systems are negative    PAST MEDICAL HISTORY  Past Medical History:   Diagnosis Date   • Diabetes 5/12/2011   • Edema 5/12/2011   • Elevated liver enzymes 5/12/2011   • Gall bladder polyp 8/12/2011   • GERD (gastroesophageal reflux disease) 5/12/2011   • Hyperlipidemia 5/12/2011   • Hypertension 5/12/2011   • Skin lesion of face 5/12/2011   • Vitamin d deficiency 5/12/2011       FAMILY HISTORY  Noncontributory    SOCIAL HISTORY  Social History     Socioeconomic History   • Marital status:      Spouse name: Not on file   • Number of children: Not on file   • Years of education: Not on file   • Highest education level: Not on file   Occupational History   • Not on file   Social Needs   • Financial resource strain: Not on file   • Food insecurity     Worry: Not on file     Inability: Not on file   • Transportation needs     Medical: Not on file     Non-medical: Not on file   Tobacco Use   • Smoking status: Former Smoker     Packs/day: 1.00     Years: 20.00     Pack years: 20.00     Types: Cigarettes     Last attempt to  quit: 1990     Years since quittin.2   • Smokeless tobacco: Never Used   Substance and Sexual Activity   • Alcohol use: No     Comment: holidays   • Drug use: No   • Sexual activity: Not Currently   Lifestyle   • Physical activity     Days per week: Not on file     Minutes per session: Not on file   • Stress: Not on file   Relationships   • Social connections     Talks on phone: Not on file     Gets together: Not on file     Attends [employer] service: Not on file     Active member of club or organization: Not on file     Attends meetings of clubs or organizations: Not on file     Relationship status: Not on file   • Intimate partner violence     Fear of current or ex partner: Not on file     Emotionally abused: Not on file     Physically abused: Not on file     Forced sexual activity: Not on file   Other Topics Concern   • Not on file   Social History Narrative   • Not on file       SURGICAL HISTORY  Past Surgical History:   Procedure Laterality Date   • OTHER ORTHOPEDIC SURGERY      right arm ORIF   • PRIMARY C SECTION     • TUBAL COAGULATION LAPAROSCOPIC BILATERAL         CURRENT MEDICATIONS  Home Medications     Reviewed by Hollie Saleem (Pharmacy Tech) on 20 at 1413  Med List Status: Complete   Medication Last Dose Status   aspirin EC (ECOTRIN) 81 MG TBEC 3/12/2020 Active   atorvastatin (LIPITOR) 80 MG tablet 3/12/2020 Active   Cholecalciferol (VITAMIN D) 400 UNIT TABS 3/12/2020 Active   Cyanocobalamin (B-12) 1000 MCG Cap 3/12/2020 Active   Empagliflozin (JARDIANCE) 25 MG Tab 3/12/2020 Active   insulin glargine (LANTUS SOLOSTAR) 100 UNIT/ML Solution Pen-injector injection 3/12/2020 Active   linagliptin (TRADJENTA) 5 MG Tab tablet 3/12/2020 Active   liraglutide (VICTOZA) 18 MG/3ML Solution Pen-injector 3/12/2020 Active   Multiple Vitamins-Minerals (OCUVITE EXTRA) Tab 3/12/2020 Active   omeprazole (PRILOSEC) 20 MG delayed-release capsule 3/12/2020 Active   pioglitazone (ACTOS) 30 MG Tab  "3/12/2020 Active                ALLERGIES  Allergies   Allergen Reactions   • Sulfa Drugs Vomiting       PHYSICAL EXAM  VITAL SIGNS: /79   Pulse 91   Temp 36.5 °C (97.7 °F) (Temporal)   Resp 14   Ht 1.676 m (5' 6\")   Wt 85.1 kg (187 lb 9.8 oz)   LMP 12/16/2006   SpO2 97%   BMI 30.28 kg/m²      Constitutional: Well developed, Well nourished, No acute distress, Non-toxic appearance.   Eyes: PERRLA, EOMI, Conjunctiva normal, No discharge.   Cardiovascular: Normal heart rate, Normal rhythm, No murmurs, No rubs, No gallops, and intact distal pulses.   Thorax & Lungs:  No respiratory distress, no rales, no rhonchi, No wheezing, No chest wall tenderness.   Abdomen: Bowel sounds normal, Soft, No tenderness, No guarding, No rebound, No pulsatile masses.   Skin: The right upper extremity the elbow has significant erythema, edema from the mid humerus to the mid forearm, there was fluctuance and a fluid-filled cavity at the elbow bursa   extremities: Skin findings as above, full range of motion of the right upper extremity elbow, no difficulty with pronation, supination extension and flexion, fluid-filled cavity in the elbow bursa  Neurologic: Alert & oriented x 3, No focal deficits noted, acting appropriately on exam, ulnar, median radial nerve intact sensation strength.  Psychiatric: Affect normal for clinical presentation.      RADIOLOGY/PROCEDURES  No orders to display     Results for orders placed or performed in visit on 03/13/20   POCT Glucose   Result Value Ref Range    Glucose - Accu-Ck 141 (A) 70 - 100 mg/dL     Procedure: Bursitis needle aspiration  The patient consented verbally and written later to the risk and benefits including infection bleeding and pain.  Betadine prep entire area, 5 mL's of lidocaine with epinephrine infused in the area for anesthesia  Using an 18-gauge needle I did aspirate 9 mL's with serosanguineous fluid and drained the bursa.  The patient tolerated the procedure " well.    COURSE & MEDICAL DECISION MAKING  Pertinent Labs & Imaging studies reviewed. (See chart for details)  This is a pleasant 64-year-old female presents with right-sided infected bursa of the elbow.  The patient has no evidence of joint abnormality with excellent range of motion of the joint.  Bursa was aspirated with 9 mL's of serosanguineous fluid expressed and has been sent to the lab for culture.  The patient has received ceftriaxone prior to arrival I will add on vancomycin currently.  The patient has no evidence of hemodynamic instability or sepsis currently.  I will hospitalizing patient for further evaluation and management.    I have discussed the patient with Dr. Ferrer well at 1450 for hospitalization.  FINAL IMPRESSION  Infected right elbow bursitis  Cellulitis right upper extremity  Needle aspiration of infected bursitis right elbow  The patient is to be hospitalized in guarded condition to Dr. Ferrer.    Electronically signed by: Delano Quick D.O., 3/13/2020 2:29 PM

## 2020-03-13 NOTE — ED NOTES
Patient ambulated with steady gait to room. Patient changed into gown and connected to vitals machine. Chart up for ERP.

## 2020-03-13 NOTE — ED TRIAGE NOTES
Pt ambulated to triage with   Chief Complaint   Patient presents with   • Elbow Pain     right, red hot and swelling for the last 2 days, h/o bursitis, redness down right FA, pt was  today and attempted to drain elbow without any discharge/drainage returned. pt report BP was in the 90's at .       at  today, h/o diabetes.  Pt Informed regarding triage process and verbalized understanding to inform triage tech or RN for any changes in condition. Placed in lobby.

## 2020-03-13 NOTE — PROGRESS NOTES
Subjective:      Vee Cueva is a 64 y.o. female who presents with Elbow Pain (R elbow swollen, redness, x2 days)    Past Medical History:   Diagnosis Date   • Diabetes 2011   • Edema 2011   • Elevated liver enzymes 2011   • Gall bladder polyp 2011   • GERD (gastroesophageal reflux disease) 2011   • Hyperlipidemia 2011   • Hypertension 2011   • Skin lesion of face 2011   • Vitamin d deficiency 2011     Social History     Socioeconomic History   • Marital status:      Spouse name: Not on file   • Number of children: Not on file   • Years of education: Not on file   • Highest education level: Not on file   Occupational History   • Not on file   Social Needs   • Financial resource strain: Not on file   • Food insecurity     Worry: Not on file     Inability: Not on file   • Transportation needs     Medical: Not on file     Non-medical: Not on file   Tobacco Use   • Smoking status: Former Smoker     Packs/day: 1.00     Years: 20.00     Pack years: 20.00     Types: Cigarettes     Last attempt to quit: 1990     Years since quittin.2   • Smokeless tobacco: Never Used   Substance and Sexual Activity   • Alcohol use: No     Comment: holidays   • Drug use: No   • Sexual activity: Not Currently   Lifestyle   • Physical activity     Days per week: Not on file     Minutes per session: Not on file   • Stress: Not on file   Relationships   • Social connections     Talks on phone: Not on file     Gets together: Not on file     Attends Sabianist service: Not on file     Active member of club or organization: Not on file     Attends meetings of clubs or organizations: Not on file     Relationship status: Not on file   • Intimate partner violence     Fear of current or ex partner: Not on file     Emotionally abused: Not on file     Physically abused: Not on file     Forced sexual activity: Not on file   Other Topics Concern   • Not on file   Social History Narrative   • Not  "on file     Family History   Problem Relation Age of Onset   • Cancer Maternal Grandmother         lung    • Cancer Mother         breast   • Other Father         urinary issues   • GI Disease Sister    • Heart Disease Brother        Allergies: Sulfa drugs    Patient is a 64-year-old female who presents today with complaint of right elbow pain, swelling, and redness.  Symptoms started over the last 2 to 3 days.  She denies any injury or trauma.  States she has felt fatigue and general malaise.  Yesterday felt some body aches and chills but so far she has not experienced that today.  Patient's blood pressure upon admission to urgent care is noted to be 98/56.  She denies currently taking blood pressure medications.  She does have a history of type 2 diabetes, states she has not checked her blood sugar this morning.  Patient appears clinically ill.          Other   This is a new problem. The current episode started in the past 7 days. The problem occurs constantly. The problem has been gradually worsening. Nothing aggravates the symptoms. She has tried nothing for the symptoms. The treatment provided no relief.       Review of Systems   Musculoskeletal:        Right elbow pain   All other systems reviewed and are negative.         Objective:     BP (!) 98/56   Pulse 87   Temp 36.9 °C (98.5 °F) (Temporal)   Resp 20   Ht 1.676 m (5' 6\")   Wt 83.9 kg (185 lb)   LMP 12/16/2006   SpO2 99%   BMI 29.86 kg/m²      Physical Exam  Vitals signs reviewed.   Constitutional:       Appearance: Normal appearance.   Musculoskeletal:        Arms:       Comments: The olecranon bursa over the right elbow is red and swollen.  There is also erythema extending into the upper arm surrounding the elbow and also down into the forearm.  I do not notice any streaking at this time.  Range of motion is slightly decreased.   Skin:     General: Skin is warm and dry.      Capillary Refill: Capillary refill takes less than 2 seconds.   "   Neurological:      General: No focal deficit present.      Mental Status: She is alert.   Psychiatric:         Mood and Affect: Mood normal.         Thought Content: Thought content normal.         Judgment: Judgment normal.       As it does appear that there may be an olecranon bursitis, I did attempt aspiration; however I was not able to aspirate any fluid.      Blood pressure recheck: 92/56    Fingerstick glucose: 141 mg/dL     Patient is not on medication for blood pressure at this time.      Procedure:    Lateral aspect of the olecranon bursa cleaned with Betadine and then infiltrated with 1 mL of 2% lidocaine.  Good anesthesia achieved.  Attempted to aspirate the olecranon bursa with an 18-gauge needle, however I am not able to withdraw any fluid on attempt.  Needle removed and pressure dressing placed over the site.      Patient's blood pressure on admission to urgent care was noted to be low, and this was rechecked.  Blood pressure is still 92/56.  Patient does appear clinically ill ; she is not febrile at this time and I do not see streaking up the arm.  At this time due to patient's age and comorbid disease including type 2 diabetes I am concerned for the possibility of early sepsis.  I discussed this with the patient and at this time will refer her on to ER.  She will be given 1 g of Rocephin IM prior to discharge.     Assessment/Plan:   Left elbow cellulitis  Left arm cellulitis    Rocephin 1 gram IM  See note above  Due to concern for decreased blood pressure patient was also referred on to ER for further evaluation     There are no diagnoses linked to this encounter.

## 2020-03-14 ENCOUNTER — APPOINTMENT (OUTPATIENT)
Dept: RADIOLOGY | Facility: MEDICAL CENTER | Age: 65
DRG: 558 | End: 2020-03-14
Attending: INTERNAL MEDICINE
Payer: MEDICARE

## 2020-03-14 LAB
ANION GAP SERPL CALC-SCNC: 8 MMOL/L (ref 7–16)
BUN SERPL-MCNC: 13 MG/DL (ref 8–22)
CALCIUM SERPL-MCNC: 9.3 MG/DL (ref 8.5–10.5)
CHLORIDE SERPL-SCNC: 106 MMOL/L (ref 96–112)
CO2 SERPL-SCNC: 24 MMOL/L (ref 20–33)
CREAT SERPL-MCNC: 0.77 MG/DL (ref 0.5–1.4)
GLUCOSE BLD-MCNC: 110 MG/DL (ref 65–99)
GLUCOSE BLD-MCNC: 77 MG/DL (ref 65–99)
GLUCOSE BLD-MCNC: 84 MG/DL (ref 65–99)
GLUCOSE BLD-MCNC: 85 MG/DL (ref 65–99)
GLUCOSE SERPL-MCNC: 119 MG/DL (ref 65–99)
POTASSIUM SERPL-SCNC: 3.1 MMOL/L (ref 3.6–5.5)
SODIUM SERPL-SCNC: 138 MMOL/L (ref 135–145)

## 2020-03-14 PROCEDURE — 80048 BASIC METABOLIC PNL TOTAL CA: CPT

## 2020-03-14 PROCEDURE — A9270 NON-COVERED ITEM OR SERVICE: HCPCS | Performed by: HOSPITALIST

## 2020-03-14 PROCEDURE — 99232 SBSQ HOSP IP/OBS MODERATE 35: CPT | Performed by: INTERNAL MEDICINE

## 2020-03-14 PROCEDURE — 700102 HCHG RX REV CODE 250 W/ 637 OVERRIDE(OP): Performed by: HOSPITALIST

## 2020-03-14 PROCEDURE — 700111 HCHG RX REV CODE 636 W/ 250 OVERRIDE (IP): Performed by: INTERNAL MEDICINE

## 2020-03-14 PROCEDURE — 700111 HCHG RX REV CODE 636 W/ 250 OVERRIDE (IP): Performed by: HOSPITALIST

## 2020-03-14 PROCEDURE — 700105 HCHG RX REV CODE 258: Performed by: HOSPITALIST

## 2020-03-14 PROCEDURE — 82962 GLUCOSE BLOOD TEST: CPT

## 2020-03-14 PROCEDURE — 770006 HCHG ROOM/CARE - MED/SURG/GYN SEMI*

## 2020-03-14 PROCEDURE — 36415 COLL VENOUS BLD VENIPUNCTURE: CPT

## 2020-03-14 RX ORDER — CEFAZOLIN SODIUM 2 G/100ML
2 INJECTION, SOLUTION INTRAVENOUS EVERY 8 HOURS
Status: DISCONTINUED | OUTPATIENT
Start: 2020-03-14 | End: 2020-03-15 | Stop reason: HOSPADM

## 2020-03-14 RX ADMIN — CEFAZOLIN SODIUM 2 G: 2 INJECTION, SOLUTION INTRAVENOUS at 21:05

## 2020-03-14 RX ADMIN — INSULIN GLARGINE 21 UNITS: 100 INJECTION, SOLUTION SUBCUTANEOUS at 21:13

## 2020-03-14 RX ADMIN — POTASSIUM CHLORIDE 40 MEQ: 1500 TABLET, EXTENDED RELEASE ORAL at 04:17

## 2020-03-14 RX ADMIN — ATORVASTATIN CALCIUM 80 MG: 20 TABLET, FILM COATED ORAL at 21:04

## 2020-03-14 RX ADMIN — PIOGLITAZONE 30 MG: 30 TABLET ORAL at 17:29

## 2020-03-14 RX ADMIN — ASPIRIN 81 MG: 81 TABLET, COATED ORAL at 17:29

## 2020-03-14 RX ADMIN — AMPICILLIN SODIUM AND SULBACTAM SODIUM 3 G: 2; 1 INJECTION, POWDER, FOR SOLUTION INTRAMUSCULAR; INTRAVENOUS at 15:15

## 2020-03-14 RX ADMIN — AMPICILLIN SODIUM AND SULBACTAM SODIUM 3 G: 2; 1 INJECTION, POWDER, FOR SOLUTION INTRAMUSCULAR; INTRAVENOUS at 08:31

## 2020-03-14 RX ADMIN — AMPICILLIN SODIUM AND SULBACTAM SODIUM 3 G: 2; 1 INJECTION, POWDER, FOR SOLUTION INTRAMUSCULAR; INTRAVENOUS at 04:17

## 2020-03-14 RX ADMIN — OMEPRAZOLE 20 MG: 20 CAPSULE, DELAYED RELEASE ORAL at 17:29

## 2020-03-14 RX ADMIN — CHOLECALCIFEROL (VITAMIN D3) 10 MCG (400 UNIT) TABLET 400 UNITS: at 17:28

## 2020-03-14 RX ADMIN — CYANOCOBALAMIN TAB 500 MCG 1000 MCG: 500 TAB at 17:29

## 2020-03-14 NOTE — PROGRESS NOTES
"Pharmacy Kinetics 64 y.o. female on vancomycin day # 1 3/14/2020    Currently on Vancomycin 1700 mg iv q24hr (1700)  Provider specified end date: n/a    Indication for Treatment: septic bursitis of right elbow    Pertinent history per medical record: Admitted on 3/13/2020 for swelling/redness of right elbow. Does have a remote PMH of olecranon bursitis, however has never had an infection to the area. Denies fever/chills but endorses fatigue over last 2-3 days. In ED, pt noted to have septic bursitis with surrounding cellulitis, this was drained and cultures were taken. No history of vancomycin at this facility.    Other antibiotics: Unasyn 3 g q6h    Allergies: Sulfa drugs     List concerns for renal function: BMI 30.28    Pertinent cultures to date:   3/13: blood x2: pending  3/13: fluid culture w/gram stain: NGTD      Recent Labs     20  1531   WBC 11.8*   NEUTSPOLYS 64.40     Recent Labs     20  1531   BUN 15   CREATININE 0.74   ALBUMIN 4.1     No intake or output data in the 24 hours ending 20 0123     /63   Pulse 86   Temp 36.9 °C (98.5 °F) (Temporal)   Resp 16   Ht 1.676 m (5' 6\")   Wt 85.1 kg (187 lb 9.8 oz)   SpO2 93%  Temp (24hrs), Av.8 °C (98.2 °F), Min:36.5 °C (97.7 °F), Max:36.9 °C (98.5 °F)      A/P   1. Vancomycin dose change: new start  2. Next vancomycin level: 3/16 @ 1630  3. Goal trough: 12-16 mcg/mL  4. Comments: Vancomycin and Unasyn initiated empirically for septic bursitis of right elbow. Little concern for accumulation with BMI of 30.28 but will proceed with protocol dose of 20 mg/kg q24h. Pharmacy will continue to follow and adjust dose accordingly.    Hu Mosley, PharmD  "

## 2020-03-14 NOTE — CARE PLAN
Problem: Communication  Goal: The ability to communicate needs accurately and effectively will improve  Outcome: PROGRESSING AS EXPECTED   Pt uses her call light correctly to get assistance.     Problem: Knowledge Deficit  Goal: Knowledge of disease process/condition, treatment plan, diagnostic tests, and medications will improve  Outcome: PROGRESSING AS EXPECTED   The pt is educated on her plan of care, treatments, medications, and has her questions answered.

## 2020-03-14 NOTE — H&P
"Hospital Medicine History & Physical Note    Date of Service  3/13/2020    Primary Care Physician  Constantin Moon M.D.    Consultants      Code Status  Full    Chief Complaint  Painful swollen red right elbow    History of Presenting Illness  64 y.o. female who has a previous medical history that includes diabetes, GERD, dyslipidemia, hypertension, vitamin D deficiency.  She has a distant history of right elbow olecranon bursitis however has never had a previous infection in this region.  On that occasion was drained once, and has been relatively asymptomatic since.  Patient reports that about 2 to 3 days ago, she noted more swelling and redness in the right elbow again.  This time redness was quite severe, and is spread down to the middle of the forearm, and up to the lower portion of the upper arm.  She has not had any fever chills but she does note that she has been feeling \"just drained\" over the last 2 or 3 days.  Here in the ED the patient was noted to have a septic bursitis with surrounding cellulitis.  This was drained percutaneously, and cultures have been drawn both of blood and of the contents of the bursa.    Review of Systems  Review of Systems   Constitutional: Negative for chills and fever.   HENT: Negative for nosebleeds and sore throat.    Eyes: Negative for blurred vision and double vision.   Respiratory: Negative for cough and shortness of breath.    Cardiovascular: Negative for chest pain, palpitations and leg swelling.   Gastrointestinal: Negative for abdominal pain, diarrhea, nausea and vomiting.   Genitourinary: Negative for dysuria and urgency.   Musculoskeletal: Negative for back pain.        Right elbow pain and swelling   Skin: Positive for rash.   Neurological: Negative for dizziness, loss of consciousness and headaches.       Past Medical History   has a past medical history of Diabetes (5/12/2011), Edema (5/12/2011), Elevated liver enzymes (5/12/2011), Gall bladder polyp (8/12/2011), " GERD (gastroesophageal reflux disease) (5/12/2011), Hyperlipidemia (5/12/2011), Hypertension (5/12/2011), Skin lesion of face (5/12/2011), and Vitamin d deficiency (5/12/2011).    Surgical History   has a past surgical history that includes primary c section; other orthopedic surgery; and tubal coagulation laparoscopic bilateral.     Family History  family history includes Cancer in her maternal grandmother and mother; GI Disease in her sister; Heart Disease in her brother; Other in her father.     Social History   reports that she quit smoking about 30 years ago. Her smoking use included cigarettes. She has a 20.00 pack-year smoking history. She has never used smokeless tobacco. She reports that she does not drink alcohol or use drugs.    Allergies  Allergies   Allergen Reactions   • Sulfa Drugs Vomiting       Medications  Prior to Admission Medications   Prescriptions Last Dose Informant Patient Reported? Taking?   Cholecalciferol (VITAMIN D) 400 UNIT TABS 3/12/2020 at 2130 Patient Yes No   Sig: Take 400 Units by mouth every evening.   Cyanocobalamin (B-12) 1000 MCG Cap 3/12/2020 at 2130 Patient Yes No   Sig: Take 1,000 mcg by mouth every evening.   Empagliflozin (JARDIANCE) 25 MG Tab 3/12/2020 at 2130 Patient Yes Yes   Sig: Take 25 mg by mouth every evening.   Multiple Vitamins-Minerals (OCUVITE EXTRA) Tab 3/12/2020 at 2130 Patient Yes No   Sig: Take 1 Tab by mouth every evening.   aspirin EC (ECOTRIN) 81 MG TBEC 3/12/2020 at 2130 Patient Yes No   Sig: Take 81 mg by mouth every evening.   atorvastatin (LIPITOR) 80 MG tablet 3/12/2020 at 2130 Patient Yes Yes   Sig: Take 80 mg by mouth every evening.   insulin glargine (LANTUS SOLOSTAR) 100 UNIT/ML Solution Pen-injector injection 3/12/2020 at 2130 Patient No No   Sig: Inject 21 Units as instructed every evening.   linagliptin (TRADJENTA) 5 MG Tab tablet 3/12/2020 at 2130 Patient Yes Yes   Sig: Take 5 mg by mouth every evening.   liraglutide (VICTOZA) 18 MG/3ML  Solution Pen-injector 3/12/2020 at 2130 Patient Yes Yes   Sig: Inject 1.8 mg as instructed every evening.   omeprazole (PRILOSEC) 20 MG delayed-release capsule 3/12/2020 at 2130 Patient Yes Yes   Sig: Take 20 mg by mouth every evening.   pioglitazone (ACTOS) 30 MG Tab 3/12/2020 at 2130 Patient Yes Yes   Sig: Take 30 mg by mouth every evening.      Facility-Administered Medications: None       Physical Exam  Temp:  [36.5 °C (97.7 °F)] 36.5 °C (97.7 °F)  Pulse:  [82-91] 84  Resp:  [14] 14  BP: (103-141)/(56-79) 141/72  SpO2:  [92 %-97 %] 92 %    Physical Exam  Vitals signs reviewed.   Constitutional:       General: She is not in acute distress.     Appearance: She is well-developed. She is not diaphoretic.   HENT:      Head: Normocephalic and atraumatic.   Neck:      Vascular: No JVD.   Cardiovascular:      Rate and Rhythm: Normal rate and regular rhythm.      Heart sounds: No murmur.   Pulmonary:      Effort: Pulmonary effort is normal. No respiratory distress.      Breath sounds: No stridor. No wheezing or rales.   Abdominal:      Palpations: Abdomen is soft.      Tenderness: There is no abdominal tenderness. There is no guarding or rebound.   Musculoskeletal:         General: No tenderness.      Right lower leg: No edema.      Left lower leg: No edema.      Comments: There is erythema that extends from the distal posterior right upper arm, to the middle of the lower arm.  There is fluctuance and tenderness overlying the olecranon bursa.  The erythema is blanching.  There are no other areas of fluctuance, subcutaneous emphysema, or bullae.  Distally the right hand is neurovascularly intact.  No pain with active or passive range of motion of the long flexors and extensors of the hands and fingers   Skin:     General: Skin is warm and dry.      Findings: No rash.   Neurological:      General: No focal deficit present.      Mental Status: She is oriented to person, place, and time. Mental status is at baseline.    Psychiatric:         Mood and Affect: Mood normal.         Thought Content: Thought content normal.         Laboratory:  Recent Labs     03/13/20  1531   WBC 11.8*   RBC 4.81   HEMOGLOBIN 13.3   HEMATOCRIT 41.5   MCV 86.3   MCH 27.7   MCHC 32.0*   RDW 44.7   PLATELETCT 269   MPV 9.3     Recent Labs     03/13/20  1531   SODIUM 138   POTASSIUM 3.1*   CHLORIDE 103   CO2 25   GLUCOSE 98   BUN 15   CREATININE 0.74   CALCIUM 10.0     Recent Labs     03/13/20  1531   ALTSGPT 20   ASTSGOT 16   ALKPHOSPHAT 71   TBILIRUBIN 0.8   GLUCOSE 98     Recent Labs     03/13/20  1531   APTT 21.1*   INR 1.04     No results for input(s): NTPROBNP in the last 72 hours.      No results for input(s): TROPONINT in the last 72 hours.    Urinalysis:    No results found     Imaging:  No orders to display         Assessment/Plan:  I anticipate this patient will require at least two midnights for appropriate medical management, necessitating inpatient admission.    Septic bursitis of elbow  Assessment & Plan  Patient has been drained here in the ED.  Cultures of the fluid as well as blood have been sent.  Admit to the hospital on IV vancomycin and ampicillin/sulbactam  Follow clinical exam and await culture results.    Type 2 diabetes mellitus with microalbuminuria, with long-term current use of insulin (HCC)- (present on admission)  Assessment & Plan  The patient's last A1c was 7.52 weeks ago.  She has maintained on p.o. glitazone, liraglutide, linagliptin, and 21 units of Lantus.  We will continue her home regimen as well as covering with sliding scale insulin.    Hypertension- (present on admission)  Assessment & Plan  Patient is maintained on no medications at present.  Follow her BPs    Hyperlipidemia- (present on admission)  Assessment & Plan  Continue atorvastatin      VTE prophylaxis: Enoxaparin

## 2020-03-14 NOTE — ASSESSMENT & PLAN NOTE
Fluid cultures pending  Blood cultures pending  Continue IV vancomycin and ampicillin/sulbactam  Continue to follow cultures and sensitivities with antibiotics to be de-escalated as soon as practical.

## 2020-03-14 NOTE — PROGRESS NOTES
The pt arrived to the floor form the ED with transport. The pt walked to the bed from the [facility] without needing help. The pt was settled into her room and assessed.

## 2020-03-14 NOTE — ASSESSMENT & PLAN NOTE
Latest hemoglobin A1c 7.5  She has maintained on p.o. glitazone, liraglutide, linagliptin, and 21 units of Lantus.  We will continue her home regimen as well as covering with sliding scale insulin.

## 2020-03-15 VITALS
TEMPERATURE: 98 F | BODY MASS INDEX: 30.15 KG/M2 | RESPIRATION RATE: 16 BRPM | DIASTOLIC BLOOD PRESSURE: 71 MMHG | HEIGHT: 66 IN | OXYGEN SATURATION: 94 % | WEIGHT: 187.61 LBS | SYSTOLIC BLOOD PRESSURE: 131 MMHG | HEART RATE: 78 BPM

## 2020-03-15 LAB
ALBUMIN SERPL BCP-MCNC: 3.5 G/DL (ref 3.2–4.9)
ALBUMIN/GLOB SERPL: 0.9 G/DL
ALP SERPL-CCNC: 67 U/L (ref 30–99)
ALT SERPL-CCNC: 14 U/L (ref 2–50)
ANION GAP SERPL CALC-SCNC: 11 MMOL/L (ref 7–16)
AST SERPL-CCNC: 14 U/L (ref 12–45)
BACTERIA FLD AEROBE CULT: ABNORMAL
BACTERIA FLD AEROBE CULT: ABNORMAL
BASOPHILS # BLD AUTO: 0.2 % (ref 0–1.8)
BASOPHILS # BLD: 0.02 K/UL (ref 0–0.12)
BILIRUB SERPL-MCNC: 0.7 MG/DL (ref 0.1–1.5)
BUN SERPL-MCNC: 10 MG/DL (ref 8–22)
CALCIUM SERPL-MCNC: 9.6 MG/DL (ref 8.5–10.5)
CHLORIDE SERPL-SCNC: 108 MMOL/L (ref 96–112)
CO2 SERPL-SCNC: 23 MMOL/L (ref 20–33)
CREAT SERPL-MCNC: 0.67 MG/DL (ref 0.5–1.4)
EOSINOPHIL # BLD AUTO: 0.13 K/UL (ref 0–0.51)
EOSINOPHIL NFR BLD: 1.5 % (ref 0–6.9)
ERYTHROCYTE [DISTWIDTH] IN BLOOD BY AUTOMATED COUNT: 42.6 FL (ref 35.9–50)
GLOBULIN SER CALC-MCNC: 3.7 G/DL (ref 1.9–3.5)
GLUCOSE BLD-MCNC: 93 MG/DL (ref 65–99)
GLUCOSE BLD-MCNC: 95 MG/DL (ref 65–99)
GLUCOSE SERPL-MCNC: 88 MG/DL (ref 65–99)
GRAM STN SPEC: ABNORMAL
HCT VFR BLD AUTO: 38.9 % (ref 37–47)
HGB BLD-MCNC: 12.8 G/DL (ref 12–16)
IMM GRANULOCYTES # BLD AUTO: 0.03 K/UL (ref 0–0.11)
IMM GRANULOCYTES NFR BLD AUTO: 0.3 % (ref 0–0.9)
LYMPHOCYTES # BLD AUTO: 2.57 K/UL (ref 1–4.8)
LYMPHOCYTES NFR BLD: 29.9 % (ref 22–41)
MCH RBC QN AUTO: 28 PG (ref 27–33)
MCHC RBC AUTO-ENTMCNC: 32.9 G/DL (ref 33.6–35)
MCV RBC AUTO: 85.1 FL (ref 81.4–97.8)
MONOCYTES # BLD AUTO: 0.7 K/UL (ref 0–0.85)
MONOCYTES NFR BLD AUTO: 8.1 % (ref 0–13.4)
NEUTROPHILS # BLD AUTO: 5.14 K/UL (ref 2–7.15)
NEUTROPHILS NFR BLD: 60 % (ref 44–72)
NRBC # BLD AUTO: 0 K/UL
NRBC BLD-RTO: 0 /100 WBC
PLATELET # BLD AUTO: 323 K/UL (ref 164–446)
PMV BLD AUTO: 9.1 FL (ref 9–12.9)
POTASSIUM SERPL-SCNC: 3.5 MMOL/L (ref 3.6–5.5)
PROT SERPL-MCNC: 7.2 G/DL (ref 6–8.2)
RBC # BLD AUTO: 4.57 M/UL (ref 4.2–5.4)
SIGNIFICANT IND 70042: ABNORMAL
SITE SITE: ABNORMAL
SODIUM SERPL-SCNC: 142 MMOL/L (ref 135–145)
SOURCE SOURCE: ABNORMAL
WBC # BLD AUTO: 8.6 K/UL (ref 4.8–10.8)

## 2020-03-15 PROCEDURE — 99239 HOSP IP/OBS DSCHRG MGMT >30: CPT | Performed by: INTERNAL MEDICINE

## 2020-03-15 PROCEDURE — 36415 COLL VENOUS BLD VENIPUNCTURE: CPT

## 2020-03-15 PROCEDURE — 85025 COMPLETE CBC W/AUTO DIFF WBC: CPT

## 2020-03-15 PROCEDURE — 82962 GLUCOSE BLOOD TEST: CPT

## 2020-03-15 PROCEDURE — 700111 HCHG RX REV CODE 636 W/ 250 OVERRIDE (IP): Performed by: INTERNAL MEDICINE

## 2020-03-15 PROCEDURE — 80053 COMPREHEN METABOLIC PANEL: CPT

## 2020-03-15 RX ORDER — CEPHALEXIN 500 MG/1
500 CAPSULE ORAL 4 TIMES DAILY
Qty: 28 CAP | Refills: 0 | Status: SHIPPED | OUTPATIENT
Start: 2020-03-15 | End: 2020-03-23 | Stop reason: SDUPTHER

## 2020-03-15 RX ORDER — CEPHALEXIN 500 MG/1
500 CAPSULE ORAL 4 TIMES DAILY
Qty: 28 CAP | Refills: 0 | Status: SHIPPED | OUTPATIENT
Start: 2020-03-15 | End: 2020-03-15

## 2020-03-15 RX ADMIN — CEFAZOLIN SODIUM 2 G: 2 INJECTION, SOLUTION INTRAVENOUS at 06:15

## 2020-03-15 RX ADMIN — CEFAZOLIN SODIUM 2 G: 2 INJECTION, SOLUTION INTRAVENOUS at 14:53

## 2020-03-15 NOTE — CARE PLAN
Problem: Infection  Goal: Will remain free from infection  Outcome: PROGRESSING AS EXPECTED   The pt is getting the aspirate from her elbow checked for infection> pt is not showing s/s of developing a second infection at this time.     Problem: Venous Thromboembolism (VTW)/Deep Vein Thrombosis (DVT) Prevention:  Goal: Patient will participate in Venous Thrombosis (VTE)/Deep Vein Thrombosis (DVT)Prevention Measures  Outcome: PROGRESSING AS EXPECTED   The pt is ambulating. No orders for SCDs and is refusing Lovenox.     Problem: Knowledge Deficit  Goal: Knowledge of the prescribed therapeutic regimen will improve  Outcome: PROGRESSING AS EXPECTED   The pt is educated on all of her medications: what they ae and why she is on them.

## 2020-03-15 NOTE — DISCHARGE INSTRUCTIONS

## 2020-03-15 NOTE — DISCHARGE SUMMARY
Discharge Summary    CHIEF COMPLAINT ON ADMISSION  Chief Complaint   Patient presents with   • Elbow Pain     right, red hot and swelling for the last 2 days, h/o bursitis, redness down right FA, pt was UC today and attempted to drain elbow without any discharge/drainage returned. pt report BP was in the 90's at .        Reason for Admission  Elbow Redness      Admission Date  3/13/2020    CODE STATUS  Full Code    HPI & HOSPITAL COURSE  This is a 64 y.o. female here with painful right elbow.  Patient has a history of olecranon bursitis which has required drainage in the past.  Patient began to develop increasingly swollen and erythematous right elbow and it was suggested that the.  She presented to the emergency department for further evaluation.  Patient found to have likely cellulitis of the right elbow.  The fluid was aspirated and sent to be cultured.  In the meantime, patient was started on empiric antibiotics including vancomycin and Unasyn.  Patient responded well to the antibiotics with improvement in the swelling of her right elbow.  She also experienced improvement in the pain.  Eventually, cultures and sensitivities became available for the fluid culture indicating that this is MSSA sensitive to cefazolin, and the patient's antibiotics were adjusted accordingly and she continued with IV cefazolin.  Due to the patient's clinical improvement, she is cleared for discharge home and instructed to take Keflex 7500 mg 4 times daily for 7 days for continued treatment of her infected olecranon bursitis.  Patient was instructed to follow-up with her primary care physician upon discharge.  Furthermore, patient was instructed to return to the emergency department immediately should her condition worsen.  Patient confirmed understanding of this plan and was in agreement.       Therefore, she is discharged in good and stable condition to home with close outpatient follow-up.    The patient met 2-midnight criteria  for an inpatient stay at the time of discharge.    Discharge Date  3/15/2020    FOLLOW UP ITEMS POST DISCHARGE  Follow-up with primary care physician for continued management.    DISCHARGE DIAGNOSES  Active Problems:    Hyperlipidemia (Chronic) POA: Yes    Hypertension (Chronic) POA: Yes    Type 2 diabetes mellitus with microalbuminuria, with long-term current use of insulin (HCC) POA: Yes    Septic bursitis of elbow POA: Yes  Resolved Problems:    * No resolved hospital problems. *      FOLLOW UP  Future Appointments   Date Time Provider Department Center   4/10/2020  1:00 PM Constantin Moon M.D. [facility]     No follow-up provider specified.    MEDICATIONS ON DISCHARGE     Medication List      START taking these medications      Instructions   cephALEXin 500 MG Caps  Commonly known as:  KEFLEX   Take 1 Cap by mouth 4 times a day for 7 days.  Dose:  500 mg        CONTINUE taking these medications      Instructions   aspirin EC 81 MG Tbec  Commonly known as:  ECOTRIN   Take 81 mg by mouth every evening.  Dose:  81 mg     atorvastatin 80 MG tablet  Commonly known as:  LIPITOR   Take 80 mg by mouth every evening.  Dose:  80 mg     B-12 1000 MCG Caps   Take 1,000 mcg by mouth every evening.  Dose:  1,000 mcg     Cholecalciferol 400 UNIT Tabs  Commonly known as:  VITAMIN D   Take 400 Units by mouth every evening.  Dose:  400 Units     insulin glargine 100 UNIT/ML Sopn injection  Commonly known as:  Lantus SoloStar   Inject 21 Units as instructed every evening.  Dose:  21 Units     Jardiance 25 MG Tabs  Generic drug:  Empagliflozin   Take 25 mg by mouth every evening.  Dose:  25 mg     Ocuvite Extra Tabs   Take 1 Tab by mouth every evening.  Dose:  1 Tab     omeprazole 20 MG delayed-release capsule  Commonly known as:  PRILOSEC   Take 20 mg by mouth every evening.  Dose:  20 mg     pioglitazone 30 MG Tabs  Commonly known as:  ACTOS   Take 30 mg by mouth every evening.  Dose:  30 mg     Tradjenta 5 MG Tabs  tablet  Generic drug:  linagliptin   Take 5 mg by mouth every evening.  Dose:  5 mg     Victoza 18 MG/3ML Sopn  Generic drug:  liraglutide   Inject 1.8 mg as instructed every evening.  Dose:  1.8 mg            Allergies  Allergies   Allergen Reactions   • Sulfa Drugs Vomiting       DIET  Orders Placed This Encounter   Procedures   • Diet Order Diabetic     Standing Status:   Standing     Number of Occurrences:   1     Order Specific Question:   Diet:     Answer:   Diabetic [3]       ACTIVITY   As tolerated.  Weight bearing as tolerated    CONSULTATIONS  Dr. Moon-orthopedic surgery    PROCEDURES  Aspiration of fluid from olecranon bursa    LABORATORY  Lab Results   Component Value Date    SODIUM 142 03/15/2020    POTASSIUM 3.5 (L) 03/15/2020    CHLORIDE 108 03/15/2020    CO2 23 03/15/2020    GLUCOSE 88 03/15/2020    BUN 10 03/15/2020    CREATININE 0.67 03/15/2020        Lab Results   Component Value Date    WBC 8.6 03/15/2020    HEMOGLOBIN 12.8 03/15/2020    HEMATOCRIT 38.9 03/15/2020    PLATELETCT 323 03/15/2020        Total time of the discharge process exceeds 35 minutes.

## 2020-03-15 NOTE — PROGRESS NOTES
[facility] Medicine Daily Progress Note    Date of Service  3/14/2020    Chief Complaint  64 y.o. female admitted 3/13/2020 with a painful, swollen, erythematous right elbow    Hospital Course    Patient is a 64-year-old female with a past medical history of diabetes mellitus type 2, GERD, dyslipidemia, hypertension, vitamin D deficiency, who presented to the emergency department on 3/13/2020 with a chief complaint of a painful swollen erythematous right elbow.  Patient has a history of right elbow olecranon bursitis which had previously been drained a few years ago.  She reports that she has been bowling frequently with repetitive motion on her elbow which resulted in aggravation of her right elbow olecranon bursitis.  She noticed some swelling which eventually began to exhibit some redness extending down her forearm as well as some pain.  Patient admitted to the hospital for treatment of cellulitis of the right elbow.    Patient reports that the pain and swelling in her right elbow has decreased to some extent.  She still does report some mild pain but states that it is improved with her current pain regimen.  She continues with empiric antibiotics.  A fluid culture was obtained and was sent to the lab and is currently pending.  She reports no other complaints at this time and is anxious to return home once appropriate.    Consultants/Specialty  None     Code Status  Full code    Disposition  Continue treatment with empiric antibiotics with cultures pending.  Likely discharge home on oral antibiotics if sensitive.    Review of Systems  ROS     Physical Exam  Temp:  [36.1 °C (96.9 °F)-37.2 °C (98.9 °F)] 37.2 °C (98.9 °F)  Pulse:  [83-89] 85  Resp:  [16-17] 17  BP: (120-135)/(63-78) 135/78  SpO2:  [93 %-97 %] 94 %    Physical Exam    Fluids    Intake/Output Summary (Last 24 hours) at 3/14/2020 1906  Last data filed at 3/14/2020 1330  Gross per 24 hour   Intake 360 ml   Output --   Net 360 ml       Laboratory  Recent  Labs     03/13/20  1531   WBC 11.8*   RBC 4.81   HEMOGLOBIN 13.3   HEMATOCRIT 41.5   MCV 86.3   MCH 27.7   MCHC 32.0*   RDW 44.7   PLATELETCT 269   MPV 9.3     Recent Labs     03/13/20  1531 03/14/20  0052   SODIUM 138 138   POTASSIUM 3.1* 3.1*   CHLORIDE 103 106   CO2 25 24   GLUCOSE 98 119*   BUN 15 13   CREATININE 0.74 0.77   CALCIUM 10.0 9.3     Recent Labs     03/13/20  1531   APTT 21.1*   INR 1.04               Imaging  IR-US GUIDED PIV   Final Result    Ultrasound-guided PERIPHERAL IV INSERTION performed by    qualified nursing staff as above.                 Assessment/Plan  Septic bursitis of elbow  Assessment & Plan    Fluid cultures pending  Blood cultures pending  Continue IV vancomycin and ampicillin/sulbactam  Continue to follow cultures and sensitivities with antibiotics to be de-escalated as soon as practical.    Type 2 diabetes mellitus with microalbuminuria, with long-term current use of insulin (HCC)- (present on admission)  Assessment & Plan  Latest hemoglobin A1c 7.5  She has maintained on p.o. glitazone, liraglutide, linagliptin, and 21 units of Lantus.  We will continue her home regimen as well as covering with sliding scale insulin.    Hypertension- (present on admission)  Assessment & Plan  Patient is maintained on no medications at present.  Follow her BPs    Hyperlipidemia- (present on admission)  Assessment & Plan  Continue atorvastatin       VTE prophylaxis: Lovenox

## 2020-03-18 LAB
BACTERIA BLD CULT: NORMAL
BACTERIA BLD CULT: NORMAL
SIGNIFICANT IND 70042: NORMAL
SIGNIFICANT IND 70042: NORMAL
SITE SITE: NORMAL
SITE SITE: NORMAL
SOURCE SOURCE: NORMAL
SOURCE SOURCE: NORMAL

## 2020-03-23 ENCOUNTER — OFFICE VISIT (OUTPATIENT)
Dept: MEDICAL GROUP | Facility: PHYSICIAN GROUP | Age: 65
End: 2020-03-23
Payer: MEDICARE

## 2020-03-23 VITALS
BODY MASS INDEX: 30.05 KG/M2 | DIASTOLIC BLOOD PRESSURE: 88 MMHG | TEMPERATURE: 97.5 F | OXYGEN SATURATION: 92 % | WEIGHT: 187 LBS | RESPIRATION RATE: 16 BRPM | HEIGHT: 66 IN | SYSTOLIC BLOOD PRESSURE: 140 MMHG | HEART RATE: 97 BPM

## 2020-03-23 DIAGNOSIS — Z09 HOSPITAL DISCHARGE FOLLOW-UP: ICD-10-CM

## 2020-03-23 DIAGNOSIS — M71.121 SEPTIC OLECRANON BURSITIS OF RIGHT ELBOW: ICD-10-CM

## 2020-03-23 PROCEDURE — 99214 OFFICE O/P EST MOD 30 MIN: CPT | Performed by: NURSE PRACTITIONER

## 2020-03-23 RX ORDER — CEPHALEXIN 500 MG/1
500 CAPSULE ORAL 4 TIMES DAILY
Qty: 28 CAP | Refills: 0 | Status: SHIPPED | OUTPATIENT
Start: 2020-03-23 | End: 2020-03-30

## 2020-03-23 ASSESSMENT — FIBROSIS 4 INDEX: FIB4 SCORE: 0.75

## 2020-03-23 NOTE — PROGRESS NOTES
Subjective:     CC:  Diagnoses of Hospital discharge follow-up and Septic olecranon bursitis of right elbow were pertinent to this visit.    HISTORY OF THE PRESENT ILLNESS: Patient is a 64 y.o. female. This pleasant patient is here today for hospital follow-up visit. She is a patient of Dr. Moon.       Hospital follow-up visit   Patient went to Urgent Care on 3/13/2020 for right elbow pain, swelling and redness. Urgent Care attempted aspiration, but was unable to aspirate fluid. At Urgent Care pt had SBP in 90's. Urgent care sent patient to ER. Patient presented to [facility] 3/13 and was admitted for septic bursitis of right elbow. She had right elbow aspiration and fluid was sent for culture. She was initially started empirically on IV Vancomycin and IV Unasyn pending culture and sensitivities. Culture and sensitivities showed sensitive to cefazolin.  Patient had improved redness, streaking and swelling. Patient was discharged home 3/15/2020 on Keflex 500 mg four times a day for 7 days. Patient has one antibiotic pill remaining in her dose. She continues to have some swelling, redness and warmth. She states that the swelling has decreased in size and is almost back to baseline. She has been icing the area and resting. Denies fever, chills, streaking, nausea, vomiting, diarrhea, or dizziness. She would like to know if she should continue antibiotics. She does have appointment scheduled with her PCP for 3/31/2020.       No problem-specific Assessment & Plan notes found for this encounter.      Allergies: Sulfa drugs    Current Outpatient Medications Ordered in Epic   Medication Sig Dispense Refill   • cephALEXin (KEFLEX) 500 MG Cap Take 1 Cap by mouth 4 times a day for 7 days. 28 Cap 0   • atorvastatin (LIPITOR) 80 MG tablet Take 80 mg by mouth every evening.     • Empagliflozin (JARDIANCE) 25 MG Tab Take 25 mg by mouth every evening.     • linagliptin (TRADJENTA) 5 MG Tab tablet Take 5 mg by mouth every evening.     •  liraglutide (VICTOZA) 18 MG/3ML Solution Pen-injector Inject 1.8 mg as instructed every evening.     • omeprazole (PRILOSEC) 20 MG delayed-release capsule Take 20 mg by mouth every evening.     • pioglitazone (ACTOS) 30 MG Tab Take 30 mg by mouth every evening.     • insulin glargine (LANTUS SOLOSTAR) 100 UNIT/ML Solution Pen-injector injection Inject 21 Units as instructed every evening. 5 PEN 0   • Cyanocobalamin (B-12) 1000 MCG Cap Take 1,000 mcg by mouth every evening.     • Cholecalciferol (VITAMIN D) 400 UNIT TABS Take 400 Units by mouth every evening.     • aspirin EC (ECOTRIN) 81 MG TBEC Take 81 mg by mouth every evening.     • Multiple Vitamins-Minerals (OCUVITE EXTRA) Tab Take 1 Tab by mouth every evening.       No current Epic-ordered facility-administered medications on file.        Past Medical History:   Diagnosis Date   • Diabetes 2011   • Edema 2011   • Elevated liver enzymes 2011   • Gall bladder polyp 2011   • GERD (gastroesophageal reflux disease) 2011   • Hyperlipidemia 2011   • Hypertension 2011   • Skin lesion of face 2011   • Vitamin d deficiency 2011       Past Surgical History:   Procedure Laterality Date   • OTHER ORTHOPEDIC SURGERY      right arm ORIF   • PRIMARY C SECTION     • TUBAL COAGULATION LAPAROSCOPIC BILATERAL         Social History     Tobacco Use   • Smoking status: Former Smoker     Packs/day: 1.00     Years: 20.00     Pack years: 20.00     Types: Cigarettes     Last attempt to quit: 1990     Years since quittin.2   • Smokeless tobacco: Never Used   Substance Use Topics   • Alcohol use: No     Comment: holidays   • Drug use: No       Social History     Social History Narrative   • Not on file       Family History   Problem Relation Age of Onset   • Cancer Maternal Grandmother         lung    • Cancer Mother         breast   • Other Father         urinary issues   • GI Disease Sister    • Heart Disease Brother        Health  "Maintenance: deferred     ROS:   Gen: no fevers/chills  Pulm: no sob, no cough  CV: no chest pain, no palpitations  GI: no nausea/vomiting, no diarrhea  : no dysuria  MSk: no myalgias  Skin: no rash, +right elbow redness, swelling, and warmth  Neuro: no headaches, no dizziness        Objective:     Vital signs reviewed  Exam: /88 (BP Location: Left arm, Patient Position: Sitting, BP Cuff Size: Adult)   Pulse 97   Temp 36.4 °C (97.5 °F) (Temporal)   Resp 16   Ht 1.676 m (5' 6\")   Wt 84.8 kg (187 lb)   SpO2 92%  Body mass index is 30.18 kg/m².    General: Normal appearing. No distress.  HENT: Normocephalic. Ears normal shape and contour.   Eyes: Eyes conjunctiva clear lids without ptosis, lids normal. Glasses in place.   Pulmonary: Clear to ausculation.  Normal effort. No rales, ronchi, or wheezing.  Cardiovascular: Regular rate and rhythm without murmur. Radial pulses are intact and equal bilaterally.  Abdomen: Soft, nontender, nondistended. Normal bowel sounds.  Neurologic: Grossly nonfocal  Skin: Warm and dry.  No obvious lesions. Right elbow red, warm, and swollen. No pain with palpation. No streaking.   Psych: Normal mood and affect. Alert and oriented x3. Judgment and insight is normal.    Assessment & Plan:   64 y.o. female with the following -      1. Hospital discharge follow-up  2. Septic olecranon bursitis of right elbow  Acute unstable.  New to me.  Hospital discharge summary, labs, and medications reviewed.  As patient still having swelling, redness, and warmth I will have her complete an additional 7 days of cephalexin.  Discussed red flag symptoms with patient, patient verbalized understanding.  She does have upcoming appointment with her PCP on 3/31/2020.  Discussed with patient that she should keep this appointment.  Discussed that if swelling, redness, and warmth still present she may need to change antibiotics, verbalized understanding. Monitor.   - cephALEXin (KEFLEX) 500 MG Cap; Take " 1 Cap by mouth 4 times a day for 7 days.  Dispense: 28 Cap; Refill: 0      Return if symptoms worsen or fail to improve, for Keep 3/31/2020 appointment .    Please note that this dictation was created using voice recognition software. I have made every reasonable attempt to correct obvious errors, but I expect that there are errors of grammar and possibly content that I did not discover before finalizing the note.

## 2020-03-30 ENCOUNTER — TELEPHONE (OUTPATIENT)
Dept: MEDICAL GROUP | Facility: PHYSICIAN GROUP | Age: 65
End: 2020-03-30

## 2020-03-30 NOTE — TELEPHONE ENCOUNTER
ESTABLISHED PATIENT PRE-VISIT PLANNING     Patient was NOT contacted to complete PVP.       1.  Reviewed notes from the last few office visits within the medical group: Yes    2.  If any orders were placed at last visit or intended to be done for this visit (i.e. 6 mos follow-up), do we have Results/Consult Notes?        •  Labs - Labs were not ordered at last office visit.       •  Imaging - Imaging was not ordered at last office visit.       •  Referrals - No referrals were ordered at last office visit.  3. Is this appointment scheduled as a Hospital Follow-Up? No    4.  Immunizations were updated in [facility] using WebIZ?: Yes       •  Web Iz Recommendations: TD, VARICELLA (Chicken Pox)  and SHINGRIX (Shingles)    5.  Patient is due for the following Health Maintenance Topics:   Health Maintenance Due   Topic Date Due   • Annual Wellness Visit  1955   • IMM HEP B VACCINE (1 of 3 - Risk 3-dose series) 05/09/1973   • IMM ZOSTER VACCINES (1 of 2) 05/09/2004   • PAP SMEAR  02/07/2017     6. Orders for overdue Health Maintenance topics pended in Pre-Charting? NO    7.  AHA (MDX) form printed for Provider? No, already completed    Pt has no alerts    8.  Patient was NOT informed to arrive 15 min prior to their scheduled appointment and bring in their medication bottles.

## 2020-03-31 ENCOUNTER — OFFICE VISIT (OUTPATIENT)
Dept: MEDICAL GROUP | Facility: PHYSICIAN GROUP | Age: 65
End: 2020-03-31
Payer: MEDICARE

## 2020-03-31 VITALS
SYSTOLIC BLOOD PRESSURE: 122 MMHG | OXYGEN SATURATION: 95 % | BODY MASS INDEX: 29.89 KG/M2 | HEIGHT: 66 IN | WEIGHT: 186 LBS | HEART RATE: 86 BPM | TEMPERATURE: 97.5 F | DIASTOLIC BLOOD PRESSURE: 62 MMHG

## 2020-03-31 DIAGNOSIS — Z79.4 TYPE 2 DIABETES MELLITUS WITH MICROALBUMINURIA, WITH LONG-TERM CURRENT USE OF INSULIN (HCC): ICD-10-CM

## 2020-03-31 DIAGNOSIS — E11.29 TYPE 2 DIABETES MELLITUS WITH MICROALBUMINURIA, WITH LONG-TERM CURRENT USE OF INSULIN (HCC): ICD-10-CM

## 2020-03-31 DIAGNOSIS — M71.121 SEPTIC OLECRANON BURSITIS OF RIGHT ELBOW: ICD-10-CM

## 2020-03-31 DIAGNOSIS — R80.9 TYPE 2 DIABETES MELLITUS WITH MICROALBUMINURIA, WITH LONG-TERM CURRENT USE OF INSULIN (HCC): ICD-10-CM

## 2020-03-31 DIAGNOSIS — L72.3 SEBACEOUS CYST: ICD-10-CM

## 2020-03-31 PROCEDURE — 99214 OFFICE O/P EST MOD 30 MIN: CPT | Performed by: FAMILY MEDICINE

## 2020-03-31 RX ORDER — CEPHALEXIN 500 MG/1
500 CAPSULE ORAL 4 TIMES DAILY
COMMUNITY
End: 2020-04-10

## 2020-03-31 ASSESSMENT — FIBROSIS 4 INDEX: FIB4 SCORE: 0.75

## 2020-03-31 NOTE — ASSESSMENT & PLAN NOTE
New problem to examiner.  Seems to be improving and currently has 1 day left of her Keflex.  Significant improvement in redness and swelling per patient.  Patient denies any pain.  No fevers or chills.  MSSA cultured from olecranon bursa.

## 2020-03-31 NOTE — PROGRESS NOTES
CC:Diagnoses of Type 2 diabetes mellitus with microalbuminuria, with long-term current use of insulin (Former[facility]), Septic olecranon bursitis of right elbow, and Sebaceous cyst were pertinent to this visit.      HISTORY OF PRESENT ILLNESS: Patient is a 64 y.o. female established patient who presents today to follow-up on multiple medical conditions.    Health Maintenance: Completed    Type 2 diabetes mellitus with microalbuminuria, with long-term current use of insulin (Former[facility])  Chronic improving medical condition.  Patient recently hospitalized for septic olecranon bursitis which has been treated with a total of 14 days of outpatient Keflex 750 mg 3 times daily.  She is having significant improvement in redness and swelling as well as pain.  She still has some mild edema over her olecranon which is nontender.  Denies any fevers or chills, malaise, fatigue.    Septic bursitis of elbow  New problem to examiner.  Seems to be improving and currently has 1 day left of her Keflex.  Significant improvement in redness and swelling per patient.  Patient denies any pain.  No fevers or chills.  MSSA cultured from olecranon bursa.    Sebaceous cyst  New problem to examiner.  Patient concerned about lump at the center of her chest below her left breast.  Rare discharge when squeezed.  Denies any pain, redness, inflammation or irritation.      Patient Active Problem List    Diagnosis Date Noted   • Sebaceous cyst 03/31/2020   • Arthralgia of left hand 02/28/2020   • Septic bursitis of elbow 07/03/2018   • Type 2 diabetes mellitus with microalbuminuria, with long-term current use of insulin (Formerly [facility]) 02/02/2018   • Vitamin B12 deficiency 08/24/2017   • Fatigue 07/20/2017   • Thumb pain 12/23/2016   • Hypokalemia 01/03/2014   • Gall bladder polyp 08/12/2011   • Hyperlipidemia 05/12/2011   • Vitamin D deficiency 05/12/2011   • Hypertension 05/12/2011   • GERD (gastroesophageal reflux disease) 05/12/2011      Allergies:Sulfa drugs    Current  Outpatient Medications   Medication Sig Dispense Refill   • cephALEXin (KEFLEX) 500 MG Cap Take 500 mg by mouth 4 times a day.     • atorvastatin (LIPITOR) 80 MG tablet Take 80 mg by mouth every evening.     • Empagliflozin (JARDIANCE) 25 MG Tab Take 25 mg by mouth every evening.     • linagliptin (TRADJENTA) 5 MG Tab tablet Take 5 mg by mouth every evening.     • liraglutide (VICTOZA) 18 MG/3ML Solution Pen-injector Inject 1.8 mg as instructed every evening.     • omeprazole (PRILOSEC) 20 MG delayed-release capsule Take 20 mg by mouth every evening.     • pioglitazone (ACTOS) 30 MG Tab Take 30 mg by mouth every evening.     • insulin glargine (LANTUS SOLOSTAR) 100 UNIT/ML Solution Pen-injector injection Inject 21 Units as instructed every evening. 5 PEN 0   • Cyanocobalamin (B-12) 1000 MCG Cap Take 1,000 mcg by mouth every evening.     • Cholecalciferol (VITAMIN D) 400 UNIT TABS Take 400 Units by mouth every evening.     • aspirin EC (ECOTRIN) 81 MG TBEC Take 81 mg by mouth every evening.       No current facility-administered medications for this visit.        Social History     Tobacco Use   • Smoking status: Former Smoker     Packs/day: 1.00     Years: 20.00     Pack years: 20.00     Types: Cigarettes     Last attempt to quit: 1990     Years since quittin.2   • Smokeless tobacco: Never Used   Substance Use Topics   • Alcohol use: No     Comment: holidays   • Drug use: No     Social History     Social History Narrative   • Not on file       Family History   Problem Relation Age of Onset   • Cancer Maternal Grandmother         lung    • Cancer Mother         breast   • Other Father         urinary issues   • GI Disease Sister    • Heart Disease Brother        Review of Systems:    Constitutional: No Fevers, Chills  Resp: No Shortness of breath  CV: No Chest pain  GI: No Nausea/Vomiting  MSK: No weakness    Exam:    /62 (BP Location: Left arm, Patient Position: Sitting, BP Cuff Size: Adult)   Pulse  "86   Temp 36.4 °C (97.5 °F) (Temporal)   Ht 1.676 m (5' 6\")   Wt 84.4 kg (186 lb)   SpO2 95%  Body mass index is 30.02 kg/m².    General:  Well nourished, well developed female in NAD  HENT: Atraumatic, normocephalic  EYES: Extraocular movements intact, pupils equal and reactive to light  NECK: Supple, FROM  CHEST: No deformities, Equal chest expansion  RESP: Unlabored, no stridor or audible wheeze  ABD: Soft, Nontender, Non-Distended  Extremities: No Clubbing, Cyanosis, or Edema  Skin: Warm/dry.  Small 1 x 1 cm sebaceous cyst on the sternum at approximately the level of the xiphoid process with sebum expressed from central opening  No erythema, purulent discharge, pain on palpation.  Right upper extremity with trace erythema but no warmth on palpation.  Nontender.  Swollen olecranon bursa that is nontender.  No lymphangitic streaking noted.  Full range of motion right upper extremity    Neuro: A/O x 4, CN 2-12 Grossly intact, Motor/sensory grossly intact  Psych: Normal behavior, normal affect      LABS: 3/13/2020 through 3/15/2020: Results reviewed and discussed with the patient, questions answered.    ED MD and hospitalist notes reviewed and summarized as above        Assessment/Plan:  1. Type 2 diabetes mellitus with microalbuminuria, with long-term current use of insulin (HCC)  Chronic improving medical condition.  Baseline point-of-care blood sugars noted in the hospital show significant provement with added linagliptin.  Counseled patient to continue linagliptin, Actos, Victoza, Lantus, Jardiance.  Continue 81 mg aspirin and 80 mg atorvastatin.  - HEMOGLOBIN A1C; Future    2. Septic olecranon bursitis of right elbow  New problem to examiner.  Continue remaining doses of antibiotics.  Counseled on ice and compression therapy.  Follow-up if worsening.    3. Sebaceous cyst  New problem to examiner.  Counseled on follow-up if worsening otherwise no interventions at this time.    Follow-up hemoglobin A1c in 2 " more months.    Please note that this dictation was created using voice recognition software. I have worked with consultants from the vendor as well as technical experts from [facility] to optimize the interface. I have made every reasonable attempt to correct obvious errors, but I expect that there are errors of grammar and possibly content that I did not discover before finalizing the note.

## 2020-03-31 NOTE — ASSESSMENT & PLAN NOTE
New problem to examiner.  Patient concerned about lump at the center of her chest below her left breast.  Rare discharge when squeezed.  Denies any pain, redness, inflammation or irritation.

## 2020-03-31 NOTE — ASSESSMENT & PLAN NOTE
Chronic improving medical condition.  Patient recently hospitalized for septic olecranon bursitis which has been treated with a total of 14 days of outpatient Keflex 750 mg 3 times daily.  She is having significant improvement in redness and swelling as well as pain.  She still has some mild edema over her olecranon which is nontender.  Denies any fevers or chills, malaise, fatigue.

## 2020-04-07 ENCOUNTER — TELEPHONE (OUTPATIENT)
Dept: MEDICAL GROUP | Facility: PHYSICIAN GROUP | Age: 65
End: 2020-04-07

## 2020-04-07 NOTE — TELEPHONE ENCOUNTER
ESTABLISHED PATIENT PRE-VISIT PLANNING     Patient was NOT contacted to complete PVP.    1.  Reviewed notes from the last few office visits within the medical group: Yes    2.  If any orders were placed at last visit or intended to be done for this visit (i.e. 6 mos follow-up), do we have Results/Consult Notes?        •  Labs - Labs were not ordered at last office visit.        •  Imaging - Imaging was not ordered at last office visit.       •  Referrals - No referrals were ordered at last office visit.    3. Is this appointment scheduled as a Hospital Follow-Up? No    4.  Immunizations were updated in [email or web address] using WebIZ?: Yes       •  Web Iz Recommendations: TD, VARICELLA (Chicken Pox)  and SHINGRIX (Shingles)    5.  Patient is due for the following Health Maintenance Topics:   Health Maintenance Due   Topic Date Due   • Annual Wellness Visit  1955   • IMM HEP B VACCINE (1 of 3 - Risk 3-dose series) 05/09/1973   • IMM ZOSTER VACCINES (1 of 2) 05/09/2004   • PAP SMEAR  02/07/2017   • DIABETES MONOFILAMENT / LE EXAM  05/07/2020     6. Orders for overdue Health Maintenance topics pended in Pre-Charting? NO    7.  AHA (MDX) form printed for Provider? No, already completed  Pt has no alerts    8.  Patient was NOT informed to arrive 15 min prior to their scheduled appointment and bring in their medication bottles.

## 2020-04-10 ENCOUNTER — OFFICE VISIT (OUTPATIENT)
Dept: MEDICAL GROUP | Facility: PHYSICIAN GROUP | Age: 65
End: 2020-04-10
Payer: MEDICARE

## 2020-04-10 ENCOUNTER — HOSPITAL ENCOUNTER (OUTPATIENT)
Facility: MEDICAL CENTER | Age: 65
End: 2020-04-10
Attending: FAMILY MEDICINE
Payer: MEDICARE

## 2020-04-10 VITALS
SYSTOLIC BLOOD PRESSURE: 110 MMHG | WEIGHT: 188 LBS | HEART RATE: 98 BPM | DIASTOLIC BLOOD PRESSURE: 60 MMHG | OXYGEN SATURATION: 98 % | BODY MASS INDEX: 30.22 KG/M2 | TEMPERATURE: 97.6 F | HEIGHT: 66 IN

## 2020-04-10 DIAGNOSIS — K21.9 GASTROESOPHAGEAL REFLUX DISEASE WITHOUT ESOPHAGITIS: ICD-10-CM

## 2020-04-10 DIAGNOSIS — R80.9 TYPE 2 DIABETES MELLITUS WITH MICROALBUMINURIA, WITH LONG-TERM CURRENT USE OF INSULIN (HCC): ICD-10-CM

## 2020-04-10 DIAGNOSIS — Z12.4 SCREENING FOR CERVICAL CANCER: ICD-10-CM

## 2020-04-10 DIAGNOSIS — Z79.4 TYPE 2 DIABETES MELLITUS WITH MICROALBUMINURIA, WITH LONG-TERM CURRENT USE OF INSULIN (HCC): ICD-10-CM

## 2020-04-10 DIAGNOSIS — Z23 NEED FOR VACCINATION: ICD-10-CM

## 2020-04-10 DIAGNOSIS — I10 ESSENTIAL HYPERTENSION: Chronic | ICD-10-CM

## 2020-04-10 DIAGNOSIS — E11.29 TYPE 2 DIABETES MELLITUS WITH MICROALBUMINURIA, WITH LONG-TERM CURRENT USE OF INSULIN (HCC): ICD-10-CM

## 2020-04-10 DIAGNOSIS — E78.00 PURE HYPERCHOLESTEROLEMIA: Chronic | ICD-10-CM

## 2020-04-10 PROCEDURE — 87624 HPV HI-RISK TYP POOLED RSLT: CPT

## 2020-04-10 PROCEDURE — 88175 CYTOPATH C/V AUTO FLUID REDO: CPT

## 2020-04-10 PROCEDURE — 99214 OFFICE O/P EST MOD 30 MIN: CPT | Performed by: FAMILY MEDICINE

## 2020-04-10 RX ORDER — LIRAGLUTIDE 6 MG/ML
1.8 INJECTION SUBCUTANEOUS EVERY EVENING
Qty: 9 PEN | Refills: 4 | Status: SHIPPED | OUTPATIENT
Start: 2020-04-10 | End: 2020-10-15

## 2020-04-10 ASSESSMENT — FIBROSIS 4 INDEX: FIB4 SCORE: 0.75

## 2020-04-10 NOTE — PROGRESS NOTES
CC:Diagnoses of Screening for cervical cancer, Need for vaccination, Type 2 diabetes mellitus with microalbuminuria, with long-term current use of insulin (HCC), Essential hypertension, Pure hypercholesterolemia, and Gastroesophageal reflux disease without esophagitis were pertinent to this visit.    Tara Cueva is a 64 y.o. female presents for a routine preventive health exam.    Health Maintenance: Completed    GERD (gastroesophageal reflux disease)  Chronic stable medical condition.  Currently well controlled with 20 mg omeprazole once daily.    Hyperlipidemia  Chronic stable medical condition.  Currently taking atorvastatin 80 mg nightly.  The 10-year ASCVD risk score (Lyn RAY Jr., et al., 2013) is: 7.2%      Type 2 diabetes mellitus with microalbuminuria, with long-term current use of insulin (HCC)  Chronic ongoing medical condition.  Previous hemoglobin A1c 7.5% on 2020.  Currently taking Actos 30 mg daily, Victoza 1.8 mg daily, linagliptin 5 mg daily, Lantus 21 units daily, Jardiance 25 mg daily.  Unable to tolerate ACE inhibitor due to low blood pressures.  Currently taking atorvastatin 80 mg daily.      Ob-Gyn/ History:    /Para: -0-1-3  Last Pap Smear: Today.  No history of abnormal pap smears.  Gyn Surgery: C-sections and tubal ligations.  Current Contraceptive Method: Postmenopausal.  Not currently sexually active.  Post-menopausal bleeding: None  Urinary incontinence: None    Screening/Preventative Topics:  Advanced directive: Not on file  Osteoporosis Screen/ DEXA: Up-to-date  Diabetes Screening: Actively diabetic  AAA Screening: n/a  Aspirin Use: 81 mg daily  Diet: Fair  Exercise: Minimal  Screen for depression: PHQ-2: 0   Substance Abuse: None      Cancer screening  Colorectal Cancer Screening: Up-to-date  Lung Cancer Screening: Not applicable  Cervical Cancer Screening: Up-to-date  Breast Cancer Screening: Up-to-date    Infectious disease screening  --STI Screening: Not  indicated  --Hepatitis C Screening: Negative    Patient Active Problem List    Diagnosis Date Noted   • Sebaceous cyst 03/31/2020   • Arthralgia of left hand 02/28/2020   • Septic bursitis of elbow 07/03/2018   • Type 2 diabetes mellitus with microalbuminuria, with long-term current use of insulin (HCC) 02/02/2018   • Vitamin B12 deficiency 08/24/2017   • Fatigue 07/20/2017   • Thumb pain 12/23/2016   • Hypokalemia 01/03/2014   • Gall bladder polyp 08/12/2011   • Hyperlipidemia 05/12/2011   • Vitamin D deficiency 05/12/2011   • GERD (gastroesophageal reflux disease) 05/12/2011      Allergies:Sulfa drugs    Current Outpatient Medications   Medication Sig Dispense Refill   • Zoster Vac Recomb Adjuvanted (SHINGRIX) 50 MCG/0.5ML Recon Susp 0.5 mL by Intramuscular route Once for 1 dose. 0.5 mL 1   • liraglutide (VICTOZA) 18 MG/3ML Solution Pen-injector Inject 1.8 mg as instructed every evening. 9 PEN 4   • atorvastatin (LIPITOR) 80 MG tablet Take 80 mg by mouth every evening.     • Empagliflozin (JARDIANCE) 25 MG Tab Take 25 mg by mouth every evening.     • linagliptin (TRADJENTA) 5 MG Tab tablet Take 5 mg by mouth every evening.     • omeprazole (PRILOSEC) 20 MG delayed-release capsule Take 20 mg by mouth every evening.     • pioglitazone (ACTOS) 30 MG Tab Take 30 mg by mouth every evening.     • insulin glargine (LANTUS SOLOSTAR) 100 UNIT/ML Solution Pen-injector injection Inject 21 Units as instructed every evening. 5 PEN 0   • Cyanocobalamin (B-12) 1000 MCG Cap Take 1,000 mcg by mouth every evening.     • Cholecalciferol (VITAMIN D) 400 UNIT TABS Take 400 Units by mouth every evening.     • aspirin EC (ECOTRIN) 81 MG TBEC Take 81 mg by mouth every evening.       No current facility-administered medications for this visit.        Social History     Tobacco Use   • Smoking status: Former Smoker     Packs/day: 1.00     Years: 20.00     Pack years: 20.00     Types: Cigarettes     Last attempt to quit: 1/1/1990     Years  "since quittin.2   • Smokeless tobacco: Never Used   Substance Use Topics   • Alcohol use: No     Comment: holidays   • Drug use: No     Social History     Social History Narrative   • Not on file       Family History   Problem Relation Age of Onset   • Cancer Maternal Grandmother         lung    • Cancer Mother         breast   • Other Father         urinary issues   • GI Disease Sister    • Heart Disease Brother        Review of Systems:    Constitutional: No Fevers, Chills  Eyes: No vision changes  ENT: No hearing changes  Resp: No Shortness of breath  CV: No Chest pain  GI: No Nausea/Vomiting  MSK: No weakness  Skin: No rashes  Neuro: No Headaches  Psych: No Suicidal ideations    All remaining systems reviewed and found to be negative, except as stated above.    Exam:    /60 (BP Location: Right arm, Patient Position: Sitting, BP Cuff Size: Adult)   Pulse 98   Temp 36.4 °C (97.6 °F) (Temporal)   Ht 1.676 m (5' 6\")   Wt 85.3 kg (188 lb)   SpO2 98%  Body mass index is 30.34 kg/m².    General:  Well nourished, well developed female in NAD  HENT: Atraumatic, normocephalic  EYES: Extraocular movements intact, pupils equal and reactive to light  NECK: Supple, FROM  CHEST: No deformities, Equal chest expansion  RESP: Unlabored, no stridor or audible wheeze  ABD: Soft, Nontender, Non-Distended  : Mildly atrophic external female genitalia.  Normal-appearing female genitalia.  Normal-appearing cervix without lesions.  Smoothening of normal rugated vaginal walls.  Extremities: No Clubbing, Cyanosis, or Edema  Skin: Warm/dry, without rashes  Neuro: A/O x 4, CN 2-12 Grossly intact, Motor/sensory grossly intact  Psych: Normal behavior, normal affect    LABS: 3/15/2020 and 2020 hemoglobin A1c: Results reviewed and discussed with the patient, questions answered.      Assessment/Plan:  1. Screening for cervical cancer  - THINPREP PAP WITH HPV; Future    2. Need for vaccination  - Zoster Vac Recomb Adjuvanted " (SHINGRIX) 50 MCG/0.5ML Recon Susp; 0.5 mL by Intramuscular route Once for 1 dose.  Dispense: 0.5 mL; Refill: 1    3. Type 2 diabetes mellitus with microalbuminuria, with long-term current use of insulin (HCC)  Chronic ongoing medical condition.  Refill Victoza as below.  Continue all other medications as prescribed.  - liraglutide (VICTOZA) 18 MG/3ML Solution Pen-injector; Inject 1.8 mg as instructed every evening.  Dispense: 9 PEN; Refill: 4    4. Pure hypercholesterolemia  Chronic stable medical condition.  Continue atorvastatin.    5. Gastroesophageal reflux disease without esophagitis  Chronic stable medical condition.  Continue omeprazole.      Patient up-to-date on preventative health maintenance examinations and vaccinations.  Age-appropriate anticipatory guidance provided today.    Preventive visit in 1 year, sooner as needed for any concerns.     Please note that this dictation was created using voice recognition software. I have worked with consultants from the vendor as well as technical experts from [facility] [employer] to optimize the interface. I have made every reasonable attempt to correct obvious errors, but I expect that there are errors of grammar and possibly content that I did not discover before finalizing the note.

## 2020-04-10 NOTE — ASSESSMENT & PLAN NOTE
Chronic ongoing medical condition.  Previous hemoglobin A1c 7.5% on 2/28/2020.  Currently taking Actos 30 mg daily, Victoza 1.8 mg daily, linagliptin 5 mg daily, Lantus 21 units daily, Jardiance 25 mg daily.  Unable to tolerate ACE inhibitor due to low blood pressures.  Currently taking atorvastatin 80 mg daily.

## 2020-04-10 NOTE — ASSESSMENT & PLAN NOTE
Chronic stable medical condition.  Currently taking atorvastatin 80 mg nightly.  The 10-year ASCVD risk score ([place] BELL Jr., et al., 2013) is: 7.2%

## 2020-04-13 ENCOUNTER — TELEPHONE (OUTPATIENT)
Dept: MEDICAL GROUP | Facility: PHYSICIAN GROUP | Age: 65
End: 2020-04-13

## 2020-04-13 NOTE — TELEPHONE ENCOUNTER
1. Caller Name: Tara Cueva                        Call Back Number: [phone] (home) [phone] (work)        How would the patient prefer to be contacted with a response: Phone call OK to leave a detailed message    2. What are the patient's symptoms (location & severity)? Blood In urine started Friday night and Saturday by Sunday no Blood but Foggy Pain is starting to subside and Clearing Up been Drinking lots of water and cranberry juice. Is going to give it another 24 hours to see if everything goes back to normal if not will call to be seen for an UTI.    3. Is this a new symptom Yes    4. When did it start? 04/10/2020 after having a PAP    5. Action taken per Active Symptom Guide: Pt is going to give it anothr 24 hours if not cleard up or improve sanjuanita call to be seen    6. Patient agrees to recommended action per Active Symptom Escalation Protocol.

## 2020-04-14 ENCOUNTER — PATIENT OUTREACH (OUTPATIENT)
Dept: HEALTH INFORMATION MANAGEMENT | Facility: OTHER | Age: 65
End: 2020-04-14

## 2020-04-16 ENCOUNTER — OFFICE VISIT (OUTPATIENT)
Dept: MEDICAL GROUP | Facility: PHYSICIAN GROUP | Age: 65
End: 2020-04-16
Payer: MEDICARE

## 2020-04-16 ENCOUNTER — HOSPITAL ENCOUNTER (OUTPATIENT)
Facility: MEDICAL CENTER | Age: 65
End: 2020-04-16
Attending: FAMILY MEDICINE
Payer: MEDICARE

## 2020-04-16 VITALS
TEMPERATURE: 98.2 F | DIASTOLIC BLOOD PRESSURE: 70 MMHG | WEIGHT: 188 LBS | SYSTOLIC BLOOD PRESSURE: 130 MMHG | HEIGHT: 66 IN | HEART RATE: 82 BPM | BODY MASS INDEX: 30.22 KG/M2 | OXYGEN SATURATION: 98 %

## 2020-04-16 DIAGNOSIS — N30.01 ACUTE CYSTITIS WITH HEMATURIA: ICD-10-CM

## 2020-04-16 LAB
APPEARANCE UR: NORMAL
BILIRUB UR STRIP-MCNC: NORMAL MG/DL
COLOR UR AUTO: NORMAL
CYTOLOGY REG CYTOL: NORMAL
GLUCOSE UR STRIP.AUTO-MCNC: NORMAL MG/DL
HPV HR 12 DNA CVX QL NAA+PROBE: NEGATIVE
HPV16 DNA SPEC QL NAA+PROBE: NEGATIVE
HPV18 DNA SPEC QL NAA+PROBE: NEGATIVE
KETONES UR STRIP.AUTO-MCNC: NORMAL MG/DL
LEUKOCYTE ESTERASE UR QL STRIP.AUTO: NORMAL
NITRITE UR QL STRIP.AUTO: POSITIVE
PH UR STRIP.AUTO: 6 [PH] (ref 5–8)
PROT UR QL STRIP: NORMAL MG/DL
RBC UR QL AUTO: NORMAL
SP GR UR STRIP.AUTO: 1.01
SPECIMEN SOURCE: NORMAL
UROBILINOGEN UR STRIP-MCNC: 0.2 MG/DL

## 2020-04-16 PROCEDURE — 87186 SC STD MICRODIL/AGAR DIL: CPT

## 2020-04-16 PROCEDURE — 99214 OFFICE O/P EST MOD 30 MIN: CPT | Performed by: FAMILY MEDICINE

## 2020-04-16 PROCEDURE — 87086 URINE CULTURE/COLONY COUNT: CPT

## 2020-04-16 PROCEDURE — 87077 CULTURE AEROBIC IDENTIFY: CPT

## 2020-04-16 PROCEDURE — 81002 URINALYSIS NONAUTO W/O SCOPE: CPT | Performed by: FAMILY MEDICINE

## 2020-04-16 RX ORDER — NITROFURANTOIN 25; 75 MG/1; MG/1
100 CAPSULE ORAL EVERY 12 HOURS
Qty: 10 CAP | Refills: 0 | Status: SHIPPED | OUTPATIENT
Start: 2020-04-16 | End: 2020-04-21

## 2020-04-16 ASSESSMENT — FIBROSIS 4 INDEX: FIB4 SCORE: 0.75

## 2020-04-16 NOTE — PROGRESS NOTES
"Chief Complaint   Patient presents with   • Dysuria     burning when urinating took Azo yesterday       HPI:  Symptom onset: 6 days ago after completing Pap smear patient took 2-day course of Azo OTC.  Current symptoms: Painful, urgent, frequent voids. No blood noted in urine.  Since onset symptoms are: Unchanged  Treatments tried: OTC antispasm med.  Associated symptoms: Negative for fever, flank pain, nausea and vomiting, vaginal discharge, pelvic pain.  History is negative for frequent UTI.     ROS:  Denies fever, chills, vomiting or abdominal pain.     OBJECTIVE:  /70 (BP Location: Right arm, Patient Position: Sitting, BP Cuff Size: Adult)   Pulse 82   Temp 36.8 °C (98.2 °F) (Temporal)   Ht 1.676 m (5' 6\")   Wt 85.3 kg (188 lb)   SpO2 98%   GENERAL: No acute distress  HENT: Atraumatic, normocephalic  EYES: Extraocular movements intact, pupils equal and reactive to light  NECK: Supple, FROM  CHEST: No deformities, Equal chest expansion  RESP: Unlabored, no stridor or audible wheeze  ABD: Non-Distended  Extremities: No Clubbing, Cyanosis, or Edema  Skin: Warm/dry, without rashes  Neuro: A/O x 4, CN 2-12 Grossly intact, Motor/sensory grossly intact  Psych: Normal behavior, normal affect       Positive leukocyte esterase and nitrate on point-of-care urinalysis presence of blood detected as well.     ASSESSMENT/PLAN:  1. Acute cystitis with hematuria  New problem to examiner.  Prescription for nitrofurantoin as below.  Follow-up PRN  - Urine Culture; Future  - POCT Urinalysis  - nitrofurantoin (MACROBID) 100 MG Cap; Take 1 Cap by mouth every 12 hours for 5 days.  Dispense: 10 Cap; Refill: 0     1. Abnormal urine dipstick in office. Urine sent for culture. Start antibiotics.  2. Provided education to drink plenty of fluids, wipe front to back every void and bowel movement.   3. Return to clinic if symptoms not improving within 3-4 days or in case of vomiting, fever, increasing pain.    Please note that this " dictation was created using voice recognition software. I have worked with consultants from the vendor as well as technical experts from [facility] to optimize the interface. I have made every reasonable attempt to correct obvious errors, but I expect that there are errors of grammar and possibly content that I did not discover before finalizing the note.

## 2020-04-17 PROBLEM — N95.2 POST-MENOPAUSAL ATROPHIC VAGINITIS: Status: ACTIVE | Noted: 2020-04-17

## 2020-04-19 LAB
BACTERIA UR CULT: ABNORMAL
BACTERIA UR CULT: ABNORMAL
SIGNIFICANT IND 70042: ABNORMAL
SITE SITE: ABNORMAL
SOURCE SOURCE: ABNORMAL

## 2020-04-20 NOTE — PROGRESS NOTES
A 64-year-old was an emergent admission to [facility] from 3/13/2020 to 3/15/2020 to treat Olecranon bursitis, right elbow. The patient was discharged Home. No additional medical conditions were listed. The patient was not under clinical case management.     The patient was ordered to start/continue to take the following medication: Cephalexin (Keflex). The patient successfully filled all medications.     The patient was ordered to follow-up with PCP. The patient had the following appointments:     1) 4/10/2020 @ 1:00 Constantin Moon, general/family practice - CONFIRMED AS KEPT     The Patient Navigator followed the patient for a total of 29 days and Patient  was receptive to services. In summary, the Patient Navigator Provided education to patient (health edu, benefits, resources, etc.). As a result, Patient adhered with Discharge Orders and Patient attended follow up appointments.

## 2020-06-01 DIAGNOSIS — Z79.4 TYPE 2 DIABETES MELLITUS WITH MICROALBUMINURIA, WITH LONG-TERM CURRENT USE OF INSULIN (HCC): ICD-10-CM

## 2020-06-01 DIAGNOSIS — E11.29 TYPE 2 DIABETES MELLITUS WITH MICROALBUMINURIA, WITH LONG-TERM CURRENT USE OF INSULIN (HCC): ICD-10-CM

## 2020-06-01 DIAGNOSIS — R80.9 TYPE 2 DIABETES MELLITUS WITH MICROALBUMINURIA, WITH LONG-TERM CURRENT USE OF INSULIN (HCC): ICD-10-CM

## 2020-06-01 RX ORDER — EMPAGLIFLOZIN 25 MG/1
TABLET, FILM COATED ORAL
Qty: 90 TAB | Refills: 0 | Status: SHIPPED | OUTPATIENT
Start: 2020-06-01 | End: 2020-06-02 | Stop reason: SDUPTHER

## 2020-06-01 NOTE — TELEPHONE ENCOUNTER
Received request via: Pharmacy    Was the patient seen in the last year in this department? Yes LOV 04/16/2020    Does the patient have an active prescription (recently filled or refills available) for medication(s) requested? No

## 2020-06-01 NOTE — TELEPHONE ENCOUNTER
Requested Prescriptions     Pending Prescriptions Disp Refills   • JARDIANCE 25 MG Tab [Pharmacy Med Name: JARDIANCE 25MG TABS] 90 Tab 0     Sig: TAKE ONE TABLET BY MOUTH EVERY DAY   Dasia Bledsoe M.D.

## 2020-06-02 ENCOUNTER — TELEPHONE (OUTPATIENT)
Dept: MEDICAL GROUP | Facility: PHYSICIAN GROUP | Age: 65
End: 2020-06-02

## 2020-06-02 DIAGNOSIS — Z79.4 TYPE 2 DIABETES MELLITUS WITH MICROALBUMINURIA, WITH LONG-TERM CURRENT USE OF INSULIN (HCC): ICD-10-CM

## 2020-06-02 DIAGNOSIS — R80.9 TYPE 2 DIABETES MELLITUS WITH MICROALBUMINURIA, WITH LONG-TERM CURRENT USE OF INSULIN (HCC): ICD-10-CM

## 2020-06-02 DIAGNOSIS — E11.29 TYPE 2 DIABETES MELLITUS WITH MICROALBUMINURIA, WITH LONG-TERM CURRENT USE OF INSULIN (HCC): ICD-10-CM

## 2020-06-02 RX ORDER — EMPAGLIFLOZIN 25 MG/1
1 TABLET, FILM COATED ORAL
Qty: 100 TAB | Refills: 0 | Status: SHIPPED | OUTPATIENT
Start: 2020-06-02 | End: 2020-09-09

## 2020-06-02 NOTE — TELEPHONE ENCOUNTER
MEDICATION PRIOR AUTHORIZATION NEEDED:    1. Name of Medication: jardiance    PA started by pharmacy for Jardiance. Status is pending

## 2020-06-02 NOTE — TELEPHONE ENCOUNTER
Received request via: Pharmacy Insurance [employer] 100 day supply required    Was the patient seen in the last year in this department? Yes    Does the patient have an active prescription (recently filled or refills available) for medication(s) requested? Insurance [employer] 100 day supply required

## 2020-06-10 DIAGNOSIS — E78.00 PURE HYPERCHOLESTEROLEMIA: Chronic | ICD-10-CM

## 2020-06-10 RX ORDER — ATORVASTATIN CALCIUM 80 MG/1
TABLET, FILM COATED ORAL
Qty: 90 TAB | Refills: 0 | Status: SHIPPED | OUTPATIENT
Start: 2020-06-10 | End: 2020-06-12 | Stop reason: SDUPTHER

## 2020-06-10 NOTE — TELEPHONE ENCOUNTER
Requested Prescriptions     Signed Prescriptions Disp Refills   • atorvastatin (LIPITOR) 80 MG tablet 90 Tab 0     Sig: TAKE ONE TABLET BY MOUTH EVERY EVENING     Authorizing Provider: LORAINE MALLOY A.P.R.N.

## 2020-06-12 DIAGNOSIS — E78.00 PURE HYPERCHOLESTEROLEMIA: Chronic | ICD-10-CM

## 2020-06-12 NOTE — TELEPHONE ENCOUNTER
Received request via: Pharm[facility]    Was the patient seen in the last year in this department? Yes    Does the patient have an active prescription (recently filled or refills available) for medication(s) requested? No

## 2020-06-13 RX ORDER — ATORVASTATIN CALCIUM 80 MG/1
TABLET, FILM COATED ORAL
Qty: 100 TAB | Refills: 0 | Status: SHIPPED | OUTPATIENT
Start: 2020-06-13 | End: 2020-10-06

## 2020-06-22 ENCOUNTER — PATIENT OUTREACH (OUTPATIENT)
Dept: HEALTH INFORMATION MANAGEMENT | Facility: OTHER | Age: 65
End: 2020-06-22

## 2020-06-22 NOTE — PROGRESS NOTES
Called member to follow up on IHD discharge. The member stated that she is fine and there was nothing she needed at this time. The member had no questions at this time. If the member calls in, please transfer to [phone]    Attempt # 1

## 2020-07-07 DIAGNOSIS — K21.9 GASTROESOPHAGEAL REFLUX DISEASE WITHOUT ESOPHAGITIS: ICD-10-CM

## 2020-07-07 RX ORDER — OMEPRAZOLE 20 MG/1
CAPSULE, DELAYED RELEASE ORAL
Qty: 90 CAP | Refills: 4 | Status: SHIPPED | OUTPATIENT
Start: 2020-07-07 | End: 2021-07-19 | Stop reason: SDUPTHER

## 2020-07-16 ENCOUNTER — HOSPITAL ENCOUNTER (OUTPATIENT)
Dept: LAB | Facility: MEDICAL CENTER | Age: 65
End: 2020-07-16
Attending: FAMILY MEDICINE
Payer: MEDICARE

## 2020-07-16 DIAGNOSIS — R80.9 TYPE 2 DIABETES MELLITUS WITH MICROALBUMINURIA, WITH LONG-TERM CURRENT USE OF INSULIN (HCC): ICD-10-CM

## 2020-07-16 DIAGNOSIS — E11.29 TYPE 2 DIABETES MELLITUS WITH MICROALBUMINURIA, WITH LONG-TERM CURRENT USE OF INSULIN (HCC): ICD-10-CM

## 2020-07-16 DIAGNOSIS — Z79.4 TYPE 2 DIABETES MELLITUS WITH MICROALBUMINURIA, WITH LONG-TERM CURRENT USE OF INSULIN (HCC): ICD-10-CM

## 2020-07-16 LAB
EST. AVERAGE GLUCOSE BLD GHB EST-MCNC: 151 MG/DL
HBA1C MFR BLD: 6.9 % (ref 0–5.6)

## 2020-07-16 PROCEDURE — 83036 HEMOGLOBIN GLYCOSYLATED A1C: CPT

## 2020-07-16 PROCEDURE — 36415 COLL VENOUS BLD VENIPUNCTURE: CPT

## 2020-07-30 ENCOUNTER — PATIENT MESSAGE (OUTPATIENT)
Dept: MEDICAL GROUP | Facility: PHYSICIAN GROUP | Age: 65
End: 2020-07-30

## 2020-08-05 DIAGNOSIS — E11.9 TYPE 2 DIABETES MELLITUS WITHOUT COMPLICATION, UNSPECIFIED WHETHER LONG TERM INSULIN USE (HCC): ICD-10-CM

## 2020-08-05 DIAGNOSIS — E13.9 DIABETES 1.5, MANAGED AS TYPE 2 (HCC): ICD-10-CM

## 2020-08-07 RX ORDER — INSULIN GLARGINE 100 [IU]/ML
21 INJECTION, SOLUTION SUBCUTANEOUS EVERY EVENING
Qty: 15 EACH | Refills: 3 | Status: SHIPPED | OUTPATIENT
Start: 2020-08-07 | End: 2021-06-28 | Stop reason: SDUPTHER

## 2020-09-09 DIAGNOSIS — Z79.4 TYPE 2 DIABETES MELLITUS WITH MICROALBUMINURIA, WITH LONG-TERM CURRENT USE OF INSULIN (HCC): ICD-10-CM

## 2020-09-09 DIAGNOSIS — R80.9 TYPE 2 DIABETES MELLITUS WITH MICROALBUMINURIA, WITH LONG-TERM CURRENT USE OF INSULIN (HCC): ICD-10-CM

## 2020-09-09 DIAGNOSIS — E11.29 TYPE 2 DIABETES MELLITUS WITH MICROALBUMINURIA, WITH LONG-TERM CURRENT USE OF INSULIN (HCC): ICD-10-CM

## 2020-09-09 RX ORDER — EMPAGLIFLOZIN 25 MG/1
TABLET, FILM COATED ORAL
Qty: 100 TAB | Refills: 3 | Status: SHIPPED | OUTPATIENT
Start: 2020-09-09 | End: 2021-11-02 | Stop reason: SDUPTHER

## 2020-10-15 DIAGNOSIS — R80.9 TYPE 2 DIABETES MELLITUS WITH MICROALBUMINURIA, WITH LONG-TERM CURRENT USE OF INSULIN (HCC): ICD-10-CM

## 2020-10-15 DIAGNOSIS — E11.29 TYPE 2 DIABETES MELLITUS WITH MICROALBUMINURIA, WITH LONG-TERM CURRENT USE OF INSULIN (HCC): ICD-10-CM

## 2020-10-15 DIAGNOSIS — Z79.4 TYPE 2 DIABETES MELLITUS WITH MICROALBUMINURIA, WITH LONG-TERM CURRENT USE OF INSULIN (HCC): ICD-10-CM

## 2020-10-19 RX ORDER — LIRAGLUTIDE 6 MG/ML
INJECTION SUBCUTANEOUS
Qty: 9 ML | Refills: 4 | Status: SHIPPED | OUTPATIENT
Start: 2020-10-19 | End: 2021-05-07 | Stop reason: SDUPTHER

## 2020-10-27 ENCOUNTER — TELEPHONE (OUTPATIENT)
Dept: MEDICAL GROUP | Facility: PHYSICIAN GROUP | Age: 65
End: 2020-10-27

## 2020-10-27 DIAGNOSIS — H54.7 VISION DECREASED: ICD-10-CM

## 2020-10-27 NOTE — TELEPHONE ENCOUNTER
2. SPECIFIC Action To Be Taken: Referral pending, please sign.    3. Diagnosis/Clinical Reason for Request: Loss of Vision     4. Specialty & Provider Name/Lab/Imaging Location: [facility]    5. Is appointment scheduled for requested order/referral: no    Patient was informed they will receive a return phone call from the office ONLY if there are any questions before processing their request. Advised to call back if they haven't received a call from the referral department in 5 days.

## 2020-10-27 NOTE — TELEPHONE ENCOUNTER
----- Message from Tara Cueva sent at 10/27/2020  2:20 PM PDT -----  Regarding: Procedure Question  Contact: [phone]  I need to have my eyes checked.  I called [facility], and they said they can't take a new patient unless I get a referral from my primary physician.  I am currently having trouble seeing even with my glasses, so I really need an exam.  Can you give me a referral to be seen at [facility]?

## 2021-01-20 ENCOUNTER — OFFICE VISIT (OUTPATIENT)
Dept: MEDICAL GROUP | Facility: PHYSICIAN GROUP | Age: 66
End: 2021-01-20
Payer: MEDICARE

## 2021-01-20 VITALS
HEIGHT: 66 IN | HEART RATE: 83 BPM | BODY MASS INDEX: 31.82 KG/M2 | SYSTOLIC BLOOD PRESSURE: 130 MMHG | OXYGEN SATURATION: 98 % | DIASTOLIC BLOOD PRESSURE: 82 MMHG | WEIGHT: 198 LBS | TEMPERATURE: 98.1 F

## 2021-01-20 DIAGNOSIS — E78.00 PURE HYPERCHOLESTEROLEMIA: Chronic | ICD-10-CM

## 2021-01-20 DIAGNOSIS — R80.9 TYPE 2 DIABETES MELLITUS WITH MICROALBUMINURIA, WITH LONG-TERM CURRENT USE OF INSULIN (HCC): ICD-10-CM

## 2021-01-20 DIAGNOSIS — E11.29 TYPE 2 DIABETES MELLITUS WITH MICROALBUMINURIA, WITH LONG-TERM CURRENT USE OF INSULIN (HCC): ICD-10-CM

## 2021-01-20 DIAGNOSIS — Z79.4 TYPE 2 DIABETES MELLITUS WITH MICROALBUMINURIA, WITH LONG-TERM CURRENT USE OF INSULIN (HCC): ICD-10-CM

## 2021-01-20 LAB
HBA1C MFR BLD: 6.9 % (ref 0–5.6)
INT CON NEG: NEGATIVE
INT CON POS: POSITIVE

## 2021-01-20 PROCEDURE — G0438 PPPS, INITIAL VISIT: HCPCS | Performed by: FAMILY MEDICINE

## 2021-01-20 PROCEDURE — 83036 HEMOGLOBIN GLYCOSYLATED A1C: CPT | Performed by: FAMILY MEDICINE

## 2021-01-20 ASSESSMENT — PATIENT HEALTH QUESTIONNAIRE - PHQ9: CLINICAL INTERPRETATION OF PHQ2 SCORE: 0

## 2021-01-20 ASSESSMENT — ACTIVITIES OF DAILY LIVING (ADL): BATHING_REQUIRES_ASSISTANCE: 0

## 2021-01-20 ASSESSMENT — FIBROSIS 4 INDEX: FIB4 SCORE: 0.76

## 2021-01-20 ASSESSMENT — ENCOUNTER SYMPTOMS: GENERAL WELL-BEING: GOOD

## 2021-01-20 NOTE — PROGRESS NOTES
Chief Complaint   Patient presents with   • Annual Exam     AHA   • Diabetes     A1C         HPI:  Vee is a 65 y.o. here for Medicare Annual Wellness Visit    Patient is here for her annual exam     Patient Active Problem List    Diagnosis Date Noted   • Post-menopausal atrophic vaginitis 04/17/2020   • Sebaceous cyst 03/31/2020   • Arthralgia of left hand 02/28/2020   • Septic bursitis of elbow 07/03/2018   • Type 2 diabetes mellitus with microalbuminuria, with long-term current use of insulin ([facility]) 02/02/2018   • Vitamin B12 deficiency 08/24/2017   • Fatigue 07/20/2017   • Thumb pain 12/23/2016   • Hypokalemia 01/03/2014   • Gall bladder polyp 08/12/2011   • Hyperlipidemia 05/12/2011   • Vitamin D deficiency 05/12/2011   • GERD (gastroesophageal reflux disease) 05/12/2011       Current Outpatient Medications   Medication Sig Dispense Refill   • VICTOZA 18 MG/3ML Solution Pen-injector INJECT 1.8MG AS DIRECTED EVERY EVENING 9 mL 4   • atorvastatin (LIPITOR) 80 MG tablet TAKE 1 TABLET BY MOUTH EVERY EVENING 100 Tab 3   • JARDIANCE 25 MG Tab TAKE ONE TABLET BY MOUTH EVERY  Tab 3   • insulin glargine (LANTUS SOLOSTAR) 100 UNIT/ML Solution Pen-injector injection Inject 21 Units as instructed every evening. 15 Each 3   • omeprazole (PRILOSEC) 20 MG delayed-release capsule TAKE ONE CAPSULE BY MOUTH EVERY DAY 90 Cap 4   • pioglitazone (ACTOS) 30 MG Tab TAKE ONE TABLET BY MOUTH EVERY DAY 90 Tab 4   • Cyanocobalamin (B-12) 1000 MCG Cap Take 1,000 mcg by mouth every evening.     • Cholecalciferol (VITAMIN D) 400 UNIT TABS Take 400 Units by mouth every evening.     • aspirin EC (ECOTRIN) 81 MG TBEC Take 81 mg by mouth every evening.       No current facility-administered medications for this visit.         Patient is taking medications as noted in medication list.  Current supplements as per medication list.     Allergies: Sulfa drugs    Current social contact/activities: Pt currently works and is very social with  her coworkers    Is patient current with immunizations? Yes.    She  reports that she quit smoking about 31 years ago. Her smoking use included cigarettes. She has a 20.00 pack-year smoking history. She has never used smokeless tobacco. She reports that she does not drink alcohol or use drugs.  Counseling given: Not Answered        DPA/Advanced directive: Patient has Living Will on file.     ROS:    Gait: Uses no assistive device  Ostomy: No   Other tubes: No   Amputations: No   Chronic oxygen use No   Last eye exam 2 months ago   Wears hearing aids: No   : Denies any urinary leakage during the last 6 months      Screening:    Depression Screening    Little interest or pleasure in doing things?  0 - not at all  Feeling down, depressed, or hopeless? 0 - not at all  Patient Health Questionnaire Score: 0    If depressive symptoms identified deferred to follow up visit unless specifically addressed in assessment and plan.    Interpretation of PHQ-9 Total Score   Score Severity   1-4 No Depression   5-9 Mild Depression   10-14 Moderate Depression   15-19 Moderately Severe Depression   20-27 Severe Depression    Screening for Cognitive Impairment    Three Minute Recall (river, margret, finger)  3/3    Jatinder clock face with all 12 numbers and set the hands to show 10 past 11.  Yes    If cognitive concerns identified, deferred for follow up unless specifically addressed in assessment and plan.    Fall Risk Assessment    Has the patient had two or more falls in the last year or any fall with injury in the last year?  No  If fall risk identified, deferred for follow up unless specifically addressed in assessment and plan.    Safety Assessment    Throw rugs on floor.  Yes  Handrails on all stairs.  No  Good lighting in all hallways.  Yes  Difficulty hearing.  Yes  Patient counseled about all safety risks that were identified.    Functional Assessment ADLs    Are there any barriers preventing you from cooking for yourself or  meeting nutritional needs?  No.    Are there any barriers preventing you from driving safely or obtaining transportation?  No.    Are there any barriers preventing you from using a telephone or calling for help?  No.    Are there any barriers preventing you from shopping?  No.    Are there any barriers preventing you from taking care of your own finances?  No.    Are there any barriers preventing you from managing your medications?  No.    Are there any barriers preventing you from showering, bathing or dressing yourself?  No.    Are you currently engaging in any exercise or physical activity?  Yes.  Patient walks her dog when she can  What is your perception of your health?  Good.    Health Maintenance Summary                Annual Wellness Visit Overdue 1955     FASTING LIPID PROFILE Overdue 5/20/2020      Done 5/20/2019 LIPID PROFILE     Patient has more history with this topic...    URINE ACR / MICROALBUMIN Overdue 10/24/2020      Done 10/24/2019 MICROALBUMIN CREAT RATIO URINE     Patient has more history with this topic...    A1C SCREENING Overdue 1/16/2021      Done 7/16/2020 HEMOGLOBIN A1C     Patient has more history with this topic...    IMM HEP B VACCINE Postponed 4/10/2021 Originally 5/9/1973. Patient Refused    SERUM CREATININE Next Due 3/15/2021      Done 3/15/2020 COMP METABOLIC PANEL     Patient has more history with this topic...    MAMMOGRAM Next Due 5/13/2021      Done 5/13/2019 MA-SCREENING MAMMO BILAT W/TOMOSYNTHESIS W/CAD     Patient has more history with this topic...    RETINAL SCREENING Next Due 10/29/2021      Done 10/29/2020 REFERRAL FOR RETINAL SCREENING EXAM     Patient has more history with this topic...    DIABETES MONOFILAMENT / LE EXAM Next Due 1/20/2022      Done 1/20/2021 AMB DIABETIC MONOFILAMENT LOWER EXTREMITY EXAM     Patient has more history with this topic...    IMM DTaP/Tdap/Td Vaccine Next Due 7/25/2024      Done 7/25/2014 Imm Admin: Tdap Vaccine    BONE DENSITY Next Due  "10/31/2024      Done 10/31/2019 DS-BONE DENSITY STUDY (DEXA)    COLONOSCOPY Next Due 2024      Done 2019 REFERRAL TO GI FOR COLONOSCOPY     Patient has more history with this topic...          Patient Care Team:  Constantin Moon M.D. as PCP - General (Family Medicine)  Edy Lee P.A.-C. as Consulting Physician (Endocrinology)    Social History     Tobacco Use   • Smoking status: Former Smoker     Packs/day: 1.00     Years: 20.00     Pack years: 20.00     Types: Cigarettes     Quit date: 1990     Years since quittin.0   • Smokeless tobacco: Never Used   Substance Use Topics   • Alcohol use: No     Comment: holidays   • Drug use: No     Family History   Problem Relation Age of Onset   • Cancer Maternal Grandmother         lung    • Cancer Mother         breast   • Other Father         urinary issues   • GI Disease Sister    • Heart Disease Brother      She  has a past medical history of Diabetes (2011), Edema (2011), Elevated liver enzymes (2011), Gall bladder polyp (2011), GERD (gastroesophageal reflux disease) (2011), Hyperlipidemia (2011), Hypertension (2011), Post-menopausal atrophic vaginitis (2020), Skin lesion of face (2011), and Vitamin d deficiency (2011).   Past Surgical History:   Procedure Laterality Date   • OTHER ORTHOPEDIC SURGERY      right arm ORIF   • PRIMARY C SECTION     • TUBAL COAGULATION LAPAROSCOPIC BILATERAL             Exam:     /82 (BP Location: Right arm, Patient Position: Sitting, BP Cuff Size: Adult)   Pulse 83   Temp 36.7 °C (98.1 °F) (Temporal)   Ht 1.689 m (5' 6.5\")   Wt 89.8 kg (198 lb)   SpO2 98%  Body mass index is 31.48 kg/m².    Hearing good.    Dentition good  Alert, oriented in no acute distress.  Eye contact is good, speech goal directed, affect calm      Assessment and Plan. The following treatment and monitoring plan is recommended:    1. Type 2 diabetes mellitus with microalbuminuria, with " long-term current use of insulin (HCC)  POCT Hemoglobin A1C    HEMOGLOBIN A1C    CBC WITH DIFFERENTIAL    Comp Metabolic Panel    Lipid Profile    MICROALBUMIN CREAT RATIO URINE    Diabetic Monofilament LE Exam  Discontinue linagliptin  Continue Lantus 21 units nightly, Victoza 1.8 mg nightly, Actos 30 mg daily, Jardiance 25 mg daily   2. Pure hypercholesterolemia   continue atorvastatin 80 mg nightly           Services suggested: No services needed at this time  Health Care Screening recommendations as per orders if indicated.  Referrals offered: PT/OT/Nutrition counseling/Behavioral Health/Smoking cessation as per orders if indicated.    Discussion today about general wellness and lifestyle habits:    · Prevent falls and reduce trip hazards; Cautioned about securing or removing rugs.  · Have a working fire alarm and carbon monoxide detector;   · Engage in regular physical activity and social activities       Follow-up: Return in about 3 months (around 4/20/2021) for Diabetes, Pending Labs, A1c.       Please note that this dictation was created using voice recognition software. I have worked with consultants from the vendor as well as technical experts from [facility] [employer] to optimize the interface. I have made every reasonable attempt to correct obvious errors, but I expect that there are errors of grammar and possibly content that I did not discover before finalizing the note.

## 2021-03-03 DIAGNOSIS — Z23 NEED FOR VACCINATION: ICD-10-CM

## 2021-04-19 ENCOUNTER — APPOINTMENT (OUTPATIENT)
Dept: RADIOLOGY | Facility: MEDICAL CENTER | Age: 66
DRG: 871 | End: 2021-04-19
Attending: EMERGENCY MEDICINE
Payer: MEDICARE

## 2021-04-19 ENCOUNTER — OFFICE VISIT (OUTPATIENT)
Dept: URGENT CARE | Facility: PHYSICIAN GROUP | Age: 66
End: 2021-04-19
Payer: MEDICARE

## 2021-04-19 ENCOUNTER — HOSPITAL ENCOUNTER (INPATIENT)
Facility: MEDICAL CENTER | Age: 66
LOS: 3 days | DRG: 871 | End: 2021-04-22
Attending: EMERGENCY MEDICINE | Admitting: HOSPITALIST
Payer: MEDICARE

## 2021-04-19 VITALS
RESPIRATION RATE: 28 BRPM | SYSTOLIC BLOOD PRESSURE: 122 MMHG | BODY MASS INDEX: 28.25 KG/M2 | OXYGEN SATURATION: 90 % | WEIGHT: 180 LBS | TEMPERATURE: 99 F | HEART RATE: 87 BPM | DIASTOLIC BLOOD PRESSURE: 90 MMHG | HEIGHT: 67 IN

## 2021-04-19 DIAGNOSIS — R07.89 CHEST HEAVINESS: ICD-10-CM

## 2021-04-19 DIAGNOSIS — J18.9 PNEUMONIA OF LEFT UPPER LOBE DUE TO INFECTIOUS ORGANISM: ICD-10-CM

## 2021-04-19 DIAGNOSIS — J96.01 ACUTE RESPIRATORY FAILURE WITH HYPOXIA (HCC): ICD-10-CM

## 2021-04-19 DIAGNOSIS — R06.02 SOB (SHORTNESS OF BREATH): ICD-10-CM

## 2021-04-19 PROBLEM — A41.9 SEPSIS (HCC): Status: ACTIVE | Noted: 2021-04-19

## 2021-04-19 LAB
ALBUMIN SERPL BCP-MCNC: 3.5 G/DL (ref 3.2–4.9)
ALBUMIN/GLOB SERPL: 0.8 G/DL
ALP SERPL-CCNC: 98 U/L (ref 30–99)
ALT SERPL-CCNC: 19 U/L (ref 2–50)
ANION GAP SERPL CALC-SCNC: 12 MMOL/L (ref 7–16)
AST SERPL-CCNC: 18 U/L (ref 12–45)
BASOPHILS # BLD AUTO: 0.1 % (ref 0–1.8)
BASOPHILS # BLD: 0.02 K/UL (ref 0–0.12)
BILIRUB SERPL-MCNC: 1.1 MG/DL (ref 0.1–1.5)
BUN SERPL-MCNC: 16 MG/DL (ref 8–22)
CALCIUM SERPL-MCNC: 9.5 MG/DL (ref 8.5–10.5)
CHLORIDE SERPL-SCNC: 99 MMOL/L (ref 96–112)
CO2 SERPL-SCNC: 24 MMOL/L (ref 20–33)
CREAT SERPL-MCNC: 0.83 MG/DL (ref 0.5–1.4)
EKG IMPRESSION: NORMAL
EOSINOPHIL # BLD AUTO: 0.01 K/UL (ref 0–0.51)
EOSINOPHIL NFR BLD: 0.1 % (ref 0–6.9)
ERYTHROCYTE [DISTWIDTH] IN BLOOD BY AUTOMATED COUNT: 46.5 FL (ref 35.9–50)
FLUAV RNA SPEC QL NAA+PROBE: NEGATIVE
FLUBV RNA SPEC QL NAA+PROBE: NEGATIVE
GLOBULIN SER CALC-MCNC: 4.3 G/DL (ref 1.9–3.5)
GLUCOSE SERPL-MCNC: 201 MG/DL (ref 65–99)
HCT VFR BLD AUTO: 43.6 % (ref 37–47)
HGB BLD-MCNC: 13.7 G/DL (ref 12–16)
IMM GRANULOCYTES # BLD AUTO: 0.1 K/UL (ref 0–0.11)
IMM GRANULOCYTES NFR BLD AUTO: 0.6 % (ref 0–0.9)
LACTATE BLD-SCNC: 1.3 MMOL/L (ref 0.5–2)
LYMPHOCYTES # BLD AUTO: 2.16 K/UL (ref 1–4.8)
LYMPHOCYTES NFR BLD: 12.5 % (ref 22–41)
MAGNESIUM SERPL-MCNC: 1.8 MG/DL (ref 1.5–2.5)
MCH RBC QN AUTO: 28 PG (ref 27–33)
MCHC RBC AUTO-ENTMCNC: 31.4 G/DL (ref 33.6–35)
MCV RBC AUTO: 89 FL (ref 81.4–97.8)
MONOCYTES # BLD AUTO: 0.84 K/UL (ref 0–0.85)
MONOCYTES NFR BLD AUTO: 4.8 % (ref 0–13.4)
NEUTROPHILS # BLD AUTO: 14.19 K/UL (ref 2–7.15)
NEUTROPHILS NFR BLD: 81.9 % (ref 44–72)
NRBC # BLD AUTO: 0 K/UL
NRBC BLD-RTO: 0 /100 WBC
PLATELET # BLD AUTO: 311 K/UL (ref 164–446)
PMV BLD AUTO: 9.9 FL (ref 9–12.9)
POTASSIUM SERPL-SCNC: 3.1 MMOL/L (ref 3.6–5.5)
PROT SERPL-MCNC: 7.8 G/DL (ref 6–8.2)
RBC # BLD AUTO: 4.9 M/UL (ref 4.2–5.4)
RSV RNA SPEC QL NAA+PROBE: NEGATIVE
SARS-COV-2 RNA RESP QL NAA+PROBE: NOTDETECTED
SODIUM SERPL-SCNC: 135 MMOL/L (ref 135–145)
SPECIMEN SOURCE: NORMAL
TROPONIN T SERPL-MCNC: 11 NG/L (ref 6–19)
WBC # BLD AUTO: 17.3 K/UL (ref 4.8–10.8)

## 2021-04-19 PROCEDURE — 770006 HCHG ROOM/CARE - MED/SURG/GYN SEMI*

## 2021-04-19 PROCEDURE — 71275 CT ANGIOGRAPHY CHEST: CPT

## 2021-04-19 PROCEDURE — 99223 1ST HOSP IP/OBS HIGH 75: CPT | Mod: AI | Performed by: HOSPITALIST

## 2021-04-19 PROCEDURE — 80048 BASIC METABOLIC PNL TOTAL CA: CPT

## 2021-04-19 PROCEDURE — A9270 NON-COVERED ITEM OR SERVICE: HCPCS | Performed by: HOSPITALIST

## 2021-04-19 PROCEDURE — 87181 SC STD AGAR DILUTION PER AGT: CPT

## 2021-04-19 PROCEDURE — 96365 THER/PROPH/DIAG IV INF INIT: CPT

## 2021-04-19 PROCEDURE — 71045 X-RAY EXAM CHEST 1 VIEW: CPT

## 2021-04-19 PROCEDURE — 700117 HCHG RX CONTRAST REV CODE 255: Performed by: EMERGENCY MEDICINE

## 2021-04-19 PROCEDURE — 700111 HCHG RX REV CODE 636 W/ 250 OVERRIDE (IP): Performed by: EMERGENCY MEDICINE

## 2021-04-19 PROCEDURE — 84484 ASSAY OF TROPONIN QUANT: CPT

## 2021-04-19 PROCEDURE — 700105 HCHG RX REV CODE 258: Performed by: EMERGENCY MEDICINE

## 2021-04-19 PROCEDURE — 93005 ELECTROCARDIOGRAM TRACING: CPT

## 2021-04-19 PROCEDURE — 700102 HCHG RX REV CODE 250 W/ 637 OVERRIDE(OP): Performed by: HOSPITALIST

## 2021-04-19 PROCEDURE — 83605 ASSAY OF LACTIC ACID: CPT

## 2021-04-19 PROCEDURE — C9803 HOPD COVID-19 SPEC COLLECT: HCPCS | Performed by: EMERGENCY MEDICINE

## 2021-04-19 PROCEDURE — 36415 COLL VENOUS BLD VENIPUNCTURE: CPT

## 2021-04-19 PROCEDURE — 80053 COMPREHEN METABOLIC PANEL: CPT

## 2021-04-19 PROCEDURE — 93005 ELECTROCARDIOGRAM TRACING: CPT | Performed by: EMERGENCY MEDICINE

## 2021-04-19 PROCEDURE — 99215 OFFICE O/P EST HI 40 MIN: CPT | Performed by: NURSE PRACTITIONER

## 2021-04-19 PROCEDURE — 87040 BLOOD CULTURE FOR BACTERIA: CPT

## 2021-04-19 PROCEDURE — 85610 PROTHROMBIN TIME: CPT

## 2021-04-19 PROCEDURE — 87077 CULTURE AEROBIC IDENTIFY: CPT

## 2021-04-19 PROCEDURE — 83735 ASSAY OF MAGNESIUM: CPT

## 2021-04-19 PROCEDURE — 0241U HCHG SARS-COV-2 COVID-19 NFCT DS RESP RNA 4 TRGT MIC: CPT

## 2021-04-19 PROCEDURE — 85025 COMPLETE CBC W/AUTO DIFF WBC: CPT

## 2021-04-19 PROCEDURE — 99285 EMERGENCY DEPT VISIT HI MDM: CPT

## 2021-04-19 PROCEDURE — 84145 PROCALCITONIN (PCT): CPT

## 2021-04-19 RX ORDER — POTASSIUM CHLORIDE 20 MEQ/1
40 TABLET, EXTENDED RELEASE ORAL ONCE
Status: COMPLETED | OUTPATIENT
Start: 2021-04-19 | End: 2021-04-19

## 2021-04-19 RX ORDER — BISACODYL 10 MG
10 SUPPOSITORY, RECTAL RECTAL
Status: DISCONTINUED | OUTPATIENT
Start: 2021-04-19 | End: 2021-04-22 | Stop reason: HOSPADM

## 2021-04-19 RX ORDER — POLYETHYLENE GLYCOL 3350 17 G/17G
1 POWDER, FOR SOLUTION ORAL
Status: DISCONTINUED | OUTPATIENT
Start: 2021-04-19 | End: 2021-04-22 | Stop reason: HOSPADM

## 2021-04-19 RX ORDER — ATORVASTATIN CALCIUM 40 MG/1
80 TABLET, FILM COATED ORAL EVERY EVENING
Status: DISCONTINUED | OUTPATIENT
Start: 2021-04-19 | End: 2021-04-22 | Stop reason: HOSPADM

## 2021-04-19 RX ORDER — OXYCODONE HYDROCHLORIDE 5 MG/1
2.5 TABLET ORAL
Status: DISCONTINUED | OUTPATIENT
Start: 2021-04-19 | End: 2021-04-22 | Stop reason: HOSPADM

## 2021-04-19 RX ORDER — GUAIFENESIN/DEXTROMETHORPHAN 100-10MG/5
10 SYRUP ORAL EVERY 6 HOURS PRN
Status: DISCONTINUED | OUTPATIENT
Start: 2021-04-19 | End: 2021-04-22 | Stop reason: HOSPADM

## 2021-04-19 RX ORDER — AZITHROMYCIN 250 MG/1
500 TABLET, FILM COATED ORAL EVERY EVENING
Status: DISCONTINUED | OUTPATIENT
Start: 2021-04-19 | End: 2021-04-20

## 2021-04-19 RX ORDER — ACETAMINOPHEN 325 MG/1
650 TABLET ORAL EVERY 6 HOURS PRN
Status: DISCONTINUED | OUTPATIENT
Start: 2021-04-19 | End: 2021-04-19

## 2021-04-19 RX ORDER — AMOXICILLIN 250 MG
2 CAPSULE ORAL 2 TIMES DAILY
Status: DISCONTINUED | OUTPATIENT
Start: 2021-04-20 | End: 2021-04-22 | Stop reason: HOSPADM

## 2021-04-19 RX ORDER — INSULIN GLARGINE 100 [IU]/ML
21 INJECTION, SOLUTION SUBCUTANEOUS EVERY EVENING
Status: DISCONTINUED | OUTPATIENT
Start: 2021-04-19 | End: 2021-04-22 | Stop reason: HOSPADM

## 2021-04-19 RX ORDER — HYDROMORPHONE HYDROCHLORIDE 1 MG/ML
0.25 INJECTION, SOLUTION INTRAMUSCULAR; INTRAVENOUS; SUBCUTANEOUS
Status: DISCONTINUED | OUTPATIENT
Start: 2021-04-19 | End: 2021-04-22 | Stop reason: HOSPADM

## 2021-04-19 RX ORDER — SODIUM CHLORIDE, SODIUM LACTATE, POTASSIUM CHLORIDE, AND CALCIUM CHLORIDE .6; .31; .03; .02 G/100ML; G/100ML; G/100ML; G/100ML
500 INJECTION, SOLUTION INTRAVENOUS
Status: DISCONTINUED | OUTPATIENT
Start: 2021-04-19 | End: 2021-04-22 | Stop reason: HOSPADM

## 2021-04-19 RX ORDER — IBUPROFEN 800 MG/1
800 TABLET ORAL 3 TIMES DAILY
Status: DISCONTINUED | OUTPATIENT
Start: 2021-04-20 | End: 2021-04-20

## 2021-04-19 RX ORDER — OXYCODONE HYDROCHLORIDE 5 MG/1
5 TABLET ORAL
Status: DISCONTINUED | OUTPATIENT
Start: 2021-04-19 | End: 2021-04-22 | Stop reason: HOSPADM

## 2021-04-19 RX ORDER — AZITHROMYCIN 500 MG/1
500 INJECTION, POWDER, LYOPHILIZED, FOR SOLUTION INTRAVENOUS ONCE
Status: DISCONTINUED | OUTPATIENT
Start: 2021-04-19 | End: 2021-04-19

## 2021-04-19 RX ORDER — OMEPRAZOLE 20 MG/1
20 CAPSULE, DELAYED RELEASE ORAL EVERY EVENING
Status: DISCONTINUED | OUTPATIENT
Start: 2021-04-19 | End: 2021-04-22 | Stop reason: HOSPADM

## 2021-04-19 RX ORDER — CHOLECALCIFEROL (VITAMIN D3) 125 MCG
5 CAPSULE ORAL NIGHTLY
Status: DISCONTINUED | OUTPATIENT
Start: 2021-04-19 | End: 2021-04-22 | Stop reason: HOSPADM

## 2021-04-19 RX ORDER — ACETAMINOPHEN 500 MG
1000 TABLET ORAL EVERY 6 HOURS PRN
Status: DISCONTINUED | OUTPATIENT
Start: 2021-04-25 | End: 2021-04-22 | Stop reason: HOSPADM

## 2021-04-19 RX ORDER — IBUPROFEN 800 MG/1
800 TABLET ORAL 3 TIMES DAILY PRN
Status: DISCONTINUED | OUTPATIENT
Start: 2021-04-25 | End: 2021-04-20

## 2021-04-19 RX ORDER — SODIUM CHLORIDE, SODIUM LACTATE, POTASSIUM CHLORIDE, AND CALCIUM CHLORIDE .6; .31; .03; .02 G/100ML; G/100ML; G/100ML; G/100ML
30 INJECTION, SOLUTION INTRAVENOUS
Status: DISCONTINUED | OUTPATIENT
Start: 2021-04-19 | End: 2021-04-22 | Stop reason: HOSPADM

## 2021-04-19 RX ORDER — DEXTROSE MONOHYDRATE 25 G/50ML
50 INJECTION, SOLUTION INTRAVENOUS
Status: DISCONTINUED | OUTPATIENT
Start: 2021-04-19 | End: 2021-04-22 | Stop reason: HOSPADM

## 2021-04-19 RX ORDER — ACETAMINOPHEN 500 MG
1000 TABLET ORAL EVERY 6 HOURS
Status: DISCONTINUED | OUTPATIENT
Start: 2021-04-20 | End: 2021-04-22 | Stop reason: HOSPADM

## 2021-04-19 RX ADMIN — SODIUM CHLORIDE 3 G: 900 INJECTION INTRAVENOUS at 19:30

## 2021-04-19 RX ADMIN — ASPIRIN 81 MG: 81 TABLET, COATED ORAL at 22:18

## 2021-04-19 RX ADMIN — OMEPRAZOLE 20 MG: 20 CAPSULE, DELAYED RELEASE ORAL at 22:18

## 2021-04-19 RX ADMIN — POTASSIUM CHLORIDE 40 MEQ: 1500 TABLET, EXTENDED RELEASE ORAL at 22:18

## 2021-04-19 RX ADMIN — ACETAMINOPHEN 1000 MG: 500 TABLET ORAL at 23:36

## 2021-04-19 RX ADMIN — AZITHROMYCIN MONOHYDRATE 500 MG: 250 TABLET ORAL at 22:18

## 2021-04-19 RX ADMIN — INSULIN GLARGINE 21 UNITS: 100 INJECTION, SOLUTION SUBCUTANEOUS at 23:38

## 2021-04-19 RX ADMIN — ATORVASTATIN CALCIUM 80 MG: 40 TABLET, FILM COATED ORAL at 22:18

## 2021-04-19 RX ADMIN — OXYCODONE 5 MG: 5 TABLET ORAL at 22:18

## 2021-04-19 RX ADMIN — IOHEXOL 50 ML: 350 INJECTION, SOLUTION INTRAVENOUS at 19:49

## 2021-04-19 ASSESSMENT — COGNITIVE AND FUNCTIONAL STATUS - GENERAL
SUGGESTED CMS G CODE MODIFIER MOBILITY: CH
DAILY ACTIVITIY SCORE: 24
SUGGESTED CMS G CODE MODIFIER DAILY ACTIVITY: CH
MOBILITY SCORE: 24

## 2021-04-19 ASSESSMENT — ENCOUNTER SYMPTOMS
HEARTBURN: 0
ABDOMINAL PAIN: 0
PALPITATIONS: 0
SHORTNESS OF BREATH: 1
LOSS OF CONSCIOUSNESS: 0
FEVER: 0
SHORTNESS OF BREATH: 1
DOUBLE VISION: 0
DIZZINESS: 0
SPUTUM PRODUCTION: 0
FALLS: 0
SORE THROAT: 0
CHILLS: 0
BACK PAIN: 0
BLURRED VISION: 0
HEADACHES: 0
FEVER: 0
COUGH: 1
NAUSEA: 0
COUGH: 1
CHILLS: 0

## 2021-04-19 ASSESSMENT — FIBROSIS 4 INDEX
FIB4 SCORE: 0.76
FIB4 SCORE: 0.76

## 2021-04-19 ASSESSMENT — PAIN DESCRIPTION - PAIN TYPE
TYPE: ACUTE PAIN
TYPE: ACUTE PAIN

## 2021-04-19 ASSESSMENT — LIFESTYLE VARIABLES
ALCOHOL_USE: NO
HAVE PEOPLE ANNOYED YOU BY CRITICIZING YOUR DRINKING: NO
AVERAGE NUMBER OF DAYS PER WEEK YOU HAVE A DRINK CONTAINING ALCOHOL: 0
HAVE YOU EVER FELT YOU SHOULD CUT DOWN ON YOUR DRINKING: NO
EVER FELT BAD OR GUILTY ABOUT YOUR DRINKING: NO
TOTAL SCORE: 0
ON A TYPICAL DAY WHEN YOU DRINK ALCOHOL HOW MANY DRINKS DO YOU HAVE: 0
DOES PATIENT WANT TO STOP DRINKING: NO
CONSUMPTION TOTAL: NEGATIVE
EVER HAD A DRINK FIRST THING IN THE MORNING TO STEADY YOUR NERVES TO GET RID OF A HANGOVER: NO
HOW MANY TIMES IN THE PAST YEAR HAVE YOU HAD 5 OR MORE DRINKS IN A DAY: 0
TOTAL SCORE: 0
TOTAL SCORE: 0

## 2021-04-19 ASSESSMENT — PATIENT HEALTH QUESTIONNAIRE - PHQ9
SUM OF ALL RESPONSES TO PHQ9 QUESTIONS 1 AND 2: 0
2. FEELING DOWN, DEPRESSED, IRRITABLE, OR HOPELESS: NOT AT ALL
1. LITTLE INTEREST OR PLEASURE IN DOING THINGS: NOT AT ALL

## 2021-04-19 NOTE — PROGRESS NOTES
Subjective:      Vee Cueva is a 65 y.o. female who presents with Cough (Pt reports dry cough, chest pressure, fatigued, sore throat. Onset  3 days. )            Cough  This is a new problem. Episode onset: pt reports new onset of sensation of chest heaviness and cough that started 3 days ago. States it feels like someone heavy is sitting on her chest. Reports pain with inspiration. Mild sinus congestion. The problem has been unchanged. The cough is non-productive. Associated symptoms include nasal congestion and shortness of breath. Pertinent negatives include no chills or fever. Associated symptoms comments: Chest discomfort or heaviness not worse with ambulation. Quit smoking 30 years ago. Reports she was infected with COVID Sept. 2020, reports mild illness at that time. She does report that she completed second COVID vaccine 2 weeks ago. Pt reports her apple watch told her last night that her oxygen saturation was at 85%. She has tried rest for the symptoms. The treatment provided no relief. There is no history of asthma or pneumonia.       Review of Systems   Constitutional: Negative for chills and fever.   HENT: Positive for congestion.    Respiratory: Positive for cough and shortness of breath.    All other systems reviewed and are negative.    Past Medical History:   Diagnosis Date   • Diabetes 5/12/2011   • Edema 5/12/2011   • Elevated liver enzymes 5/12/2011   • Gall bladder polyp 8/12/2011   • GERD (gastroesophageal reflux disease) 5/12/2011   • Hyperlipidemia 5/12/2011   • Hypertension 5/12/2011   • Post-menopausal atrophic vaginitis 4/17/2020   • Skin lesion of face 5/12/2011   • Vitamin d deficiency 5/12/2011      Past Surgical History:   Procedure Laterality Date   • OTHER ORTHOPEDIC SURGERY      right arm ORIF   • PRIMARY C SECTION     • TUBAL COAGULATION LAPAROSCOPIC BILATERAL        Social History     Socioeconomic History   • Marital status:      Spouse name: Not on file   • Number of  "children: Not on file   • Years of education: Not on file   • Highest education level: Not on file   Occupational History   • Not on file   Tobacco Use   • Smoking status: Former Smoker     Packs/day: 1.00     Years: 20.00     Pack years: 20.00     Types: Cigarettes     Quit date: 1990     Years since quittin.3   • Smokeless tobacco: Never Used   Substance and Sexual Activity   • Alcohol use: Yes     Comment: holidays   • Drug use: No   • Sexual activity: Not Currently   Other Topics Concern   • Not on file   Social History Narrative   • Not on file     Social Determinants of Health     Financial Resource Strain:    • Difficulty of Paying Living Expenses:    Food Insecurity:    • Worried About Running Out of Food in the Last Year:    • Ran Out of Food in the Last Year:    Transportation Needs:    • Lack of Transportation (Medical):    • Lack of Transportation (Non-Medical):    Physical Activity:    • Days of Exercise per Week:    • Minutes of Exercise per Session:    Stress:    • Feeling of Stress :    Social Connections:    • Frequency of Communication with Friends and Family:    • Frequency of Social Gatherings with Friends and Family:    • Attends [employer] Services:    • Active Member of Clubs or Organizations:    • Attends Club or Organization Meetings:    • Marital Status:    Intimate Partner Violence:    • Fear of Current or Ex-Partner:    • Emotionally Abused:    • Physically Abused:    • Sexually Abused:           Objective:     /90 (BP Location: Left arm, Patient Position: Sitting, BP Cuff Size: Large adult)   Pulse 87   Temp 37.2 °C (99 °F) (Temporal)   Resp (!) 28   Ht 1.689 m (5' 6.5\")   Wt 81.6 kg (180 lb)   LMP 2006   SpO2 90%   BMI 28.62 kg/m²      Physical Exam  Vitals and nursing note reviewed.   Constitutional:       Appearance: Normal appearance. She is normal weight.   HENT:      Head: Normocephalic and atraumatic.      Right Ear: Tympanic membrane and external ear " normal.      Left Ear: Tympanic membrane and external ear normal.      Nose: Congestion present.      Mouth/Throat:      Mouth: Mucous membranes are moist.      Pharynx: Oropharynx is clear.   Eyes:      Extraocular Movements: Extraocular movements intact.      Pupils: Pupils are equal, round, and reactive to light.   Cardiovascular:      Rate and Rhythm: Normal rate and regular rhythm.   Pulmonary:      Effort: Pulmonary effort is normal. Tachypnea present.      Breath sounds: Decreased breath sounds present.   Chest:       Musculoskeletal:         General: Normal range of motion.      Cervical back: Normal range of motion.   Skin:     General: Skin is warm and dry.      Capillary Refill: Capillary refill takes less than 2 seconds.   Neurological:      General: No focal deficit present.      Mental Status: She is alert and oriented to person, place, and time. Mental status is at baseline.   Psychiatric:         Mood and Affect: Mood normal.         Speech: Speech normal.         Thought Content: Thought content normal.         Judgment: Judgment normal.                 Assessment/Plan:        1. SOB (shortness of breath)    2. Chest heaviness    Discussed with patient concerning reports of low O2 sats last night and she is barely at 90% on RA at rest in the clinic. She has increased respiratory effort at rest  She will likely need admission based on her hypoxia until etiology is known  DDX discussed with patient include but are not limited to PNA, PE, pleural effusions, viral illness  At this time in the evening I will not be able to get labs and imaging to work her up thoroughly, so I have advised her to present to the ER for further eval and possible admission this evening. She agrees  Her mother is with her and will drive her to the ER  Ambulated OOC with steady gait  No acute changes

## 2021-04-20 PROBLEM — R78.81 BACTEREMIA: Status: ACTIVE | Noted: 2021-04-20

## 2021-04-20 LAB
ANION GAP SERPL CALC-SCNC: 14 MMOL/L (ref 7–16)
BUN SERPL-MCNC: 15 MG/DL (ref 8–22)
CALCIUM SERPL-MCNC: 9 MG/DL (ref 8.5–10.5)
CHLORIDE SERPL-SCNC: 99 MMOL/L (ref 96–112)
CO2 SERPL-SCNC: 23 MMOL/L (ref 20–33)
CREAT SERPL-MCNC: 0.75 MG/DL (ref 0.5–1.4)
ERYTHROCYTE [DISTWIDTH] IN BLOOD BY AUTOMATED COUNT: 45.1 FL (ref 35.9–50)
GLUCOSE BLD-MCNC: 101 MG/DL (ref 65–99)
GLUCOSE BLD-MCNC: 104 MG/DL (ref 65–99)
GLUCOSE BLD-MCNC: 113 MG/DL (ref 65–99)
GLUCOSE BLD-MCNC: 144 MG/DL (ref 65–99)
GLUCOSE SERPL-MCNC: 154 MG/DL (ref 65–99)
HCT VFR BLD AUTO: 37.9 % (ref 37–47)
HGB BLD-MCNC: 12.1 G/DL (ref 12–16)
INR PPP: 1.12 (ref 0.87–1.13)
LACTATE BLD-SCNC: 1 MMOL/L (ref 0.5–2)
LACTATE BLD-SCNC: 1.2 MMOL/L (ref 0.5–2)
LACTATE BLD-SCNC: 1.5 MMOL/L (ref 0.5–2)
MCH RBC QN AUTO: 27.9 PG (ref 27–33)
MCHC RBC AUTO-ENTMCNC: 31.9 G/DL (ref 33.6–35)
MCV RBC AUTO: 87.3 FL (ref 81.4–97.8)
PLATELET # BLD AUTO: 258 K/UL (ref 164–446)
PMV BLD AUTO: 9.7 FL (ref 9–12.9)
POTASSIUM SERPL-SCNC: 3.1 MMOL/L (ref 3.6–5.5)
PROCALCITONIN SERPL-MCNC: 3 NG/ML
PROTHROMBIN TIME: 14.7 SEC (ref 12–14.6)
RBC # BLD AUTO: 4.34 M/UL (ref 4.2–5.4)
SODIUM SERPL-SCNC: 136 MMOL/L (ref 135–145)
WBC # BLD AUTO: 16.3 K/UL (ref 4.8–10.8)

## 2021-04-20 PROCEDURE — A9270 NON-COVERED ITEM OR SERVICE: HCPCS | Performed by: HOSPITALIST

## 2021-04-20 PROCEDURE — 99223 1ST HOSP IP/OBS HIGH 75: CPT | Mod: GC | Performed by: INTERNAL MEDICINE

## 2021-04-20 PROCEDURE — A9270 NON-COVERED ITEM OR SERVICE: HCPCS | Performed by: INTERNAL MEDICINE

## 2021-04-20 PROCEDURE — 82962 GLUCOSE BLOOD TEST: CPT | Mod: 91

## 2021-04-20 PROCEDURE — 700102 HCHG RX REV CODE 250 W/ 637 OVERRIDE(OP): Performed by: INTERNAL MEDICINE

## 2021-04-20 PROCEDURE — 99233 SBSQ HOSP IP/OBS HIGH 50: CPT | Performed by: INTERNAL MEDICINE

## 2021-04-20 PROCEDURE — 700105 HCHG RX REV CODE 258: Performed by: HOSPITALIST

## 2021-04-20 PROCEDURE — 700111 HCHG RX REV CODE 636 W/ 250 OVERRIDE (IP): Performed by: HOSPITALIST

## 2021-04-20 PROCEDURE — 770006 HCHG ROOM/CARE - MED/SURG/GYN SEMI*

## 2021-04-20 PROCEDURE — 36415 COLL VENOUS BLD VENIPUNCTURE: CPT

## 2021-04-20 PROCEDURE — 85027 COMPLETE CBC AUTOMATED: CPT

## 2021-04-20 PROCEDURE — 700111 HCHG RX REV CODE 636 W/ 250 OVERRIDE (IP): Performed by: INTERNAL MEDICINE

## 2021-04-20 PROCEDURE — 700102 HCHG RX REV CODE 250 W/ 637 OVERRIDE(OP): Performed by: HOSPITALIST

## 2021-04-20 PROCEDURE — 83605 ASSAY OF LACTIC ACID: CPT

## 2021-04-20 PROCEDURE — 87040 BLOOD CULTURE FOR BACTERIA: CPT

## 2021-04-20 RX ORDER — POTASSIUM CHLORIDE 20 MEQ/1
40 TABLET, EXTENDED RELEASE ORAL ONCE
Status: COMPLETED | OUTPATIENT
Start: 2021-04-20 | End: 2021-04-20

## 2021-04-20 RX ORDER — MAGNESIUM SULFATE HEPTAHYDRATE 40 MG/ML
2 INJECTION, SOLUTION INTRAVENOUS ONCE
Status: COMPLETED | OUTPATIENT
Start: 2021-04-20 | End: 2021-04-20

## 2021-04-20 RX ADMIN — INSULIN GLARGINE 21 UNITS: 100 INJECTION, SOLUTION SUBCUTANEOUS at 17:29

## 2021-04-20 RX ADMIN — OMEPRAZOLE 20 MG: 20 CAPSULE, DELAYED RELEASE ORAL at 17:30

## 2021-04-20 RX ADMIN — SODIUM CHLORIDE 3 G: 900 INJECTION INTRAVENOUS at 03:00

## 2021-04-20 RX ADMIN — IBUPROFEN 800 MG: 800 TABLET, FILM COATED ORAL at 06:11

## 2021-04-20 RX ADMIN — ACETAMINOPHEN 1000 MG: 500 TABLET ORAL at 17:30

## 2021-04-20 RX ADMIN — MAGNESIUM SULFATE 2 G: 2 INJECTION INTRAVENOUS at 08:01

## 2021-04-20 RX ADMIN — ATORVASTATIN CALCIUM 80 MG: 40 TABLET, FILM COATED ORAL at 17:30

## 2021-04-20 RX ADMIN — ACETAMINOPHEN 1000 MG: 500 TABLET ORAL at 12:10

## 2021-04-20 RX ADMIN — ENOXAPARIN SODIUM 40 MG: 40 INJECTION SUBCUTANEOUS at 06:11

## 2021-04-20 RX ADMIN — DOCUSATE SODIUM 50 MG AND SENNOSIDES 8.6 MG 2 TABLET: 8.6; 5 TABLET, FILM COATED ORAL at 17:30

## 2021-04-20 RX ADMIN — SODIUM CHLORIDE 3 G: 900 INJECTION INTRAVENOUS at 14:35

## 2021-04-20 RX ADMIN — SODIUM CHLORIDE 3 G: 900 INJECTION INTRAVENOUS at 09:43

## 2021-04-20 RX ADMIN — ASPIRIN 81 MG: 81 TABLET, COATED ORAL at 17:30

## 2021-04-20 RX ADMIN — POTASSIUM CHLORIDE 40 MEQ: 1500 TABLET, EXTENDED RELEASE ORAL at 08:01

## 2021-04-20 RX ADMIN — SODIUM CHLORIDE 3 G: 900 INJECTION INTRAVENOUS at 21:31

## 2021-04-20 RX ADMIN — ACETAMINOPHEN 1000 MG: 500 TABLET ORAL at 06:11

## 2021-04-20 ASSESSMENT — ENCOUNTER SYMPTOMS
BLURRED VISION: 0
PALPITATIONS: 0
ORTHOPNEA: 0
DIZZINESS: 0
HEADACHES: 0
DIARRHEA: 0
ABDOMINAL PAIN: 0
CONSTIPATION: 0
VOMITING: 0
HEMOPTYSIS: 0
FEVER: 0
NAUSEA: 0
SORE THROAT: 0
SPUTUM PRODUCTION: 0
COUGH: 1
BACK PAIN: 1
WHEEZING: 0
CHILLS: 0
SHORTNESS OF BREATH: 1
MYALGIAS: 1

## 2021-04-20 ASSESSMENT — PAIN DESCRIPTION - PAIN TYPE
TYPE: ACUTE PAIN

## 2021-04-20 NOTE — HOSPITAL COURSE
65-year-old female past medical history diabetes, hypertension, dyslipidemia presented 4/19/2021 with shortness of breath, cough for last 3 days. Her shortness of breath gradually worsening. Per pt Apple Watch showed saturation 85%. She had covid 09/2020 completely recovered. Her grandson was recently sick with a cold. On admission   patient tachycardic at 95, respiratory rate 18, /80, 92% on room air, WBC 17.3, K 3.1, LA pending, Covid negative.      Chest x-ray showing left upper lobe consolidation.  CT chest confirming consolidation without any evidence of PE.     In ER she was given Unasyn and azithromycin  4/20- showed bacteremia strep and ID was consulted.

## 2021-04-20 NOTE — ED TRIAGE NOTES
"Tara Cueva   65 y.o. female   Patient presents with:  Chief Complaint   Patient presents with   • Shortness of Breath     Pt ambulated steady gait to triage for above complaint.     Patient presents to ED for SOB.  Started Friday night.  Patient noticed her pulse ox average was 86% on her apple watch.  Pain in Chest left of center, pain with inspiration 8/10. Pain worse with coughing.    Hx of COVID in September 2020.     Pt is alert and oriented, speaking in full sentences, follows commands and responds appropriately to questions. NAD. Resp are even and unlabored.     Pt placed in waiting room. Pt educated on triage process. Pt encouraged to alert staff for any changes.    Patient and staff wearing appropriate PPE    /80   Pulse 95   Temp 37.1 °C (98.8 °F) (Oral)   Resp 18   Ht 1.676 m (5' 6\")   Wt 85.5 kg (188 lb 7.9 oz)   LMP 12/16/2006   SpO2 94%   BMI 30.42 kg/m²     "

## 2021-04-20 NOTE — CONSULTS
[facility] INPATIENT CONSULT NOTE     Date of Service: 4/20/2021    Consult Requested By: Corey Bennett M.D.    Reason for Consultation: CAP and strep bacteremia    Chief Complaint: Cough and SOB    History of Present Illness:     Tara Cueva is a 65-year-old female with PMH of diabetes, HTN, and dyslipidemia who presented on 4/19/2021 with a 3-day history of progressively worsening cough and SOB. She describes her cough as dry and associated with sharp left sided chest pain that is worse inspiration. She reports SpO2 of 85% last night per her Apple Watch. She denies prior history of such symptoms. She denies associated symptoms of fever, chills, sore throat, congestion, sputum production, nausea, vomiting, abdominal pain, LUTS, rash, pruritis, dizziness, headaches, and abnormal bleeding or bruising. She denies heavy alcohol use, she reports drinking a glass of wine occasionally. She has a 20 pack-year smoking history and quit smoking in 1990.     Review of Systems:  All other systems reviewed and are negative expect as noted in HPI    Past Medical History:   Diagnosis Date   • COVID-19    • Diabetes 5/12/2011   • Edema 5/12/2011   • Elevated liver enzymes 5/12/2011   • Gall bladder polyp 8/12/2011   • GERD (gastroesophageal reflux disease) 5/12/2011   • Hyperlipidemia 5/12/2011   • Hypertension 5/12/2011   • Post-menopausal atrophic vaginitis 4/17/2020   • Skin lesion of face 5/12/2011   • Vitamin d deficiency 5/12/2011     Past Surgical History:   Procedure Laterality Date   • OTHER ORTHOPEDIC SURGERY      right arm ORIF   • PRIMARY C SECTION     • TUBAL COAGULATION LAPAROSCOPIC BILATERAL       Family History   Problem Relation Age of Onset   • Cancer Maternal Grandmother         lung    • Cancer Mother         breast   • Other Father         urinary issues   • GI Disease Sister    • Heart Disease Brother      Social History     Socioeconomic History   • Marital status:      Spouse name: Not  on file   • Number of children: Not on file   • Years of education: Not on file   • Highest education level: Not on file   Occupational History   • Not on file   Tobacco Use   • Smoking status: Former Smoker     Packs/day: 1.00     Years: 20.00     Pack years: 20.00     Types: Cigarettes     Quit date: 1990     Years since quittin.3   • Smokeless tobacco: Never Used   Substance and Sexual Activity   • Alcohol use: Yes     Comment: holidays   • Drug use: No   • Sexual activity: Not Currently   Other Topics Concern   • Not on file   Social History Narrative   • Not on file     Social Determinants of Health     Financial Resource Strain:    • Difficulty of Paying Living Expenses:    Food Insecurity:    • Worried About Running Out of Food in the Last Year:    • Ran Out of Food in the Last Year:    Transportation Needs:    • Lack of Transportation (Medical):    • Lack of Transportation (Non-Medical):    Physical Activity:    • Days of Exercise per Week:    • Minutes of Exercise per Session:    Stress:    • Feeling of Stress :    Social Connections:    • Frequency of Communication with Friends and Family:    • Frequency of Social Gatherings with Friends and Family:    • Attends [employer] Services:    • Active Member of Clubs or Organizations:    • Attends Club or Organization Meetings:    • Marital Status:    Intimate Partner Violence:    • Fear of Current or Ex-Partner:    • Emotionally Abused:    • Physically Abused:    • Sexually Abused:      Allergies   Allergen Reactions   • Sulfa Drugs Vomiting       Current Facility-Administered Medications:   •  aspirin EC (ECOTRIN) tablet 81 mg, 81 mg, Oral, Q EVENING, Jeanne Sims M.D., 81 mg at 21 2218  •  atorvastatin (LIPITOR) tablet 80 mg, 80 mg, Oral, Q EVENING, Jeanne Sims M.D., 80 mg at 21 2218  •  insulin glargine (Lantus) injection, 21 Units, Subcutaneous, Q EVENING, Jeanne Sims M.D., 21 Units at 21 2338  •  omeprazole (PRILOSEC)  capsule 20 mg, 20 mg, Oral, Q EVENING, Jeanne Sims M.D., 20 mg at 04/19/21 2218  •  senna-docusate (PERICOLACE or SENOKOT S) 8.6-50 MG per tablet 2 tablet, 2 tablet, Oral, BID **AND** polyethylene glycol/lytes (MIRALAX) PACKET 1 Packet, 1 Packet, Oral, QDAY PRN **AND** magnesium hydroxide (MILK OF MAGNESIA) suspension 30 mL, 30 mL, Oral, QDAY PRN **AND** bisacodyl (DULCOLAX) suppository 10 mg, 10 mg, Rectal, QDAY PRN, Jeanne Sims M.D.  •  lactated ringers infusion (BOLUS), 500 mL, Intravenous, Once PRN, Jeanne Sims M.D.  •  lactated ringers infusion (BOLUS): BMI greater than 30, 30 mL/kg (Ideal), Intravenous, Once PRN, Jeanne Sims M.D.  •  enoxaparin (LOVENOX) inj 40 mg, 40 mg, Subcutaneous, DAILY, Jeanne Sims M.D., 40 mg at 04/20/21 0611  •  ampicillin/sulbactam (UNASYN) 3 g in  mL IVPB, 3 g, Intravenous, Q6HRS, Jeanne Sims M.D., Stopped at 04/20/21 1013  •  guaiFENesin dextromethorphan (ROBITUSSIN DM) 100-10 MG/5ML syrup 10 mL, 10 mL, Oral, Q6HRS PRN, Jeanne Sims M.D.  •  insulin regular (HumuLIN R,NovoLIN R) injection, 1-6 Units, Subcutaneous, 4X/DAY ACHS, Stopped at 04/20/21 0700 **AND** POC blood glucose manual result, , , Q AC AND BEDTIME(S) **AND** NOTIFY MD and PharmD, , , Once **AND** glucose 4 g chewable tablet 16 g, 16 g, Oral, Q15 MIN PRN **AND** dextrose 50% (D50W) injection 50 mL, 50 mL, Intravenous, Q15 MIN PRN, Jeanne Sims M.D.  •  melatonin tablet 5 mg, 5 mg, Oral, Nightly, Jeanne Sims M.D.  •  Pharmacy Consult Request ...Pain Management Review 1 Each, 1 Each, Other, PHARMACY TO DOSE, Jeanne Sims M.D.  •  acetaminophen (TYLENOL) tablet 1,000 mg, 1,000 mg, Oral, Q6HRS, 1,000 mg at 04/20/21 0611 **FOLLOWED BY** [START ON 4/25/2021] acetaminophen (TYLENOL) tablet 1,000 mg, 1,000 mg, Oral, Q6HRS PRN, Jeanne Sims M.D.  •  oxyCODONE immediate-release (ROXICODONE) tablet 2.5 mg, 2.5 mg, Oral, Q3HRS PRN **OR** oxyCODONE immediate-release (ROXICODONE) tablet 5 mg, 5 mg,  "Oral, Q3HRS PRN, 5 mg at 21 2218 **OR** HYDROmorphone (Dilaudid) injection 0.25 mg, 0.25 mg, Intravenous, Q3HRS PRN, Jeanne Sims M.D.      Physical Exam:     Vital Signs:   /57   Pulse 61   Temp 36.2 °C (97.1 °F) (Temporal)   Resp 16   Ht 1.676 m (5' 6\")   Wt 85.5 kg (188 lb 7.9 oz)   LMP 2006   SpO2 95%   BMI 30.42 kg/m²   Temp  Av.8 °C (98.2 °F)  Min: 36.1 °C (97 °F)  Max: 37.7 °C (99.8 °F)  Vital signs reviewed.    Constitutional: Awake, appears stated age, no apparent distress.   Head: Normocephalic, atraumatic.   Eyes: Conjunctivae normal, EOM intact, sclerae anicteric.   Mouth-Throat: Lips without lesions, oropharynx is clear and moist.  Neck: Supple, no masses or lymphadenopathy appreciated.   Cardiovascular: Normal rate, regular rhythm, no murmurs, no pedal edema.  Pulmonary-Chest: No respiratory distress, unlabored respiratory effort, lungs clear to auscultation, no wheezes or rales.   Abdominal: Soft, obese, non-tender, bowel sounds present, no masses or hepatosplenomegaly appreciated.   Musculoskeletal: No joint tenderness, swelling, erythema, or restriction of motion noted.  Neurological: Alert, oriented to person, place, and time. No gross cranial nerve or focal neural deficits noted.  Skin: Warm and dry. No suspicious rashes or lesions on visualized skin.       LABS:  Recent Labs     21  2336   SODIUM 135  --  136   POTASSIUM 3.1*  --  3.1*   CHLORIDE 99  --  99   CO2 24  --  23   BUN 16  --  15   CREATININE 0.83  --  0.75   MAGNESIUM  --  1.8  --    CALCIUM 9.5  --  9.0     Recent Labs     21  2336   ALTSGPT 19  --    ASTSGOT 18  --    ALKPHOSPHAT 98  --    TBILIRUBIN 1.1  --    GLUCOSE 201* 154*     Recent Labs     21/19/21  2336 21  0110   RBC 4.90  --  4.34   HEMOGLOBIN 13.7  --  12.1   HEMATOCRIT 43.6  --  37.9   PLATELETCT 311  --  258   PROTHROMBTM  --  14.7*  --    INR  --  1.12  --  " "    Recent Labs     04/19/21  1820 04/20/21  0110   WBC 17.3* 16.3*   NEUTSPOLYS 81.90*  --    LYMPHOCYTES 12.50*  --    MONOCYTES 4.80  --    EOSINOPHILS 0.10  --    BASOPHILS 0.10  --    ASTSGOT 18  --    ALTSGPT 19  --    ALKPHOSPHAT 98  --    TBILIRUBIN 1.1  --      Tested negative for SARS-CoV-2, RSV and Influenza A and B by PCR   A1c 6.9 on 1/20/21  Serial lactates WNL  Normal trop-T 11      MICRO:  Results     Procedure Component Value Units Date/Time    BLOOD CULTURE [[id number]]  (Abnormal) Collected: 04/19/21 1908    Order Status: Completed Specimen: Blood from Peripheral Updated: 04/20/21 0825     Significant Indicator POS     Source BLD     Site PERIPHERAL     Culture Result Growth detected by Bactec instrument. 04/20/2021  08:24  Gram Stain: Gram positive cocci: Possible Streptococcus sp.      Narrative:      1 of 2 for Blood Culture x 2 sites order. Per Hospital  Policy: Only change Specimen Src: to \"Line\" if specified by  physician order.  No site indicated    Blood Culture [[id number]] Collected: 04/20/21 0110    Order Status: Completed Specimen: Blood Updated: 04/20/21 0122    Narrative:      ORDER WAS CANCELLED 04/19/2021 22:11, Duplicate ..  From different peripheral sites, if not done within the last  24 hours (Per Hospital Policy: Only change specimen source to  \"Line\" if specified by physician order)    Culture Respiratory W/ GRM STN [[id number]]     Order Status: Completed Specimen: Respirate from Sputum     Culture Respiratory W/ Grm Stn [[id number]]     Order Status: Completed Specimen: Respirate from Sputum     Blood Culture [[id number]] Collected: 04/19/21 2046    Order Status: Canceled Specimen: Other from Peripheral     Blood Culture [[id number]] Collected: 04/19/21 2046    Order Status: Canceled Specimen: Other from Peripheral     COV-2, FLU A/B, AND RSV BY PCR (2-4 HOURS CEPHEID): Collect NP swab in VTM [[id number]] Collected: 04/19/21 1447    Order Status: Completed Specimen: " "Respirate Updated: 04/19/21 2018     Influenza virus A RNA Negative     Influenza virus B, PCR Negative     RSV, PCR Negative     SARS-CoV-2 by PCR NotDetected     Comment: PATIENTS: Important information regarding your results and instructions can  be found at [email or web address]   \"After your  Covid-19 Test\"  [facility] providers: PLEASE REFER TO DE-ESCALATION AND RETESTING PROTOCOL  on [email or web address]  **The [employer] GeneXpert Xpress SARS-CoV-2 RT-PCR Test has been made  available for use under the Emergency Use Authorization (EUA) only.          SARS-CoV-2 Source NP Swab    Narrative:      Have you been in close contact with a person who is suspected  or known to be positive for COVID-19 within the last 30 days  (e.g. last seen that person < 30 days ago)->No    BLOOD CULTURE [[id number]] Collected: 04/19/21 1901    Order Status: Canceled Specimen: Other from Peripheral         Latest pertinent labs were reviewed.      IMAGING STUDIES:    4/19/21 CT-CTA CHEST PULMONARY ARTERY WITH RECONS  No evidence of pulmonary embolus.  Left upper lobe consolidation most consistent with pneumonia. Recommend follow-up chest x-ray in one to 2 weeks after appropriate treatment to ensure resolution.  Trace left pleural effusion with left basilar atelectasis.    4/19/21 DX-CHEST-PORTABLE  Left upper lobe consolidation could be due to pneumonia, scarring or mass. Recommend follow-up chest x-ray one to 2 weeks after appropriate treatment to ensure resolution. Alternatively this could be further evaluated sooner with chest CT.      Assessment and Recommendations:   65-year-old female admitted with sepsis secondary to community acquired pneumonia. CT-PA negative for PE but showed a L upper lobe consolidation. Started on empiric Unasyn and azithromycin. 1 of 1 blood cultures growing Gram positive cocci in chains. Leukocytosis trended down. Afebrile since admission; no longer tachycardic or tachypneic. "     Pertinent Diagnoses:  Sepsis, resolved   Community acquired pneumonia   Gram positive bacteremia   Type 2 diabetes, well controlled  Hypokalemia     · Azithromycin discontinued per culture results; continue Unasyn for community acquired pneumonia and bacteremia; duration of antibiotic course TBD  · Repeat blood cultures ordered for tomorrow, 4/21/21  · Monitor leukocytosis and VS  · Diabetes management and maintenance of electrolyte balance per primary team   · ID will continue to follow      Plan was discussed with the primary team. Please feel free to call with questions.

## 2021-04-20 NOTE — PROGRESS NOTES
[facility] Medicine Daily Progress Note    Date of Service  4/20/2021    Chief Complaint  65 y.o. female admitted 4/19/2021 with shortness of breath    Hospital Course  65-year-old female past medical history diabetes, hypertension, dyslipidemia presented 4/19/2021 with shortness of breath, cough for last 3 days. Her shortness of breath gradually worsening. Per pt Apple Watch showed saturation 85%. She had covid 09/2020 completely recovered. Her grandson was recently sick with a cold. On admission   patient tachycardic at 95, respiratory rate 18, /80, 92% on room air, WBC 17.3, K 3.1, LA pending, Covid negative.      Chest x-ray showing left upper lobe consolidation.  CT chest confirming consolidation without any evidence of PE.     In ER she was given Unasyn and azithromycin  4/20- showed bacteremia strep and ID was consulted.       Interval Problem Update  Pt feeling better. Breathing improving. Bacteremia positive. ID consulted     Consultants/Specialty  ID     Code Status  Full Code    Disposition  Inpatient     Review of Systems  Review of Systems   Constitutional: Positive for malaise/fatigue. Negative for chills and fever.   HENT: Positive for congestion. Negative for sore throat.    Eyes: Negative for blurred vision.   Respiratory: Positive for cough and shortness of breath. Negative for hemoptysis, sputum production and wheezing.    Cardiovascular: Positive for chest pain. Negative for palpitations, orthopnea and leg swelling.   Gastrointestinal: Negative for abdominal pain, constipation, diarrhea, nausea and vomiting.   Genitourinary: Negative for dysuria and urgency.   Musculoskeletal: Positive for back pain and myalgias.   Neurological: Negative for dizziness and headaches.        Physical Exam  Temp:  [36.1 °C (97 °F)-37.7 °C (99.8 °F)] 36.2 °C (97.1 °F)  Pulse:  [61-96] 61  Resp:  [16-24] 16  BP: (101-148)/(57-80) 104/57  SpO2:  [92 %-96 %] 95 %    Physical Exam  Vitals and nursing note reviewed.    Constitutional:       Appearance: She is ill-appearing.   HENT:      Head: Normocephalic and atraumatic.      Right Ear: External ear normal.      Left Ear: External ear normal.   Eyes:      General: No scleral icterus.  Cardiovascular:      Rate and Rhythm: Normal rate.      Heart sounds: No murmur.   Pulmonary:      Effort: Pulmonary effort is normal.      Breath sounds: Rhonchi present. No wheezing or rales.   Abdominal:      General: There is no distension.      Palpations: Abdomen is soft.      Tenderness: There is no abdominal tenderness. There is no guarding.   Musculoskeletal:         General: No swelling or tenderness.   Skin:     Coloration: Skin is not jaundiced.   Neurological:      General: No focal deficit present.      Mental Status: She is alert and oriented to person, place, and time.      Cranial Nerves: No cranial nerve deficit.   Psychiatric:         Mood and Affect: Mood normal.         Thought Content: Thought content normal.         Judgment: Judgment normal.         Fluids    Intake/Output Summary (Last 24 hours) at 4/20/2021 1144  Last data filed at 4/20/2021 0801  Gross per 24 hour   Intake 50 ml   Output --   Net 50 ml       Laboratory  Recent Labs     04/19/21  1820 04/20/21  0110   WBC 17.3* 16.3*   RBC 4.90 4.34   HEMOGLOBIN 13.7 12.1   HEMATOCRIT 43.6 37.9   MCV 89.0 87.3   MCH 28.0 27.9   MCHC 31.4* 31.9*   RDW 46.5 45.1   PLATELETCT 311 258   MPV 9.9 9.7     Recent Labs     04/19/21  1820 04/19/21  2336   SODIUM 135 136   POTASSIUM 3.1* 3.1*   CHLORIDE 99 99   CO2 24 23   GLUCOSE 201* 154*   BUN 16 15   CREATININE 0.83 0.75   CALCIUM 9.5 9.0     Recent Labs     04/19/21  2336   INR 1.12               Imaging  CT-CTA CHEST PULMONARY ARTERY W/ RECONS   Final Result      1.  No evidence of pulmonary embolus.      2.  Left upper lobe consolidation most consistent with pneumonia. Recommend follow-up chest x-ray in one to 2 weeks after appropriate treatment to ensure resolution.      3.   Trace left pleural effusion with left basilar atelectasis.            DX-CHEST-PORTABLE (1 VIEW)   Final Result      Left upper lobe consolidation could be due to pneumonia, scarring or mass. Recommend follow-up chest x-ray one to 2 weeks after appropriate treatment to ensure resolution. Alternatively this could be further evaluated sooner with chest CT.           Assessment/Plan  * Sepsis (HCC)- (present on admission)  Assessment & Plan  This is Sepsis Present on admission  SIRS criteria identified on my evaluation include: Tachycardia, with heart rate greater than 90 BPM and Leukocytosis, with WBC greater than 12,000  Source is pneumonia   Suspected onset of infection (date and time) NA  Sepsis protocol initiated  Fluid resuscitation ordered per protocol  IV antibiotics as appropriate for source of sepsis  While organ dysfunction may be noted elsewhere in this problem list or in the chart, degree of organ dysfunction does not meet CMS criteria for severe sepsis  4/20-improving. Continue to monitor.  Current antibiotic. Follow-up with infectious disease          Bacteremia- (present on admission)  Assessment & Plan  + strep. ID consulted.   Appreciate recs  Most likely from Pneumonia.   Consider echo if ID recommend     Community acquired pneumonia of left upper lobe of lung- (present on admission)  Assessment & Plan  I have started her on Unasyn and azithromycin  As needed Robitussin-DM    Type 2 diabetes mellitus with microalbuminuria, with long-term current use of insulin (HCC)- (present on admission)  Assessment & Plan  Last A1c 6.93 months ago .  resume home Lantus of 21 units  Hold home oral hypoglycemics  Start ISS, diabetic diet, hypoglycemic protocol    Hypokalemia- (present on admission)  Assessment & Plan  Replaced.  Continue to monitor  Check magnesium levels    Hyperlipidemia- (present on admission)  Assessment & Plan  Continue statin       VTE prophylaxis: lovenox

## 2021-04-20 NOTE — PROGRESS NOTES
Assumed care of patient at 0645. Bedside report received. Assessment complete.  AA&Ox4. Denies CP/SOB.  Reporting minimal pain. Declined pharmacologic intervention at this time.   Educated patient regarding pharmacologic and non pharmacologic modalities for pain management.  Tolerating diet. Denies N/V.  + void. Last BM PTA  Pt ambulates SBA.  All needs met at this time. Call light within reach. Pt calls appropriately. Bed low and locked, non skid socks in place. Hourly rounding in place.

## 2021-04-20 NOTE — ASSESSMENT & PLAN NOTE
This is Sepsis Present on admission  SIRS criteria identified on my evaluation include: Tachycardia, with heart rate greater than 90 BPM and Leukocytosis, with WBC greater than 12,000  Source is pneumonia   Suspected onset of infection (date and time) NA  Sepsis protocol initiated  Fluid resuscitation ordered per protocol  IV antibiotics as appropriate for source of sepsis  While organ dysfunction may be noted elsewhere in this problem list or in the chart, degree of organ dysfunction does not meet CMS criteria for severe sepsis  4/20-improving. Continue to monitor.  Current antibiotic. Follow-up with infectious disease

## 2021-04-20 NOTE — ASSESSMENT & PLAN NOTE
Last A1c 6.93 months ago .  resume home Lantus of 21 units  Hold home oral hypoglycemics  Start ISS, diabetic diet, hypoglycemic protocol

## 2021-04-20 NOTE — ED PROVIDER NOTES
ED Provider  Scribed for Russel Martin D.O. by Jaime Hilton. 2021  6:50 PM    Means of arrival:Walk-In  History obtained from:Patient  History limited by: None    CHIEF COMPLAINT  Chief Complaint   Patient presents with    Shortness of Breath       HPI  Tara Cueva is a 65 y.o. female who presents for evaluation of acute, constant shortness of breath onset two days ago. She was resting in bed last night feeling short of breath when her Apple Watch reported an oxygen saturation of 86%. All day today she continued to have shortness of breath and chest pain with inhalation with prompted her to present to urgent care. At urgent care, she had a pulse ox reading of 85% and she was instructed to present to the ED. Her shortness of breath is unchanged with exertion and is exacerbated with left-sided chest pain. She was diagnosed with Covid-19 in September and was not hospitalized. The patient reports associated chest pain with inhalation. Negative for nausea, vomiting, fever, diaphoresis, or syncope. The patient has a history of diabetes and hypercholesterolemia. No family history of early myocardial infarctions.      REVIEW OF SYSTEMS  See HPI for further details. All other systems are negative.     PAST MEDICAL HISTORY   has a past medical history of COVID-19, Diabetes (2011), Edema (2011), Elevated liver enzymes (2011), Gall bladder polyp (2011), GERD (gastroesophageal reflux disease) (2011), Hyperlipidemia (2011), Hypertension (2011), Post-menopausal atrophic vaginitis (2020), Skin lesion of face (2011), and Vitamin d deficiency (2011).    SOCIAL HISTORY  Social History     Tobacco Use    Smoking status: Former Smoker     Packs/day: 1.00     Years: 20.00     Pack years: 20.00     Types: Cigarettes     Quit date: 1990     Years since quittin.3    Smokeless tobacco: Never Used   Substance and Sexual Activity    Alcohol use: Yes     Comment: holidays     "Drug use: No    Sexual activity: Not Currently       SURGICAL HISTORY   has a past surgical history that includes primary c section; other orthopedic surgery; and tubal coagulation laparoscopic bilateral.    CURRENT MEDICATIONS  Home Medications       Reviewed by Hollie Graves (Pharmacy Tech) on 04/19/21 at 1946  Med List Status: Complete     Medication Last Dose Status   aspirin EC (ECOTRIN) 81 MG TBEC 4/18/2021 Active   atorvastatin (LIPITOR) 80 MG tablet 4/18/2021 Active   Cholecalciferol (VITAMIN D) 400 UNIT TABS 4/18/2021 Active   Cyanocobalamin (B-12) 1000 MCG Cap 4/18/2021 Active   insulin glargine (LANTUS SOLOSTAR) 100 UNIT/ML Solution Pen-injector injection 4/18/2021 Active   JARDIANCE 25 MG Tab 4/18/2021 Active   omeprazole (PRILOSEC) 20 MG delayed-release capsule 4/18/2021 Active   pioglitazone (ACTOS) 30 MG Tab 4/18/2021 Active   VICTOZA 18 MG/3ML Solution Pen-injector 4/18/2021 Active                    ALLERGIES  Allergies   Allergen Reactions    Sulfa Drugs Vomiting       PHYSICAL EXAM  VITAL SIGNS: /80   Pulse 95   Temp 37.1 °C (98.8 °F) (Oral)   Resp 18   Ht 1.676 m (5' 6\")   Wt 85.5 kg (188 lb 7.9 oz)   LMP 12/16/2006   SpO2 94%   BMI 30.42 kg/m²   Constitutional: Alert in no apparent distress.  HENT: No signs of trauma, mucous membranes are moist  Eyes: Conjunctiva normal, Non-icteric.   Neck: Normal range of motion, No tenderness, Supple.  Lymphatic: No lymphadenopathy noted.   Cardiovascular: Regular rate and rhythm, no murmurs.   Thorax & Lungs: Normal breath sounds, No respiratory distress, No wheezing, No chest tenderness.   Abdomen: Bowel sounds normal, Soft, No tenderness, No masses, No pulsatile masses. No peritoneal signs.  Skin: Warm, Dry, normal color.   Back: No bony tenderness, No CVA tenderness.   Extremities: No edema, No tenderness, No cyanosis  Musculoskeletal: Good range of motion in all major joints. No tenderness to palpation or major deformities noted. "   Neurologic: Alert and oriented x4, Normal motor function, Normal sensory function, No focal deficits noted.   Psychiatric: Affect normal, Judgment normal, Mood normal.     DIAGNOSTIC STUDIES / PROCEDURES    EKG  12 Lead EKG interpreted by me shown below.      LABS  Results for orders placed or performed during the hospital encounter of 04/19/21   CBC with Differential   Result Value Ref Range    WBC 17.3 (H) 4.8 - 10.8 K/uL    RBC 4.90 4.20 - 5.40 M/uL    Hemoglobin 13.7 12.0 - 16.0 g/dL    Hematocrit 43.6 37.0 - 47.0 %    MCV 89.0 81.4 - 97.8 fL    MCH 28.0 27.0 - 33.0 pg    MCHC 31.4 (L) 33.6 - 35.0 g/dL    RDW 46.5 35.9 - 50.0 fL    Platelet Count 311 164 - 446 K/uL    MPV 9.9 9.0 - 12.9 fL    Neutrophils-Polys 81.90 (H) 44.00 - 72.00 %    Lymphocytes 12.50 (L) 22.00 - 41.00 %    Monocytes 4.80 0.00 - 13.40 %    Eosinophils 0.10 0.00 - 6.90 %    Basophils 0.10 0.00 - 1.80 %    Immature Granulocytes 0.60 0.00 - 0.90 %    Nucleated RBC 0.00 /100 WBC    Neutrophils (Absolute) 14.19 (H) 2.00 - 7.15 K/uL    Lymphs (Absolute) 2.16 1.00 - 4.80 K/uL    Monos (Absolute) 0.84 0.00 - 0.85 K/uL    Eos (Absolute) 0.01 0.00 - 0.51 K/uL    Baso (Absolute) 0.02 0.00 - 0.12 K/uL    Immature Granulocytes (abs) 0.10 0.00 - 0.11 K/uL    NRBC (Absolute) 0.00 K/uL   Complete Metabolic Panel (CMP)   Result Value Ref Range    Sodium 135 135 - 145 mmol/L    Potassium 3.1 (L) 3.6 - 5.5 mmol/L    Chloride 99 96 - 112 mmol/L    Co2 24 20 - 33 mmol/L    Anion Gap 12.0 7.0 - 16.0    Glucose 201 (H) 65 - 99 mg/dL    Bun 16 8 - 22 mg/dL    Creatinine 0.83 0.50 - 1.40 mg/dL    Calcium 9.5 8.5 - 10.5 mg/dL    AST(SGOT) 18 12 - 45 U/L    ALT(SGPT) 19 2 - 50 U/L    Alkaline Phosphatase 98 30 - 99 U/L    Total Bilirubin 1.1 0.1 - 1.5 mg/dL    Albumin 3.5 3.2 - 4.9 g/dL    Total Protein 7.8 6.0 - 8.2 g/dL    Globulin 4.3 (H) 1.9 - 3.5 g/dL    A-G Ratio 0.8 g/dL   Troponin   Result Value Ref Range    Troponin T 11 6 - 19 ng/L   LACTIC ACID   Result  Value Ref Range    Lactic Acid 1.3 0.5 - 2.0 mmol/L   ESTIMATED GFR   Result Value Ref Range    GFR If African American >60 >60 mL/min/1.73 m 2    GFR If Non African American >60 >60 mL/min/1.73 m 2   COV-2, FLU A/B, AND RSV BY PCR (2-4 HOURS CEPHEID): Collect NP swab in VTM    Specimen: Respirate   Result Value Ref Range    Influenza virus A RNA Negative Negative    Influenza virus B, PCR Negative Negative    RSV, PCR Negative Negative    SARS-CoV-2 by PCR NotDetected     SARS-CoV-2 Source NP Swab    Magnesium   Result Value Ref Range    Magnesium 1.8 1.5 - 2.5 mg/dL   Blood Culture    Specimen: Blood   Result Value Ref Range    Significant Indicator NEG     Source BLD     Site -     Culture Result -    EKG (NOW)   Result Value Ref Range    Report       [facility]    Test Date:  2021  Pt Name:    ZHANNA CHAKRABORTY                   Department: ER  MRN:        [id number]                      Room:  Gender:     Female                       Technician: [id number]  :        [id number]                   Requested By:ER TRIAGE PROTOCOL  Order #:    [id number]                    Reading MD: HERIBERTO ROBERTSON D.O.    Measurements  Intervals                                Axis  Rate:       94                           P:          60  NY:         136                          QRS:        51  QRSD:       96                           T:          43  QT:         320  QTc:        401    Interpretive Statements  SINUS RHYTHM  BORDERLINE T WAVE ABNORMALITIES  BASELINE WANDER IN LEAD(S) V1  No previous ECG available for comparison  Electronically Signed On 2021 19:12:30 PDT by HERIBERTO ROBERTSON D.O.       All labs reviewed by me.    RADIOLOGY  CT-CTA CHEST PULMONARY ARTERY W/ RECONS   Final Result      1.  No evidence of pulmonary embolus.      2.  Left upper lobe consolidation most consistent with pneumonia. Recommend follow-up chest x-ray in one to 2 weeks after appropriate treatment to ensure  resolution.      3.  Trace left pleural effusion with left basilar atelectasis.            DX-CHEST-PORTABLE (1 VIEW)   Final Result      Left upper lobe consolidation could be due to pneumonia, scarring or mass. Recommend follow-up chest x-ray one to 2 weeks after appropriate treatment to ensure resolution. Alternatively this could be further evaluated sooner with chest CT.        The radiologist's interpretations of all radiological studies have been reviewed by me.    Films have been independently by me      COURSE  Pertinent Labs & Imaging studies reviewed. (See chart for details)    6:05 PM - Ordered for DX Chest, Troponin, CMP, CBC with Differential, and EKG to evaluate the patient.     6:50 PM - Patient seen and examined at bedside. Discussed plan of care. The patient will be medicated with Zithromax 500 mg and Unasyn 3 g. Ordered for Lactic Acid, Blood Culture, CT CTA Chest Pulmonary, and CoV-2 Flu A/B and RSV by PCR to evaluate her symptoms. Discussed likelihood of admission for further evaluation. I will await lab and radiology results before determining whether interventions are necessary. The patient is agreeable and understanding of my plan of care.     7:15 PM - Paged Hospitalist.     7:21 PM - I spoke with Dr. Sims (Hospitalist) and updated her on the condition of the patient. She agrees to admit the patient under her care for further evaluation and treatment.     MEDICAL DECISION MAKING  This is a 65 y.o. female who presents with complaints of shortness of breath.  She was hypoxic in the ER urgent care.  There is concerns for pulmonary embolism, congestive heart failure and pneumonia.  X-rays were done shows left upper lobe pneumonia.  Her O2 saturations here were 90% on room air she was placed on O2 and saturations were 9495%.  She was started on antibiotics empirically here and admitted for further evaluation and treatment.      DISPOSITION:  Patient will be hospitalized by Dr. Sims (Hospitalist)  in stable condition.    FINAL IMPRESSION  1. Acute respiratory failure with hypoxia (HCC)    2. Pneumonia of left upper lobe due to infectious organism         Jaime HART (Scribe), am scribing for, and in the presence of, Russel Martin D.O..    Electronically signed by: Jaime Hilton (Scribe), 4/19/2021    Russel HART D.O. personally performed the services described in this documentation, as scribed by Jaime Hilton in my presence, and it is both accurate and complete.      C.     The note accurately reflects work and decisions made by me.  Russel Martin D.O.  4/19/2021  11:58 PM

## 2021-04-20 NOTE — CARE PLAN
Problem: Communication  Goal: The ability to communicate needs accurately and effectively will improve  Outcome: PROGRESSING AS EXPECTED   Pt education provided on pt's need to communicate pain level.       Problem: Safety  Goal: Will remain free from injury  Outcome: PROGRESSING AS EXPECTED  Goal: Will remain free from falls  Outcome: PROGRESSING AS EXPECTED   Safety precautions in place. Education on safety and falls provided to pt.       Problem: Pain Management  Goal: Pain level will decrease to patient's comfort goal  Outcome: PROGRESSING AS EXPECTED   Pain medication given to pt as required by proper pain scale. Medication alternatives provided to pt.

## 2021-04-20 NOTE — H&P
Hospital Medicine History & Physical Note    Date of Service  4/19/2021    Primary Care Physician  Constantin Moon M.D.    Consultants  none    Code Status  Full Code    Chief Complaint  Chief Complaint   Patient presents with   • Shortness of Breath       History of Presenting Illness  65 y.o. female with past medical history of diabetes, hypertension, dyslipidemia, who presented 4/19/2021 with shortness of breath and cough which started 3 days ago.  Shortness of breath and cough have been progressively worsening and she says her Apple Watch noted her saturations to be 85% last night.  Her cough is dry.  She denies any associated fever.  She has been having sharp 10 out of 10 left-sided chest pain which worsens on respiration.    In ER, patient tachycardic at 95, respiratory rate 18, /80, 92% on room air, WBC 17.3, K 3.1, LA pending, Covid negative.     Chest x-ray showing left upper lobe consolidation.  CT chest confirming consolidation without any evidence of PE.    In ER she was given Unasyn and azithromycin.    Review of Systems  Review of Systems   Constitutional: Negative for chills and fever.   HENT: Negative for congestion, hearing loss and sore throat.    Eyes: Negative for blurred vision and double vision.   Respiratory: Positive for cough and shortness of breath. Negative for sputum production.    Cardiovascular: Positive for chest pain. Negative for palpitations.   Gastrointestinal: Negative for abdominal pain, heartburn and nausea.   Genitourinary: Negative for dysuria and urgency.   Musculoskeletal: Negative for back pain and falls.   Skin: Negative for itching and rash.   Neurological: Negative for dizziness, loss of consciousness and headaches.   All other systems reviewed and are negative.      Past Medical History   has a past medical history of COVID-19, Diabetes (5/12/2011), Edema (5/12/2011), Elevated liver enzymes (5/12/2011), Gall bladder polyp (8/12/2011), GERD (gastroesophageal  reflux disease) (5/12/2011), Hyperlipidemia (5/12/2011), Hypertension (5/12/2011), Post-menopausal atrophic vaginitis (4/17/2020), Skin lesion of face (5/12/2011), and Vitamin d deficiency (5/12/2011).    Surgical History   has a past surgical history that includes primary c section; other orthopedic surgery; and tubal coagulation laparoscopic bilateral.     Family History  family history includes Cancer in her maternal grandmother and mother; GI Disease in her sister; Heart Disease in her brother; Other in her father.     Social History   reports that she quit smoking about 31 years ago. Her smoking use included cigarettes. She has a 20.00 pack-year smoking history. She has never used smokeless tobacco. She reports current alcohol use. She reports that she does not use drugs.    Allergies  Allergies   Allergen Reactions   • Sulfa Drugs Vomiting       Medications  Prior to Admission Medications   Prescriptions Last Dose Informant Patient Reported? Taking?   Cholecalciferol (VITAMIN D) 400 UNIT TABS 4/18/2021 at Unknown time Patient Yes No   Sig: Take 400 Units by mouth every evening.   Cyanocobalamin (B-12) 1000 MCG Cap 4/18/2021 at Unknown time Patient Yes No   Sig: Take 1,000 mcg by mouth every evening.   JARDIANCE 25 MG Tab 4/18/2021 at Unknown time  No No   Sig: TAKE ONE TABLET BY MOUTH EVERY DAY   Patient taking differently: Take 25 mg by mouth every evening.   VICTOZA 18 MG/3ML Solution Pen-injector 4/18/2021 at Unknown time  No No   Sig: INJECT 1.8MG AS DIRECTED EVERY EVENING   Patient taking differently: Inject 1.8 mg under the skin every evening.   aspirin EC (ECOTRIN) 81 MG TBEC 4/18/2021 at Unknown time Patient Yes No   Sig: Take 81 mg by mouth every evening.   atorvastatin (LIPITOR) 80 MG tablet 4/18/2021 at Unknown time  No No   Sig: TAKE 1 TABLET BY MOUTH EVERY EVENING   Patient taking differently: Take 80 mg by mouth every evening.   insulin glargine (LANTUS SOLOSTAR) 100 UNIT/ML Solution Pen-injector  injection 4/18/2021 at Unknown time  No No   Sig: Inject 21 Units as instructed every evening.   omeprazole (PRILOSEC) 20 MG delayed-release capsule 4/18/2021 at Unknown time  No No   Sig: TAKE ONE CAPSULE BY MOUTH EVERY DAY   Patient taking differently: Take 20 mg by mouth every evening.   pioglitazone (ACTOS) 30 MG Tab 4/18/2021 at Unknown time  No No   Sig: TAKE ONE TABLET BY MOUTH EVERY DAY   Patient taking differently: Take 30 mg by mouth every evening.      Facility-Administered Medications: None       Physical Exam  Temp:  [37.1 °C (98.8 °F)] 37.1 °C (98.8 °F)  Pulse:  [92-96] 96  Resp:  [18-24] 24  BP: (135-148)/(63-80) 139/63  SpO2:  [92 %-96 %] 92 %    Physical Exam  Vitals and nursing note reviewed.   Constitutional:       Appearance: Normal appearance.   HENT:      Head: Normocephalic and atraumatic.      Right Ear: External ear normal.      Left Ear: External ear normal.      Nose: Nose normal.      Mouth/Throat:      Mouth: Mucous membranes are moist.      Pharynx: Oropharynx is clear.   Eyes:      Extraocular Movements: Extraocular movements intact.      Pupils: Pupils are equal, round, and reactive to light.   Cardiovascular:      Rate and Rhythm: Normal rate and regular rhythm.   Pulmonary:      Effort: Pulmonary effort is normal.      Breath sounds: Normal breath sounds.   Abdominal:      General: Abdomen is flat. Bowel sounds are normal. There is no distension.      Palpations: Abdomen is soft.      Tenderness: There is no abdominal tenderness.   Musculoskeletal:      Cervical back: Normal range of motion and neck supple.      Right lower leg: No edema.      Left lower leg: No edema.   Skin:     General: Skin is warm and dry.   Neurological:      General: No focal deficit present.      Mental Status: She is alert and oriented to person, place, and time.   Psychiatric:         Mood and Affect: Mood normal.         Behavior: Behavior normal.         Laboratory:  Recent Labs     04/19/21  1820   WBC  17.3*   RBC 4.90   HEMOGLOBIN 13.7   HEMATOCRIT 43.6   MCV 89.0   MCH 28.0   MCHC 31.4*   RDW 46.5   PLATELETCT 311   MPV 9.9     Recent Labs     04/19/21  1820   SODIUM 135   POTASSIUM 3.1*   CHLORIDE 99   CO2 24   GLUCOSE 201*   BUN 16   CREATININE 0.83   CALCIUM 9.5     Recent Labs     04/19/21  1820   ALTSGPT 19   ASTSGOT 18   ALKPHOSPHAT 98   TBILIRUBIN 1.1   GLUCOSE 201*         No results for input(s): NTPROBNP in the last 72 hours.      Recent Labs     04/19/21  1820   TROPONINT 11       Imaging:  CT-CTA CHEST PULMONARY ARTERY W/ RECONS   Final Result      1.  No evidence of pulmonary embolus.      2.  Left upper lobe consolidation most consistent with pneumonia. Recommend follow-up chest x-ray in one to 2 weeks after appropriate treatment to ensure resolution.      3.  Trace left pleural effusion with left basilar atelectasis.            DX-CHEST-PORTABLE (1 VIEW)   Final Result      Left upper lobe consolidation could be due to pneumonia, scarring or mass. Recommend follow-up chest x-ray one to 2 weeks after appropriate treatment to ensure resolution. Alternatively this could be further evaluated sooner with chest CT.            Assessment/Plan:  I anticipate this patient will require at least two midnights for appropriate medical management, necessitating inpatient admission.    * Sepsis (HCC)  Assessment & Plan  This is Sepsis Present on admission  SIRS criteria identified on my evaluation include: Tachycardia, with heart rate greater than 90 BPM and Leukocytosis, with WBC greater than 12,000  Source is pneumonia   Suspected onset of infection (date and time) NA  Sepsis protocol initiated  Fluid resuscitation ordered per protocol  IV antibiotics as appropriate for source of sepsis  While organ dysfunction may be noted elsewhere in this problem list or in the chart, degree of organ dysfunction does not meet CMS criteria for severe sepsis          Community acquired pneumonia of left upper lobe of  lung  Assessment & Plan  I have started her on Unasyn and azithromycin  As needed Robitussin-DM    Type 2 diabetes mellitus with microalbuminuria, with long-term current use of insulin (HCC)- (present on admission)  Assessment & Plan  Resume home Lantus of 21 units  Hold home oral hypoglycemics  Start ISS, diabetic diet, hypoglycemic protocol    Hypokalemia- (present on admission)  Assessment & Plan  I have ordered replacement with oral potassium  Repeat BMP in a.m.  Check magnesium levels    Hyperlipidemia- (present on admission)  Assessment & Plan  Continue statin

## 2021-04-20 NOTE — ED NOTES
Patient resting comfortably. Chest rise and fall noted. Call light within reach. Patient updated on plan of care.

## 2021-04-20 NOTE — ASSESSMENT & PLAN NOTE
+ strep. ID consulted.   Appreciate recs  Most likely from Pneumonia.   Consider echo if ID recommend

## 2021-04-21 PROBLEM — J96.01 ACUTE RESPIRATORY FAILURE WITH HYPOXIA (HCC): Status: ACTIVE | Noted: 2021-04-21

## 2021-04-21 LAB
ALBUMIN SERPL BCP-MCNC: 3.1 G/DL (ref 3.2–4.9)
ALBUMIN/GLOB SERPL: 0.8 G/DL
ALP SERPL-CCNC: 80 U/L (ref 30–99)
ALT SERPL-CCNC: 17 U/L (ref 2–50)
ANION GAP SERPL CALC-SCNC: 8 MMOL/L (ref 7–16)
AST SERPL-CCNC: 22 U/L (ref 12–45)
BASOPHILS # BLD AUTO: 0.2 % (ref 0–1.8)
BASOPHILS # BLD: 0.02 K/UL (ref 0–0.12)
BILIRUB SERPL-MCNC: 0.5 MG/DL (ref 0.1–1.5)
BUN SERPL-MCNC: 15 MG/DL (ref 8–22)
CALCIUM SERPL-MCNC: 8.8 MG/DL (ref 8.5–10.5)
CHLORIDE SERPL-SCNC: 104 MMOL/L (ref 96–112)
CO2 SERPL-SCNC: 28 MMOL/L (ref 20–33)
CREAT SERPL-MCNC: 0.64 MG/DL (ref 0.5–1.4)
EOSINOPHIL # BLD AUTO: 0.13 K/UL (ref 0–0.51)
EOSINOPHIL NFR BLD: 1.3 % (ref 0–6.9)
ERYTHROCYTE [DISTWIDTH] IN BLOOD BY AUTOMATED COUNT: 45.1 FL (ref 35.9–50)
GLOBULIN SER CALC-MCNC: 3.7 G/DL (ref 1.9–3.5)
GLUCOSE BLD-MCNC: 119 MG/DL (ref 65–99)
GLUCOSE BLD-MCNC: 121 MG/DL (ref 65–99)
GLUCOSE BLD-MCNC: 135 MG/DL (ref 65–99)
GLUCOSE BLD-MCNC: 78 MG/DL (ref 65–99)
GLUCOSE SERPL-MCNC: 93 MG/DL (ref 65–99)
HCT VFR BLD AUTO: 37 % (ref 37–47)
HGB BLD-MCNC: 11.9 G/DL (ref 12–16)
IMM GRANULOCYTES # BLD AUTO: 0.05 K/UL (ref 0–0.11)
IMM GRANULOCYTES NFR BLD AUTO: 0.5 % (ref 0–0.9)
LYMPHOCYTES # BLD AUTO: 1.88 K/UL (ref 1–4.8)
LYMPHOCYTES NFR BLD: 18.7 % (ref 22–41)
MAGNESIUM SERPL-MCNC: 2 MG/DL (ref 1.5–2.5)
MCH RBC QN AUTO: 28.5 PG (ref 27–33)
MCHC RBC AUTO-ENTMCNC: 32.2 G/DL (ref 33.6–35)
MCV RBC AUTO: 88.7 FL (ref 81.4–97.8)
MONOCYTES # BLD AUTO: 0.72 K/UL (ref 0–0.85)
MONOCYTES NFR BLD AUTO: 7.2 % (ref 0–13.4)
NEUTROPHILS # BLD AUTO: 7.23 K/UL (ref 2–7.15)
NEUTROPHILS NFR BLD: 72.1 % (ref 44–72)
NRBC # BLD AUTO: 0 K/UL
NRBC BLD-RTO: 0 /100 WBC
PLATELET # BLD AUTO: 289 K/UL (ref 164–446)
PMV BLD AUTO: 9.3 FL (ref 9–12.9)
POTASSIUM SERPL-SCNC: 3.2 MMOL/L (ref 3.6–5.5)
PROCALCITONIN SERPL-MCNC: 1.25 NG/ML
PROT SERPL-MCNC: 6.8 G/DL (ref 6–8.2)
RBC # BLD AUTO: 4.17 M/UL (ref 4.2–5.4)
SODIUM SERPL-SCNC: 140 MMOL/L (ref 135–145)
WBC # BLD AUTO: 10 K/UL (ref 4.8–10.8)

## 2021-04-21 PROCEDURE — 83735 ASSAY OF MAGNESIUM: CPT

## 2021-04-21 PROCEDURE — 700102 HCHG RX REV CODE 250 W/ 637 OVERRIDE(OP): Performed by: INTERNAL MEDICINE

## 2021-04-21 PROCEDURE — 87150 DNA/RNA AMPLIFIED PROBE: CPT

## 2021-04-21 PROCEDURE — 87040 BLOOD CULTURE FOR BACTERIA: CPT

## 2021-04-21 PROCEDURE — 94760 N-INVAS EAR/PLS OXIMETRY 1: CPT

## 2021-04-21 PROCEDURE — 99232 SBSQ HOSP IP/OBS MODERATE 35: CPT | Mod: GC | Performed by: INTERNAL MEDICINE

## 2021-04-21 PROCEDURE — 85025 COMPLETE CBC W/AUTO DIFF WBC: CPT

## 2021-04-21 PROCEDURE — 80053 COMPREHEN METABOLIC PANEL: CPT

## 2021-04-21 PROCEDURE — A9270 NON-COVERED ITEM OR SERVICE: HCPCS | Performed by: INTERNAL MEDICINE

## 2021-04-21 PROCEDURE — 700105 HCHG RX REV CODE 258: Performed by: HOSPITALIST

## 2021-04-21 PROCEDURE — 87077 CULTURE AEROBIC IDENTIFY: CPT

## 2021-04-21 PROCEDURE — A9270 NON-COVERED ITEM OR SERVICE: HCPCS | Performed by: HOSPITALIST

## 2021-04-21 PROCEDURE — 700111 HCHG RX REV CODE 636 W/ 250 OVERRIDE (IP): Performed by: HOSPITALIST

## 2021-04-21 PROCEDURE — 84145 PROCALCITONIN (PCT): CPT

## 2021-04-21 PROCEDURE — 770006 HCHG ROOM/CARE - MED/SURG/GYN SEMI*

## 2021-04-21 PROCEDURE — 99232 SBSQ HOSP IP/OBS MODERATE 35: CPT | Performed by: INTERNAL MEDICINE

## 2021-04-21 PROCEDURE — 82962 GLUCOSE BLOOD TEST: CPT | Mod: 91

## 2021-04-21 PROCEDURE — 700102 HCHG RX REV CODE 250 W/ 637 OVERRIDE(OP): Performed by: HOSPITALIST

## 2021-04-21 RX ORDER — POTASSIUM CHLORIDE 20 MEQ/1
40 TABLET, EXTENDED RELEASE ORAL ONCE
Status: COMPLETED | OUTPATIENT
Start: 2021-04-21 | End: 2021-04-21

## 2021-04-21 RX ADMIN — DOCUSATE SODIUM 50 MG AND SENNOSIDES 8.6 MG 2 TABLET: 8.6; 5 TABLET, FILM COATED ORAL at 06:31

## 2021-04-21 RX ADMIN — SODIUM CHLORIDE 3 G: 900 INJECTION INTRAVENOUS at 23:25

## 2021-04-21 RX ADMIN — ACETAMINOPHEN 1000 MG: 500 TABLET ORAL at 06:30

## 2021-04-21 RX ADMIN — ASPIRIN 81 MG: 81 TABLET, COATED ORAL at 18:33

## 2021-04-21 RX ADMIN — INSULIN GLARGINE 21 UNITS: 100 INJECTION, SOLUTION SUBCUTANEOUS at 18:41

## 2021-04-21 RX ADMIN — SODIUM CHLORIDE 3 G: 900 INJECTION INTRAVENOUS at 12:58

## 2021-04-21 RX ADMIN — SODIUM CHLORIDE 3 G: 900 INJECTION INTRAVENOUS at 18:34

## 2021-04-21 RX ADMIN — POTASSIUM CHLORIDE 40 MEQ: 1500 TABLET, EXTENDED RELEASE ORAL at 09:32

## 2021-04-21 RX ADMIN — SODIUM CHLORIDE 3 G: 900 INJECTION INTRAVENOUS at 06:31

## 2021-04-21 RX ADMIN — ATORVASTATIN CALCIUM 80 MG: 40 TABLET, FILM COATED ORAL at 18:33

## 2021-04-21 RX ADMIN — OMEPRAZOLE 20 MG: 20 CAPSULE, DELAYED RELEASE ORAL at 18:34

## 2021-04-21 RX ADMIN — ENOXAPARIN SODIUM 40 MG: 40 INJECTION SUBCUTANEOUS at 06:31

## 2021-04-21 ASSESSMENT — ENCOUNTER SYMPTOMS
DIZZINESS: 0
SPUTUM PRODUCTION: 0
WHEEZING: 0
COUGH: 1
HEMOPTYSIS: 0
SORE THROAT: 0
FEVER: 0
BACK PAIN: 1
CONSTIPATION: 0
CHILLS: 0
PALPITATIONS: 0
HEADACHES: 0
BLURRED VISION: 0
MYALGIAS: 1
ORTHOPNEA: 0
ABDOMINAL PAIN: 0
SHORTNESS OF BREATH: 1
NAUSEA: 0
DIARRHEA: 0
VOMITING: 0

## 2021-04-21 NOTE — PROGRESS NOTES
Received report of patient at start of shift. Patient is AOx4, no complaints of pain. Assessment complete, on 2L O2 via NC. Received notification from microbiology at 1010 with update that one set of blood cultures are positive for strep pneumoniae, Dr. Bennett notified at 1216.  Patient ambulates with SBA, encouraged to use call light for any needs/assistance. Plan of care discussed, safety education provided.

## 2021-04-21 NOTE — PROGRESS NOTES
[facility] INFECTIOUS DISEASES INPATIENT PROGRESS NOTE    Date of Service: 4/21/2021  Consult Requested By: Corey Bennett M.D.  Reason for Consultation: CAP and Gram positive bacteremia      Chief Complaint: Cough and SOB      HPI:  65-year-old female with PMH of diabetes, HTN, and dyslipidemia who presented on 4/19/2021 with a 3-day history of progressively worsening cough and SOB. She describes her cough as dry and associated with sharp left sided chest pain that is worse inspiration. She reports SpO2 of 85% last night per her Apple Watch. She denies prior history of such symptoms. She denies associated symptoms of fever, chills, sore throat, congestion, sputum production, nausea, vomiting, abdominal pain, LUTS, rash, pruritis, dizziness, headaches, and abnormal bleeding or bruising. She denies heavy alcohol use, she reports drinking a glass of wine occasionally. She has a 20 pack-year smoking history and quit smoking in 1990.       Interval Update:  No acute events overnight. Continuing to require 2 LPM supplemental O2 via NC. No longer with chest pain; reports continued improvement in cough and SOB. Reports ambulating and using her IS. Denies fever, chills, nausea, vomiting, abdominal pain, constipation, diarrhea, LUTS, FNDs, and abnormal bleeding or bruising.       Review of Systems:  All other systems reviewed and are negative expect as noted in HPI or Interval Update.       Past Medical History:   Diagnosis Date   • COVID-19    • Diabetes 5/12/2011   • Edema 5/12/2011   • Elevated liver enzymes 5/12/2011   • Gall bladder polyp 8/12/2011   • GERD (gastroesophageal reflux disease) 5/12/2011   • Hyperlipidemia 5/12/2011   • Hypertension 5/12/2011   • Post-menopausal atrophic vaginitis 4/17/2020   • Skin lesion of face 5/12/2011   • Vitamin d deficiency 5/12/2011     Past Surgical History:   Procedure Laterality Date   • OTHER ORTHOPEDIC SURGERY      right arm ORIF   • PRIMARY C SECTION     • TUBAL COAGULATION  LAPAROSCOPIC BILATERAL       Family History   Problem Relation Age of Onset   • Cancer Maternal Grandmother         lung    • Cancer Mother         breast   • Other Father         urinary issues   • GI Disease Sister    • Heart Disease Brother      Social History     Socioeconomic History   • Marital status:      Spouse name: Not on file   • Number of children: Not on file   • Years of education: Not on file   • Highest education level: Not on file   Occupational History   • Not on file   Tobacco Use   • Smoking status: Former Smoker     Packs/day: 1.00     Years: 20.00     Pack years: 20.00     Types: Cigarettes     Quit date: 1990     Years since quittin.3   • Smokeless tobacco: Never Used   Substance and Sexual Activity   • Alcohol use: Yes     Comment: holidays   • Drug use: No   • Sexual activity: Not Currently   Other Topics Concern   • Not on file   Social History Narrative   • Not on file     Social Determinants of Health     Financial Resource Strain:    • Difficulty of Paying Living Expenses:    Food Insecurity:    • Worried About Running Out of Food in the Last Year:    • Ran Out of Food in the Last Year:    Transportation Needs:    • Lack of Transportation (Medical):    • Lack of Transportation (Non-Medical):    Physical Activity:    • Days of Exercise per Week:    • Minutes of Exercise per Session:    Stress:    • Feeling of Stress :    Social Connections:    • Frequency of Communication with Friends and Family:    • Frequency of Social Gatherings with Friends and Family:    • Attends Caoda[employer] Services:    • Active Member of Clubs or Organizations:    • Attends Club or Organization Meetings:    • Marital Status:    Intimate Partner Violence:    • Fear of Current or Ex-Partner:    • Emotionally Abused:    • Physically Abused:    • Sexually Abused:      Allergies   Allergen Reactions   • Sulfa Drugs Vomiting       Current Facility-Administered Medications:   •  potassium chloride SA  (Kdur) tablet 40 mEq, 40 mEq, Oral, Once, Corey Bennett M.D.  •  aspirin EC (ECOTRIN) tablet 81 mg, 81 mg, Oral, Q EVENING, Jeanne Sims M.D., 81 mg at 04/20/21 1730  •  atorvastatin (LIPITOR) tablet 80 mg, 80 mg, Oral, Q EVENING, Jeanne Sims M.D., 80 mg at 04/20/21 1730  •  insulin glargine (Lantus) injection, 21 Units, Subcutaneous, Q EVENING, Jeanne Sims M.D., 21 Units at 04/20/21 1729  •  omeprazole (PRILOSEC) capsule 20 mg, 20 mg, Oral, Q EVENING, Jeanne Sims M.D., 20 mg at 04/20/21 1730  •  senna-docusate (PERICOLACE or SENOKOT S) 8.6-50 MG per tablet 2 tablet, 2 tablet, Oral, BID, 2 tablet at 04/21/21 0631 **AND** polyethylene glycol/lytes (MIRALAX) PACKET 1 Packet, 1 Packet, Oral, QDAY PRN **AND** magnesium hydroxide (MILK OF MAGNESIA) suspension 30 mL, 30 mL, Oral, QDAY PRN **AND** bisacodyl (DULCOLAX) suppository 10 mg, 10 mg, Rectal, QDAY PRN, Jeanne Smis M.D.  •  lactated ringers infusion (BOLUS), 500 mL, Intravenous, Once PRN, Jeanne Sims M.D.  •  lactated ringers infusion (BOLUS): BMI greater than 30, 30 mL/kg (Ideal), Intravenous, Once PRN, Jeanne Sims M.D.  •  enoxaparin (LOVENOX) inj 40 mg, 40 mg, Subcutaneous, DAILY, Jeanne Sims M.D., 40 mg at 04/21/21 0631  •  ampicillin/sulbactam (UNASYN) 3 g in  mL IVPB, 3 g, Intravenous, Q6HRS, Jeanne Sims M.D., Last Rate: 200 mL/hr at 04/21/21 0631, 3 g at 04/21/21 0631  •  guaiFENesin dextromethorphan (ROBITUSSIN DM) 100-10 MG/5ML syrup 10 mL, 10 mL, Oral, Q6HRS PRN, Jeanne Sims M.D.  •  insulin regular (HumuLIN R,NovoLIN R) injection, 1-6 Units, Subcutaneous, 4X/DAY ACHS, Stopped at 04/20/21 0700 **AND** POC blood glucose manual result, , , Q AC AND BEDTIME(S) **AND** NOTIFY MD and PharmD, , , Once **AND** glucose 4 g chewable tablet 16 g, 16 g, Oral, Q15 MIN PRN **AND** dextrose 50% (D50W) injection 50 mL, 50 mL, Intravenous, Q15 MIN PRN, Jeanne Sims M.D.  •  melatonin tablet 5 mg, 5 mg, Oral, Nightly, Jeanne Sims,  "M.D.  •  Pharmacy Consult Request ...Pain Management Review 1 Each, 1 Each, Other, PHARMACY TO DOSE, Jeanne Sims M.D.  •  acetaminophen (TYLENOL) tablet 1,000 mg, 1,000 mg, Oral, Q6HRS, 1,000 mg at 21 0630 **FOLLOWED BY** [START ON 2021] acetaminophen (TYLENOL) tablet 1,000 mg, 1,000 mg, Oral, Q6HRS PRN, Jeanne Sims M.D.  •  oxyCODONE immediate-release (ROXICODONE) tablet 2.5 mg, 2.5 mg, Oral, Q3HRS PRN **OR** oxyCODONE immediate-release (ROXICODONE) tablet 5 mg, 5 mg, Oral, Q3HRS PRN, 5 mg at 21 2218 **OR** HYDROmorphone (Dilaudid) injection 0.25 mg, 0.25 mg, Intravenous, Q3HRS PRN, Jeanne Sims M.D.      Physical Exam:    Vitals:    21 1720 21 2020 21 0325 21 0724   BP: 118/68 102/66 134/75 126/71   Pulse: 66 68 75 72   Resp: 17 17 17 17   Temp: 36.3 °C (97.4 °F) 37.1 °C (98.7 °F) 36 °C (96.8 °F) 36.4 °C (97.5 °F)   TempSrc: Temporal Temporal Temporal Temporal   SpO2: 99% 97% 96% 96%   Weight:       Height:       /71   Pulse 72   Temp 36.4 °C (97.5 °F) (Temporal)   Resp 17   Ht 1.676 m (5' 6\")   Wt 85.5 kg (188 lb 7.9 oz)   LMP 2006   SpO2 96%   BMI 30.42 kg/m²    Temp  Av.6 °C (97.8 °F)  Min: 36 °C (96.8 °F)  Max: 37.7 °C (99.8 °F)  Vital signs reviewed.    Constitutional: Awake, appears stated age, no apparent distress.   Head: Normocephalic, atraumatic.   Eyes: Conjunctivae normal, EOM intact, sclerae anicteric.   Mouth-Throat: Lips without lesions, oropharynx is clear and moist.  Neck: Supple, no masses or lymphadenopathy appreciated.   Cardiovascular: Normal rate, regular rhythm, holosystolic murmur appreciated, no pedal edema.  Pulmonary-Chest: No respiratory distress, unlabored respiratory effort, lungs clear to auscultation bilaterally, no wheezes or rales.   Abdominal: Soft, obese, non-tender, bowel sounds present, rebound or guarding.   Musculoskeletal: No joint tenderness, swelling, erythema, or restriction of motion " "noted.  Neurological: Alert, oriented to person, place, and time. No gross cranial nerve or focal neural deficits noted.  Skin: Warm and dry. No suspicious rashes or lesions on visualized skin.       LABS:    Recent Labs     04/19/21 1820 04/19/21 2120 04/19/21 2336 04/21/21 0458   SODIUM 135  --  136 140   POTASSIUM 3.1*  --  3.1* 3.2*   CHLORIDE 99  --  99 104   CO2 24  --  23 28   BUN 16  --  15 15   CREATININE 0.83  --  0.75 0.64   MAGNESIUM  --  1.8  --  2.0   CALCIUM 9.5  --  9.0 8.8     Recent Labs     04/19/21 1820 04/19/21 2336 04/21/21 0458   ALTSGPT 19  --  17   ASTSGOT 18  --  22   ALKPHOSPHAT 98  --  80   TBILIRUBIN 1.1  --  0.5   GLUCOSE 201* 154* 93     Recent Labs     04/19/21 1820 04/19/21 2336 04/20/21 0110 04/21/21  0458   RBC 4.90  --  4.34 4.17*   HEMOGLOBIN 13.7  --  12.1 11.9*   HEMATOCRIT 43.6  --  37.9 37.0   PLATELETCT 311  --  258 289   PROTHROMBTM  --  14.7*  --   --    INR  --  1.12  --   --      Recent Labs     04/19/21 1820 04/20/21 0110 04/21/21 0458   WBC 17.3* 16.3* 10.0   NEUTSPOLYS 81.90*  --  72.10*   LYMPHOCYTES 12.50*  --  18.70*   MONOCYTES 4.80  --  7.20   EOSINOPHILS 0.10  --  1.30   BASOPHILS 0.10  --  0.20   ASTSGOT 18  --  22   ALTSGPT 19  --  17   ALKPHOSPHAT 98  --  80   TBILIRUBIN 1.1  --  0.5        MICRO:    Results     Procedure Component Value Units Date/Time    BLOOD CULTURE [[id number]]  (Abnormal) Collected: 04/19/21 1908    Order Status: Completed Specimen: Blood from Peripheral Updated: 04/21/21 1014     Significant Indicator POS     Source BLD     Site PERIPHERAL     Culture Result Growth detected by Bactec instrument. 04/20/2021  08:24      Streptococcus pneumoniae  Susceptibilities in progress      Narrative:      CALL  [facility]  141 tel. [phone],  CALLED  141 tel. [phone] 04/21/2021, 10:11, RB PERF. RESULTS CALLED  TO:[phone] RN and faxed  1 of 2 for Blood Culture x 2 sites order. Per Hospital  Policy: Only change Specimen Src: to \"Line\" if " "specified by  physician order.  No site indicated    Blood Culture [[id number]] Collected: 04/20/21 0110    Order Status: Completed Specimen: Blood Updated: 04/21/21 0719     Significant Indicator NEG     Source BLD     Site Peripheral     Culture Result No Growth  Note: Blood cultures are incubated for 5 days and  are monitored continuously.Positive blood cultures  are called to the RN and reported as soon as  they are identified.      Narrative:      ORDER WAS CANCELLED 04/19/21 22:11, Duplicate ..  From different peripheral sites, if not done within the last  24 hours (Per Hospital Policy: Only change specimen source to  \"Line\" if specified by physician order)  Left Wrist    BLOOD CULTURE [[id number]] Collected: 04/21/21 0458    Order Status: Completed Specimen: Blood from Peripheral Updated: 04/21/21 0518    Narrative:      Per Hospital Policy: Only change Specimen Src: to \"Line\" if  specified by physician order.    BLOOD CULTURE [[id number]] Collected: 04/21/21 0458    Order Status: Completed Specimen: Blood from Peripheral Updated: 04/21/21 0518    Narrative:      Per Hospital Policy: Only change Specimen Src: to \"Line\" if  specified by physician order.    Culture Respiratory W/ GRM STN [[id number]]     Order Status: Completed Specimen: Respirate from Sputum     Culture Respiratory W/ Grm Stn [[id number]]     Order Status: Completed Specimen: Respirate from Sputum     Blood Culture [[id number]] Collected: 04/19/21 2046    Order Status: Canceled Specimen: Other from Peripheral     Blood Culture [[id number]] Collected: 04/19/21 2046    Order Status: Canceled Specimen: Other from Peripheral     COV-2, FLU A/B, AND RSV BY PCR (2-4 HOURS CEPHEID): Collect NP swab in VTM [[id number]] Collected: 04/19/21 1447    Order Status: Completed Specimen: Respirate Updated: 04/19/21 2018     Influenza virus A RNA Negative     Influenza virus B, PCR Negative     RSV, PCR Negative     SARS-CoV-2 by PCR NotDetected     " "Comment: PATIENTS: Important information regarding your results and instructions can  be found at [email or web address]   \"After your  Covid-19 Test\"  [facility] providers: PLEASE REFER TO DE-ESCALATION AND RETESTING PROTOCOL  on inside[email or web address]  **The [employer] GeneXpert Xpress SARS-CoV-2 RT-PCR Test has been made  available for use under the Emergency Use Authorization (EUA) only.          SARS-CoV-2 Source NP Swab    Narrative:      Have you been in close contact with a person who is suspected  or known to be positive for COVID-19 within the last 30 days  (e.g. last seen that person < 30 days ago)->No    BLOOD CULTURE [[id number]] Collected: 04/19/21 1901    Order Status: Canceled Specimen: Other from Peripheral       Latest pertinent labs were reviewed.      IMAGING STUDIES:  No new imaging.       Pertinent Diagnoses:  · Sepsis - resolved   · Left upper lobe community acquired pneumonia - working on weaning the patient off supplemental O2   · Strep pneumo bacteremia - susceptibilities pending; repeat blood culture from 4/20/21 NGTD   · Type 2 diabetes - well controlled, A1c 6.9 in Jan 2021  · Hypokalemia - K 3.2 today      Assessment and Recommendations:  65-year-old female admitted with sepsis secondary to community acquired pneumonia. CT-PA negative for PE but showed a L upper lobe consolidation. Started on empiric Unasyn and azithromycin. 4/19/21 blood culture growing Strep pneumo. Leukocytosis has resolved. Afebrile since admission; no longer tachycardic or tachypneic. Repeat blood culture from 4/20/21 with NGTD. Still requiring supplemental O2.     · Continue Unasyn for Strep pneumo bacteremia and BRITTNEY pneumonia   · TTE to assess for IE  · Continue to follow blood culture results; susceptibilities for initial blood culture pending   · If repeat cultures negative, can transition to Levaquin to complete a 10-day course of antibiotics from date of neg blood culture, i.e. from 4/20/21 to " 4/30/21; normal QTc 401 ms on 4/19/21 EKG  · Monitor VS and labs  · Diabetes management and maintenance of electrolyte balance per primary team       Plan was discussed with the primary team. ID will continue to follow. Please feel free to call with questions.

## 2021-04-21 NOTE — ASSESSMENT & PLAN NOTE
Due to community-acquired pneumonia.  Continue RT protocol.  Antibiotic as per above.  Wean off oxygen

## 2021-04-21 NOTE — PROGRESS NOTES
[facility] Medicine Daily Progress Note    Date of Service  4/21/2021    Chief Complaint  65 y.o. female admitted 4/19/2021 with shortness of breath    Hospital Course  65-year-old female past medical history diabetes, hypertension, dyslipidemia presented 4/19/2021 with shortness of breath, cough for last 3 days. Her shortness of breath gradually worsening. Per pt Apple Watch showed saturation 85%. She had covid 09/2020 completely recovered. Her grandson was recently sick with a cold. On admission   patient tachycardic at 95, respiratory rate 18, /80, 92% on room air, WBC 17.3, K 3.1, LA pending, Covid negative.      Chest x-ray showing left upper lobe consolidation.  CT chest confirming consolidation without any evidence of PE.     In ER she was given Unasyn and azithromycin  4/20- showed bacteremia strep and ID was consulted.       Interval Problem Update  Pt feeling better. Breathing improving. Bacteremia positive. ID consulted   4/21-patient is feeling better.  Breathing is better.  Cough has been more persistent.  Blood culture positive for strep pneumonia.  ID following.  Patient still on Unasyn.    Consultants/Specialty  ID     Code Status  Full Code    Disposition  Inpatient     Review of Systems  Review of Systems   Constitutional: Negative for chills, fever and malaise/fatigue.   HENT: Negative for congestion and sore throat.    Eyes: Negative for blurred vision.   Respiratory: Positive for cough and shortness of breath. Negative for hemoptysis, sputum production and wheezing.    Cardiovascular: Negative for chest pain, palpitations, orthopnea and leg swelling.   Gastrointestinal: Negative for abdominal pain, constipation, diarrhea, nausea and vomiting.   Genitourinary: Negative for dysuria and urgency.   Musculoskeletal: Positive for back pain and myalgias.   Neurological: Negative for dizziness and headaches.        Physical Exam  Temp:  [36 °C (96.8 °F)-37.1 °C (98.7 °F)] 36.4 °C (97.5 °F)  Pulse:   [66-75] 72  Resp:  [17] 17  BP: (102-134)/(66-75) 126/71  SpO2:  [96 %-99 %] 96 %    Physical Exam  Vitals and nursing note reviewed.   Constitutional:       Appearance: She is ill-appearing.   HENT:      Head: Normocephalic and atraumatic.      Right Ear: External ear normal.      Left Ear: External ear normal.   Eyes:      General: No scleral icterus.  Cardiovascular:      Rate and Rhythm: Normal rate.      Heart sounds: No murmur.   Pulmonary:      Effort: Pulmonary effort is normal.      Breath sounds: Rhonchi present. No wheezing or rales.   Abdominal:      General: There is no distension.      Palpations: Abdomen is soft.      Tenderness: There is no abdominal tenderness. There is no guarding.   Musculoskeletal:         General: No swelling or tenderness.   Skin:     Coloration: Skin is not jaundiced.   Neurological:      General: No focal deficit present.      Mental Status: She is alert and oriented to person, place, and time.      Cranial Nerves: No cranial nerve deficit.   Psychiatric:         Mood and Affect: Mood normal.         Thought Content: Thought content normal.         Judgment: Judgment normal.         Fluids    Intake/Output Summary (Last 24 hours) at 4/21/2021 1231  Last data filed at 4/21/2021 0930  Gross per 24 hour   Intake 1240 ml   Output 0 ml   Net 1240 ml       Laboratory  Recent Labs     04/19/21  1820 04/20/21  0110 04/21/21  0458   WBC 17.3* 16.3* 10.0   RBC 4.90 4.34 4.17*   HEMOGLOBIN 13.7 12.1 11.9*   HEMATOCRIT 43.6 37.9 37.0   MCV 89.0 87.3 88.7   MCH 28.0 27.9 28.5   MCHC 31.4* 31.9* 32.2*   RDW 46.5 45.1 45.1   PLATELETCT 311 258 289   MPV 9.9 9.7 9.3     Recent Labs     04/19/21  1820 04/19/21  2336 04/21/21  0458   SODIUM 135 136 140   POTASSIUM 3.1* 3.1* 3.2*   CHLORIDE 99 99 104   CO2 24 23 28   GLUCOSE 201* 154* 93   BUN 16 15 15   CREATININE 0.83 0.75 0.64   CALCIUM 9.5 9.0 8.8     Recent Labs     04/19/21  2336   INR 1.12               Imaging  CT-CTA CHEST PULMONARY  ARTERY W/ RECONS   Final Result      1.  No evidence of pulmonary embolus.      2.  Left upper lobe consolidation most consistent with pneumonia. Recommend follow-up chest x-ray in one to 2 weeks after appropriate treatment to ensure resolution.      3.  Trace left pleural effusion with left basilar atelectasis.            DX-CHEST-PORTABLE (1 VIEW)   Final Result      Left upper lobe consolidation could be due to pneumonia, scarring or mass. Recommend follow-up chest x-ray one to 2 weeks after appropriate treatment to ensure resolution. Alternatively this could be further evaluated sooner with chest CT.           Assessment/Plan  * Sepsis (HCC)- (present on admission)  Assessment & Plan  This is Sepsis Present on admission  SIRS criteria identified on my evaluation include: Tachycardia, with heart rate greater than 90 BPM and Leukocytosis, with WBC greater than 12,000  Source is pneumonia   Suspected onset of infection (date and time) NA  Sepsis protocol initiated  Fluid resuscitation ordered per protocol  IV antibiotics as appropriate for source of sepsis  While organ dysfunction may be noted elsewhere in this problem list or in the chart, degree of organ dysfunction does not meet CMS criteria for severe sepsis  4/20-improving. Continue to monitor.  Current antibiotic. Follow-up with infectious disease          Acute respiratory failure with hypoxia (HCC)  Assessment & Plan  Due to community-acquired pneumonia.  Continue RT protocol.  Antibiotic as per above.  Wean off oxygen    Bacteremia- (present on admission)  Assessment & Plan  + strep. ID consulted.   Appreciate recs  Most likely from Pneumonia.   Consider echo if ID recommend     Community acquired pneumonia of left upper lobe of lung- (present on admission)  Assessment & Plan  As per above.  Continue Unasyn.  She will need repeat chest x-ray/CT in 2-4 weeks    Type 2 diabetes mellitus with microalbuminuria, with long-term current use of insulin (HCC)-  (present on admission)  Assessment & Plan  Last A1c 6.93 months ago .  resume home Lantus of 21 units  Hold home oral hypoglycemics  Start ISS, diabetic diet, hypoglycemic protocol    Hypokalemia- (present on admission)  Assessment & Plan  Replaced.  Continue to monitor  Check magnesium levels    Hyperlipidemia- (present on admission)  Assessment & Plan  Continue statin       VTE prophylaxis: lovenox

## 2021-04-21 NOTE — PROGRESS NOTES
Pt is A+Ox4   Complains of mild shoulder pain, medicated per MAR w/ scheduled Tylenol   VSS, 2 liters O2 (96%)   Tolerating a diabetic diet    Denies N/V/D   LBM PTA, +void   ACHS FSBS: 101  Ambulating with little to no staff assistance   Denies chest pain/SOB   Pt updated on POC, appropriate fall precautions in place, all needs met at this time   Hourly rounding in place

## 2021-04-21 NOTE — CARE PLAN
Problem: Venous Thromboembolism (VTW)/Deep Vein Thrombosis (DVT) Prevention:  Goal: Patient will participate in Venous Thrombosis (VTE)/Deep Vein Thrombosis (DVT)Prevention Measures  Outcome: PROGRESSING AS EXPECTED  Pt is compliant with DVT prevention medications, ambulating with little to staff assistance   Educated on DVT prevention      Problem: Knowledge Deficit  Goal: Knowledge of the prescribed therapeutic regimen will improve  Outcome: PROGRESSING AS EXPECTED  Pt educated on PRN as well as scheduled medications      Problem: Respiratory:  Goal: Respiratory status will improve  Outcome: PROGRESSING AS EXPECTED  Pt educated on the importance and proper technique for using the IS, pt verbalized understanding and is working on increasing her number

## 2021-04-22 ENCOUNTER — APPOINTMENT (OUTPATIENT)
Dept: CARDIOLOGY | Facility: MEDICAL CENTER | Age: 66
DRG: 871 | End: 2021-04-22
Attending: INTERNAL MEDICINE
Payer: MEDICARE

## 2021-04-22 VITALS
SYSTOLIC BLOOD PRESSURE: 147 MMHG | HEART RATE: 68 BPM | HEIGHT: 66 IN | OXYGEN SATURATION: 94 % | BODY MASS INDEX: 30.29 KG/M2 | DIASTOLIC BLOOD PRESSURE: 81 MMHG | TEMPERATURE: 97.6 F | WEIGHT: 188.49 LBS | RESPIRATION RATE: 17 BRPM

## 2021-04-22 PROBLEM — M71.129 SEPTIC BURSITIS OF ELBOW: Status: RESOLVED | Noted: 2018-07-03 | Resolved: 2021-04-22

## 2021-04-22 PROBLEM — A41.9 SEPSIS (HCC): Status: RESOLVED | Noted: 2021-04-19 | Resolved: 2021-04-22

## 2021-04-22 PROBLEM — J96.01 ACUTE RESPIRATORY FAILURE WITH HYPOXIA (HCC): Status: RESOLVED | Noted: 2021-04-21 | Resolved: 2021-04-22

## 2021-04-22 LAB
ALBUMIN SERPL BCP-MCNC: 3.2 G/DL (ref 3.2–4.9)
ALBUMIN/GLOB SERPL: 0.8 G/DL
ALP SERPL-CCNC: 80 U/L (ref 30–99)
ALT SERPL-CCNC: 21 U/L (ref 2–50)
ANION GAP SERPL CALC-SCNC: 8 MMOL/L (ref 7–16)
AST SERPL-CCNC: 19 U/L (ref 12–45)
BACTERIA BLD CULT: ABNORMAL
BACTERIA BLD CULT: ABNORMAL
BASOPHILS # BLD AUTO: 0.5 % (ref 0–1.8)
BASOPHILS # BLD: 0.04 K/UL (ref 0–0.12)
BILIRUB SERPL-MCNC: 0.5 MG/DL (ref 0.1–1.5)
BUN SERPL-MCNC: 10 MG/DL (ref 8–22)
CALCIUM SERPL-MCNC: 9 MG/DL (ref 8.5–10.5)
CHLORIDE SERPL-SCNC: 103 MMOL/L (ref 96–112)
CO2 SERPL-SCNC: 28 MMOL/L (ref 20–33)
CREAT SERPL-MCNC: 0.54 MG/DL (ref 0.5–1.4)
EOSINOPHIL # BLD AUTO: 0.06 K/UL (ref 0–0.51)
EOSINOPHIL NFR BLD: 0.8 % (ref 0–6.9)
ERYTHROCYTE [DISTWIDTH] IN BLOOD BY AUTOMATED COUNT: 46.5 FL (ref 35.9–50)
ETEST SENSITIVITY ETEST: NORMAL
GLOBULIN SER CALC-MCNC: 3.9 G/DL (ref 1.9–3.5)
GLUCOSE BLD-MCNC: 126 MG/DL (ref 65–99)
GLUCOSE BLD-MCNC: 90 MG/DL (ref 65–99)
GLUCOSE SERPL-MCNC: 119 MG/DL (ref 65–99)
HCT VFR BLD AUTO: 42.5 % (ref 37–47)
HGB BLD-MCNC: 13 G/DL (ref 12–16)
IMM GRANULOCYTES # BLD AUTO: 0.04 K/UL (ref 0–0.11)
IMM GRANULOCYTES NFR BLD AUTO: 0.5 % (ref 0–0.9)
LYMPHOCYTES # BLD AUTO: 1.85 K/UL (ref 1–4.8)
LYMPHOCYTES NFR BLD: 23.7 % (ref 22–41)
MCH RBC QN AUTO: 28.4 PG (ref 27–33)
MCHC RBC AUTO-ENTMCNC: 30.6 G/DL (ref 33.6–35)
MCV RBC AUTO: 93 FL (ref 81.4–97.8)
MONOCYTES # BLD AUTO: 0.85 K/UL (ref 0–0.85)
MONOCYTES NFR BLD AUTO: 10.9 % (ref 0–13.4)
NEUTROPHILS # BLD AUTO: 4.95 K/UL (ref 2–7.15)
NEUTROPHILS NFR BLD: 63.6 % (ref 44–72)
NRBC # BLD AUTO: 0 K/UL
NRBC BLD-RTO: 0 /100 WBC
PLATELET # BLD AUTO: 272 K/UL (ref 164–446)
PMV BLD AUTO: 9.3 FL (ref 9–12.9)
POTASSIUM SERPL-SCNC: 3.1 MMOL/L (ref 3.6–5.5)
PROT SERPL-MCNC: 7.1 G/DL (ref 6–8.2)
RBC # BLD AUTO: 4.57 M/UL (ref 4.2–5.4)
SIGNIFICANT IND 70042: ABNORMAL
SITE SITE: ABNORMAL
SODIUM SERPL-SCNC: 139 MMOL/L (ref 135–145)
SOURCE SOURCE: ABNORMAL
WBC # BLD AUTO: 7.8 K/UL (ref 4.8–10.8)

## 2021-04-22 PROCEDURE — 700105 HCHG RX REV CODE 258: Performed by: HOSPITALIST

## 2021-04-22 PROCEDURE — 99239 HOSP IP/OBS DSCHRG MGMT >30: CPT | Performed by: INTERNAL MEDICINE

## 2021-04-22 PROCEDURE — 80053 COMPREHEN METABOLIC PANEL: CPT

## 2021-04-22 PROCEDURE — 85025 COMPLETE CBC W/AUTO DIFF WBC: CPT

## 2021-04-22 PROCEDURE — 82962 GLUCOSE BLOOD TEST: CPT

## 2021-04-22 PROCEDURE — 700111 HCHG RX REV CODE 636 W/ 250 OVERRIDE (IP): Performed by: HOSPITALIST

## 2021-04-22 PROCEDURE — 99232 SBSQ HOSP IP/OBS MODERATE 35: CPT | Mod: GC | Performed by: INTERNAL MEDICINE

## 2021-04-22 RX ORDER — LEVOFLOXACIN 500 MG/1
500 TABLET, FILM COATED ORAL DAILY
Qty: 10 TABLET | Refills: 0 | Status: SHIPPED | OUTPATIENT
Start: 2021-04-22 | End: 2021-05-02

## 2021-04-22 RX ADMIN — SODIUM CHLORIDE 3 G: 900 INJECTION INTRAVENOUS at 06:29

## 2021-04-22 RX ADMIN — SODIUM CHLORIDE 3 G: 900 INJECTION INTRAVENOUS at 11:49

## 2021-04-22 NOTE — DISCHARGE SUMMARY
Discharge Summary    CHIEF COMPLAINT ON ADMISSION  Chief Complaint   Patient presents with   • Shortness of Breath       Reason for Admission  Sent by Urgent Care; Shortness Of *     Admission Date  4/19/2021    CODE STATUS  Full Code    HPI & HOSPITAL COURSE      65-year-old female past medical history diabetes, hypertension, dyslipidemia presented 4/19/2021 with shortness of breath, cough for last 3 days. Her shortness of breath gradually worsening. Per pt Apple Watch showed saturation 85%. She had covid 09/2020 completely recovered. Her grandson was recently sick with a cold. On admission   patient tachycardic at 95, respiratory rate 18, /80, 92% on room air, WBC 17.3, K 3.1, LA pending, Covid negative.      Chest x-ray showing left upper lobe consolidation.  CT chest confirming consolidation without any evidence of PE.     In ER she was given Unasyn and azithromycin  4/20- showed bacteremia strep and ID was consulted.   Her breathing did improve. She was able to wean off oxygen. Sepsis resolved.   ID recommend to continue levofloxacin for 10 days. Also patient advise to follow up with primary care for repeat CXR or chest CT for lung consolidation reeval.   Echocardiogram ordered but not able to complete because pt was eager to go home. Low clinic suspicion for endocarditis development from strep, however primary care can also consider as outpatient.        Therefore, she is discharged in good and stable condition to home with close outpatient follow-up.    The patient met 2-midnight criteria for an inpatient stay at the time of discharge.    Discharge Date  04/22/2021    FOLLOW UP ITEMS POST DISCHARGE  None     DISCHARGE DIAGNOSES  Principal Problem (Resolved):    Sepsis (HCC) POA: Yes  Active Problems:    Hyperlipidemia (Chronic) POA: Yes    Type 2 diabetes mellitus with microalbuminuria, with long-term current use of insulin (HCC) POA: Yes    Community acquired pneumonia of left upper lobe of lung POA:  Yes    Bacteremia POA: Yes  Resolved Problems:    Hypokalemia POA: Yes      Overview: 1 history of hypokalemia with diuretics. Patient admits to a high salt       consumption.    Acute respiratory failure with hypoxia (HCC) POA: Yes      FOLLOW UP  Future Appointments   Date Time Provider Department Center   4/26/2021  6:00 AM [facility] None   1/21/2022  3:00 PM Constantin Moon M.D. [facility] Moon M.D.  [address] [facility] [address]  [phone]            MEDICATIONS ON DISCHARGE     Medication List      START taking these medications      Instructions   levoFLOXacin 500 MG tablet  Commonly known as: LEVAQUIN   Take 1 tablet by mouth every day for 10 days.  Dose: 500 mg        CHANGE how you take these medications      Instructions   atorvastatin 80 MG tablet  What changed:   · how much to take  · how to take this  · when to take this  · additional instructions  Commonly known as: LIPITOR   TAKE 1 TABLET BY MOUTH EVERY EVENING     Jardiance 25 MG Tabs  What changed:   · how much to take  · when to take this  Generic drug: Empagliflozin   TAKE ONE TABLET BY MOUTH EVERY DAY     omeprazole 20 MG delayed-release capsule  What changed: when to take this  Commonly known as: PRILOSEC   TAKE ONE CAPSULE BY MOUTH EVERY DAY     pioglitazone 30 MG Tabs  What changed: when to take this  Commonly known as: ACTOS   TAKE ONE TABLET BY MOUTH EVERY DAY     Victoza 18 MG/3ML Sopn  What changed: additional instructions  Generic drug: liraglutide   INJECT 1.8MG AS DIRECTED EVERY EVENING        CONTINUE taking these medications      Instructions   aspirin EC 81 MG Tbec  Commonly known as: ECOTRIN   Take 81 mg by mouth every evening.  Dose: 81 mg     B-12 1000 MCG Caps   Take 1,000 mcg by mouth every evening.  Dose: 1,000 mcg     Cholecalciferol 400 UNIT Tabs  Commonly known as: VITAMIN D   Take 400 Units by mouth every evening.  Dose: 400 Units     Lantus SoloStar 100 UNIT/ML Sopn injection  Generic drug:  insulin glargine   Inject 21 Units as instructed every evening.  Dose: 21 Units            Allergies  Allergies   Allergen Reactions   • Sulfa Drugs Vomiting       DIET  Orders Placed This Encounter   Procedures   • Diet Order Diet: Consistent CHO (Diabetic)     Standing Status:   Standing     Number of Occurrences:   1     Order Specific Question:   Diet:     Answer:   Consistent CHO (Diabetic) [4]       ACTIVITY  As tolerated.  Weight bearing as tolerated    CONSULTATIONS  ID    PROCEDURES  None     LABORATORY  Lab Results   Component Value Date    SODIUM 139 04/22/2021    POTASSIUM 3.1 (L) 04/22/2021    CHLORIDE 103 04/22/2021    CO2 28 04/22/2021    GLUCOSE 119 (H) 04/22/2021    BUN 10 04/22/2021    CREATININE 0.54 04/22/2021        Lab Results   Component Value Date    WBC 7.8 04/22/2021    HEMOGLOBIN 13.0 04/22/2021    HEMATOCRIT 42.5 04/22/2021    PLATELETCT 272 04/22/2021        Total time of the discharge process exceeds 35 minutes.

## 2021-04-22 NOTE — PROGRESS NOTES
[facility] INFECTIOUS DISEASES INPATIENT PROGRESS NOTE      Date of Service: 4/21/2021  Consult Requested By: Corey Bennett M.D.  Reason for Consultation: CAP and Gram positive bacteremia      Chief Complaint: Cough and SOB      HPI:  65-year-old female with PMH of diabetes, HTN, and dyslipidemia who presented on 4/19/2021 with a 3-day history of progressively worsening cough and SOB. She describes her cough as dry and associated with sharp left sided chest pain that is worse inspiration. She reports SpO2 of 85% last night per her Apple Watch. She denies prior history of such symptoms. She denies associated symptoms of fever, chills, sore throat, congestion, sputum production, nausea, vomiting, abdominal pain, LUTS, rash, pruritis, dizziness, headaches, and abnormal bleeding or bruising. She denies heavy alcohol use, she reports drinking a glass of wine occasionally. She has a 20 pack-year smoking history and quit smoking in 1990.       Interval Update:  No acute events overnight. Weaned off supplemental O2. Reports feeling better, still awaiting TTE. Denies fever, chills, chest pain, nausea, vomiting, SOB, dizziness, abdominal pain, bowel or bladder dysfunction, and abnormal bleeding or bruising.       Review of Systems:  All other systems reviewed and are negative expect as noted in HPI or Interval Update.       Past Medical History:   Diagnosis Date   • COVID-19    • Diabetes 5/12/2011   • Edema 5/12/2011   • Elevated liver enzymes 5/12/2011   • Gall bladder polyp 8/12/2011   • GERD (gastroesophageal reflux disease) 5/12/2011   • Hyperlipidemia 5/12/2011   • Hypertension 5/12/2011   • Post-menopausal atrophic vaginitis 4/17/2020   • Skin lesion of face 5/12/2011   • Vitamin d deficiency 5/12/2011     Past Surgical History:   Procedure Laterality Date   • OTHER ORTHOPEDIC SURGERY      right arm ORIF   • PRIMARY C SECTION     • TUBAL COAGULATION LAPAROSCOPIC BILATERAL       Family History   Problem Relation Age of Onset    • Cancer Maternal Grandmother         lung    • Cancer Mother         breast   • Other Father         urinary issues   • GI Disease Sister    • Heart Disease Brother      Social History     Socioeconomic History   • Marital status:      Spouse name: Not on file   • Number of children: Not on file   • Years of education: Not on file   • Highest education level: Not on file   Occupational History   • Not on file   Tobacco Use   • Smoking status: Former Smoker     Packs/day: 1.00     Years: 20.00     Pack years: 20.00     Types: Cigarettes     Quit date: 1990     Years since quittin.3   • Smokeless tobacco: Never Used   Substance and Sexual Activity   • Alcohol use: Yes     Comment: holidays   • Drug use: No   • Sexual activity: Not Currently   Other Topics Concern   • Not on file   Social History Narrative   • Not on file     Social Determinants of Health     Financial Resource Strain:    • Difficulty of Paying Living Expenses:    Food Insecurity:    • Worried About Running Out of Food in the Last Year:    • Ran Out of Food in the Last Year:    Transportation Needs:    • Lack of Transportation (Medical):    • Lack of Transportation (Non-Medical):    Physical Activity:    • Days of Exercise per Week:    • Minutes of Exercise per Session:    Stress:    • Feeling of Stress :    Social Connections:    • Frequency of Communication with Friends and Family:    • Frequency of Social Gatherings with Friends and Family:    • Attends Bahai Services:    • Active Member of Clubs or Organizations:    • Attends Club or Organization Meetings:    • Marital Status:    Intimate Partner Violence:    • Fear of Current or Ex-Partner:    • Emotionally Abused:    • Physically Abused:    • Sexually Abused:      Allergies   Allergen Reactions   • Sulfa Drugs Vomiting       Current Facility-Administered Medications:   •  aspirin EC (ECOTRIN) tablet 81 mg, 81 mg, Oral, Q EVENING, Jeanne Sims M.D., 81 mg at 21  1833  •  atorvastatin (LIPITOR) tablet 80 mg, 80 mg, Oral, Q EVENING, Jeanne Sims M.D., 80 mg at 04/21/21 1833  •  insulin glargine (Lantus) injection, 21 Units, Subcutaneous, Q EVENING, Jeanne Sims M.D., 21 Units at 04/21/21 1841  •  omeprazole (PRILOSEC) capsule 20 mg, 20 mg, Oral, Q EVENING, Jeanne Sims M.D., 20 mg at 04/21/21 1834  •  senna-docusate (PERICOLACE or SENOKOT S) 8.6-50 MG per tablet 2 tablet, 2 tablet, Oral, BID, 2 tablet at 04/21/21 0631 **AND** polyethylene glycol/lytes (MIRALAX) PACKET 1 Packet, 1 Packet, Oral, QDAY PRN **AND** magnesium hydroxide (MILK OF MAGNESIA) suspension 30 mL, 30 mL, Oral, QDAY PRN **AND** bisacodyl (DULCOLAX) suppository 10 mg, 10 mg, Rectal, QDAY PRN, Jeanne Sims M.D.  •  lactated ringers infusion (BOLUS), 500 mL, Intravenous, Once PRN, Jeanne Sims M.D.  •  lactated ringers infusion (BOLUS): BMI greater than 30, 30 mL/kg (Ideal), Intravenous, Once PRN, Jeanne Sims M.D.  •  enoxaparin (LOVENOX) inj 40 mg, 40 mg, Subcutaneous, DAILY, Jeanne Sims M.D., 40 mg at 04/21/21 0631  •  ampicillin/sulbactam (UNASYN) 3 g in  mL IVPB, 3 g, Intravenous, Q6HRS, Jeanne Sims M.D., Last Rate: 200 mL/hr at 04/22/21 1149, 3 g at 04/22/21 1149  •  guaiFENesin dextromethorphan (ROBITUSSIN DM) 100-10 MG/5ML syrup 10 mL, 10 mL, Oral, Q6HRS PRN, Jeanne Sims M.D.  •  insulin regular (HumuLIN R,NovoLIN R) injection, 1-6 Units, Subcutaneous, 4X/DAY ACHS, Stopped at 04/20/21 0700 **AND** POC blood glucose manual result, , , Q AC AND BEDTIME(S) **AND** NOTIFY MD and PharmD, , , Once **AND** glucose 4 g chewable tablet 16 g, 16 g, Oral, Q15 MIN PRN **AND** dextrose 50% (D50W) injection 50 mL, 50 mL, Intravenous, Q15 MIN PRN, Jeanne Sims M.D.  •  melatonin tablet 5 mg, 5 mg, Oral, Nightly, Jeanne Sims M.D.  •  Pharmacy Consult Request ...Pain Management Review 1 Each, 1 Each, Other, PHARMACY TO DOSE, Jeanne Sims M.D.  •  acetaminophen (TYLENOL) tablet 1,000 mg,  "1,000 mg, Oral, Q6HRS, 1,000 mg at 21 0630 **FOLLOWED BY** [START ON 2021] acetaminophen (TYLENOL) tablet 1,000 mg, 1,000 mg, Oral, Q6HRS PRN, Jeanne Sims M.D.  •  oxyCODONE immediate-release (ROXICODONE) tablet 2.5 mg, 2.5 mg, Oral, Q3HRS PRN **OR** oxyCODONE immediate-release (ROXICODONE) tablet 5 mg, 5 mg, Oral, Q3HRS PRN, 5 mg at 21 2218 **OR** HYDROmorphone (Dilaudid) injection 0.25 mg, 0.25 mg, Intravenous, Q3HRS PRN, Jeanne Sims M.D.      Physical Exam:    Vitals:    21 1543 21 1930 21 0335 21 0720   BP: 131/72 135/77 128/65 147/81   Pulse: 76 79 67 68   Resp: 18 18 18 17   Temp: 36.8 °C (98.2 °F) 36.4 °C (97.5 °F) 35.9 °C (96.7 °F) 36.4 °C (97.6 °F)   TempSrc: Temporal Temporal Temporal Temporal   SpO2: 98% 92% 96% 94%   Weight:       Height:       /81   Pulse 68   Temp 36.4 °C (97.6 °F) (Temporal)   Resp 17   Ht 1.676 m (5' 6\")   Wt 85.5 kg (188 lb 7.9 oz)   LMP 2006   SpO2 94%   BMI 30.42 kg/m²    Temp  Av.6 °C (97.8 °F)  Min: 36 °C (96.8 °F)  Max: 37.7 °C (99.8 °F)  Vital signs reviewed.    Constitutional: Awake, appears stated age, no apparent distress.   Head: Normocephalic, atraumatic.   Eyes: Conjunctivae normal, EOM intact, sclerae anicteric.   Mouth-Throat: Lips without lesions, oropharynx is clear and moist.  Neck: Supple, no masses or lymphadenopathy appreciated.   Cardiovascular: Normal rate, regular rhythm, no pedal edema.  Pulmonary-Chest: No respiratory distress, unlabored respiratory effort, lungs clear to auscultation bilaterally, no wheezes or rales.   Abdominal: Soft, obese, non-tender, bowel sounds present, no rebound or guarding.   Musculoskeletal: No joint tenderness, swelling, erythema, or restriction of motion noted.  Neurological: Alert, oriented to person, place, and time. No gross cranial nerve or focal neural deficits noted.  Skin: Warm and dry. No suspicious rashes or lesions on visualized skin. "       LABS:    Recent Labs     04/19/21 1820 04/19/21 2120 04/19/21 2336 04/21/21 0458 04/22/21  0938   SODIUM   < >  --  136 140 139   POTASSIUM   < >  --  3.1* 3.2* 3.1*   CHLORIDE   < >  --  99 104 103   CO2   < >  --  23 28 28   BUN   < >  --  15 15 10   CREATININE   < >  --  0.75 0.64 0.54   MAGNESIUM  --  1.8  --  2.0  --    CALCIUM   < >  --  9.0 8.8 9.0    < > = values in this interval not displayed.     Recent Labs     04/19/21 1820 04/19/21 1820 04/19/21 2336 04/21/21 0458 04/22/21  0938   ALTSGPT 19  --   --  17 21   ASTSGOT 18  --   --  22 19   ALKPHOSPHAT 98  --   --  80 80   TBILIRUBIN 1.1  --   --  0.5 0.5   GLUCOSE 201*   < > 154* 93 119*    < > = values in this interval not displayed.     Recent Labs     04/19/21 1820 04/19/21 2336 04/20/21 0110 04/21/21 0458 04/22/21  0938   RBC   < >  --  4.34 4.17* 4.57   HEMOGLOBIN   < >  --  12.1 11.9* 13.0   HEMATOCRIT   < >  --  37.9 37.0 42.5   PLATELETCT   < >  --  258 289 272   PROTHROMBTM  --  14.7*  --   --   --    INR  --  1.12  --   --   --     < > = values in this interval not displayed.     Recent Labs     04/19/21 1820 04/19/21 1820 04/20/21 0110 04/21/21 0458 04/22/21  0938   WBC 17.3*   < > 16.3* 10.0 7.8   NEUTSPOLYS 81.90*  --   --  72.10* 63.60   LYMPHOCYTES 12.50*  --   --  18.70* 23.70   MONOCYTES 4.80  --   --  7.20 10.90   EOSINOPHILS 0.10  --   --  1.30 0.80   BASOPHILS 0.10  --   --  0.20 0.50   ASTSGOT 18  --   --  22 19   ALTSGPT 19  --   --  17 21   ALKPHOSPHAT 98  --   --  80 80   TBILIRUBIN 1.1  --   --  0.5 0.5    < > = values in this interval not displayed.        MICRO:    4/19/21 blood culture growing Strep pneumo sensitive to penicillin and cefotaxime.   4/20/21 blood culture NGTD  4/21/21 blood cultures x 2 NGTD    Results     Procedure Component Value Units Date/Time    BLOOD CULTURE [[id number]]  (Abnormal)  (Susceptibility) Collected: 04/19/21 1908    Order Status: Completed Specimen: Blood from Peripheral  "Updated: 04/22/21 0900     Significant Indicator POS     Source BLD     Site PERIPHERAL     Culture Result Growth detected by Bactec instrument. 04/20/2021  08:24      Streptococcus pneumoniae    Narrative:      CALL  [facility]  141 tel. [phone],  CALLED  141 tel. [phone] 04/21/2021, 10:11, RB PERF. RESULTS CALLED  TO:[id number] RN and faxed  1 of 2 for Blood Culture x 2 sites order. Per Hospital  Policy: Only change Specimen Src: to \"Line\" if specified by  physician order.  No site indicated    Susceptibility     Streptococcus pneumoniae (1)     Antibiotic Interpretation Method Status    Penicillin non-meningitis Sensitive E-TEST Final    Cefotaxime-meningitis Sensitive E-TEST Final    Penicillin-meningitis Sensitive E-TEST Final    Cefotaxime-non meningitis Sensitive E-TEST Final    Erythromycin Sensitive E-TEST Final                   E-Test [[id number]] Collected: 04/19/21 1908    Order Status: Completed Specimen: Other Updated: 04/22/21 0900     ETEST Sensitivity FINAL    Narrative:      141 tel. [phone] 04/21/2021, 10:11, RB PERF. RESULTS CALLED TO:[id number] RN  and faxed  1 of 2 for Blood Culture x 2 sites order. Per Hospital  Policy: Only change Specimen Src: to \"Line\" if specified by  physician order.    BLOOD CULTURE [[id number]] Collected: 04/21/21 0458    Order Status: Completed Specimen: Blood from Peripheral Updated: 04/22/21 0717     Significant Indicator NEG     Source BLD     Site PERIPHERAL     Culture Result No Growth  Note: Blood cultures are incubated for 5 days and  are monitored continuously.Positive blood cultures  are called to the RN and reported as soon as  they are identified.      Narrative:      Per Hospital Policy: Only change Specimen Src: to \"Line\" if  specified by physician order.  Left AC    BLOOD CULTURE [[id number]] Collected: 04/21/21 0458    Order Status: Completed Specimen: Blood from Peripheral Updated: 04/22/21 0717     Significant Indicator NEG     Source BLD     Site PERIPHERAL    " " Culture Result No Growth  Note: Blood cultures are incubated for 5 days and  are monitored continuously.Positive blood cultures  are called to the RN and reported as soon as  they are identified.      Narrative:      Per Hospital Policy: Only change Specimen Src: to \"Line\" if  specified by physician order.  Right Hand    Blood Culture [[id number]] Collected: 04/20/21 0110    Order Status: Completed Specimen: Blood Updated: 04/21/21 0719     Significant Indicator NEG     Source BLD     Site Peripheral     Culture Result No Growth  Note: Blood cultures are incubated for 5 days and  are monitored continuously.Positive blood cultures  are called to the RN and reported as soon as  they are identified.      Narrative:      ORDER WAS CANCELLED 04/19/21 22:11, Duplicate ..  From different peripheral sites, if not done within the last  24 hours (Per Hospital Policy: Only change specimen source to  \"Line\" if specified by physician order)  Left Wrist    Culture Respiratory W/ GRM STN [[id number]]     Order Status: Completed Specimen: Respirate from Sputum     Culture Respiratory W/ Grm Stn [[id number]]     Order Status: Completed Specimen: Respirate from Sputum     Blood Culture [[id number]] Collected: 04/19/21 2046    Order Status: Canceled Specimen: Other from Peripheral     Blood Culture [[id number]] Collected: 04/19/21 2046    Order Status: Canceled Specimen: Other from Peripheral     COV-2, FLU A/B, AND RSV BY PCR (2-4 HOURS CEPHEID): Collect NP swab in VTM [[id number]] Collected: 04/19/21 1447    Order Status: Completed Specimen: Respirate Updated: 04/19/21 2018     Influenza virus A RNA Negative     Influenza virus B, PCR Negative     RSV, PCR Negative     SARS-CoV-2 by PCR NotDetected     Comment: PATIENTS: Important information regarding your results and instructions can  be found at [email or web address]   \"After your  Covid-19 Test\"  [facility] providers: PLEASE REFER TO DE-ESCALATION " AND RETESTING PROTOCOL  on [email or web address]  **The [employer] GeneXpert Xpress SARS-CoV-2 RT-PCR Test has been made  available for use under the Emergency Use Authorization (EUA) only.          SARS-CoV-2 Source NP Swab    Narrative:      Have you been in close contact with a person who is suspected  or known to be positive for COVID-19 within the last 30 days  (e.g. last seen that person < 30 days ago)->No    BLOOD CULTURE [[id number]] Collected: 04/19/21 1901    Order Status: Canceled Specimen: Other from Peripheral       Latest pertinent labs were reviewed.      IMAGING STUDIES:  No new imaging.       Pertinent Diagnoses:  · Sepsis - resolved   · Left upper lobe pneumonia - weaned off supplemental O2  · Strep pneumo bacteremia - 4/19/21 blood culture growing Strep pneumo susceptible to penicillin and cefotaxime; 4/20/21 blood culture NGTD, 4/21/21 blood cultures x 2 NGTD  · Type 2 diabetes - well controlled, A1c 6.9 in Jan 2021  · Hypokalemia - K 3.1 today      Assessment and Recommendations:  65-year-old female admitted with sepsis secondary to community acquired pneumonia. CT-PA negative for PE but showed a L upper lobe consolidation. Started on empiric Unasyn and azithromycin. Sepsis resolved quickly. 4/19/21 blood culture growing Strep pneumo. Azithromycin discontinued. Leukocytosis resolved. Afebrile since admission. 4/20/21 blood culture NGTD and 4/21/21 blood cultures x 2 NGTD. Weaned off supplemental O2.     · Continue Unasyn for Strep pneumo bacteremia and BRITTNEY pneumonia while inpatient    · TTE to assess for IE pending   · If TTE negative for IE, can transition to Levaquin to complete a 10-day course of antibiotics from date of neg blood culture, i.e. from 4/20/21 to 4/30/21; normal QTc 401 ms on 4/19/21 EKG  · Diabetes management and maintenance of electrolyte balance per primary team       Plan was discussed with the primary team. ID will sign off. Please feel free to call with questions.

## 2021-04-22 NOTE — DISCHARGE INSTRUCTIONS
Discharge Instructions per Corey Bennett M.D.    Continue antibiotic for 10 days   Follow up with primary care within 2 weeks     DIET: healthy     ACTIVITY: as tolerated     DIAGNOSIS: Sepsis, community-acquired pneumonia    Return to ER if worsening cough, shortness of breath, fever, chest pain, confusion, severe headache, unusual bleeding, focal weakness    Discharge Instructions    Discharged to home by car with relative. Discharged via wheelchair, hospital escort: Yes.  Special equipment needed: Not Applicable    Be sure to schedule a follow-up appointment with your primary care doctor or any specialists as instructed.     Discharge Plan:   Diet Plan: Discussed  Activity Level: Discussed  Confirmed Follow up Appointment: Patient to Call and Schedule Appointment  Confirmed Symptoms Management: Discussed  Medication Reconciliation Updated: Yes    I understand that a diet low in cholesterol, fat, and sodium is recommended for good health. Unless I have been given specific instructions below for another diet, I accept this instruction as my diet prescription.   Other diet: Regular as tolerated; healthy diet     Special Instructions: None    · Is patient discharged on Warfarin / Coumadin?   No     Depression / Suicide Risk    As you are discharged from this [facility] facility, it is important to learn how to keep safe from harming yourself.    Recognize the warning signs:  · Abrupt changes in personality, positive or negative- including increase in energy   · Giving away possessions  · Change in eating patterns- significant weight changes-  positive or negative  · Change in sleeping patterns- unable to sleep or sleeping all the time   · Unwillingness or inability to communicate  · Depression  · Unusual sadness, discouragement and loneliness  · Talk of wanting to die  · Neglect of personal appearance   · Rebelliousness- reckless behavior  · Withdrawal from people/activities they love  · Confusion- inability to  concentrate     If you or a loved one observes any of these behaviors or has concerns about self-harm, here's what you can do:  · Talk about it- your feelings and reasons for harming yourself  · Remove any means that you might use to hurt yourself (examples: pills, rope, extension cords, firearm)  · Get professional help from the community (Mental Health, Substance Abuse, psychological counseling)  · Do not be alone:Call your Safe Contact- someone whom you trust who will be there for you.  · Call your local CRISIS HOTLINE 945-5857 or 568-087-7826  · Call your local Children's Mobile Crisis Response Team [place] (995) 973-2621 or www.[employer]  · Call the toll free National Suicide Prevention Hotlines   · National Suicide Prevention Lifeline 197-443-GZUX (9396)  · National Hope Line Network 800-SUICIDE (483-6765)

## 2021-04-22 NOTE — CARE PLAN
Problem: Infection  Goal: Will remain free from infection  Outcome: PROGRESSING AS EXPECTED  Note: Antibiotics administered per MAR.  Handwashing/foam performed before and after patient care.      Problem: Knowledge Deficit  Goal: Knowledge of disease process/condition, treatment plan, diagnostic tests, and medications will improve  Outcome: PROGRESSING AS EXPECTED  Note: Plan of care discussed with patient. All questions addressed.

## 2021-04-22 NOTE — CARE PLAN
"  Problem: Safety  Goal: Will remain free from injury  Outcome: PROGRESSING AS EXPECTED  Pt scored as a \"no fall\" risk, non-skid socks and other fall precautions still in place      Problem: Venous Thromboembolism (VTW)/Deep Vein Thrombosis (DVT) Prevention:  Goal: Patient will participate in Venous Thrombosis (VTE)/Deep Vein Thrombosis (DVT)Prevention Measures  Outcome: PROGRESSING AS EXPECTED  Pt is compliant w/ DVT prophylaxis, alternating injection sites, pt educated on purpose of medication      Problem: Respiratory:  Goal: Respiratory status will improve  Outcome: PROGRESSING AS EXPECTED  Pt independently using the IS, volume is currently 1,000   +dry cough      "

## 2021-04-22 NOTE — PROGRESS NOTES
Pt is A+Ox4   Denies all pain   VSS, 2 liters O2 (98%)   Tolerating a diabetic diet               Denies N/V/D   LBM PTA, +void   ACHS FSBS: 121  Ambulating with little to no staff assistance   Denies chest pain/SOB    +dry cough   Pt updated on POC, appropriate fall precautions in place, all needs met at this time   Hourly rounding in place

## 2021-04-22 NOTE — CARE PLAN
Problem: Safety  Goal: Will remain free from injury  Outcome: PROGRESSING AS EXPECTED  Non skid socks in use. Hourly rounding implemented.      Problem: Respiratory:  Goal: Respiratory status will improve  Outcome: PROGRESSING AS EXPECTED   in use to monitor O2 levels. Encouraging deep breathing/coughing. OOB for meals.

## 2021-04-22 NOTE — PROGRESS NOTES
Pt A&O x 4.  Declines pain at this time.  Pt concerned about left eye. Pt states she woke up and felt like something was in her eye.  Eye flushed with saline flush. +redness noted. Eye assessed with light. Nothing in eye. Warm wash cloth provided. +slight blurry vision reported.  Pt on RA sating 92%.   Tolerating diabetic diet. FSBG 90.  LBM today 4/22/21.  POC discussed with pt. All needs addressed at this time.

## 2021-04-22 NOTE — PROGRESS NOTES
IV d/c. Pt getting dressed at this time. Orders for discharge lounge placed. Pt up self, steady gait. Abx script sent electronically by MD.

## 2021-04-23 ENCOUNTER — TELEPHONE (OUTPATIENT)
Dept: HEALTH INFORMATION MANAGEMENT | Facility: OTHER | Age: 66
End: 2021-04-23

## 2021-04-23 NOTE — TELEPHONE ENCOUNTER
HTH/SCP LSW placed outreach call to mbr to follow up regarding hospital discharge. Mbr reported she was doing well but that her eye has been bothering her. She reported she hasn't had any luck obtaining a PCP follow up appt. LSW discussed [facility] benefits which mbr voiced interest in and would like referral sent. Discussed that [facility] would reach out to her to schedule she voiced understanding. Discussed if she has completed an Advanced Directive, she reported she believes she has in the past but is unsure of the location. Mbr requested packet be mailed to her. LSW mailed out AD packet.

## 2021-04-25 LAB
BACTERIA BLD CULT: NORMAL
SIGNIFICANT IND 70042: NORMAL
SITE SITE: NORMAL
SOURCE SOURCE: NORMAL

## 2021-04-26 ENCOUNTER — HOSPITAL ENCOUNTER (OUTPATIENT)
Dept: LAB | Facility: MEDICAL CENTER | Age: 66
End: 2021-04-26
Attending: FAMILY MEDICINE
Payer: MEDICARE

## 2021-04-26 DIAGNOSIS — E11.29 TYPE 2 DIABETES MELLITUS WITH MICROALBUMINURIA, WITH LONG-TERM CURRENT USE OF INSULIN (HCC): ICD-10-CM

## 2021-04-26 DIAGNOSIS — R80.9 TYPE 2 DIABETES MELLITUS WITH MICROALBUMINURIA, WITH LONG-TERM CURRENT USE OF INSULIN (HCC): ICD-10-CM

## 2021-04-26 DIAGNOSIS — Z79.4 TYPE 2 DIABETES MELLITUS WITH MICROALBUMINURIA, WITH LONG-TERM CURRENT USE OF INSULIN (HCC): ICD-10-CM

## 2021-04-26 LAB
ALBUMIN SERPL BCP-MCNC: 3.7 G/DL (ref 3.2–4.9)
ALBUMIN/GLOB SERPL: 0.9 G/DL
ALP SERPL-CCNC: 80 U/L (ref 30–99)
ALT SERPL-CCNC: 18 U/L (ref 2–50)
ANION GAP SERPL CALC-SCNC: 13 MMOL/L (ref 7–16)
AST SERPL-CCNC: 17 U/L (ref 12–45)
BACTERIA BLD CULT: NORMAL
BASOPHILS # BLD AUTO: 0.4 % (ref 0–1.8)
BASOPHILS # BLD: 0.04 K/UL (ref 0–0.12)
BILIRUB SERPL-MCNC: 0.6 MG/DL (ref 0.1–1.5)
BUN SERPL-MCNC: 12 MG/DL (ref 8–22)
CALCIUM SERPL-MCNC: 9.4 MG/DL (ref 8.5–10.5)
CHLORIDE SERPL-SCNC: 102 MMOL/L (ref 96–112)
CHOLEST SERPL-MCNC: 109 MG/DL (ref 100–199)
CO2 SERPL-SCNC: 26 MMOL/L (ref 20–33)
CREAT SERPL-MCNC: 0.77 MG/DL (ref 0.5–1.4)
EOSINOPHIL # BLD AUTO: 0.11 K/UL (ref 0–0.51)
EOSINOPHIL NFR BLD: 1.2 % (ref 0–6.9)
ERYTHROCYTE [DISTWIDTH] IN BLOOD BY AUTOMATED COUNT: 43.9 FL (ref 35.9–50)
EST. AVERAGE GLUCOSE BLD GHB EST-MCNC: 154 MG/DL
FASTING STATUS PATIENT QL REPORTED: NORMAL
GLOBULIN SER CALC-MCNC: 3.9 G/DL (ref 1.9–3.5)
GLUCOSE SERPL-MCNC: 114 MG/DL (ref 65–99)
HBA1C MFR BLD: 7 % (ref 4–5.6)
HCT VFR BLD AUTO: 45.2 % (ref 37–47)
HDLC SERPL-MCNC: 33 MG/DL
HGB BLD-MCNC: 14.2 G/DL (ref 12–16)
IMM GRANULOCYTES # BLD AUTO: 0.16 K/UL (ref 0–0.11)
IMM GRANULOCYTES NFR BLD AUTO: 1.7 % (ref 0–0.9)
LDLC SERPL CALC-MCNC: 47 MG/DL
LYMPHOCYTES # BLD AUTO: 2.8 K/UL (ref 1–4.8)
LYMPHOCYTES NFR BLD: 30 % (ref 22–41)
MCH RBC QN AUTO: 27.9 PG (ref 27–33)
MCHC RBC AUTO-ENTMCNC: 31.4 G/DL (ref 33.6–35)
MCV RBC AUTO: 88.8 FL (ref 81.4–97.8)
MONOCYTES # BLD AUTO: 0.75 K/UL (ref 0–0.85)
MONOCYTES NFR BLD AUTO: 8 % (ref 0–13.4)
NEUTROPHILS # BLD AUTO: 5.46 K/UL (ref 2–7.15)
NEUTROPHILS NFR BLD: 58.7 % (ref 44–72)
NRBC # BLD AUTO: 0 K/UL
NRBC BLD-RTO: 0 /100 WBC
PLATELET # BLD AUTO: 471 K/UL (ref 164–446)
PMV BLD AUTO: 9.1 FL (ref 9–12.9)
POTASSIUM SERPL-SCNC: 3.2 MMOL/L (ref 3.6–5.5)
PROT SERPL-MCNC: 7.6 G/DL (ref 6–8.2)
RBC # BLD AUTO: 5.09 M/UL (ref 4.2–5.4)
SIGNIFICANT IND 70042: NORMAL
SITE SITE: NORMAL
SODIUM SERPL-SCNC: 141 MMOL/L (ref 135–145)
SOURCE SOURCE: NORMAL
TRIGL SERPL-MCNC: 143 MG/DL (ref 0–149)
WBC # BLD AUTO: 9.3 K/UL (ref 4.8–10.8)

## 2021-04-26 PROCEDURE — 80053 COMPREHEN METABOLIC PANEL: CPT

## 2021-04-26 PROCEDURE — 85025 COMPLETE CBC W/AUTO DIFF WBC: CPT

## 2021-04-26 PROCEDURE — 82043 UR ALBUMIN QUANTITATIVE: CPT

## 2021-04-26 PROCEDURE — 83036 HEMOGLOBIN GLYCOSYLATED A1C: CPT

## 2021-04-26 PROCEDURE — 80061 LIPID PANEL: CPT

## 2021-04-26 PROCEDURE — 36415 COLL VENOUS BLD VENIPUNCTURE: CPT

## 2021-04-26 PROCEDURE — 82570 ASSAY OF URINE CREATININE: CPT

## 2021-04-27 ENCOUNTER — TELEPHONE (OUTPATIENT)
Dept: MEDICAL GROUP | Facility: PHYSICIAN GROUP | Age: 66
End: 2021-04-27

## 2021-04-27 LAB
CREAT UR-MCNC: 103.64 MG/DL
MICROALBUMIN UR-MCNC: 3.8 MG/DL
MICROALBUMIN/CREAT UR: 37 MG/G (ref 0–30)

## 2021-04-27 NOTE — TELEPHONE ENCOUNTER
Phone Number Called: [phone] (home) [phone] (work)      Call outcome: Spoke to patient regarding message below.    Message: Patient scheduled 5/5/21

## 2021-04-28 LAB
BACTERIA BLD CULT: ABNORMAL
BACTERIA BLD CULT: ABNORMAL
SIGNIFICANT IND 70042: ABNORMAL
SITE SITE: ABNORMAL
SOURCE SOURCE: ABNORMAL

## 2021-04-30 ENCOUNTER — TELEPHONE (OUTPATIENT)
Dept: MEDICAL GROUP | Facility: PHYSICIAN GROUP | Age: 66
End: 2021-04-30

## 2021-04-30 NOTE — TELEPHONE ENCOUNTER
Future Appointments       Provider Department Center    5/7/2021 1:30 PM [facility]    1/21/2022 3:00 PM Constantin Moon M.D. [facility]        ESTABLISHED PATIENT PRE-VISIT PLANNING     Patient was NOT contacted to complete PVP.     Note: Patient will not be contacted if there is no indication to call.     1.  Reviewed notes from the last few office visits within the medical group: Yes, lov 01/20/2021    2.  If any orders were placed at last visit or intended to be done for this visit (i.e. 6 mos follow-up), do we have Results/Consult Notes?         •  Labs - Labs ordered, completed on 04/26/2021 and results are in chart.  Note: If patient appointment is for lab review and patient did not complete labs, check with provider if OK to reschedule patient until labs completed.       •  Imaging - Imaging was not ordered at last office visit.       •  Referrals - No referrals were ordered at last office visit.    3. Is this appointment scheduled as a Hospital Follow-Up? No    4.  Immunizations were updated in Epic using Reconcile Outside Information activity? Yes    5.  Patient is due for the following Health Maintenance Topics:   Health Maintenance Due   Topic Date Due   • IMM HEP B VACCINE (1 of 3 - Risk 3-dose series) Never done   • MAMMOGRAM  05/13/2021     6.  AHA (Pulse8) form printed for Provider? N/A

## 2021-05-03 ENCOUNTER — TELEPHONE (OUTPATIENT)
Dept: INFECTIOUS DISEASES | Facility: MEDICAL CENTER | Age: 66
End: 2021-05-03

## 2021-05-03 NOTE — TELEPHONE ENCOUNTER
----- Message from Angelo Ferrer M.D. sent at 4/30/2021  2:03 PM PDT -----  Jose Randall,    Please let Ms. Cueva know that 1 out of 2 sets of blood cultures obtained from 4/21 turned positive for a skin bacteria. This is likely a contaminant and does not need antibiotics if she is feeling well.

## 2021-05-07 ENCOUNTER — OFFICE VISIT (OUTPATIENT)
Dept: MEDICAL GROUP | Facility: PHYSICIAN GROUP | Age: 66
End: 2021-05-07
Payer: MEDICARE

## 2021-05-07 VITALS
SYSTOLIC BLOOD PRESSURE: 120 MMHG | BODY MASS INDEX: 30.53 KG/M2 | OXYGEN SATURATION: 94 % | HEART RATE: 87 BPM | HEIGHT: 66 IN | DIASTOLIC BLOOD PRESSURE: 76 MMHG | WEIGHT: 190 LBS | TEMPERATURE: 97.9 F

## 2021-05-07 DIAGNOSIS — E11.29 TYPE 2 DIABETES MELLITUS WITH MICROALBUMINURIA, WITH LONG-TERM CURRENT USE OF INSULIN (HCC): ICD-10-CM

## 2021-05-07 DIAGNOSIS — E87.6 HYPOKALEMIA: ICD-10-CM

## 2021-05-07 DIAGNOSIS — A40.9 SEPSIS DUE TO STREPTOCOCCUS SPECIES, UNSPECIFIED WHETHER ACUTE ORGAN DYSFUNCTION PRESENT (HCC): ICD-10-CM

## 2021-05-07 DIAGNOSIS — R80.9 TYPE 2 DIABETES MELLITUS WITH MICROALBUMINURIA, WITH LONG-TERM CURRENT USE OF INSULIN (HCC): ICD-10-CM

## 2021-05-07 DIAGNOSIS — G47.34 NOCTURNAL HYPOXIA: ICD-10-CM

## 2021-05-07 DIAGNOSIS — Z79.4 TYPE 2 DIABETES MELLITUS WITH MICROALBUMINURIA, WITH LONG-TERM CURRENT USE OF INSULIN (HCC): ICD-10-CM

## 2021-05-07 DIAGNOSIS — J18.9 COMMUNITY ACQUIRED PNEUMONIA OF LEFT UPPER LOBE OF LUNG: ICD-10-CM

## 2021-05-07 PROCEDURE — 99214 OFFICE O/P EST MOD 30 MIN: CPT | Performed by: FAMILY MEDICINE

## 2021-05-07 RX ORDER — POTASSIUM CHLORIDE 20 MEQ/1
20 TABLET, EXTENDED RELEASE ORAL DAILY
Qty: 90 TABLET | Refills: 3 | Status: SHIPPED | OUTPATIENT
Start: 2021-05-07 | End: 2022-05-19

## 2021-05-07 RX ORDER — LIRAGLUTIDE 6 MG/ML
1.8 INJECTION SUBCUTANEOUS EVERY EVENING
Qty: 9 ML | Refills: 11 | Status: SHIPPED | OUTPATIENT
Start: 2021-05-07 | End: 2022-05-16 | Stop reason: SDUPTHER

## 2021-05-07 ASSESSMENT — FIBROSIS 4 INDEX: FIB4 SCORE: 0.56

## 2021-05-08 NOTE — PROGRESS NOTES
CC:Diagnoses of Nocturnal hypoxia, Type 2 diabetes mellitus with microalbuminuria, with long-term current use of insulin (HCC), Hypokalemia, Community acquired pneumonia of left upper lobe of lung, and Sepsis due to Streptococcus species, unspecified whether acute organ dysfunction present (HCC) were pertinent to this visit.      HISTORY OF PRESENT ILLNESS: Patient is a 65 y.o. female established patient who presents today to follow-up her recent hospitalization for sepsis and possible sepsis induced pneumonia.  Patient is admitted to the hospital on 4/19/2021 and discharged on 4/22/2021.  Patient initially noticed that she was becoming hypoxic at night due to her Apple Watch telling her oxygen levels were dropping.  She did have an associated cough chest pressure fatigue and sore throat that had been ongoing for 3 days prior to her presentation on 4/19/2021 2 urgent care.  Evaluation in urgent care, demonstrated that she was hypoxic on presentation at room air and was advised to go to the ER.  Initial evaluation in the ER demonstrated that she met criteria for sepsis with a lactate of 1.3 and was admitted to the hospital.  She was also found to have persistently low potassium during her hospitalization as well.  Patient was placed on Unasyn and azithromycin and eventually transitioned to levofloxacin for 10 days and was discharged with remaining doses of levofloxacin which she has completed since discharge.  Blood cultures show growth of Streptococcus pneumonia.  Patient now currently reports that she feels nearly back to her baseline but is noting that she feels tired and continues to have noted hypoxia on her apple watch.  Patient has [facility] evaluated [facility] for this as well however no orders can be found regarding this work-up.    Patient had follow-up labs completed on 4/26/2021 after discharge from the hospital which shows continued low potassium but no kidney or liver dysfunction.  CBC  appears normal, and cholesterol is well controlled.        Patient Active Problem List    Diagnosis Date Noted   • Sepsis due to Streptococcus species ([facility]) 05/07/2021   • Bacteremia 04/20/2021   • Community acquired pneumonia of left upper lobe of lung 04/19/2021   • Post-menopausal atrophic vaginitis 04/17/2020   • Sebaceous cyst 03/31/2020   • Arthralgia of left hand 02/28/2020   • Type 2 diabetes mellitus with microalbuminuria, with long-term current use of insulin ([facility]) 02/02/2018   • Vitamin B12 deficiency 08/24/2017   • Fatigue 07/20/2017   • Gall bladder polyp 08/12/2011   • Hyperlipidemia 05/12/2011   • Vitamin D deficiency 05/12/2011   • GERD (gastroesophageal reflux disease) 05/12/2011      Allergies:Sulfa drugs    Current Outpatient Medications   Medication Sig Dispense Refill   • potassium chloride SA (KDUR) 20 MEQ Tab CR Take 1 tablet by mouth every day. 90 tablet 3   • liraglutide (VICTOZA) 18 MG/3ML Solution Pen-injector Inject 1.8 mg under the skin every evening. 9 mL 11   • atorvastatin (LIPITOR) 80 MG tablet TAKE 1 TABLET BY MOUTH EVERY EVENING (Patient taking differently: Take 80 mg by mouth every evening.) 100 Tab 3   • JARDIANCE 25 MG Tab TAKE ONE TABLET BY MOUTH EVERY DAY (Patient taking differently: Take 25 mg by mouth every evening.) 100 Tab 3   • insulin glargine (LANTUS SOLOSTAR) 100 UNIT/ML Solution Pen-injector injection Inject 21 Units as instructed every evening. 15 Each 3   • omeprazole (PRILOSEC) 20 MG delayed-release capsule TAKE ONE CAPSULE BY MOUTH EVERY DAY (Patient taking differently: Take 20 mg by mouth every evening.) 90 Cap 4   • pioglitazone (ACTOS) 30 MG Tab TAKE ONE TABLET BY MOUTH EVERY DAY (Patient taking differently: Take 30 mg by mouth every evening.) 90 Tab 4   • Cyanocobalamin (B-12) 1000 MCG Cap Take 1,000 mcg by mouth every evening.     • Cholecalciferol (VITAMIN D) 400 UNIT TABS Take 400 Units by mouth every evening.     • aspirin EC (ECOTRIN) 81 MG TBEC Take 81  "mg by mouth every evening.     • miconazole (MONISTAT) 2 % Cream        No current facility-administered medications for this visit.       Social History     Tobacco Use   • Smoking status: Former Smoker     Packs/day: 1.00     Years: 20.00     Pack years: 20.00     Types: Cigarettes     Quit date: 1990     Years since quittin.3   • Smokeless tobacco: Never Used   Substance Use Topics   • Alcohol use: Yes     Comment: holidays   • Drug use: No     Social History     Social History Narrative   • Not on file       Family History   Problem Relation Age of Onset   • Cancer Maternal Grandmother         lung    • Cancer Mother         breast   • Other Father         urinary issues   • GI Disease Sister    • Heart Disease Brother          Exam:    /76 (BP Location: Right arm, Patient Position: Sitting, BP Cuff Size: Adult)   Pulse 87   Temp 36.6 °C (97.9 °F) (Temporal)   Ht 1.676 m (5' 6\")   Wt 86.2 kg (190 lb)   SpO2 94%  Body mass index is 30.67 kg/m².    General:  Well nourished, well developed female in NAD  Psych: Normal Behavior, Normal affect    LABS: 2021 through 2021 and 2021: Results reviewed and discussed with the patient, questions answered.    ED MD, hospitalist, infectious disease notes reviewed and summarized as above.    Assessment/Plan:  1. Nocturnal hypoxia  New problem to examiner.  Overnight pulse oximetry ordered  - OVERNIGHT PULSE OXIMETRY    2. Type 2 diabetes mellitus with microalbuminuria, with long-term current use of insulin ([facility])  Chronic stable medical condition.  Renewal of her toes as below.  Hemoglobin A1c 7.0% on 2021.  Continue Actos, continue Jardiance, continue Lantus 21 units daily  - liraglutide (VICTOZA) 18 MG/3ML Solution Pen-injector; Inject 1.8 mg under the skin every evening.  Dispense: 9 mL; Refill: 11    3. Hypokalemia  Acute problem to examiner supplementation with potassium as below.  Follow-up in 1 month.  Follow-up BMP and CBC " ordered  - potassium chloride SA (KDUR) 20 MEQ Tab CR; Take 1 tablet by mouth every day.  Dispense: 90 tablet; Refill: 3    4. Community acquired pneumonia of left upper lobe of lung  5. Sepsis due to Streptococcus species, unspecified whether acute organ dysfunction present (HCC)  Resolving medical condition.  Patient with residual shortness of breath.  Counseled on follow-up if worsening.  Follow through with overnight pulse oximetry.    Please note that this dictation was created using voice recognition software. I have worked with consultants from the vendor as well as technical experts from [facility] to optimize the interface. I have made every reasonable attempt to correct obvious errors, but I expect that there are errors of grammar and possibly content that I did not discover before finalizing the note.

## 2021-05-19 ENCOUNTER — TELEPHONE (OUTPATIENT)
Dept: MEDICAL GROUP | Facility: PHYSICIAN GROUP | Age: 66
End: 2021-05-19

## 2021-05-19 DIAGNOSIS — G47.34 NOCTURNAL HYPOXIA: ICD-10-CM

## 2021-05-26 ENCOUNTER — OFFICE VISIT (OUTPATIENT)
Dept: MEDICAL GROUP | Facility: PHYSICIAN GROUP | Age: 66
End: 2021-05-26
Payer: MEDICARE

## 2021-05-26 VITALS
BODY MASS INDEX: 30.22 KG/M2 | HEIGHT: 66 IN | HEART RATE: 81 BPM | DIASTOLIC BLOOD PRESSURE: 72 MMHG | OXYGEN SATURATION: 97 % | WEIGHT: 188 LBS | TEMPERATURE: 98.2 F | SYSTOLIC BLOOD PRESSURE: 112 MMHG

## 2021-05-26 DIAGNOSIS — Z12.31 ENCOUNTER FOR SCREENING MAMMOGRAM FOR BREAST CANCER: ICD-10-CM

## 2021-05-26 DIAGNOSIS — G47.34 NOCTURNAL HYPOXIA: ICD-10-CM

## 2021-05-26 PROCEDURE — 99214 OFFICE O/P EST MOD 30 MIN: CPT | Performed by: FAMILY MEDICINE

## 2021-05-26 ASSESSMENT — FIBROSIS 4 INDEX: FIB4 SCORE: 0.57

## 2021-05-26 NOTE — PROGRESS NOTES
CC:Diagnoses of Encounter for screening mammogram for breast cancer and Nocturnal hypoxia were pertinent to this visit.      HISTORY OF PRESENT ILLNESS: Patient is a 66 y.o. female established patient who presents today to follow-up on nocturnal hypoxemia noted on home overnight pulse oximetry.  Patient had home overnight pulse oximetry ordered by [facility] during one of their comprehensive geriatric assessments.  Patient had home oxygen ordered by provider [facility] after abnormal findings noted on home overnight pulse oximetry, since patient had a follow-up visit with me care was discontinued by [facility] provider.  Counseled the patient needs additional and laboratory sleep study to confirm diagnosis of obstructive sleep apnea and start appropriate treatment which does not include oxygen.      Patient Active Problem List    Diagnosis Date Noted   • Sepsis due to Streptococcus species ([facility]) 05/07/2021   • Bacteremia 04/20/2021   • Community acquired pneumonia of left upper lobe of lung 04/19/2021   • Post-menopausal atrophic vaginitis 04/17/2020   • Sebaceous cyst 03/31/2020   • Arthralgia of left hand 02/28/2020   • Type 2 diabetes mellitus with microalbuminuria, with long-term current use of insulin ([facility]) 02/02/2018   • Vitamin B12 deficiency 08/24/2017   • Fatigue 07/20/2017   • Gall bladder polyp 08/12/2011   • Hyperlipidemia 05/12/2011   • Vitamin D deficiency 05/12/2011   • GERD (gastroesophageal reflux disease) 05/12/2011      Allergies:Sulfa drugs    Current Outpatient Medications   Medication Sig Dispense Refill   • potassium chloride SA (KDUR) 20 MEQ Tab CR Take 1 tablet by mouth every day. 90 tablet 3   • liraglutide (VICTOZA) 18 MG/3ML Solution Pen-injector Inject 1.8 mg under the skin every evening. 9 mL 11   • atorvastatin (LIPITOR) 80 MG tablet TAKE 1 TABLET BY MOUTH EVERY EVENING (Patient taking differently: Take 80 mg by mouth every evening.) 100 Tab 3   • JARDIANCE 25 MG Tab TAKE ONE TABLET BY MOUTH EVERY DAY  "(Patient taking differently: Take 25 mg by mouth every evening.) 100 Tab 3   • insulin glargine (LANTUS SOLOSTAR) 100 UNIT/ML Solution Pen-injector injection Inject 21 Units as instructed every evening. 15 Each 3   • omeprazole (PRILOSEC) 20 MG delayed-release capsule TAKE ONE CAPSULE BY MOUTH EVERY DAY (Patient taking differently: Take 20 mg by mouth every evening.) 90 Cap 4   • pioglitazone (ACTOS) 30 MG Tab TAKE ONE TABLET BY MOUTH EVERY DAY (Patient taking differently: Take 30 mg by mouth every evening.) 90 Tab 4   • Cyanocobalamin (B-12) 1000 MCG Cap Take 1,000 mcg by mouth every evening.     • Cholecalciferol (VITAMIN D) 400 UNIT TABS Take 400 Units by mouth every evening.     • aspirin EC (ECOTRIN) 81 MG TBEC Take 81 mg by mouth every evening.       No current facility-administered medications for this visit.       Social History     Tobacco Use   • Smoking status: Former Smoker     Packs/day: 1.00     Years: 20.00     Pack years: 20.00     Types: Cigarettes     Quit date: 1990     Years since quittin.4   • Smokeless tobacco: Never Used   Vaping Use   • Vaping Use: Never used   Substance Use Topics   • Alcohol use: Yes     Comment: holidays   • Drug use: No     Social History     Social History Narrative   • Not on file       Family History   Problem Relation Age of Onset   • Cancer Maternal Grandmother         lung    • Cancer Mother         breast   • Other Father         urinary issues   • GI Disease Sister    • Heart Disease Brother          Exam:    /72 (BP Location: Right arm, Patient Position: Sitting, BP Cuff Size: Adult)   Pulse 81   Temp 36.8 °C (98.2 °F) (Temporal)   Ht 1.685 m (5' 6.34\")   Wt 85.3 kg (188 lb)   SpO2 97%  Body mass index is 30.03 kg/m².    General:  Well nourished, well developed female in NAD  Psych: Normal Behavior, Normal affect    LABS: 2021: Results reviewed and discussed with the patient, questions answered.    2021 overnight pulse oximetry home " study reviewed and discussed with patient.    4/29/2021 [facility] notes reviewed    Assessment/Plan:  1. Encounter for screening mammogram for breast cancer  - MA-SCREENING MAMMO BILAT W/CAD; Future    2. Nocturnal hypoxia  Extensive time spent counseling patient on appropriate treatment of possible obstructive sleep apnea.  Counseled that oxygen is not the appropriate treatment for nocturnal hypoxemia of unknown cause.  Patient accepts that she will follow-up with laboratory sleep study on 7/28/2021.    My total time spent caring for the patient on the day of the encounter was 35 minutes.  This includes time reviewing notes, counseling patient, and answering questions.  This does not include time spent on separately billable procedures/tests.      No follow-ups on file.      Please note that this dictation was created using voice recognition software. I have worked with consultants from the vendor as well as technical experts from [facility] [employer] to optimize the interface. I have made every reasonable attempt to correct obvious errors, but I expect that there are errors of grammar and possibly content that I did not discover before finalizing the note.

## 2021-06-14 ENCOUNTER — HOSPITAL ENCOUNTER (OUTPATIENT)
Dept: RADIOLOGY | Facility: MEDICAL CENTER | Age: 66
End: 2021-06-14
Attending: FAMILY MEDICINE
Payer: MEDICARE

## 2021-06-14 DIAGNOSIS — Z12.31 ENCOUNTER FOR SCREENING MAMMOGRAM FOR BREAST CANCER: ICD-10-CM

## 2021-06-14 PROCEDURE — 77063 BREAST TOMOSYNTHESIS BI: CPT

## 2021-06-28 DIAGNOSIS — E13.9 DIABETES 1.5, MANAGED AS TYPE 2 (HCC): ICD-10-CM

## 2021-06-28 DIAGNOSIS — E11.9 TYPE 2 DIABETES MELLITUS WITHOUT COMPLICATION, UNSPECIFIED WHETHER LONG TERM INSULIN USE (HCC): ICD-10-CM

## 2021-06-28 RX ORDER — INSULIN GLARGINE 100 [IU]/ML
21 INJECTION, SOLUTION SUBCUTANEOUS EVERY EVENING
Qty: 15 EACH | Refills: 3 | Status: SHIPPED | OUTPATIENT
Start: 2021-06-28 | End: 2022-04-11 | Stop reason: SDUPTHER

## 2021-06-28 RX ORDER — PIOGLITAZONEHYDROCHLORIDE 30 MG/1
30 TABLET ORAL
Qty: 90 TABLET | Refills: 4 | Status: SHIPPED | OUTPATIENT
Start: 2021-06-28 | End: 2022-06-21 | Stop reason: SDUPTHER

## 2021-07-19 DIAGNOSIS — K21.9 GASTROESOPHAGEAL REFLUX DISEASE WITHOUT ESOPHAGITIS: ICD-10-CM

## 2021-07-20 RX ORDER — OMEPRAZOLE 20 MG/1
20 CAPSULE, DELAYED RELEASE ORAL
Qty: 90 CAPSULE | Refills: 0 | Status: SHIPPED | OUTPATIENT
Start: 2021-07-20 | End: 2021-10-19 | Stop reason: SDUPTHER

## 2021-07-26 PROBLEM — G47.34 NOCTURNAL HYPOXIA: Status: ACTIVE | Noted: 2021-07-26

## 2021-07-26 PROBLEM — G47.33 OSA (OBSTRUCTIVE SLEEP APNEA): Status: ACTIVE | Noted: 2021-07-26

## 2021-07-26 PROBLEM — Z87.891 FORMER SMOKER: Status: ACTIVE | Noted: 2021-07-26

## 2021-07-26 NOTE — PROGRESS NOTES
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 kindly referred by Constantin Moon M.D.  for evaluation of possible obstructive sleep apnea syndrome.  The patient had a overnight oximetry performed on 5/11/2021 which showed saturations less than or equal to 88% for 414.1 minutes with a glenis saturation of 65%.  The patient experienced 284 desaturation events and the oxygen desaturation index was 37.  The oximetric pattern was sawtoothed and clustered consistent with JER.    Since her hospitalization her saturations have been inadequate. Sleeps alone but wakes self up snoring.      Symptom Summary:  Snoring: +  Very loud snoring: ?  Witnessed apneas: unknown  Resuscitative snorts: denies  Nocturnal shortness of breath: denies  Non-restorative sleep: + during wht week; weekends feels better  Insomnia: -  Nocturnal awakenings: -  Nocturia: + times 2  EDS: +  Fatigue: +  Tiredness: + during the week, weekends feels better  Falls asleep accidentally: denies  Napping or returning to bed after arising: denies  Restless legs: - not currently  Limb movements during sleep: unknown  Nocturnal headaches: -    Younger brother has jer and uses cpap.    Significant comorbidities and modifying factors include type 2 diabetes, strep pneumoniae bacteremia and pneumonia April 2021, former smoker,  obesity, and nocturnal hypoxemia.    Patient Active Problem List    Diagnosis Date Noted   • JER (obstructive sleep apnea) 07/26/2021   • Nocturnal hypoxia 07/26/2021   • Former smoker 07/26/2021   • Sepsis due to Streptococcus species ([facility]) 05/07/2021   • Bacteremia 04/20/2021   • Community acquired pneumonia of left upper lobe of lung 04/19/2021   • Post-menopausal atrophic vaginitis 04/17/2020   • Sebaceous cyst 03/31/2020   • Arthralgia of left hand 02/28/2020   • Type 2 diabetes mellitus with microalbuminuria, with long-term current use of insulin ([facility])  2018   • Vitamin B12 deficiency 2017   • Fatigue 2017   • Gall bladder polyp 2011   • Hyperlipidemia 2011   • Vitamin D deficiency 2011   • GERD (gastroesophageal reflux disease) 2011       Past Medical History:   Diagnosis Date   • COVID-19    • Diabetes 2011   • Edema 2011   • Elevated liver enzymes 2011   • Gall bladder polyp 2011   • GERD (gastroesophageal reflux disease) 2011   • Hyperlipidemia 2011   • Hypertension 2011   • Post-menopausal atrophic vaginitis 2020   • Skin lesion of face 2011   • Vitamin d deficiency 2011        Past Surgical History:   Procedure Laterality Date   • OTHER ORTHOPEDIC SURGERY      right arm ORIF   • PRIMARY C SECTION     • TUBAL COAGULATION LAPAROSCOPIC BILATERAL         Family History   Problem Relation Age of Onset   • Cancer Maternal Grandmother         lung    • Cancer Mother         breast   • Other Father         urinary issues   • GI Disease Sister    • Heart Disease Brother        Social History     Socioeconomic History   • Marital status:      Spouse name: Not on file   • Number of children: Not on file   • Years of education: Not on file   • Highest education level: Not on file   Occupational History   • Not on file   Tobacco Use   • Smoking status: Former Smoker     Packs/day: 1.00     Years: 20.00     Pack years: 20.00     Types: Cigarettes     Quit date: 1990     Years since quittin.5   • Smokeless tobacco: Never Used   Vaping Use   • Vaping Use: Never used   Substance and Sexual Activity   • Alcohol use: Yes     Comment: holidays   • Drug use: No   • Sexual activity: Not Currently   Other Topics Concern   • Not on file   Social History Narrative   • Not on file     Social Determinants of Health     Financial Resource Strain:    • Difficulty of Paying Living Expenses:    Food Insecurity:    • Worried About Running Out of Food in the Last Year:    • Ran Out of  Food in the Last Year:    Transportation Needs:    • Lack of Transportation (Medical):    • Lack of Transportation (Non-Medical):    Physical Activity:    • Days of Exercise per Week:    • Minutes of Exercise per Session:    Stress:    • Feeling of Stress :    Social Connections:    • Frequency of Communication with Friends and Family:    • Frequency of Social Gatherings with Friends and Family:    • Attends [employer] Services:    • Active Member of Clubs or Organizations:    • Attends Club or Organization Meetings:    • Marital Status:    Intimate Partner Violence:    • Fear of Current or Ex-Partner:    • Emotionally Abused:    • Physically Abused:    • Sexually Abused:        Current Outpatient Medications   Medication Sig Dispense Refill   • zolpidem (AMBIEN) 5 MG Tab Take 1 tablet by mouth at bedtime as needed for Sleep (1 to 2 po qhs prn insomnia/sleep study. Bring to sleep study.) for up to 2 doses. 2 tablet 0   • omeprazole (PRILOSEC) 20 MG delayed-release capsule Take 1 capsule by mouth every day. 90 capsule 0   • pioglitazone (ACTOS) 30 MG Tab Take 1 tablet by mouth every day. 90 tablet 4   • insulin glargine (LANTUS SOLOSTAR) 100 UNIT/ML Solution Pen-injector injection Inject 21 Units under the skin every evening. 15 Each 3   • potassium chloride SA (KDUR) 20 MEQ Tab CR Take 1 tablet by mouth every day. 90 tablet 3   • liraglutide (VICTOZA) 18 MG/3ML Solution Pen-injector Inject 1.8 mg under the skin every evening. 9 mL 11   • atorvastatin (LIPITOR) 80 MG tablet TAKE 1 TABLET BY MOUTH EVERY EVENING (Patient taking differently: Take 80 mg by mouth every evening.) 100 Tab 3   • JARDIANCE 25 MG Tab TAKE ONE TABLET BY MOUTH EVERY DAY (Patient taking differently: Take 25 mg by mouth every evening.) 100 Tab 3   • Cyanocobalamin (B-12) 1000 MCG Cap Take 1,000 mcg by mouth every evening.     • Cholecalciferol (VITAMIN D) 400 UNIT TABS Take 400 Units by mouth every evening.     • aspirin EC (ECOTRIN) 81 MG TBEC  "Take 81 mg by mouth every evening.       No current facility-administered medications for this visit.    \"CURRENT RX\"    ALLERGIES: Sulfa drugs    ROS    Constitutional: Denies fever, chills, sweats,  weight loss, fatigue.  Eyes: Denies vision loss, pain, drainage, double vision, glasses.  Ears/Nose/Mouth/Throat: Denies earache, difficulty hearing, rhinitis/nasal congestion, injury, recurrent sore throat, persistent hoarseness, decayed teeth/toothaches, ringing or buzzing in the ears.  Cardiovascular: Denies chest pain, tightness, palpitations, swelling in legs/feet, fainting, difficulty breathing when lying down but gets better when sitting up.   Respiratory: Denies shortness of breath, cough, sputum, wheezing, painful breathing, coughing up blood.   Sleep: per HPI  Gastrointestinal: Denies  difficulty swallowing, nausea, abdominal pain, diarrhea, constipation, heartburn.  Genitourinary: Denies  blood in urine, discharge, frequent urination.   Musculoskeletal: Denies painful joints, sore muscles, back pain.   Integumentary: Denies rashes, lumps, color changes.   Neurological: Denies frequent headaches,weakness, dizziness.    PHYSICAL EXAM  Obese    /74 (BP Location: Left arm, Patient Position: Sitting, BP Cuff Size: Large adult)   Pulse 73   Resp 16   Ht 1.676 m (5' 6\")   Wt 87.1 kg (192 lb)   LMP 12/16/2006   SpO2 96%   BMI 30.99 kg/m²   Appearance: Well-nourished, well-developed, no acute distress  Eyes:  PERRLA, EOMI  ENMT: masked  Neck: Supple, trachea midline, no masses  Respiratory effort:  No intercostal retractions or use of accessory muscles  Lung auscultation:  No wheezes rhonchi rubs or rales  Cardiac: No murmurs, rubs, or gallops; regular rhythm, normal rate; no edema  Abdomen:  No tenderness, no organomegaly. Obese  Musculoskeletal:  Grossly normal; gait and station normal; digits and nails normal  Skin:  No rashes, petechiae, cyanosis  Neurologic: without focal signs; oriented to person, " time, place, and purpose; judgement intact  Psychiatric:  No depression, anxiety, agitation        Medical Decision Making:  The medical record was reviewed in its entirety including the referral notes, records from primary care, consultants notes, hospital records, labs, imaging, microbiology, immunology, and immunizations.  Care gaps identified and reviewed with the patient.    Diagnostic and titration nocturnal polysomnograms, home sleep apnea tests, continuous nocturnal oximetry results, multiple sleep latency tests, and compliance reports reviewed.        1. JER (obstructive sleep apnea)  - zolpidem (AMBIEN) 5 MG Tab; Take 1 tablet by mouth at bedtime as needed for Sleep (1 to 2 po qhs prn insomnia/sleep study. Bring to sleep study.) for up to 2 doses.  Dispense: 2 tablet; Refill: 0  - Polysomnography, 4 or More; Future    2. Nocturnal hypoxia    3. Gastroesophageal reflux disease without esophagitis    4. Pure hypercholesterolemia    5. Sepsis due to Streptococcus species, unspecified whether acute organ dysfunction present ([facility])    6. Type 2 diabetes mellitus with microalbuminuria, with long-term current use of insulin ([facility])    7. Community acquired pneumonia of left upper lobe of lung    8. Former smoker    - OBESITY COUNSELING (No Charge): Patient identified as having weight management issue.  Appropriate orders and counseling given.    If your BMI is 25-29.9 you are overweight. If your BMI is 30 or greater you are obese. To lose weight eat less, move more, or both. Any diet that reduces caloric intake can help with weight loss. Extra weight may reduce your lifespan. Avoid dramatic unsustainable dietary changes that result in the yo-yo effect (down then back up.)  Usually small modifications in diet and exercise are easier to stick with.    PLAN:   The patient has signs and symptoms consistent with obstructive sleep apnea hypopnea syndrome. Will schedule a nocturnal polysomnogram or a home sleep apnea test  depending on signs and symptoms as well as insurance restrictions.    The risks of untreated sleep apnea were discussed with the patient at length. Patients with JER are at increased risk of cardiovascular disease including coronary artery disease, systemic arterial hypertension, pulmonary arterial hypertension, cardiac arrythmias, and stroke. JER patients have an increased risk of motor vehicle accidents, type 2 diabetes, chronic kidney disease, and non-alcoholic liver disease. The patient was advised to avoid driving a motor vehicle when drowsy.    Positive airway pressure will favorably impact many of the adverse conditions and effects provoked by JER.    Have advised the patient to follow up with the appropriate healthcare practitioners for all other medical problems and issues.    Mask wearing, handwashing, and social distancing protocols reviewed and encouraged.    45 minutes      Return for after sleep study.        Recommend a f/u cxr given 4/21 pneumonia.

## 2021-07-28 ENCOUNTER — SLEEP CENTER VISIT (OUTPATIENT)
Dept: SLEEP MEDICINE | Facility: MEDICAL CENTER | Age: 66
End: 2021-07-28
Payer: MEDICARE

## 2021-07-28 VITALS
DIASTOLIC BLOOD PRESSURE: 74 MMHG | BODY MASS INDEX: 30.86 KG/M2 | OXYGEN SATURATION: 96 % | WEIGHT: 192 LBS | RESPIRATION RATE: 16 BRPM | SYSTOLIC BLOOD PRESSURE: 110 MMHG | HEART RATE: 73 BPM | HEIGHT: 66 IN

## 2021-07-28 DIAGNOSIS — R80.9 TYPE 2 DIABETES MELLITUS WITH MICROALBUMINURIA, WITH LONG-TERM CURRENT USE OF INSULIN (HCC): ICD-10-CM

## 2021-07-28 DIAGNOSIS — Z79.4 TYPE 2 DIABETES MELLITUS WITH MICROALBUMINURIA, WITH LONG-TERM CURRENT USE OF INSULIN (HCC): ICD-10-CM

## 2021-07-28 DIAGNOSIS — Z87.891 FORMER SMOKER: ICD-10-CM

## 2021-07-28 DIAGNOSIS — G47.33 OSA (OBSTRUCTIVE SLEEP APNEA): ICD-10-CM

## 2021-07-28 DIAGNOSIS — G47.34 NOCTURNAL HYPOXIA: ICD-10-CM

## 2021-07-28 DIAGNOSIS — J18.9 COMMUNITY ACQUIRED PNEUMONIA OF LEFT UPPER LOBE OF LUNG: ICD-10-CM

## 2021-07-28 DIAGNOSIS — K21.9 GASTROESOPHAGEAL REFLUX DISEASE WITHOUT ESOPHAGITIS: ICD-10-CM

## 2021-07-28 DIAGNOSIS — A40.9 SEPSIS DUE TO STREPTOCOCCUS SPECIES, UNSPECIFIED WHETHER ACUTE ORGAN DYSFUNCTION PRESENT (HCC): ICD-10-CM

## 2021-07-28 DIAGNOSIS — E78.00 PURE HYPERCHOLESTEROLEMIA: Chronic | ICD-10-CM

## 2021-07-28 DIAGNOSIS — E11.29 TYPE 2 DIABETES MELLITUS WITH MICROALBUMINURIA, WITH LONG-TERM CURRENT USE OF INSULIN (HCC): ICD-10-CM

## 2021-07-28 PROCEDURE — 99204 OFFICE O/P NEW MOD 45 MIN: CPT | Performed by: INTERNAL MEDICINE

## 2021-07-28 RX ORDER — ZOLPIDEM TARTRATE 5 MG/1
5 TABLET ORAL NIGHTLY PRN
Qty: 2 TABLET | Refills: 0 | Status: SHIPPED | OUTPATIENT
Start: 2021-07-28 | End: 2021-08-28

## 2021-07-28 ASSESSMENT — FIBROSIS 4 INDEX: FIB4 SCORE: 0.57

## 2021-08-13 ENCOUNTER — PATIENT MESSAGE (OUTPATIENT)
Dept: HEALTH INFORMATION MANAGEMENT | Facility: OTHER | Age: 66
End: 2021-08-13

## 2021-08-28 ENCOUNTER — SLEEP STUDY (OUTPATIENT)
Dept: SLEEP MEDICINE | Facility: MEDICAL CENTER | Age: 66
End: 2021-08-28
Attending: INTERNAL MEDICINE
Payer: MEDICARE

## 2021-08-28 DIAGNOSIS — G47.33 OSA (OBSTRUCTIVE SLEEP APNEA): ICD-10-CM

## 2021-08-31 NOTE — PROCEDURES
Clinical Comments:   The patient underwent a split night polysomnogram with a CPAP titration using the standard montage for measurement of paramaters of sleep, respiratory events, movement abnormalities, heart rate and rhythm. A Microphone was used to monitior snoring.  Interpretation:  Study start time was 08:37:25 PM. Total recording time was 3h 44.0m (224 minutes) with a total sleep time of 2h 21.0m (141 minutes) resulting in a sleep efficiency of 62.95%.  Sleep latency from the start fo the study was 33 minutes minutes and REM latency from sleep onset was 131 minutes minutes.  Respiratory:   There were 2 apneas in total consisting of 2 obstructive apneas, 0 mixed apneas, and 0 central apneas. There were 47 hypopneas in total.  The apnea index was 0.85 per hour and the hypopnea index was 20.00 per hour.  The overall AHI was 20.9, with a REM AHI of 34.29, and a supine AHI of 0.00.  Limb Movements:  There were a total of 134 periodic leg movements, of which 39 were PLMS arousals. This resulted in a PLMS index of 57.0 and a PLMS arousal index of 16.6  Oximetry:  The mean SaO2 was 90.0% for the diagnostic portion of the study, with a minimum SaO2 of 74.0%.   Treatment:  Interpretation:  Treatment recording time was 5h 47.5m (347 minutes) with a total sleep time of 5h 26.5m (326 minutes) resulting in a sleep efficiency of 94.0%.   Sleep latency from the start of treatment was 01 minutes minutes and REM latency from sleep onset was 0h 53.5m minutes.   The patient had 55 arousals in total for an arousal index of 10.1.  Respiratory:   There were 0 apneas in total consisting of 0 obstructive apneas, 0 central apneas, and 0 mixed apneas for an apnea index of 0.00.   The patient had 7 hypopneas in total, which resulted in a hypopnea index of 1.29.   The overall AHI was 1.29, with a REM AHI of 2.36, and a supine AHI of 7.50.   Limb Movements:  There were a total of 17 periodic leg movements, of which 9 were PLMS arousals.  This resulted in a PLMS index of 3.1 and a PLMS arousal index of 1.7.  Oximetry:  The mean SaO2 during treatment was 91.0%, with a minimum oxygen saturation of 82.0%.  CPAP was tried from 5 to 8cm H2O.    RECORDING TECHNIQUE:       After the scalp was prepared, gold plated electrodes were applied to the scalp according to the International 10-20 System. EEG (electroencephalogram) was continuously monitored from the O1-M2, O2-M1, C3-M2, C4-M1, F3-M2, and F4-M1.   EOGs (electrooculograms) were monitored by electrodes placed at the left and right outer canthi.  Chin EMG (electromyogram) was monitored by electrodes placed on the mentalis and sub-mentalis muscles.  Nasal and oral airflow were monitored using a triple port thermocouple as well as oronasal pressure transducer.  Respiratory effort was measured by inductive plethysmography technology employing abdominal and thoracic belts.  Blood oxygen saturation and pulse were monitored by pulse oximetry.  Heart rhythm was monitored by surface electrocardiogram.  Leg EMG was studied using surface electrodes placed on left and right anterior tibialis.  A microphone was used to monitor tracheal sounds and snoring.  Body position was monitored and documented by technician observation      SUMMARY:    This was an overnight diagnostic polysomnogram with a subsequent positive airway pressure titration, also known as a split- night study.  The patient chose to use a small DreamWear mask with heated humidification.    During the diagnostic phase the total recording time was 224 minutes, the sleep period time was 187 minutes, and the total sleep time was 141 minutes.  The patient's sleep efficiency was 62.95% which is reduced.  The patient experienced 1 REM period(s).    The sleep stage durations revealed 49.5 minutes of wake after sleep onset (WASO), 39 minutes of N1 sleep, 73 minutes of N2 sleep, 4 minutes of N3 sleep, and 24 minutes of REM.    The latency to sleep was 33 minutes  which is slightly prolonged.  The latency to REM was 131 minutes which is near normal.  Severe sleep fragmentation occurred.  The arousal index was 51.  The Limb Movement with Arousal Index was 17.9, the Total Limb Movement (isolated) Index was 10.2, and the PLM Series Index was 50.0.    The patient experienced 0 central and 2 obstructive apneas, 0 central and 47 obstructive hypopneas, 49 apneas and hypopneas, and 62 RERAS.  The apnea hypopnea index was 20.9, the RDI was 47.2, the mean event duration was 34.7 seconds, and the longest event lasted 91.6 seconds.  The REM index was 6.4 and the supine index was 0.0.  The apnea hypopnea index represents moderate obstructive sleep apnea hypopnea syndrome.    The glenis saturation during sleep was 74% and the patient spent 63.8% of the diagnostic recording with saturations less than or equal to 90%.    During sleep, the minimum heart rate was 60 bpm, the mean heart rate was 68 bpm, and the maximum heart rate was 79 bpm.    Once the patient met the split-night protocol, the technician performed a positive airway pressure titration.  The technician initiated treatment with CPAP 5 cmH2O and increased the pressure to a maximum of 8 cmH2O.    The patient did best on CPAP 8 cm water with a resultant AHI of 0.0, a minimum saturation of 88%, and a mean saturation of 91%.  The titration with CPAP at 8 cm water included nonsupine REM sleep.        ASSESSMENT:    Moderate obstructive sleep apnea hypopnea - AHI 20.9  Persistent nocturnal desaturation - glenis saturation 74% - saturations <90% for 63.8% of the diagnostic recording  Elevated limb movement with arousal index of 17.9  Acceptable CPAP titration to a best pressure of 8 cm water with a resultant AHI of 0.0, a glenis saturation of 88%, a mean saturation of 91%, and the achievement of nonsupine REM sleep      RECOMMENDATION:    Recommend CPAP 8 cmH2O using small DreamWear mask and heated humidification followed by data card and  clinical review in 6-8 weeks.        Dr. Cheko Cloud MD

## 2021-09-06 PROCEDURE — 95811 POLYSOM 6/>YRS CPAP 4/> PARM: CPT | Performed by: INTERNAL MEDICINE

## 2021-09-20 NOTE — PROGRESS NOTES
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 kindly referred by Constantin Moon M.D. for evaluation of possible obstructive sleep apnea syndrome. The patient had a overnight oximetry performed on 5/11/2021 which showed saturations less than or equal to 88% for 414.1 minutes with a glenis saturation of 65%. The patient experienced 284 desaturation events and the oxygen desaturation index was 37. The oximetric pattern was sawtoothed and clustered consistent with JER.   Since her hospitalization her saturations have been inadequate. Sleeps alone but wakes self up snoring.  Her sleep study was performed on 8/28/2021 and showed a moderate degree of obstructive sleep apnea with an AHI 20.9, glenis saturation of 74%, and saturations less than 90% for 63.8% of the diagnostic recording.  She also had an elevated limb movement with arousal index of 17.9.  She underwent an acceptable CPAP titration to a best pressure of 8 cm water with a resultant AHI of 0.0, a glenis saturation of 88%, a mean saturation of 91%, and the achievement of nonsupine REM sleep.  We have advised CPAP at 8 cm water using a small DreamWear mask and heated humidification followed by data card and clinical review in 6 to 8 weeks.      Significant comorbidities and modifying factors include type 2 diabetes, strep pneumoniae bacteremia and pneumonia April 2021, Prevnar 13 administered 2019, Pneumovax administered 2020, former smoker, obesity, and nocturnal hypoxemia. He is up-to-date with SARS-CoV-2 vaccine but needs a 2021 influenza vaccination.        Patient Active Problem List    Diagnosis Date Noted   • Need for influenza vaccination 09/22/2021   • JER (obstructive sleep apnea) 07/26/2021   • Nocturnal hypoxia 07/26/2021   • Former smoker 07/26/2021   • Sepsis due to Streptococcus species (HCC) 05/07/2021   • Bacteremia 04/20/2021   • Community acquired pneumonia of left upper lobe of  lung 2021   • Post-menopausal atrophic vaginitis 2020   • Sebaceous cyst 2020   • Arthralgia of left hand 2020   • Type 2 diabetes mellitus with microalbuminuria, with long-term current use of insulin (HCC) 2018   • Vitamin B12 deficiency 2017   • Fatigue 2017   • Gall bladder polyp 2011   • Hyperlipidemia 2011   • Vitamin D deficiency 2011   • GERD (gastroesophageal reflux disease) 2011       Past Medical History:   Diagnosis Date   • COVID-19    • Diabetes 2011   • Edema 2011   • Elevated liver enzymes 2011   • Gall bladder polyp 2011   • GERD (gastroesophageal reflux disease) 2011   • Hyperlipidemia 2011   • Hypertension 2011   • Post-menopausal atrophic vaginitis 2020   • Skin lesion of face 2011   • Vitamin d deficiency 2011        Past Surgical History:   Procedure Laterality Date   • OTHER ORTHOPEDIC SURGERY      right arm ORIF   • PRIMARY C SECTION     • TUBAL COAGULATION LAPAROSCOPIC BILATERAL         Family History   Problem Relation Age of Onset   • Cancer Maternal Grandmother         lung    • Cancer Mother         breast   • Other Father         urinary issues   • GI Disease Sister    • Heart Disease Brother        Social History     Socioeconomic History   • Marital status:      Spouse name: Not on file   • Number of children: Not on file   • Years of education: Not on file   • Highest education level: Not on file   Occupational History   • Not on file   Tobacco Use   • Smoking status: Former Smoker     Packs/day: 1.00     Years: 20.00     Pack years: 20.00     Types: Cigarettes     Quit date: 1990     Years since quittin.7   • Smokeless tobacco: Never Used   Vaping Use   • Vaping Use: Never used   Substance and Sexual Activity   • Alcohol use: Yes     Comment: holidays   • Drug use: No   • Sexual activity: Not Currently   Other Topics Concern   • Not on file   Social  History Narrative   • Not on file     Social Determinants of Health     Financial Resource Strain:    • Difficulty of Paying Living Expenses:    Food Insecurity:    • Worried About Running Out of Food in the Last Year:    • Ran Out of Food in the Last Year:    Transportation Needs:    • Lack of Transportation (Medical):    • Lack of Transportation (Non-Medical):    Physical Activity:    • Days of Exercise per Week:    • Minutes of Exercise per Session:    Stress:    • Feeling of Stress :    Social Connections:    • Frequency of Communication with Friends and Family:    • Frequency of Social Gatherings with Friends and Family:    • Attends [employer] Services:    • Active Member of Clubs or Organizations:    • Attends Club or Organization Meetings:    • Marital Status:    Intimate Partner Violence:    • Fear of Current or Ex-Partner:    • Emotionally Abused:    • Physically Abused:    • Sexually Abused:        Current Outpatient Medications   Medication Sig Dispense Refill   • omeprazole (PRILOSEC) 20 MG delayed-release capsule Take 1 capsule by mouth every day. 90 capsule 0   • pioglitazone (ACTOS) 30 MG Tab Take 1 tablet by mouth every day. 90 tablet 4   • insulin glargine (LANTUS SOLOSTAR) 100 UNIT/ML Solution Pen-injector injection Inject 21 Units under the skin every evening. 15 Each 3   • potassium chloride SA (KDUR) 20 MEQ Tab CR Take 1 tablet by mouth every day. 90 tablet 3   • liraglutide (VICTOZA) 18 MG/3ML Solution Pen-injector Inject 1.8 mg under the skin every evening. 9 mL 11   • atorvastatin (LIPITOR) 80 MG tablet TAKE 1 TABLET BY MOUTH EVERY EVENING (Patient taking differently: Take 80 mg by mouth every evening.) 100 Tab 3   • JARDIANCE 25 MG Tab TAKE ONE TABLET BY MOUTH EVERY DAY (Patient taking differently: Take 25 mg by mouth every evening.) 100 Tab 3   • Cyanocobalamin (B-12) 1000 MCG Cap Take 1,000 mcg by mouth every evening.     • Cholecalciferol (VITAMIN D) 400 UNIT TABS Take 400 Units by mouth  "every evening.     • aspirin EC (ECOTRIN) 81 MG TBEC Take 81 mg by mouth every evening.       No current facility-administered medications for this visit.    \"CURRENT RX\"    ALLERGIES: Sulfa drugs    ROS    Constitutional: Denies fever, chills, sweats,  weight loss, fatigue  Cardiovascular: Denies chest pain, tightness, palpitations, swelling in legs/feet, fainting, difficulty breathing when lying down but gets better when sitting up.   Respiratory: Denies shortness of breath, cough, sputum, wheezing, painful breathing, coughing up blood.   Sleep: per HPI  Gastrointestinal: Denies  difficulty swallowing, nausea, abdominal pain, diarrhea, constipation, heartburn.  Musculoskeletal: Denies painful joints, sore muscles, back pain.        PHYSICAL EXAM  Obese    /72 (BP Location: Left arm, Patient Position: Sitting, BP Cuff Size: Large adult)   Pulse 68   Resp 16   Ht 1.676 m (5' 6\")   Wt 88 kg (194 lb)   LMP 12/16/2006   SpO2 96%   BMI 31.31 kg/m²   Appearance: Well-nourished, well-developed, no acute distress  Eyes:  PERRLA, EOMI  ENMT: masked  Neck: Supple, trachea midline, no masses  Respiratory effort:  No intercostal retractions or use of accessory muscles  Lung auscultation:  No wheezes rhonchi rubs or rales  Cardiac: No murmurs, rubs, or gallops; regular rhythm, normal rate; no edema  Abdomen:  No tenderness, no organomegaly. Obese  Musculoskeletal:  Grossly normal; gait and station normal; digits and nails normal  Skin:  No rashes, petechiae, cyanosis  Neurologic: without focal signs; oriented to person, time, place, and purpose; judgement intact  Psychiatric:  No depression, anxiety, agitation      Medical Decision Making       The medical record was reviewed in its entirety including the referral notes, records from primary care, consultants notes, hospital records, lab, imaging, microbiology, immunology, and immunizations.  Care gaps identified and reviewed with the patient.    Diagnostic and " titration nocturnal polysomnogram's, home sleep apnea tests, continuous nocturnal oximetry results, multiple sleep latency tests, and compliance reports reviewed.  1. JER (obstructive sleep apnea)  - DME CPAP    2. Sepsis due to Streptococcus species, unspecified whether acute organ dysfunction present ([facility])    3. Type 2 diabetes mellitus with microalbuminuria, with long-term current use of insulin ([facility])    4. Pure hypercholesterolemia    5. Nocturnal hypoxia    6. Gastroesophageal reflux disease without esophagitis    7. Community acquired pneumonia of left upper lobe of lung    8. Need for influenza vaccination    - OBESITY COUNSELING (No Charge): Patient identified as having weight management issue.  Appropriate orders and counseling given.    If your BMI is 25-29.9 you are overweight. If your BMI is 30 or greater you are obese. To lose weight eat less, move more, or both. Any diet that reduces caloric intake can help with weight loss. Extra weight may reduce your lifespan. Avoid dramatic unsustainable dietary changes that result in the yo-yo effect (down then back up.)  Usually small modifications in diet and exercise are easier to stick with.    PLAN:   Her sleep study was performed on 8/28/2021 and showed a moderate degree of obstructive sleep apnea with an AHI 20.9, glenis saturation of 74%, and saturations less than 90% for 63.8% of the diagnostic recording.  She also had an elevated limb movement with arousal index of 17.9.  She underwent an acceptable CPAP titration to a best pressure of 8 cm water with a resultant AHI of 0.0, a glenis saturation of 88%, a mean saturation of 91%, and the achievement of nonsupine REM sleep.  We have advised CPAP at 8 cm water using a small DreamWear mask and heated humidification followed by data card and clinical review in 6 to 8 weeks.    The risks of untreated sleep apnea were discussed with the patient at length. Patients with JER are at increased risk of cardiovascular  disease including coronary artery disease, systemic arterial hypertension, pulmonary arterial hypertension, cardiac arrythmias, and stroke. JER patients have an increased risk of motor vehicle accidents, type 2 diabetes, chronic kidney disease, and non-alcoholic liver disease. The patient was advised to avoid driving a motor vehicle when drowsy.    Positive airway pressure will favorably impact many of the adverse conditions and effects provoked by JER.    Have advised the patient to follow up with the appropriate healthcare practitioners for all other medical problems and issues.    Mask wearing, handwashing, and social distancing protocols reviewed and encouraged.    Return in about 10 weeks (around 12/1/2021).      Total time 30 minutes

## 2021-09-22 ENCOUNTER — SLEEP CENTER VISIT (OUTPATIENT)
Dept: SLEEP MEDICINE | Facility: MEDICAL CENTER | Age: 66
End: 2021-09-22
Payer: MEDICARE

## 2021-09-22 VITALS
DIASTOLIC BLOOD PRESSURE: 72 MMHG | HEART RATE: 68 BPM | RESPIRATION RATE: 16 BRPM | WEIGHT: 194 LBS | SYSTOLIC BLOOD PRESSURE: 126 MMHG | BODY MASS INDEX: 31.18 KG/M2 | HEIGHT: 66 IN | OXYGEN SATURATION: 96 %

## 2021-09-22 DIAGNOSIS — R80.9 TYPE 2 DIABETES MELLITUS WITH MICROALBUMINURIA, WITH LONG-TERM CURRENT USE OF INSULIN (HCC): ICD-10-CM

## 2021-09-22 DIAGNOSIS — G47.33 OSA (OBSTRUCTIVE SLEEP APNEA): ICD-10-CM

## 2021-09-22 DIAGNOSIS — Z79.4 TYPE 2 DIABETES MELLITUS WITH MICROALBUMINURIA, WITH LONG-TERM CURRENT USE OF INSULIN (HCC): ICD-10-CM

## 2021-09-22 DIAGNOSIS — E11.29 TYPE 2 DIABETES MELLITUS WITH MICROALBUMINURIA, WITH LONG-TERM CURRENT USE OF INSULIN (HCC): ICD-10-CM

## 2021-09-22 DIAGNOSIS — K21.9 GASTROESOPHAGEAL REFLUX DISEASE WITHOUT ESOPHAGITIS: ICD-10-CM

## 2021-09-22 DIAGNOSIS — G47.34 NOCTURNAL HYPOXIA: ICD-10-CM

## 2021-09-22 DIAGNOSIS — E78.00 PURE HYPERCHOLESTEROLEMIA: Chronic | ICD-10-CM

## 2021-09-22 DIAGNOSIS — J18.9 COMMUNITY ACQUIRED PNEUMONIA OF LEFT UPPER LOBE OF LUNG: ICD-10-CM

## 2021-09-22 DIAGNOSIS — Z23 NEED FOR INFLUENZA VACCINATION: ICD-10-CM

## 2021-09-22 DIAGNOSIS — A40.9 SEPSIS DUE TO STREPTOCOCCUS SPECIES, UNSPECIFIED WHETHER ACUTE ORGAN DYSFUNCTION PRESENT (HCC): ICD-10-CM

## 2021-09-22 PROCEDURE — 99214 OFFICE O/P EST MOD 30 MIN: CPT | Performed by: INTERNAL MEDICINE

## 2021-09-22 ASSESSMENT — FIBROSIS 4 INDEX: FIB4 SCORE: 0.57

## 2021-10-19 DIAGNOSIS — K21.9 GASTROESOPHAGEAL REFLUX DISEASE WITHOUT ESOPHAGITIS: ICD-10-CM

## 2021-10-19 RX ORDER — OMEPRAZOLE 20 MG/1
20 CAPSULE, DELAYED RELEASE ORAL
Qty: 90 CAPSULE | Refills: 3 | Status: SHIPPED | OUTPATIENT
Start: 2021-10-19 | End: 2022-10-26 | Stop reason: SDUPTHER

## 2021-11-02 ENCOUNTER — PATIENT MESSAGE (OUTPATIENT)
Dept: MEDICAL GROUP | Facility: PHYSICIAN GROUP | Age: 66
End: 2021-11-02

## 2021-11-02 DIAGNOSIS — Z79.4 TYPE 2 DIABETES MELLITUS WITH MICROALBUMINURIA, WITH LONG-TERM CURRENT USE OF INSULIN (HCC): ICD-10-CM

## 2021-11-02 DIAGNOSIS — R80.9 TYPE 2 DIABETES MELLITUS WITH MICROALBUMINURIA, WITH LONG-TERM CURRENT USE OF INSULIN (HCC): ICD-10-CM

## 2021-11-02 DIAGNOSIS — E11.29 TYPE 2 DIABETES MELLITUS WITH MICROALBUMINURIA, WITH LONG-TERM CURRENT USE OF INSULIN (HCC): ICD-10-CM

## 2021-11-02 RX ORDER — EMPAGLIFLOZIN 25 MG/1
1 TABLET, FILM COATED ORAL
Qty: 100 TABLET | Refills: 3 | Status: SHIPPED | OUTPATIENT
Start: 2021-11-02 | End: 2022-12-12 | Stop reason: SDUPTHER

## 2021-11-26 NOTE — PROGRESS NOTES
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 kindly referred by Constantin Moon M.D. for evaluation of possible obstructive sleep apnea syndrome and first seen on 9/22/2021. The patient had a overnight oximetry performed on 5/11/2021 which showed saturations less than or equal to 88% for 414.1 minutes with a glenis saturation of 65%. The patient experienced 284 desaturation events and the oxygen desaturation index was 37. The oximetric pattern was sawtoothed and clustered consistent with JER.     Her sleep study was performed on 8/28/2021 and showed a moderate degree of obstructive sleep apnea with an AHI 20.9, glenis saturation of 74%, and saturations less than 90% for 63.8% of the diagnostic recording. She also had an elevated limb movement with arousal index of 17.9. She underwent an acceptable CPAP titration to a best pressure of 8 cm water with a resultant AHI of 0.0, a glenis saturation of 88%, a mean saturation of 91%, and the achievement of nonsupine REM sleep.     We have advised CPAP at 8 cm water using a small DreamWear mask and heated humidification followed by data card and clinical review in 6 to 8 weeks.    She remains PAP intolerant.  Have discussed a dental approach. She has upper dentures so she may not qualify.  We also discussed inspire.  She appears to be a good candidate for Inspire but she is not sure that she wants to go that route.    Significant comorbidities and modifying factors include type 2 diabetes, strep pneumoniae bacteremia and pneumonia April 2021, Prevnar 13 administered 2019, Pneumovax administered 2020, former smoker, obesity, up-to-date with SARS-CoV-2 vaccination, need for 2021 influenza vaccination, up-to-date with Shingrix vaccination, and nocturnal hypoxemia.     Patient Active Problem List    Diagnosis Date Noted   • Need for influenza vaccination  09/22/2021   • JER (obstructive sleep apnea) 07/26/2021   • Nocturnal hypoxia 07/26/2021   • Former smoker 07/26/2021   • Sepsis due to Streptococcus species ([facility]) 05/07/2021   • Bacteremia 04/20/2021   • Community acquired pneumonia of left upper lobe of lung 04/19/2021   • Post-menopausal atrophic vaginitis 04/17/2020   • Sebaceous cyst 03/31/2020   • Arthralgia of left hand 02/28/2020   • Type 2 diabetes mellitus with microalbuminuria, with long-term current use of insulin ([facility]) 02/02/2018   • Vitamin B12 deficiency 08/24/2017   • Fatigue 07/20/2017   • Gall bladder polyp 08/12/2011   • Hyperlipidemia 05/12/2011   • Vitamin D deficiency 05/12/2011   • GERD (gastroesophageal reflux disease) 05/12/2011       Past Medical History:   Diagnosis Date   • COVID-19    • Diabetes 5/12/2011   • Edema 5/12/2011   • Elevated liver enzymes 5/12/2011   • Gall bladder polyp 8/12/2011   • GERD (gastroesophageal reflux disease) 5/12/2011   • Hyperlipidemia 5/12/2011   • Hypertension 5/12/2011   • Post-menopausal atrophic vaginitis 4/17/2020   • Skin lesion of face 5/12/2011   • Vitamin d deficiency 5/12/2011        Past Surgical History:   Procedure Laterality Date   • OTHER ORTHOPEDIC SURGERY      right arm ORIF   • PRIMARY C SECTION     • TUBAL COAGULATION LAPAROSCOPIC BILATERAL         Family History   Problem Relation Age of Onset   • Cancer Maternal Grandmother         lung    • Cancer Mother         breast   • Other Father         urinary issues   • GI Disease Sister    • Heart Disease Brother        Social History     Socioeconomic History   • Marital status:      Spouse name: Not on file   • Number of children: Not on file   • Years of education: Not on file   • Highest education level: Not on file   Occupational History   • Not on file   Tobacco Use   • Smoking status: Former Smoker     Packs/day: 1.00     Years: 20.00     Pack years: 20.00     Types: Cigarettes     Quit date: 1/1/1990     Years since quitting:  31.9   • Smokeless tobacco: Never Used   Vaping Use   • Vaping Use: Never used   Substance and Sexual Activity   • Alcohol use: Yes     Comment: holidays   • Drug use: No   • Sexual activity: Not Currently   Other Topics Concern   • Not on file   Social History Narrative   • Not on file     Social Determinants of Health     Financial Resource Strain:    • Difficulty of Paying Living Expenses: Not on file   Food Insecurity:    • Worried About Running Out of Food in the Last Year: Not on file   • Ran Out of Food in the Last Year: Not on file   Transportation Needs:    • Lack of Transportation (Medical): Not on file   • Lack of Transportation (Non-Medical): Not on file   Physical Activity:    • Days of Exercise per Week: Not on file   • Minutes of Exercise per Session: Not on file   Stress:    • Feeling of Stress : Not on file   Social Connections:    • Frequency of Communication with Friends and Family: Not on file   • Frequency of Social Gatherings with Friends and Family: Not on file   • Attends Judaism Services: Not on file   • Active Member of Clubs or Organizations: Not on file   • Attends Club or Organization Meetings: Not on file   • Marital Status: Not on file   Intimate Partner Violence:    • Fear of Current or Ex-Partner: Not on file   • Emotionally Abused: Not on file   • Physically Abused: Not on file   • Sexually Abused: Not on file   Housing Stability:    • Unable to Pay for Housing in the Last Year: Not on file   • Number of Places Lived in the Last Year: Not on file   • Unstable Housing in the Last Year: Not on file       Current Outpatient Medications   Medication Sig Dispense Refill   • Empagliflozin (JARDIANCE) 25 MG Tab Take 1 Tablet by mouth every day. 100 Tablet 3   • omeprazole (PRILOSEC) 20 MG delayed-release capsule Take 1 Capsule by mouth every day. 90 Capsule 3   • pioglitazone (ACTOS) 30 MG Tab Take 1 tablet by mouth every day. 90 tablet 4   • insulin glargine (LANTUS SOLOSTAR) 100  "UNIT/ML Solution Pen-injector injection Inject 21 Units under the skin every evening. 15 Each 3   • potassium chloride SA (KDUR) 20 MEQ Tab CR Take 1 tablet by mouth every day. 90 tablet 3   • liraglutide (VICTOZA) 18 MG/3ML Solution Pen-injector Inject 1.8 mg under the skin every evening. 9 mL 11   • atorvastatin (LIPITOR) 80 MG tablet TAKE 1 TABLET BY MOUTH EVERY EVENING (Patient taking differently: Take 80 mg by mouth every evening.) 100 Tab 3   • Cyanocobalamin (B-12) 1000 MCG Cap Take 1,000 mcg by mouth every evening.     • Cholecalciferol (VITAMIN D) 400 UNIT TABS Take 400 Units by mouth every evening.     • aspirin EC (ECOTRIN) 81 MG TBEC Take 81 mg by mouth every evening.       No current facility-administered medications for this visit.    \"CURRENT RX\"    ALLERGIES: Sulfa drugs    ROS    Constitutional: Denies fever, chills, sweats,  weight loss, fatigue  Cardiovascular: Denies chest pain, tightness, palpitations, swelling in legs/feet, fainting, difficulty breathing when lying down but gets better when sitting up.   Respiratory: Denies shortness of breath, cough, sputum, wheezing, painful breathing, coughing up blood.   Sleep: per HPI  Gastrointestinal: Denies  difficulty swallowing, nausea, abdominal pain, diarrhea, constipation, heartburn.  Musculoskeletal: Denies painful joints, sore muscles, back pain.        PHYSICAL EXAM  Obese    /80 (BP Location: Right arm, Patient Position: Sitting, BP Cuff Size: Adult)   Pulse 77   Resp 16   Ht 1.676 m (5' 6\")   Wt 87.5 kg (193 lb)   LMP 12/16/2006   SpO2 97%   BMI 31.15 kg/m²   Appearance: Well-nourished, well-developed, no acute distress  Eyes:  PERRLA, EOMI  ENMT: masked  Neck: Supple, trachea midline, no masses  Respiratory effort:  No intercostal retractions or use of accessory muscles  Lung auscultation:  No wheezes rhonchi rubs or rales  Cardiac: No murmurs, rubs, or gallops; regular rhythm, normal rate; no edema  Abdomen:  No tenderness, no " organomegaly.  Musculoskeletal:  Grossly normal; gait and station normal; digits and nails normal  Skin:  No rashes, petechiae, cyanosis  Neurologic: without focal signs; oriented to person, time, place, and purpose; judgement intact  Psychiatric:  No depression, anxiety, agitation      Medical Decision Making       The medical record was reviewed in its entirety including the referral notes, records from primary care, consultants notes, hospital records, lab, imaging, microbiology, immunology, and immunizations.  Care gaps identified and reviewed with the patient.    Diagnostic and titration nocturnal polysomnogram's, home sleep apnea tests, continuous nocturnal oximetry results, multiple sleep latency tests, and compliance reports reviewed.  1. JER (obstructive sleep apnea)      PLAN:   Will refer to dentistry but I am not absolutely sure she qualifies and that she has upper dentures.    We also discussed the implantable device called Inspire.  She will research this.    The risks of untreated sleep apnea were discussed with the patient at length. Patients with JER are at increased risk of cardiovascular disease including coronary artery disease, systemic arterial hypertension, pulmonary arterial hypertension, cardiac arrythmias, and stroke. JER patients have an increased risk of motor vehicle accidents, type 2 diabetes, chronic kidney disease, and non-alcoholic liver disease. The patient was advised to avoid driving a motor vehicle when drowsy.    Positive airway pressure will favorably impact many of the adverse conditions and effects provoked by JER.    Have advised the patient to follow up with the appropriate healthcare practitioners for all other medical problems and issues.    Mask wearing, handwashing, and social distancing protocols reviewed and encouraged.    Return in about 3 months (around 2/28/2022).      - OBESITY COUNSELING (No Charge): Patient identified as having weight management issue.   Appropriate orders and counseling given.    If your BMI is 25-29.9 you are overweight. If your BMI is 30 or greater you are obese. To lose weight eat less, move more, or both. Any diet that reduces caloric intake can help with weight loss. Extra weight may reduce your lifespan. Avoid dramatic unsustainable dietary changes that result in the yo-yo effect (down then back up.)  Usually small modifications in diet and exercise are easier to stick with.

## 2021-11-29 ENCOUNTER — OFFICE VISIT (OUTPATIENT)
Dept: SLEEP MEDICINE | Facility: MEDICAL CENTER | Age: 66
End: 2021-11-29
Payer: MEDICARE

## 2021-11-29 ENCOUNTER — TELEPHONE (OUTPATIENT)
Dept: MEDICAL GROUP | Facility: PHYSICIAN GROUP | Age: 66
End: 2021-11-29

## 2021-11-29 VITALS
BODY MASS INDEX: 31.02 KG/M2 | HEIGHT: 66 IN | DIASTOLIC BLOOD PRESSURE: 80 MMHG | WEIGHT: 193 LBS | RESPIRATION RATE: 16 BRPM | SYSTOLIC BLOOD PRESSURE: 140 MMHG | HEART RATE: 77 BPM | OXYGEN SATURATION: 97 %

## 2021-11-29 DIAGNOSIS — G47.33 OSA (OBSTRUCTIVE SLEEP APNEA): ICD-10-CM

## 2021-11-29 PROCEDURE — 99214 OFFICE O/P EST MOD 30 MIN: CPT | Performed by: INTERNAL MEDICINE

## 2021-11-29 ASSESSMENT — FIBROSIS 4 INDEX: FIB4 SCORE: 0.57

## 2021-11-29 NOTE — TELEPHONE ENCOUNTER
Future Appointments       Provider Department Center    12/6/2021 8:00 AM Alberto Contreras M.D. [facility] [place]        ESTABLISHED PATIENT PRE-VISIT PLANNING     Patient was NOT contacted to complete PVP.     Note: Patient will not be contacted if there is no indication to call.     1.  Reviewed notes from the last few office visits within the medical group: Yes, LOV 05/26/2021    2.  If any orders were placed at last visit or intended to be done for this visit (i.e. 6 mos follow-up), do we have Results/Consult Notes?         •  Labs - Labs were not ordered at last office visit.  Note: If patient appointment is for lab review and patient did not complete labs, check with provider if OK to reschedule patient until labs completed.       •  Imaging - Imaging was not ordered at last office visit.       •  Referrals - No referrals were ordered at last office visit.    3. Is this appointment scheduled as a Hospital Follow-Up? No    4.  Immunizations were updated in Epic using Reconcile Outside Information activity? Yes    5.  Patient is due for the following Health Maintenance Topics:   Health Maintenance Due   Topic Date Due   • IMM INFLUENZA (1) 09/01/2021   • A1C SCREENING  10/26/2021   • RETINAL SCREENING  10/29/2021     6.  AHA (Pulse[employer]) form printed for Provider? N/A

## 2021-12-06 ENCOUNTER — OFFICE VISIT (OUTPATIENT)
Dept: MEDICAL GROUP | Facility: PHYSICIAN GROUP | Age: 66
End: 2021-12-06
Payer: MEDICARE

## 2021-12-06 VITALS
OXYGEN SATURATION: 95 % | WEIGHT: 193 LBS | HEART RATE: 77 BPM | BODY MASS INDEX: 31.02 KG/M2 | SYSTOLIC BLOOD PRESSURE: 138 MMHG | TEMPERATURE: 98.3 F | DIASTOLIC BLOOD PRESSURE: 82 MMHG | HEIGHT: 66 IN | RESPIRATION RATE: 16 BRPM

## 2021-12-06 DIAGNOSIS — K21.9 GASTROESOPHAGEAL REFLUX DISEASE WITHOUT ESOPHAGITIS: ICD-10-CM

## 2021-12-06 DIAGNOSIS — E78.00 PURE HYPERCHOLESTEROLEMIA: Chronic | ICD-10-CM

## 2021-12-06 DIAGNOSIS — E11.29 TYPE 2 DIABETES MELLITUS WITH MICROALBUMINURIA, WITH LONG-TERM CURRENT USE OF INSULIN (HCC): ICD-10-CM

## 2021-12-06 DIAGNOSIS — E55.9 VITAMIN D DEFICIENCY: ICD-10-CM

## 2021-12-06 DIAGNOSIS — R80.9 TYPE 2 DIABETES MELLITUS WITH MICROALBUMINURIA, WITH LONG-TERM CURRENT USE OF INSULIN (HCC): ICD-10-CM

## 2021-12-06 DIAGNOSIS — E53.8 VITAMIN B12 DEFICIENCY: ICD-10-CM

## 2021-12-06 DIAGNOSIS — E87.6 HYPOKALEMIA: ICD-10-CM

## 2021-12-06 DIAGNOSIS — Z79.4 TYPE 2 DIABETES MELLITUS WITH MICROALBUMINURIA, WITH LONG-TERM CURRENT USE OF INSULIN (HCC): ICD-10-CM

## 2021-12-06 DIAGNOSIS — Z23 NEED FOR VACCINATION: ICD-10-CM

## 2021-12-06 DIAGNOSIS — K82.4 GALL BLADDER POLYP: ICD-10-CM

## 2021-12-06 PROBLEM — J18.9 COMMUNITY ACQUIRED PNEUMONIA OF LEFT UPPER LOBE OF LUNG: Status: RESOLVED | Noted: 2021-04-19 | Resolved: 2021-12-06

## 2021-12-06 PROBLEM — R53.83 FATIGUE: Status: RESOLVED | Noted: 2017-07-20 | Resolved: 2021-12-06

## 2021-12-06 PROBLEM — A40.9 SEPSIS DUE TO STREPTOCOCCUS SPECIES (HCC): Status: RESOLVED | Noted: 2021-05-07 | Resolved: 2021-12-06

## 2021-12-06 PROBLEM — R78.81 BACTEREMIA: Status: RESOLVED | Noted: 2021-04-20 | Resolved: 2021-12-06

## 2021-12-06 PROBLEM — L72.3 SEBACEOUS CYST: Status: RESOLVED | Noted: 2020-03-31 | Resolved: 2021-12-06

## 2021-12-06 PROCEDURE — 99214 OFFICE O/P EST MOD 30 MIN: CPT | Mod: 25 | Performed by: STUDENT IN AN ORGANIZED HEALTH CARE EDUCATION/TRAINING PROGRAM

## 2021-12-06 PROCEDURE — 90662 IIV NO PRSV INCREASED AG IM: CPT | Performed by: STUDENT IN AN ORGANIZED HEALTH CARE EDUCATION/TRAINING PROGRAM

## 2021-12-06 PROCEDURE — G0008 ADMIN INFLUENZA VIRUS VAC: HCPCS | Performed by: STUDENT IN AN ORGANIZED HEALTH CARE EDUCATION/TRAINING PROGRAM

## 2021-12-06 ASSESSMENT — FIBROSIS 4 INDEX: FIB4 SCORE: 0.57

## 2021-12-06 NOTE — PROGRESS NOTES
New to Provider  (and [facility])      Tara Cueva is a 66 y.o. female.    Reason for visit: New patient to establish         - Prior PCP:  Dr. Constantin Moon ([facility])     (provider is moving).     Chief Complaint   Patient presents with   • Establish Care             Problems discussed this visit:    This note uses problem-based documentation.  Subjective HPI is included in Assessment & Plan, at bottom of note.   Problem   Type 2 Diabetes Mellitus With Microalbuminuria, With Long-Term Current Use of Insulin (Hcc)   Vitamin B12 Deficiency   Hypokalemia   Gall Bladder Polyp   Hyperlipidemia   Vitamin D Deficiency   Gerd (Gastroesophageal Reflux Disease)                          Past Medical History:   Diagnosis Date   • COVID-19    • Diabetes 2011   • Edema 2011   • Elevated liver enzymes 2011   • Gall bladder polyp 2011   • GERD (gastroesophageal reflux disease) 2011   • Hyperlipidemia 2011   • Hypertension 2011   • Post-menopausal atrophic vaginitis 2020   • Skin lesion of face 2011   • Vitamin d deficiency 2011       Past Surgical History:   Procedure Laterality Date   • OTHER ORTHOPEDIC SURGERY      right arm ORIF   • PRIMARY C SECTION     • TUBAL COAGULATION LAPAROSCOPIC BILATERAL         Family History   Problem Relation Age of Onset   • Cancer Maternal Grandmother         lung    • Cancer Mother         breast   • Other Father         urinary issues   • GI Disease Sister    • Heart Disease Brother        Social History     Tobacco Use   • Smoking status: Former Smoker     Packs/day: 1.00     Years: 20.00     Pack years: 20.00     Types: Cigarettes     Quit date: 1990     Years since quittin.9   • Smokeless tobacco: Never Used   • Tobacco comment: Quit   (Hx 1 ppd x 20 yrs).    Substance Use Topics   • Alcohol use: Not Currently     Comment: couple per year       Current medications:  (including new changes):  Current Outpatient  "Medications   Medication Sig Dispense Refill   • Empagliflozin (JARDIANCE) 25 MG Tab Take 1 Tablet by mouth every day. 100 Tablet 3   • omeprazole (PRILOSEC) 20 MG delayed-release capsule Take 1 Capsule by mouth every day. 90 Capsule 3   • pioglitazone (ACTOS) 30 MG Tab Take 1 tablet by mouth every day. 90 tablet 4   • insulin glargine (LANTUS SOLOSTAR) 100 UNIT/ML Solution Pen-injector injection Inject 21 Units under the skin every evening. 15 Each 3   • potassium chloride SA (KDUR) 20 MEQ Tab CR Take 1 tablet by mouth every day. 90 tablet 3   • liraglutide (VICTOZA) 18 MG/3ML Solution Pen-injector Inject 1.8 mg under the skin every evening. 9 mL 11   • atorvastatin (LIPITOR) 80 MG tablet TAKE 1 TABLET BY MOUTH EVERY EVENING (Patient taking differently: Take 80 mg by mouth every evening.) 100 Tab 3   • Cyanocobalamin (B-12) 1000 MCG Cap Take 1,000 mcg by mouth every evening.     • Cholecalciferol (VITAMIN D) 400 UNIT TABS Take 400 Units by mouth every evening.     • aspirin EC (ECOTRIN) 81 MG TBEC Take 81 mg by mouth every evening.       No current facility-administered medications for this visit.       Allergies as of 12/06/2021 - Reviewed 12/06/2021   Allergen Reaction Noted   • Sulfa drugs Vomiting 03/30/2012                    Review of Symptoms  (as noted elsewhere, and .....)    GEN/CONST:   Denies fever, chills,    CARDIO:   Denies chest pain, or edema.    RESP:   Denies shortness of breath, or coughing.  GI:    Denies nausea, vomiting, diarrhea,      PSYCH:  Denies anxiety, depression,        Physical Exam  /82 (BP Location: Right arm, Patient Position: Sitting, BP Cuff Size: Large adult)   Pulse 77   Temp 36.8 °C (98.3 °F) (Temporal)   Resp 16   Ht 1.676 m (5' 6\")   Wt 87.5 kg (193 lb)   LMP 12/16/2006   SpO2 95%   BMI 31.15 kg/m²   General:  Alert and oriented, No apparent distress.  Neck: Supple. No lymphadenopathy noted. Thyroid not enlarged.   Lungs: Clear to auscultation bilaterally.  No " wheezes or rales.     Cardiovascular: Regular rate and rhythm.  No murmurs, or gallops.  Abdomen:  Soft.  Non-distended.  Non-tender.     Extremities: No leg edema.  Good general motion of extremities.    Psychological: Appears to have normal mood and affect.      Labs:  Recent / Last-Prior labs reviewed.     CBC, CMP, Lipids, TSH, A1c, micro-Albumin, Vit.D and B12                             Assessment & Plan  (with subjective history and orders):  Of note, the list below is not in a specific nor sortable order.      Problem List Items Addressed This Visit     Gall bladder polyp     Gallbladder polyp noted in 2011.   Was 7.2 mm, at that time.   No follow-up since original imaging.    No RUQ or abdomen pain or cramping.   - get new imaging.          Relevant Orders    US-RUQ    GERD (gastroesophageal reflux disease)     Chronic and ongoing GERD.  Currently on omeprazole 20 mg daily.  Currently fairly stable, without major changes.   Denies issues or symptoms, unless forgets med.  - continue on same meds (as above).          Hyperlipidemia (Chronic)     Chronic and ongoing HLD.  Currently on Lipitor 80 QHS, and ASA 81.  Currently fairly stable, without major changes.   Denies:  angina, palpitations, or dyspnea   - continue on same meds (as above).    - can check labs periodically.          Hypokalemia     Prior issue when on diuretic.   But since stopped that med (and all BP meds).  Prior labs with low potassium - 3.2  Previously started on potassium (K-dur 20, most days).   Not rechecked since then.   - get new labs.          Relevant Orders    Basic Metabolic Panel    Type 2 diabetes mellitus with microalbuminuria, with long-term current use of insulin (HCC)     Chronic and ongoing DM-2, on insulin. .  Currently on      - empagliflozine (Jardiance) 25 mg, daily     - pioglitazone (Actos) 30 mg daily.     - liraglutide (Victoza) 1.8 inj, QHS     - glargine 21 units QHS  Currently fairly stable, without major  changes.   Notes prior issue with metformin (nausea).  Denies:  polyphagia, polydipsia, polyuria.    Last A1c - 7.0.  - continue on same meds (as above).          Relevant Orders    HEMOGLOBIN A1C    Vitamin B12 deficiency     Previously low vit B12, improved on supplementation.  Previously as low as 174 in 2014.  Has been on supplementation since then.  Currently on 1000 mcg of B12 daily.  - continue on supplementation (as above).   - can check labs occasionally.          Relevant Orders    VITAMIN B12    FOLATE    Vitamin D deficiency     Previously low vit D, improved on supplementation.  Previously as low as 23 in 2011.  Has been on supplementation since then.  Currently on vit.D supplement (but uncertain of how-much).  Denies major joint or bone pains (other than hands).  - continue on supplementation (as above).   - can check labs occasionally.          Relevant Orders    VITAMIN D,25 HYDROXY      Other Visit Diagnoses     Need for vaccination        Relevant Orders    INFLUENZA VACCINE, HIGH DOSE (65+ ONLY)        Of note, the above list is not in a specific nor sortable order.                  Diagnosis Table, with orders:  1. Type 2 diabetes mellitus with microalbuminuria, with long-term current use of insulin (HCC)  HEMOGLOBIN A1C   2. Gall bladder polyp  US-RUQ   3. Hypokalemia  Basic Metabolic Panel   4. Vitamin B12 deficiency  VITAMIN B12    FOLATE   5. Vitamin D deficiency  VITAMIN D,25 HYDROXY   6. Pure hypercholesterolemia     7. Gastroesophageal reflux disease without esophagitis     8. Need for vaccination  INFLUENZA VACCINE, HIGH DOSE (65+ ONLY)                 Follow-up:  7 weeks:  annual visit:     for  AWV - SCP (and DM-2) (after 1/21/2022)              A computerized dictation system may have been used in this note.    Despite review, there may be some errors in spelling or grammar.    Alberto Contreras M.D.  12/6/2021

## 2021-12-06 NOTE — ASSESSMENT & PLAN NOTE
Previously low vit D, improved on supplementation.  Previously as low as 23 in 2011.  Has been on supplementation since then.  Currently on vit.D supplement (but uncertain of how-much).  Denies major joint or bone pains (other than hands).  - continue on supplementation (as above).   - can check labs occasionally.

## 2021-12-06 NOTE — ASSESSMENT & PLAN NOTE
Previously low vit B12, improved on supplementation.  Previously as low as 174 in 2014.  Has been on supplementation since then.  Currently on 1000 mcg of B12 daily.  - continue on supplementation (as above).   - can check labs occasionally.

## 2021-12-06 NOTE — ASSESSMENT & PLAN NOTE
Chronic and ongoing GERD.  Currently on omeprazole 20 mg daily.  Currently fairly stable, without major changes.   Denies issues or symptoms, unless forgets med.  - continue on same meds (as above).

## 2021-12-06 NOTE — ASSESSMENT & PLAN NOTE
Gallbladder polyp noted in 2011.   Was 7.2 mm, at that time.   No follow-up since original imaging.    No RUQ or abdomen pain or cramping.   - get new imaging.

## 2021-12-06 NOTE — ASSESSMENT & PLAN NOTE
Prior issue when on diuretic.   But since stopped that med (and all BP meds).  Prior labs with low potassium - 3.2  Previously started on potassium (K-dur 20, most days).   Not rechecked since then.   - get new labs.

## 2021-12-06 NOTE — ASSESSMENT & PLAN NOTE
Chronic and ongoing HLD.  Currently on Lipitor 80 QHS, and ASA 81.  Currently fairly stable, without major changes.   Denies:  angina, palpitations, or dyspnea   - continue on same meds (as above).    - can check labs periodically.

## 2021-12-06 NOTE — PATIENT INSTRUCTIONS
Please get the labs in about 5-6 weeks.  Please get the labs done at least a week prior to your next visit.      Please get them done while fasting   (no food, coffee, or juices, for 8 hours - but water is ok).     Thank you.

## 2021-12-10 ENCOUNTER — HOSPITAL ENCOUNTER (OUTPATIENT)
Dept: RADIOLOGY | Facility: MEDICAL CENTER | Age: 66
End: 2021-12-10
Attending: STUDENT IN AN ORGANIZED HEALTH CARE EDUCATION/TRAINING PROGRAM
Payer: MEDICARE

## 2021-12-10 DIAGNOSIS — K82.4 GALL BLADDER POLYP: ICD-10-CM

## 2021-12-10 PROCEDURE — 76705 ECHO EXAM OF ABDOMEN: CPT

## 2022-01-25 ENCOUNTER — HOSPITAL ENCOUNTER (OUTPATIENT)
Dept: LAB | Facility: MEDICAL CENTER | Age: 67
End: 2022-01-25
Attending: STUDENT IN AN ORGANIZED HEALTH CARE EDUCATION/TRAINING PROGRAM
Payer: MEDICARE

## 2022-01-25 DIAGNOSIS — E55.9 VITAMIN D DEFICIENCY: ICD-10-CM

## 2022-01-25 DIAGNOSIS — E11.29 TYPE 2 DIABETES MELLITUS WITH MICROALBUMINURIA, WITH LONG-TERM CURRENT USE OF INSULIN (HCC): ICD-10-CM

## 2022-01-25 DIAGNOSIS — E53.8 VITAMIN B12 DEFICIENCY: ICD-10-CM

## 2022-01-25 DIAGNOSIS — R80.9 TYPE 2 DIABETES MELLITUS WITH MICROALBUMINURIA, WITH LONG-TERM CURRENT USE OF INSULIN (HCC): ICD-10-CM

## 2022-01-25 DIAGNOSIS — Z79.4 TYPE 2 DIABETES MELLITUS WITH MICROALBUMINURIA, WITH LONG-TERM CURRENT USE OF INSULIN (HCC): ICD-10-CM

## 2022-01-25 DIAGNOSIS — E87.6 HYPOKALEMIA: ICD-10-CM

## 2022-01-25 LAB
25(OH)D3 SERPL-MCNC: 33 NG/ML (ref 30–100)
ANION GAP SERPL CALC-SCNC: 10 MMOL/L (ref 7–16)
BUN SERPL-MCNC: 14 MG/DL (ref 8–22)
CALCIUM SERPL-MCNC: 9.6 MG/DL (ref 8.5–10.5)
CHLORIDE SERPL-SCNC: 105 MMOL/L (ref 96–112)
CO2 SERPL-SCNC: 26 MMOL/L (ref 20–33)
CREAT SERPL-MCNC: 0.77 MG/DL (ref 0.5–1.4)
EST. AVERAGE GLUCOSE BLD GHB EST-MCNC: 163 MG/DL
FASTING STATUS PATIENT QL REPORTED: NORMAL
FOLATE SERPL-MCNC: 9.6 NG/ML
GLUCOSE SERPL-MCNC: 99 MG/DL (ref 65–99)
HBA1C MFR BLD: 7.3 % (ref 4–5.6)
POTASSIUM SERPL-SCNC: 3.8 MMOL/L (ref 3.6–5.5)
SODIUM SERPL-SCNC: 141 MMOL/L (ref 135–145)
VIT B12 SERPL-MCNC: 1780 PG/ML (ref 211–911)

## 2022-01-25 PROCEDURE — 80048 BASIC METABOLIC PNL TOTAL CA: CPT

## 2022-01-25 PROCEDURE — 82607 VITAMIN B-12: CPT

## 2022-01-25 PROCEDURE — 83036 HEMOGLOBIN GLYCOSYLATED A1C: CPT

## 2022-01-25 PROCEDURE — 36415 COLL VENOUS BLD VENIPUNCTURE: CPT

## 2022-01-25 PROCEDURE — 82306 VITAMIN D 25 HYDROXY: CPT

## 2022-01-25 PROCEDURE — 82746 ASSAY OF FOLIC ACID SERUM: CPT

## 2022-01-28 ENCOUNTER — OFFICE VISIT (OUTPATIENT)
Dept: MEDICAL GROUP | Facility: PHYSICIAN GROUP | Age: 67
End: 2022-01-28
Payer: MEDICARE

## 2022-01-28 VITALS
OXYGEN SATURATION: 95 % | TEMPERATURE: 97.2 F | DIASTOLIC BLOOD PRESSURE: 78 MMHG | SYSTOLIC BLOOD PRESSURE: 122 MMHG | WEIGHT: 196 LBS | BODY MASS INDEX: 31.5 KG/M2 | HEART RATE: 68 BPM | HEIGHT: 66 IN

## 2022-01-28 DIAGNOSIS — R01.1 MURMUR, CARDIAC: ICD-10-CM

## 2022-01-28 DIAGNOSIS — E11.29 TYPE 2 DIABETES MELLITUS WITH MICROALBUMINURIA, WITH LONG-TERM CURRENT USE OF INSULIN (HCC): ICD-10-CM

## 2022-01-28 DIAGNOSIS — R80.9 TYPE 2 DIABETES MELLITUS WITH MICROALBUMINURIA, WITH LONG-TERM CURRENT USE OF INSULIN (HCC): ICD-10-CM

## 2022-01-28 DIAGNOSIS — Z00.00 ENCOUNTER FOR MEDICARE ANNUAL WELLNESS EXAM: ICD-10-CM

## 2022-01-28 DIAGNOSIS — K21.9 GASTROESOPHAGEAL REFLUX DISEASE WITHOUT ESOPHAGITIS: ICD-10-CM

## 2022-01-28 DIAGNOSIS — Z79.4 TYPE 2 DIABETES MELLITUS WITH MICROALBUMINURIA, WITH LONG-TERM CURRENT USE OF INSULIN (HCC): ICD-10-CM

## 2022-01-28 PROBLEM — G47.34 NOCTURNAL HYPOXIA: Status: RESOLVED | Noted: 2021-07-26 | Resolved: 2022-01-28

## 2022-01-28 PROBLEM — G47.33 OSA (OBSTRUCTIVE SLEEP APNEA): Status: RESOLVED | Noted: 2021-07-26 | Resolved: 2022-01-28

## 2022-01-28 PROCEDURE — G0439 PPPS, SUBSEQ VISIT: HCPCS | Performed by: STUDENT IN AN ORGANIZED HEALTH CARE EDUCATION/TRAINING PROGRAM

## 2022-01-28 ASSESSMENT — FIBROSIS 4 INDEX: FIB4 SCORE: 0.57

## 2022-01-28 ASSESSMENT — PATIENT HEALTH QUESTIONNAIRE - PHQ9: CLINICAL INTERPRETATION OF PHQ2 SCORE: 0

## 2022-01-28 ASSESSMENT — ENCOUNTER SYMPTOMS: GENERAL WELL-BEING: FAIR

## 2022-01-28 ASSESSMENT — ACTIVITIES OF DAILY LIVING (ADL): BATHING_REQUIRES_ASSISTANCE: 0

## 2022-01-28 NOTE — PROGRESS NOTES
Chief Complaint   Patient presents with   • Annual Exam       HPI:  Tara Cueva is a 66 y.o. here for Medicare Annual Wellness Visit     Patient Active Problem List    Diagnosis Date Noted   • Murmur, cardiac 01/28/2022   • Former smoker 07/26/2021   • Post-menopausal atrophic vaginitis 04/17/2020   • Arthralgia of left hand 02/28/2020   • DM-2, Type 2 diabetes mellitus with microalbuminuria, with long-term current use of insulin (HCC) 02/02/2018   • Vitamin B12 deficiency 08/24/2017   • Hypokalemia 01/03/2014   • Gall bladder polyp 08/12/2011   • Hyperlipidemia 05/12/2011   • Vitamin D deficiency 05/12/2011   • GERD (gastroesophageal reflux disease) 05/12/2011       Current Outpatient Medications   Medication Sig Dispense Refill   • atorvastatin (LIPITOR) 80 MG tablet Take 1 Tablet by mouth every evening. TAKE 1 TABLET BY MOUTH EVERY EVENING 100 Tablet 4   • Empagliflozin (JARDIANCE) 25 MG Tab Take 1 Tablet by mouth every day. 100 Tablet 3   • omeprazole (PRILOSEC) 20 MG delayed-release capsule Take 1 Capsule by mouth every day. 90 Capsule 3   • pioglitazone (ACTOS) 30 MG Tab Take 1 tablet by mouth every day. 90 tablet 4   • insulin glargine (LANTUS SOLOSTAR) 100 UNIT/ML Solution Pen-injector injection Inject 21 Units under the skin every evening. 15 Each 3   • potassium chloride SA (KDUR) 20 MEQ Tab CR Take 1 tablet by mouth every day. 90 tablet 3   • liraglutide (VICTOZA) 18 MG/3ML Solution Pen-injector Inject 1.8 mg under the skin every evening. 9 mL 11   • Cyanocobalamin (B-12) 1000 MCG Cap Take 1,000 mcg by mouth every evening.     • Cholecalciferol (VITAMIN D) 400 UNIT TABS Take 400 Units by mouth every evening.     • aspirin EC (ECOTRIN) 81 MG TBEC Take 81 mg by mouth every evening.       No current facility-administered medications for this visit.          Current supplements as per medication list.     Allergies: Sulfa drugs    Current social contact/activities: bowels twice a weeks.      She  reports  that she quit smoking about 32 years ago. Her smoking use included cigarettes. She has a 20.00 pack-year smoking history. She has never used smokeless tobacco. She reports previous alcohol use. She reports that she does not use drugs.  Counseling given: Not Answered  Comment: Quit 1990  (Hx 1 ppd x 20 yrs).       DPA/Advanced Directive:  Patient has Advanced Directive, but it is not on file. Instructed to bring in a copy to scan into their chart.    ROS:    Gait: Uses no assistive device  Ostomy: No  Other tubes: No  Amputations: No  Chronic oxygen use: No  Last eye exam: unknown (~ 2 years ago) ... will get new one.   Wears hearing aids: No   : Reports urinary leakage during the last 6 months that has not interfered at all with their daily activities or sleep. ... with cough/sneeze.     Screening:    Depression Screening  Little interest or pleasure in doing things?  0 - not at all  Feeling down, depressed , or hopeless? 0 - not at all  Patient Health Questionnaire Score: 0     Screening for Cognitive Impairment  Three Minute Recall (captain, garden, picture) 3/3    Jatinder clock face with all 12 numbers and set the hands to show 5 past 8.  Yes    Cognitive concerns identified deferred for follow up unless specifically addressed in assessment and plan.    Fall Risk Assessment  Has the patient had two or more falls in the last year or any fall with injury in the last year?  No    Safety Assessment  Throw rugs on floor.  Yes ... states not the type to trip over.  Handrails on all stairs.  No  Good lighting in all hallways.  Yes  Difficulty hearing.  No  Patient counseled about all safety risks that were identified.    Functional Assessment ADLs  Are there any barriers preventing you from cooking for yourself or meeting nutritional needs?  No.    Are there any barriers preventing you from driving safely or obtaining transportation?  No.    Are there any barriers preventing you from using a telephone or calling for  help?  No.    Are there any barriers preventing you from shopping?  No.    Are there any barriers preventing you from taking care of your own finances?  No.    Are there any barriers preventing you from managing your medications?  No.    Are there any barriers preventing you from showering, bathing or dressing yourself?  No.    Are you currently engaging in any exercise or physical activity?  Yes.  walking  What is your perception of your health?  Fair.      Health Maintenance Summary          Overdue - RETINAL SCREENING (Yearly) Overdue since 10/29/2021    10/29/2020  REFERRAL FOR RETINAL SCREENING EXAM    10/24/2019  POCT Retinal Eye Exam    11/20/2018  REFERRAL FOR RETINAL SCREENING EXAM    07/10/2014  Reason not specified    04/21/2012  Reason not specified          Overdue - DIABETES MONOFILAMENT / LE EXAM (Yearly) Overdue since 1/20/2022 01/20/2021  Diabetic Monofilament LE Exam    05/07/2019  Diabetic Monofilament LE Exam    12/23/2016  Diabetic Monofilament Lower Extremity Exam    07/25/2014  Reason not specified    08/11/2011  Reason not specified          FASTING LIPID PROFILE (Yearly) Next due on 4/26/2022 04/26/2021  Lipid Profile    05/20/2019  Lipid Profile    05/03/2018  LIPID PROFILE    07/21/2017  LIPID PROFILE    12/23/2016  LIPID PROFILE    Only the first 5 history entries have been loaded, but more history exists.          URINE ACR / MICROALBUMIN (Yearly) Next due on 4/26/2022 04/26/2021  MICROALBUMIN CREAT RATIO URINE    10/24/2019  MICROALBUMIN CREAT RATIO URINE    06/11/2018  MICROALBUMIN CREAT RATIO URINE    05/03/2018  MICROALBUMIN CREAT RATIO URINE    04/25/2017  MICROALBUMIN CREAT RATIO URINE    Only the first 5 history entries have been loaded, but more history exists.          A1C SCREENING (Every 6 Months) Tentatively due on 7/25/2022 01/25/2022  HEMOGLOBIN A1C    04/26/2021  HEMOGLOBIN A1C    01/20/2021  POCT Hemoglobin A1C    07/16/2020  HEMOGLOBIN A1C    02/28/2020   POCT  A1C    Only the first 5 history entries have been loaded, but more history exists.          SERUM CREATININE (Yearly) Next due on 1/25/2023 01/25/2022  Basic Metabolic Panel    04/26/2021  Comp Metabolic Panel    04/22/2021  Comp Metabolic Panel    04/21/2021  Comp Metabolic Panel    04/19/2021  Complete Metabolic Panel (CMP)    Only the first 5 history entries have been loaded, but more history exists.          Annual Wellness Visit (Every 366 Days) Next due on 1/29/2023 01/28/2022  Visit Dx: Encounter for Medicare annual wellness exam    01/28/2022  Subsequent Annual Wellness Visit - Includes PPPS ()    01/20/2021  Initial Annual Wellness Visit - Includes PPPS ()          MAMMOGRAM (Every 2 Years) Tentatively due on 6/14/2023 06/14/2021  MA-SCREENING MAMMO BILAT W/TOMOSYNTHESIS W/CAD    05/13/2019  MA-SCREENING MAMMO BILAT W/TOMOSYNTHESIS W/CAD    03/01/2017  MA-SCREEN MAMMO W/CAD-BILAT    01/24/2014  MA-SCREENING MAMMOGRAM W/ CAD          IMM DTaP/Tdap/Td Vaccine (2 - Td or Tdap) Next due on 7/25/2024 07/25/2014  Imm Admin: Tdap Vaccine          BONE DENSITY (Every 5 Years) Tentatively due on 10/31/2024    10/31/2019  DS-BONE DENSITY STUDY (DEXA)          COLORECTAL CANCER SCREENING (COLONOSCOPY - Every 10 Years) Tentatively due on 12/31/2029 12/31/2019  REFERRAL TO GI FOR COLONOSCOPY    11/06/2014  AMB REFERRAL TO GI FOR COLONOSCOPY          HEPATITIS C SCREENING  Completed    05/13/2011  HEP C VIRUS ANTIBODY          IMM ZOSTER VACCINES (Series Information) Completed    10/31/2020  Imm Admin: Zoster Vaccine Recombinant (RZV) (SHINGRIX)    07/16/2020  Imm Admin: Zoster Vaccine Recombinant (RZV) (SHINGRIX)          IMM PNEUMOCOCCAL VACCINE: 65+ Years (Series Information) Completed    12/05/2020  Imm Admin: Pneumococcal polysaccharide vaccine (PPSV-23)    10/24/2019  Imm Admin: Pneumococcal Conjugate Vaccine (Prevnar/PCV-13)    01/03/2014  Imm Admin: Pneumococcal polysaccharide  vaccine (PPSV-23)          COVID-19 Vaccine (Series Information) Completed    2021  Imm Admin: Pfizer SARS-CoV-2 Vaccine 12+    2021  Imm Admin: Pfizer SARS-CoV-2 Vaccine    2021  Imm Admin: Pfizer SARS-CoV-2 Vaccine          IMM INFLUENZA (Series Information) Completed    2021  Imm Admin: Influenza Vaccine Adult HD    2020  Imm Admin: Influenza Vaccine Adult HD    10/24/2019  Imm Admin: Influenza Vaccine Adult HD    10/24/2017  Imm Admin: Influenza Vaccine Quad Inj (Pf)    2016  Imm Admin: Influenza Vaccine Quad Inj (Pf)    Only the first 5 history entries have been loaded, but more history exists.          IMM HEP B VACCINE (Series Information) Aged Out    No completion history exists for this topic.          IMM MENINGOCOCCAL VACCINE (MCV4) (Series Information) Aged Out    No completion history exists for this topic.          Discontinued - PAP SMEAR  Discontinued    04/10/2020  THINPREP PAP WITH HPV    04/10/2020  Pathology Gynecology Specimen    2014  PAP IG (IMAGE GUIDED)    2014  PAP IG, HPV-HR                Patient Care Team:  Alberto Contreras M.D. as PCP - General (Internal Medicine)  Constantin Moon M.D. as PCP - [facility] Paneled  Edy Lee P.A.-C. as Consulting Physician (Endocrinology)        Social History     Tobacco Use   • Smoking status: Former Smoker     Packs/day: 1.00     Years: 20.00     Pack years: 20.00     Types: Cigarettes     Quit date: 1990     Years since quittin.0   • Smokeless tobacco: Never Used   • Tobacco comment: Quit   (Hx 1 ppd x 20 yrs).    Vaping Use   • Vaping Use: Never used   Substance Use Topics   • Alcohol use: Not Currently     Comment: couple per year   • Drug use: No     Family History   Problem Relation Age of Onset   • Cancer Maternal Grandmother         lung    • Cancer Mother         breast   • Other Father         urinary issues   • GI Disease Sister    • Heart Disease Brother      She  has a past  "medical history of COVID-19, Diabetes (5/12/2011), Edema (5/12/2011), Elevated liver enzymes (5/12/2011), Gall bladder polyp (8/12/2011), GERD (gastroesophageal reflux disease) (5/12/2011), Hyperlipidemia (5/12/2011), Hypertension (5/12/2011), Post-menopausal atrophic vaginitis (4/17/2020), Skin lesion of face (5/12/2011), and Vitamin d deficiency (5/12/2011).   Past Surgical History:   Procedure Laterality Date   • OTHER ORTHOPEDIC SURGERY      right arm ORIF   • PRIMARY C SECTION     • TUBAL COAGULATION LAPAROSCOPIC BILATERAL         Exam:   /78 (BP Location: Left arm, Patient Position: Sitting, BP Cuff Size: Adult)   Pulse 68   Temp 36.2 °C (97.2 °F) (Temporal)   Ht 1.676 m (5' 6\")   Wt 88.9 kg (196 lb)   SpO2 95%  Body mass index is 31.64 kg/m².  Hearing good.    Dentition upper and lower dentures    Alert, oriented in no acute distress.  Eye contact is good, speech goal directed, affect calm  RRR, but  +  faint murmur (2/6, systolic).   CTAB.  No pedal edema.       Assessment and Plan. The following treatment and monitoring plan is recommended:    1. Encounter for Medicare annual wellness exam  - Subsequent Annual Wellness Visit - Includes PPPS ()    2. Murmur, cardiac  - Subsequent Annual Wellness Visit - Includes PPPS ()  - EC-ECHOCARDIOGRAM COMPLETE W/O CONT; Future    3. DM-2, Type 2 diabetes mellitus with microalbuminuria, with long-term current use of insulin ([facility])  - Subsequent Annual Wellness Visit - Includes PPPS ()    4. Gastroesophageal reflux disease without esophagitis  - Subsequent Annual Wellness Visit - Includes PPPS ()    Problem List Items Addressed This Visit     DM-2, Type 2 diabetes mellitus with microalbuminuria, with long-term current use of insulin (HCC)     Chronic and ongoing DM-2, on insulin. .  Currently on      - empagliflozine (Jardiance) 25 mg, daily     - pioglitazone (Actos) 30 mg daily.     - liraglutide (Victoza) 1.8 inj, QHS     - glargine 21 " units QHS  Currently fairly stable, without major changes.   Notes prior issue with metformin (nausea).  Denies:  polyphagia, polydipsia, polyuria.    Last A1c - 7.3  - continue on same meds (as above).   - return with glucose log (fasting vs daytime).   - re-evaluate in a few weeks.          Relevant Orders    Subsequent Annual Wellness Visit - Includes PPPS () (Completed)    GERD (gastroesophageal reflux disease)     Chronic and ongoing GERD.  Currently on omeprazole 20 mg daily.  Currently fairly stable, without major changes.   Denies issues or symptoms, unless forgets med.  - continue on same meds (as above).          Relevant Orders    Subsequent Annual Wellness Visit - Includes PPPS () (Completed)    Murmur, cardiac     On exam, very faint murmur noted.  ~ 2/6, systolic.   Only noted once before (~2012)...  Not evaluated with echo.  - get Echo.          Relevant Orders    Subsequent Annual Wellness Visit - Includes PPPS () (Completed)    EC-ECHOCARDIOGRAM COMPLETE W/O CONT      Other Visit Diagnoses     Encounter for Medicare annual wellness exam        Relevant Orders    Subsequent Annual Wellness Visit - Includes PPPS () (Completed)          Services suggested: follow-up with Echo....  Health Care Screening: Age-appropriate preventive services recommended by USPTF and ACIP covered by Medicare were discussed today. Services ordered if indicated and agreed upon by the patient.  Referrals offered: Community-ba2sed lifestyle interventions to reduce health risks and promote self-management and wellness, fall prevention, nutrition, physical activity, tobacco-use cessation, weight loss, and mental health services as per orders if indicated.    Discussion today about general wellness and lifestyle habits:    · Prevent falls and reduce trip hazards; Cautioned about securing or removing rugs.  · Have a working fire alarm and carbon monoxide detector;   · Engage in regular physical activity and  social activities       Patient Counseling:  --Please focus on a moderation in diet.      sodium/caffeine intake, saturated fat and cholesterol, caloric balance,      sufficient fresh fruits/vegetables, fiber,      iron, and 0.4-0.8mg of folate supplement per day (for females capable of pregnancy).   --Continue with regular brushing, flossing, and dental visits.   --Continue with regular exercise.   --Minimize the use of tobacco, alcohol, or other drugs.    --Continue with general safety precautions: safety belts, safety helmets, smoke detector, etc.      Follow-up: Return in about 3 weeks (around 2/18/2022) for DM-2 visit.     A computerized dictation system may have been used in this note.    Despite review, there may be some errors in spelling or grammar.    Alberto Contreras M.D.  1/28/2022

## 2022-01-28 NOTE — ASSESSMENT & PLAN NOTE
On exam, very faint murmur noted.  ~ 2/6, systolic.   Only noted once before (~2012)...  Not evaluated with echo.  - get Echo.

## 2022-01-28 NOTE — PATIENT INSTRUCTIONS
Please monitor your blood sugar and return in a few weeks.     Please get the Echocardiogram, when able to.      Also please keep up your general health.     Patient Counseling:  --Please focus on a moderation in diet.      sodium/caffeine intake, saturated fat and cholesterol, caloric balance,      sufficient fresh fruits/vegetables, fiber,      iron, and 0.4-0.8mg of folate supplement per day (for females capable of pregnancy).   --Continue with regular brushing, flossing, and dental visits.   --Continue with regular exercise.   --Minimize the use of tobacco, alcohol, or other drugs.    --Continue with general safety precautions: safety belts, safety helmets, smoke detector, etc.

## 2022-01-28 NOTE — ASSESSMENT & PLAN NOTE
Chronic and ongoing DM-2, on insulin. .  Currently on      - empagliflozine (Jardiance) 25 mg, daily     - pioglitazone (Actos) 30 mg daily.     - liraglutide (Victoza) 1.8 inj, QHS     - glargine 21 units QHS  Currently fairly stable, without major changes.   Notes prior issue with metformin (nausea).  Denies:  polyphagia, polydipsia, polyuria.    Last A1c - 7.3  - continue on same meds (as above).   - return with glucose log (fasting vs daytime).   - re-evaluate in a few weeks.

## 2022-02-18 ENCOUNTER — APPOINTMENT (OUTPATIENT)
Dept: MEDICAL GROUP | Facility: PHYSICIAN GROUP | Age: 67
End: 2022-02-18
Payer: MEDICARE

## 2022-02-24 ENCOUNTER — OFFICE VISIT (OUTPATIENT)
Dept: MEDICAL GROUP | Facility: PHYSICIAN GROUP | Age: 67
End: 2022-02-24
Payer: MEDICARE

## 2022-02-24 VITALS
WEIGHT: 194 LBS | SYSTOLIC BLOOD PRESSURE: 160 MMHG | HEIGHT: 66 IN | OXYGEN SATURATION: 94 % | BODY MASS INDEX: 31.18 KG/M2 | TEMPERATURE: 97.3 F | HEART RATE: 65 BPM | DIASTOLIC BLOOD PRESSURE: 80 MMHG | RESPIRATION RATE: 12 BRPM

## 2022-02-24 DIAGNOSIS — Z13.228 SCREENING FOR METABOLIC DISORDER: ICD-10-CM

## 2022-02-24 DIAGNOSIS — E53.8 VITAMIN B12 DEFICIENCY: ICD-10-CM

## 2022-02-24 DIAGNOSIS — R80.9 TYPE 2 DIABETES MELLITUS WITH MICROALBUMINURIA, WITH LONG-TERM CURRENT USE OF INSULIN (HCC): ICD-10-CM

## 2022-02-24 DIAGNOSIS — Z79.4 TYPE 2 DIABETES MELLITUS WITH MICROALBUMINURIA, WITH LONG-TERM CURRENT USE OF INSULIN (HCC): ICD-10-CM

## 2022-02-24 DIAGNOSIS — E11.29 TYPE 2 DIABETES MELLITUS WITH MICROALBUMINURIA, WITH LONG-TERM CURRENT USE OF INSULIN (HCC): ICD-10-CM

## 2022-02-24 DIAGNOSIS — Z87.891 FORMER SMOKER: ICD-10-CM

## 2022-02-24 DIAGNOSIS — E78.00 PURE HYPERCHOLESTEROLEMIA: ICD-10-CM

## 2022-02-24 DIAGNOSIS — R03.0 ELEVATED BLOOD PRESSURE READING: ICD-10-CM

## 2022-02-24 PROCEDURE — 99214 OFFICE O/P EST MOD 30 MIN: CPT | Performed by: STUDENT IN AN ORGANIZED HEALTH CARE EDUCATION/TRAINING PROGRAM

## 2022-02-24 ASSESSMENT — FIBROSIS 4 INDEX: FIB4 SCORE: 0.57

## 2022-02-24 NOTE — PATIENT INSTRUCTIONS
Please get the labs on / AFTER  05/01/2022.  Please get the labs done at least a week prior to your next visit.      Please get them done while fasting   (no food, coffee, or juices, for 8 hours - but water is ok).        Check your blood pressure at home....  If it continues to stay over 150 for the top number or 90 for the second number, then come back in early (in a few weeks)....  If it improves, after your stress reduces, then follow-up as scheduled.       Thank you.

## 2022-02-24 NOTE — ASSESSMENT & PLAN NOTE
Patient had elevated BP readings on this visit.  BP ~170/90, 160/80.  Notes being under a great deal of stress recently.  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  ... often checking texts during visit (due to that issue).  Past couple visits with much better BP.   ... may be stress induced.   - Discussed options with patient, but declines meds.   - advised to monitor BP at home.      ... If remains > 150 / 90, then return within a few weeks         (she expects extra stress to be < 1-2 weeks).      ... If back to normal, then follow-up as scheduled.

## 2022-02-24 NOTE — PROGRESS NOTES
Established Patient     Tara Cueva is a 66 y.o. female.    Chief Complaint   Patient presents with   • Diabetes Follow-up             Problems addressed this visit:     This note uses problem-based documentation.  Subjective HPI is included in Assessment & Plan, at bottom of note.   Problem   Elevated Blood Pressure Reading   DM-2, Type 2 diabetes mellitus with microalbuminuria, with long-term current use of insulin (HCC)   Vitamin B12 Deficiency                           Past Medical History:   Diagnosis Date   • COVID-19    • Diabetes 2011   • Edema 2011   • Elevated liver enzymes 2011   • Gall bladder polyp 2011   • GERD (gastroesophageal reflux disease) 2011   • Hyperlipidemia 2011   • Hypertension 2011   • Post-menopausal atrophic vaginitis 2020   • Skin lesion of face 2011   • Vitamin d deficiency 2011       Patient Active Problem List   Diagnosis   • Hyperlipidemia   • Vitamin D deficiency   • GERD (gastroesophageal reflux disease)   • Gall bladder polyp   • Hypokalemia   • Vitamin B12 deficiency   • DM-2, Type 2 diabetes mellitus with microalbuminuria, with long-term current use of insulin (HCC)   • Arthralgia of left hand   • Post-menopausal atrophic vaginitis   • Former smoker   • Murmur, cardiac   • Elevated blood pressure reading       Past Surgical History:   Procedure Laterality Date   • OTHER ORTHOPEDIC SURGERY      right arm ORIF   • PRIMARY C SECTION     • TUBAL COAGULATION LAPAROSCOPIC BILATERAL         Family History   Problem Relation Age of Onset   • Cancer Maternal Grandmother         lung    • Cancer Mother         breast   • Other Father         urinary issues   • GI Disease Sister    • Heart Disease Brother        Social History     Tobacco Use   • Smoking status: Former Smoker     Packs/day: 1.00     Years: 20.00     Pack years: 20.00     Types: Cigarettes     Quit date: 1990     Years since quittin.1   • Smokeless tobacco: Never  "Used   • Tobacco comment: Quit 1990  (Hx 1 ppd x 20 yrs).    Substance Use Topics   • Alcohol use: Not Currently     Comment: couple per year       Current medications:  (including new changes):  Current Outpatient Medications   Medication Sig Dispense Refill   • atorvastatin (LIPITOR) 80 MG tablet Take 1 Tablet by mouth every evening. TAKE 1 TABLET BY MOUTH EVERY EVENING 100 Tablet 4   • Empagliflozin (JARDIANCE) 25 MG Tab Take 1 Tablet by mouth every day. 100 Tablet 3   • omeprazole (PRILOSEC) 20 MG delayed-release capsule Take 1 Capsule by mouth every day. 90 Capsule 3   • pioglitazone (ACTOS) 30 MG Tab Take 1 tablet by mouth every day. 90 tablet 4   • insulin glargine (LANTUS SOLOSTAR) 100 UNIT/ML Solution Pen-injector injection Inject 21 Units under the skin every evening. 15 Each 3   • potassium chloride SA (KDUR) 20 MEQ Tab CR Take 1 tablet by mouth every day. 90 tablet 3   • liraglutide (VICTOZA) 18 MG/3ML Solution Pen-injector Inject 1.8 mg under the skin every evening. 9 mL 11   • Cholecalciferol (VITAMIN D) 400 UNIT TABS Take 400 Units by mouth every evening.     • aspirin EC (ECOTRIN) 81 MG TBEC Take 81 mg by mouth every evening.       No current facility-administered medications for this visit.       Allergies as of 02/24/2022 - Reviewed 02/24/2022   Allergen Reaction Noted   • Sulfa drugs Vomiting 03/30/2012                   Review of Symptoms   (as noted elsewhere, and .....)   GEN/CONST:   Denies fever, chills,    CARDIO:   Denies chest pain, or edema.    RESP:   Denies shortness of breath, or coughing.  GI:    Denies nausea, vomiting, diarrhea,      PSYCH:  Denies anxiety, depression,           Physical Exam  /80   Pulse 65   Temp 36.3 °C (97.3 °F) (Temporal)   Resp 12   Ht 1.676 m (5' 6\")   Wt 88 kg (194 lb)   LMP 12/16/2006   SpO2 94%   BMI 31.31 kg/m²    ... BP - 170/90, then 160/80......  General:  Alert and oriented, No apparent distress.    Lungs: Clear to auscultation " bilaterally.  No wheezes or rales.  Cardiovascular: Regular rate and rhythm.       + faint murmur in upper right chest.   Abdomen:  Soft.  Non-tender.  No rebound or guarding.   Extremities:  + Trace pedal edema.  Good general motion of extremities.   Psychological: Appears to have normal mood and affect.        Monofilament testing   with a 10 gram force: sensation intact: decreased on left (3/4)  Visual Inspection: Feet without maceration, ulcers, fissures.  Pedal pulses: intact bilaterally      Labs:  Recent / Last-Prior labs reviewed.    BMP, A1c, Vit.D and folate/B12                             Assessment & Plan  (with subjective history and orders):  Of note, the list below is not in a specific order.      Problem List Items Addressed This Visit     DM-2, Type 2 diabetes mellitus with microalbuminuria,   with long-term current use of insulin (HCC)     Chronic and ongoing DM-2, on insulin. .  Currently on      - empagliflozine (Jardiance) 25 mg, daily     - pioglitazone (Actos) 30 mg daily.     - liraglutide (Victoza) 1.8 inj, QHS     - glargine 21 units QHS  Currently fairly stable, without major changes.   Notes prior issue with metformin (nausea).  Denies:  polyphagia, polydipsia, polyuria.    Last A1c - 7.3  (1/25/2021)  ... notes that was when having too much soda...  ... since then decreased the soda intake (4 --> 2 /day).   Home glucose readings with AM-,     And with QHS readings with 112-140        (and 1 reading of 175, but after pie)....  - continue on same meds (as above).   - continue to reduce soda intake.   - get new A1c in 3 months....  - monofilament (today)... 2/24/2022 (reduced on left).   - get retinal POCT test... (today)....     (as she does not recall eye dr's name)....         Relevant Orders    Comp Metabolic Panel    HEMOGLOBIN A1C    MICROALBUMIN CREAT RATIO URINE    Diabetic Monofilament LE Exam (Completed)    POCT Retinal Eye Exam    Elevated blood pressure reading     Patient  had elevated BP readings on this visit.  BP ~170/90, 160/80.  Notes being under a great deal of stress recently.  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  ... often checking texts during visit (due to that issue).  Past couple visits with much better BP.   ... may be stress induced.   - Discussed options with patient, but declines meds.   - advised to monitor BP at home.      ... If remains > 150 / 90, then return within a few weeks         (she expects extra stress to be < 1-2 weeks).      ... If back to normal, then follow-up as scheduled.             Vitamin B12 deficiency     Previously low vit B12, improved on supplementation.  Previously as low as 174 in 2014.  Has been on supplementation since then.  Recently had been on 1000 mcg of B12 daily.  Recent labs were elevated... 1780  - Stop supplementation (for now).   - can check labs occasionally.            Other Visit Diagnoses     Screening for metabolic disorder        Relevant Orders    CBC WITH DIFFERENTIAL      Former smoker    Relevant Orders    CBC WITH DIFFERENTIAL    Hyperlipidemia (Chronic)    Relevant Orders    Lipid Profile    TSH WITH REFLEX TO FT4     Of note, the above list is not in a specific nor sortable order.                  Diagnosis Table, with orders:  1. DM-2, Type 2 diabetes mellitus with microalbuminuria, with long-term current use of insulin (HCC)  Comp Metabolic Panel    HEMOGLOBIN A1C    MICROALBUMIN CREAT RATIO URINE    Diabetic Monofilament LE Exam    POCT Retinal Eye Exam   2. Elevated blood pressure reading     3. Vitamin B12 deficiency     4. Pure hypercholesterolemia  Lipid Profile    TSH WITH REFLEX TO FT4   5. Former smoker  CBC WITH DIFFERENTIAL   6. Screening for metabolic disorder  CBC WITH DIFFERENTIAL                 Follow-up:  12 weeks  -  DM-2 and general labs (and echo ?).               A computerized dictation system may have been used in this note.    Despite review,  there may be some errors in spelling or grammar.    Alberto Contreras M.D.  2/24/2022

## 2022-02-24 NOTE — ASSESSMENT & PLAN NOTE
Chronic and ongoing DM-2, on insulin. .  Currently on      - empagliflozine (Jardiance) 25 mg, daily     - pioglitazone (Actos) 30 mg daily.     - liraglutide (Victoza) 1.8 inj, QHS     - glargine 21 units QHS  Currently fairly stable, without major changes.   Notes prior issue with metformin (nausea).  Denies:  polyphagia, polydipsia, polyuria.    Last A1c - 7.3  (1/25/2021)  ... notes that was when having too much soda...  ... since then decreased the soda intake (4 --> 2 /day).   Home glucose readings with AM-,     And with QHS readings with 112-140        (and 1 reading of 175, but after pie)....  - continue on same meds (as above).   - continue to reduce soda intake.   - get new A1c in 3 months....  - monofilament (today)... 2/24/2022 (reduced on left).   - get retinal POCT test... (today)....     (as she does not recall eye dr's name)....

## 2022-02-25 NOTE — ASSESSMENT & PLAN NOTE
Previously low vit B12, improved on supplementation.  Previously as low as 174 in 2014.  Has been on supplementation since then.  Recently had been on 1000 mcg of B12 daily.  Recent labs were elevated... 1780  - Stop supplementation (for now).   - can check labs occasionally.

## 2022-03-11 ENCOUNTER — TELEPHONE (OUTPATIENT)
Dept: MEDICAL GROUP | Facility: PHYSICIAN GROUP | Age: 67
End: 2022-03-11
Payer: MEDICARE

## 2022-03-22 ENCOUNTER — PATIENT MESSAGE (OUTPATIENT)
Dept: HEALTH INFORMATION MANAGEMENT | Facility: OTHER | Age: 67
End: 2022-03-22

## 2022-05-12 ENCOUNTER — TELEPHONE (OUTPATIENT)
Dept: MEDICAL GROUP | Facility: PHYSICIAN GROUP | Age: 67
End: 2022-05-12
Payer: MEDICARE

## 2022-05-12 ENCOUNTER — HOSPITAL ENCOUNTER (OUTPATIENT)
Dept: CARDIOLOGY | Facility: MEDICAL CENTER | Age: 67
End: 2022-05-12
Attending: STUDENT IN AN ORGANIZED HEALTH CARE EDUCATION/TRAINING PROGRAM
Payer: MEDICARE

## 2022-05-12 DIAGNOSIS — R01.1 MURMUR, CARDIAC: ICD-10-CM

## 2022-05-12 LAB
LV EJECT FRACT  99904: 65
LV EJECT FRACT MOD 4C 99902: 65.27

## 2022-05-12 PROCEDURE — 93306 TTE W/DOPPLER COMPLETE: CPT

## 2022-05-12 PROCEDURE — 93306 TTE W/DOPPLER COMPLETE: CPT | Mod: 26 | Performed by: INTERNAL MEDICINE

## 2022-05-12 NOTE — TELEPHONE ENCOUNTER
Future Appointments       Provider Department Center    5/12/2022 4:15 PM [facility] EXAM 12 ECHO [facility] IMAGING - ECHOCARDIOLOGY - [facility]    5/19/2022 4:00 PM Alberto Contreras M.D. [facility] [place]        ESTABLISHED PATIENT PRE-VISIT PLANNING     Patient was contacted to complete PVP.     Note: Patient will not be contacted if there is no indication to call.     1.  Reviewed notes from the last few office visits within the medical group: Yes, LOV  02/24/2022    2.  If any orders were placed at last visit or intended to be done for this visit (i.e. 6 mos follow-up), do we have Results/Consult Notes?         •  Labs - Labs ordered, NOT completed. Patient advised to complete prior to next appointment.  Note: If patient appointment is for lab review and patient did not complete labs, check with provider if OK to reschedule patient until labs completed.       •  Imaging - Imaging ordered, NOT completed. Patient advised to complete prior to next appointment.       •  Referrals - No referrals were ordered at last office visit.    3. Is this appointment scheduled as a Hospital Follow-Up? No    4.  Immunizations were updated in Epic using Reconcile Outside Information activity? Yes    5.  Patient is due for the following Health Maintenance Topics:   Health Maintenance Due   Topic Date Due   • RETINAL SCREENING  10/29/2021   • FASTING LIPID PROFILE  04/26/2022   • URINE ACR / MICROALBUMIN  04/26/2022       - Patient is up-to-date on all Health Maintenance topics. No records have been requested at this time.    6.  AHA (Pulse8) form printed for Provider? N/A   Pt advised to arrive 15 minutes prior to scheduled appointment.

## 2022-05-16 ENCOUNTER — HOSPITAL ENCOUNTER (OUTPATIENT)
Dept: LAB | Facility: MEDICAL CENTER | Age: 67
End: 2022-05-16
Attending: STUDENT IN AN ORGANIZED HEALTH CARE EDUCATION/TRAINING PROGRAM
Payer: MEDICARE

## 2022-05-16 DIAGNOSIS — R80.9 TYPE 2 DIABETES MELLITUS WITH MICROALBUMINURIA, WITH LONG-TERM CURRENT USE OF INSULIN (HCC): ICD-10-CM

## 2022-05-16 DIAGNOSIS — Z79.4 TYPE 2 DIABETES MELLITUS WITH MICROALBUMINURIA, WITH LONG-TERM CURRENT USE OF INSULIN (HCC): ICD-10-CM

## 2022-05-16 DIAGNOSIS — E11.29 TYPE 2 DIABETES MELLITUS WITH MICROALBUMINURIA, WITH LONG-TERM CURRENT USE OF INSULIN (HCC): ICD-10-CM

## 2022-05-16 DIAGNOSIS — E78.00 PURE HYPERCHOLESTEROLEMIA: ICD-10-CM

## 2022-05-16 DIAGNOSIS — Z87.891 FORMER SMOKER: ICD-10-CM

## 2022-05-16 DIAGNOSIS — Z13.228 SCREENING FOR METABOLIC DISORDER: ICD-10-CM

## 2022-05-16 LAB
ALBUMIN SERPL BCP-MCNC: 4.1 G/DL (ref 3.2–4.9)
ALBUMIN/GLOB SERPL: 1.4 G/DL
ALP SERPL-CCNC: 103 U/L (ref 30–99)
ALT SERPL-CCNC: 19 U/L (ref 2–50)
ANION GAP SERPL CALC-SCNC: 9 MMOL/L (ref 7–16)
AST SERPL-CCNC: 22 U/L (ref 12–45)
BASOPHILS # BLD AUTO: 0.3 % (ref 0–1.8)
BASOPHILS # BLD: 0.02 K/UL (ref 0–0.12)
BILIRUB SERPL-MCNC: 0.5 MG/DL (ref 0.1–1.5)
BUN SERPL-MCNC: 12 MG/DL (ref 8–22)
CALCIUM SERPL-MCNC: 8.9 MG/DL (ref 8.5–10.5)
CHLORIDE SERPL-SCNC: 110 MMOL/L (ref 96–112)
CHOLEST SERPL-MCNC: 139 MG/DL (ref 100–199)
CO2 SERPL-SCNC: 26 MMOL/L (ref 20–33)
CREAT SERPL-MCNC: 0.64 MG/DL (ref 0.5–1.4)
CREAT UR-MCNC: 105.32 MG/DL
EOSINOPHIL # BLD AUTO: 0.13 K/UL (ref 0–0.51)
EOSINOPHIL NFR BLD: 1.7 % (ref 0–6.9)
ERYTHROCYTE [DISTWIDTH] IN BLOOD BY AUTOMATED COUNT: 43.9 FL (ref 35.9–50)
EST. AVERAGE GLUCOSE BLD GHB EST-MCNC: 148 MG/DL
FASTING STATUS PATIENT QL REPORTED: NORMAL
GFR SERPLBLD CREATININE-BSD FMLA CKD-EPI: 96 ML/MIN/1.73 M 2
GLOBULIN SER CALC-MCNC: 2.9 G/DL (ref 1.9–3.5)
GLUCOSE SERPL-MCNC: 96 MG/DL (ref 65–99)
HBA1C MFR BLD: 6.8 % (ref 4–5.6)
HCT VFR BLD AUTO: 44.7 % (ref 37–47)
HDLC SERPL-MCNC: 50 MG/DL
HGB BLD-MCNC: 14.3 G/DL (ref 12–16)
IMM GRANULOCYTES # BLD AUTO: 0.03 K/UL (ref 0–0.11)
IMM GRANULOCYTES NFR BLD AUTO: 0.4 % (ref 0–0.9)
LDLC SERPL CALC-MCNC: 59 MG/DL
LYMPHOCYTES # BLD AUTO: 2.47 K/UL (ref 1–4.8)
LYMPHOCYTES NFR BLD: 32 % (ref 22–41)
MCH RBC QN AUTO: 27.8 PG (ref 27–33)
MCHC RBC AUTO-ENTMCNC: 32 G/DL (ref 33.6–35)
MCV RBC AUTO: 86.8 FL (ref 81.4–97.8)
MICROALBUMIN UR-MCNC: 8.5 MG/DL
MICROALBUMIN/CREAT UR: 81 MG/G (ref 0–30)
MONOCYTES # BLD AUTO: 0.52 K/UL (ref 0–0.85)
MONOCYTES NFR BLD AUTO: 6.7 % (ref 0–13.4)
NEUTROPHILS # BLD AUTO: 4.54 K/UL (ref 2–7.15)
NEUTROPHILS NFR BLD: 58.9 % (ref 44–72)
NRBC # BLD AUTO: 0 K/UL
NRBC BLD-RTO: 0 /100 WBC
PLATELET # BLD AUTO: 276 K/UL (ref 164–446)
PMV BLD AUTO: 10.2 FL (ref 9–12.9)
POTASSIUM SERPL-SCNC: 3.8 MMOL/L (ref 3.6–5.5)
PROT SERPL-MCNC: 7 G/DL (ref 6–8.2)
RBC # BLD AUTO: 5.15 M/UL (ref 4.2–5.4)
SODIUM SERPL-SCNC: 145 MMOL/L (ref 135–145)
TRIGL SERPL-MCNC: 151 MG/DL (ref 0–149)
WBC # BLD AUTO: 7.7 K/UL (ref 4.8–10.8)

## 2022-05-16 PROCEDURE — 85025 COMPLETE CBC W/AUTO DIFF WBC: CPT

## 2022-05-16 PROCEDURE — 80053 COMPREHEN METABOLIC PANEL: CPT

## 2022-05-16 PROCEDURE — 82570 ASSAY OF URINE CREATININE: CPT

## 2022-05-16 PROCEDURE — 36415 COLL VENOUS BLD VENIPUNCTURE: CPT

## 2022-05-16 PROCEDURE — 82043 UR ALBUMIN QUANTITATIVE: CPT

## 2022-05-16 PROCEDURE — 80061 LIPID PANEL: CPT

## 2022-05-16 PROCEDURE — 84443 ASSAY THYROID STIM HORMONE: CPT

## 2022-05-16 PROCEDURE — 83036 HEMOGLOBIN GLYCOSYLATED A1C: CPT

## 2022-05-16 RX ORDER — LIRAGLUTIDE 6 MG/ML
1.8 INJECTION SUBCUTANEOUS EVERY EVENING
Qty: 9 ML | Refills: 0 | Status: CANCELLED | OUTPATIENT
Start: 2022-05-16

## 2022-05-16 RX ORDER — LIRAGLUTIDE 6 MG/ML
1.8 INJECTION SUBCUTANEOUS EVERY EVENING
Qty: 9 ML | Refills: 3 | Status: SHIPPED | OUTPATIENT
Start: 2022-05-16 | End: 2022-10-03 | Stop reason: SDUPTHER

## 2022-05-17 LAB — TSH SERPL DL<=0.005 MIU/L-ACNC: 2.16 UIU/ML (ref 0.38–5.33)

## 2022-05-19 ENCOUNTER — OFFICE VISIT (OUTPATIENT)
Dept: MEDICAL GROUP | Facility: PHYSICIAN GROUP | Age: 67
End: 2022-05-19
Payer: MEDICARE

## 2022-05-19 VITALS
HEIGHT: 66 IN | RESPIRATION RATE: 19 BRPM | BODY MASS INDEX: 30.86 KG/M2 | DIASTOLIC BLOOD PRESSURE: 86 MMHG | HEART RATE: 72 BPM | OXYGEN SATURATION: 95 % | TEMPERATURE: 98.5 F | SYSTOLIC BLOOD PRESSURE: 166 MMHG | WEIGHT: 192 LBS

## 2022-05-19 DIAGNOSIS — E11.29 TYPE 2 DIABETES MELLITUS WITH MICROALBUMINURIA, WITH LONG-TERM CURRENT USE OF INSULIN (HCC): ICD-10-CM

## 2022-05-19 DIAGNOSIS — Z79.4 TYPE 2 DIABETES MELLITUS WITH MICROALBUMINURIA, WITH LONG-TERM CURRENT USE OF INSULIN (HCC): ICD-10-CM

## 2022-05-19 DIAGNOSIS — I10 PRIMARY HYPERTENSION: ICD-10-CM

## 2022-05-19 DIAGNOSIS — R80.9 TYPE 2 DIABETES MELLITUS WITH MICROALBUMINURIA, WITH LONG-TERM CURRENT USE OF INSULIN (HCC): ICD-10-CM

## 2022-05-19 PROCEDURE — 99214 OFFICE O/P EST MOD 30 MIN: CPT | Performed by: STUDENT IN AN ORGANIZED HEALTH CARE EDUCATION/TRAINING PROGRAM

## 2022-05-19 ASSESSMENT — FIBROSIS 4 INDEX: FIB4 SCORE: 1.24

## 2022-05-19 NOTE — PROGRESS NOTES
Established Patient     Tara Cueva is a 67 y.o. female.    Chief Complaint   Patient presents with   • Lab Follow-up             Problems addressed this visit:     This note uses problem-based documentation.  Subjective HPI is included in Assessment & Plan, at bottom of note.   Problem   Primary Hypertension   DM-2, Type 2 diabetes mellitus with microalbuminuria,   with long-term current use of insulin (HCC)                           Past Medical History:   Diagnosis Date   • COVID-19    • Diabetes 2011   • Edema 2011   • Elevated liver enzymes 2011   • Gall bladder polyp 2011   • GERD (gastroesophageal reflux disease) 2011   • Hyperlipidemia 2011   • Hypertension 2011   • Post-menopausal atrophic vaginitis 2020   • Skin lesion of face 2011   • Vitamin d deficiency 2011       Patient Active Problem List   Diagnosis   • Hyperlipidemia   • Vitamin D deficiency   • GERD (gastroesophageal reflux disease)   • Gall bladder polyp   • Hypokalemia   • Vitamin B12 deficiency   • DM-2, Type 2 diabetes mellitus with microalbuminuria, with long-term current use of insulin (HCC)   • Arthralgia of left hand   • Post-menopausal atrophic vaginitis   • Former smoker   • Murmur, cardiac   • Primary hypertension       Past Surgical History:   Procedure Laterality Date   • OTHER ORTHOPEDIC SURGERY      right arm ORIF   • PRIMARY C SECTION     • TUBAL COAGULATION LAPAROSCOPIC BILATERAL         Family History   Problem Relation Age of Onset   • Cancer Maternal Grandmother         lung    • Cancer Mother         breast   • Other Father         urinary issues   • GI Disease Sister    • Heart Disease Brother        Social History     Tobacco Use   • Smoking status: Former Smoker     Packs/day: 1.00     Years: 20.00     Pack years: 20.00     Types: Cigarettes     Quit date: 1990     Years since quittin.4   • Smokeless tobacco: Never Used   • Tobacco comment: Quit   (Hx 1 ppd x  "20 yrs).    Substance Use Topics   • Alcohol use: Not Currently     Comment: couple per year       Current medications:  (including new changes):  Current Outpatient Medications   Medication Sig Dispense Refill   • liraglutide (VICTOZA) 18 MG/3ML Solution Pen-injector Inject 1.8 mg under the skin every evening. 9 mL 3   • insulin glargine (LANTUS SOLOSTAR) 100 UNIT/ML Solution Pen-injector injection Inject 21 Units under the skin every evening. 6 mL 3   • atorvastatin (LIPITOR) 80 MG tablet Take 1 Tablet by mouth every evening. TAKE 1 TABLET BY MOUTH EVERY EVENING 100 Tablet 4   • Empagliflozin (JARDIANCE) 25 MG Tab Take 1 Tablet by mouth every day. 100 Tablet 3   • omeprazole (PRILOSEC) 20 MG delayed-release capsule Take 1 Capsule by mouth every day. 90 Capsule 3   • pioglitazone (ACTOS) 30 MG Tab Take 1 tablet by mouth every day. 90 tablet 4   • Cholecalciferol (VITAMIN D) 400 UNIT TABS Take 400 Units by mouth every evening.     • aspirin EC (ECOTRIN) 81 MG TBEC Take 81 mg by mouth every evening.       No current facility-administered medications for this visit.       Allergies as of 05/19/2022 - Reviewed 05/19/2022   Allergen Reaction Noted   • Sulfa drugs Vomiting 03/30/2012                   Review of Symptoms   (as noted elsewhere, and .....)   GEN/CONST:   Denies fever, chills,    CARDIO:   Denies chest pain, or edema.    RESP:   Denies shortness of breath, or coughing.  GI:    Denies nausea, vomiting, diarrhea,      PSYCH:  Denies anxiety, depression,           Physical Exam  BP (!) 166/86 (BP Location: Right arm, Patient Position: Sitting, BP Cuff Size: Adult)   Pulse 72   Temp 36.9 °C (98.5 °F) (Temporal)   Resp 19   Ht 1.676 m (5' 6\")   Wt 87.1 kg (192 lb)   LMP 12/16/2006   SpO2 95%   BMI 30.99 kg/m²   General:  Alert and oriented, No apparent distress.    Lungs: Clear to auscultation bilaterally.  No wheezes or rales.  Cardiovascular: Regular rate and rhythm.      ? Questionable murmur today... "   Abdomen:  Soft.  Non-tender.  No rebound or guarding.   Extremities:  No leg edema.  Good general motion of extremities.   Psychological: Appears to have normal mood and affect.        Labs:  Recent / Last-Prior labs reviewed.    CBC, CMP, Lipids, TSH, A1c and micro-Albumin       Echo (5/12/22) - with mild TR, and rvsp~35.                             Assessment & Plan  (with subjective history and orders):  Of note, the list below is not in a specific nor sortable order.      Problem List Items Addressed This Visit     DM-2, Type 2 diabetes mellitus with microalbuminuria,   with long-term current use of insulin (HCC)     Chronic and ongoing DM-2, on insulin. .  Currently on      - empagliflozine (Jardiance) 25 mg, daily     - pioglitazone (Actos) 30 mg daily.     - liraglutide (Victoza) 1.8 inj, QHS     - glargine 21 units QHS  Currently fairly stable, without major changes.   Notes prior issue with metformin (nausea).  Denies:  polyphagia, polydipsia, polyuria.    Also on statin, but not ace/arb (declined).....  ... Mild/micro-proteinuria, but declines ace/arb...  Prior A1c - 7.3  (1/25/2021)  ... notes that was when having too much soda...  ... since then decreased the soda intake (4 --> 2 /day).   New A1c - 6.8  (05/16/2022)....   - continue on same meds (as above).   - continue to reduce soda intake.   - see eye doctor for retinal exam      (prior retinal-POCT was unsuccessful)....     (and she does not recall eye dr's name)....  - follow-up with new provider.   - consider ace/arb in the future (declined for now)....            Primary Hypertension     Patient with elevated BP, on multiple occasions.  ... in setting of DM-2....  Today, BP - 166/86, then 174/78.  Home BP - not measured.  Previously attributed to stress (at work).  Today, attributed to stress of check-in and      the timing of the appointment.  Denies:  angina, palpitations, or dyspnea   - Discussed starting medications.   - Patient declines.  -  Follow-up with new provider for recheck     (hopefully, within a few weeks).....  - Bring home BP list and machine to next visit.              Of note, the above list is not in a specific nor sortable order.                  Diagnosis Table, with orders:  1. DM-2, Type 2 diabetes mellitus with microalbuminuria, with long-term current use of insulin (HCC)     2. Primary hypertension                   Follow-up:  Please establish with a new provider,           as I will be leaving the clinic.        ...   They may wish to recheck blood pressure,          and consider ace/arb (declined today).               A computerized dictation system may have been used in this note.    Despite review, there may be some errors in spelling or grammar.    Alberto Contreras M.D.  5/19/2022

## 2022-05-19 NOTE — PATIENT INSTRUCTIONS
Please measure your blood pressure at home,   and bring a log (list) with you (and your BP machine) to your next visit.      Please follow-up with your new provider.     Please call [phone]) to schedule with a new provider, as I will be leaving soon.  (There may be more openings at the [place] location.).... Thank you.     .............  .............    If you want a topical (rub-on) cream or gel, that can help with a painful or tender area, there are a couple that you can try (and are even over the counter).    1.  Lidocaine cream can also help to numb the area (to cover-up the pain, but not an anti-inflammatory).    2.  Voltaren gel (diclofenac gel) is an anti-inflammatory (like ibuprofen) that you can apply topically (with fewer wide-spread effects than oral medications, of the same class).  (But theoretically, might irritate the kidneys a bit more)...  You may wish to try one of these, to see which works best for you.

## 2022-05-19 NOTE — ASSESSMENT & PLAN NOTE
Chronic and ongoing DM-2, on insulin. .  Currently on      - empagliflozine (Jardiance) 25 mg, daily     - pioglitazone (Actos) 30 mg daily.     - liraglutide (Victoza) 1.8 inj, QHS     - glargine 21 units QHS  Currently fairly stable, without major changes.   Notes prior issue with metformin (nausea).  Denies:  polyphagia, polydipsia, polyuria.    Also on statin, but not ace/arb (declined).....  ... Mild/micro-proteinuria, but declines ace/arb...  Prior A1c - 7.3  (1/25/2021)  ... notes that was when having too much soda...  ... since then decreased the soda intake (4 --> 2 /day).   New A1c - 6.8  (05/16/2022)....   - continue on same meds (as above).   - continue to reduce soda intake.   - see eye doctor for retinal exam      (prior retinal-POCT was unsuccessful)....     (and she does not recall eye dr's name)....  - follow-up with new provider.   - consider ace/arb in the future (declined for now)....

## 2022-05-19 NOTE — ASSESSMENT & PLAN NOTE
Patient with elevated BP, on multiple occasions.  ... in setting of DM-2....  Today, BP - 166/86, then 174/78.  Home BP - not measured.  Previously attributed to stress (at work).  Today, attributed to stress of check-in and      the timing of the appointment.  Denies:  angina, palpitations, or dyspnea   - Discussed starting medications.   - Patient declines.  - Follow-up with new provider for recheck     (hopefully, within a few weeks).....  - Bring home BP list and machine to next visit.

## 2022-06-14 SDOH — ECONOMIC STABILITY: FOOD INSECURITY: WITHIN THE PAST 12 MONTHS, YOU WORRIED THAT YOUR FOOD WOULD RUN OUT BEFORE YOU GOT MONEY TO BUY MORE.: NEVER TRUE

## 2022-06-14 SDOH — HEALTH STABILITY: PHYSICAL HEALTH: ON AVERAGE, HOW MANY MINUTES DO YOU ENGAGE IN EXERCISE AT THIS LEVEL?: 30 MIN

## 2022-06-14 SDOH — ECONOMIC STABILITY: FOOD INSECURITY: WITHIN THE PAST 12 MONTHS, THE FOOD YOU BOUGHT JUST DIDN'T LAST AND YOU DIDN'T HAVE MONEY TO GET MORE.: NEVER TRUE

## 2022-06-14 SDOH — ECONOMIC STABILITY: HOUSING INSECURITY
IN THE LAST 12 MONTHS, WAS THERE A TIME WHEN YOU DID NOT HAVE A STEADY PLACE TO SLEEP OR SLEPT IN A SHELTER (INCLUDING NOW)?: NO

## 2022-06-14 SDOH — HEALTH STABILITY: PHYSICAL HEALTH: ON AVERAGE, HOW MANY DAYS PER WEEK DO YOU ENGAGE IN MODERATE TO STRENUOUS EXERCISE (LIKE A BRISK WALK)?: 3 DAYS

## 2022-06-14 SDOH — ECONOMIC STABILITY: INCOME INSECURITY: HOW HARD IS IT FOR YOU TO PAY FOR THE VERY BASICS LIKE FOOD, HOUSING, MEDICAL CARE, AND HEATING?: NOT VERY HARD

## 2022-06-14 SDOH — ECONOMIC STABILITY: TRANSPORTATION INSECURITY
IN THE PAST 12 MONTHS, HAS LACK OF RELIABLE TRANSPORTATION KEPT YOU FROM MEDICAL APPOINTMENTS, MEETINGS, WORK OR FROM GETTING THINGS NEEDED FOR DAILY LIVING?: NO

## 2022-06-14 SDOH — ECONOMIC STABILITY: TRANSPORTATION INSECURITY
IN THE PAST 12 MONTHS, HAS THE LACK OF TRANSPORTATION KEPT YOU FROM MEDICAL APPOINTMENTS OR FROM GETTING MEDICATIONS?: NO

## 2022-06-14 SDOH — ECONOMIC STABILITY: HOUSING INSECURITY: IN THE LAST 12 MONTHS, HOW MANY PLACES HAVE YOU LIVED?: 1

## 2022-06-14 SDOH — ECONOMIC STABILITY: TRANSPORTATION INSECURITY
IN THE PAST 12 MONTHS, HAS LACK OF TRANSPORTATION KEPT YOU FROM MEETINGS, WORK, OR FROM GETTING THINGS NEEDED FOR DAILY LIVING?: NO

## 2022-06-14 SDOH — ECONOMIC STABILITY: INCOME INSECURITY: IN THE LAST 12 MONTHS, WAS THERE A TIME WHEN YOU WERE NOT ABLE TO PAY THE MORTGAGE OR RENT ON TIME?: NO

## 2022-06-14 SDOH — HEALTH STABILITY: MENTAL HEALTH
STRESS IS WHEN SOMEONE FEELS TENSE, NERVOUS, ANXIOUS, OR CAN'T SLEEP AT NIGHT BECAUSE THEIR MIND IS TROUBLED. HOW STRESSED ARE YOU?: NOT AT ALL

## 2022-06-14 ASSESSMENT — SOCIAL DETERMINANTS OF HEALTH (SDOH)
HOW OFTEN DO YOU GET TOGETHER WITH FRIENDS OR RELATIVES?: MORE THAN THREE TIMES A WEEK
HOW HARD IS IT FOR YOU TO PAY FOR THE VERY BASICS LIKE FOOD, HOUSING, MEDICAL CARE, AND HEATING?: NOT VERY HARD
HOW OFTEN DO YOU GET TOGETHER WITH FRIENDS OR RELATIVES?: MORE THAN THREE TIMES A WEEK
IN A TYPICAL WEEK, HOW MANY TIMES DO YOU TALK ON THE PHONE WITH FAMILY, FRIENDS, OR NEIGHBORS?: MORE THAN THREE TIMES A WEEK
HOW OFTEN DO YOU HAVE A DRINK CONTAINING ALCOHOL: MONTHLY OR LESS
HOW OFTEN DO YOU HAVE SIX OR MORE DRINKS ON ONE OCCASION: NEVER
HOW OFTEN DO YOU ATTEND CHURCH OR RELIGIOUS SERVICES?: NEVER
HOW OFTEN DO YOU ATTENT MEETINGS OF THE CLUB OR ORGANIZATION YOU BELONG TO?: NEVER
HOW MANY DRINKS CONTAINING ALCOHOL DO YOU HAVE ON A TYPICAL DAY WHEN YOU ARE DRINKING: 1 OR 2
DO YOU BELONG TO ANY CLUBS OR ORGANIZATIONS SUCH AS CHURCH GROUPS UNIONS, FRATERNAL OR ATHLETIC GROUPS, OR SCHOOL GROUPS?: NO
IN A TYPICAL WEEK, HOW MANY TIMES DO YOU TALK ON THE PHONE WITH FAMILY, FRIENDS, OR NEIGHBORS?: MORE THAN THREE TIMES A WEEK
HOW OFTEN DO YOU ATTENT MEETINGS OF THE CLUB OR ORGANIZATION YOU BELONG TO?: NEVER
DO YOU BELONG TO ANY CLUBS OR ORGANIZATIONS SUCH AS CHURCH GROUPS UNIONS, FRATERNAL OR ATHLETIC GROUPS, OR SCHOOL GROUPS?: NO
WITHIN THE PAST 12 MONTHS, YOU WORRIED THAT YOUR FOOD WOULD RUN OUT BEFORE YOU GOT THE MONEY TO BUY MORE: NEVER TRUE
HOW OFTEN DO YOU ATTEND CHURCH OR RELIGIOUS SERVICES?: NEVER

## 2022-06-14 ASSESSMENT — LIFESTYLE VARIABLES
SKIP TO QUESTIONS 9-10: 1
AUDIT-C TOTAL SCORE: 1
HOW OFTEN DO YOU HAVE A DRINK CONTAINING ALCOHOL: MONTHLY OR LESS
HOW MANY STANDARD DRINKS CONTAINING ALCOHOL DO YOU HAVE ON A TYPICAL DAY: 1 OR 2
HOW OFTEN DO YOU HAVE SIX OR MORE DRINKS ON ONE OCCASION: NEVER

## 2022-06-16 ENCOUNTER — TELEPHONE (OUTPATIENT)
Dept: HEALTH INFORMATION MANAGEMENT | Facility: OTHER | Age: 67
End: 2022-06-16

## 2022-06-21 ENCOUNTER — OFFICE VISIT (OUTPATIENT)
Dept: MEDICAL GROUP | Facility: PHYSICIAN GROUP | Age: 67
End: 2022-06-21
Payer: MEDICARE

## 2022-06-21 VITALS
TEMPERATURE: 98.1 F | SYSTOLIC BLOOD PRESSURE: 118 MMHG | WEIGHT: 187.2 LBS | OXYGEN SATURATION: 96 % | DIASTOLIC BLOOD PRESSURE: 70 MMHG | RESPIRATION RATE: 16 BRPM | BODY MASS INDEX: 30.08 KG/M2 | HEART RATE: 71 BPM | HEIGHT: 66 IN

## 2022-06-21 DIAGNOSIS — R80.9 TYPE 2 DIABETES MELLITUS WITH MICROALBUMINURIA, WITH LONG-TERM CURRENT USE OF INSULIN (HCC): Primary | ICD-10-CM

## 2022-06-21 DIAGNOSIS — E11.29 TYPE 2 DIABETES MELLITUS WITH MICROALBUMINURIA, WITH LONG-TERM CURRENT USE OF INSULIN (HCC): Primary | ICD-10-CM

## 2022-06-21 DIAGNOSIS — Z79.4 TYPE 2 DIABETES MELLITUS WITH MICROALBUMINURIA, WITH LONG-TERM CURRENT USE OF INSULIN (HCC): Primary | ICD-10-CM

## 2022-06-21 PROCEDURE — 92250 FUNDUS PHOTOGRAPHY W/I&R: CPT | Performed by: NURSE PRACTITIONER

## 2022-06-21 PROCEDURE — 99213 OFFICE O/P EST LOW 20 MIN: CPT | Performed by: NURSE PRACTITIONER

## 2022-06-21 RX ORDER — PIOGLITAZONEHYDROCHLORIDE 30 MG/1
30 TABLET ORAL
Qty: 100 TABLET | Refills: 3 | Status: SHIPPED | OUTPATIENT
Start: 2022-06-21 | End: 2023-09-20 | Stop reason: SDUPTHER

## 2022-06-21 RX ORDER — INSULIN GLARGINE 100 [IU]/ML
21 INJECTION, SOLUTION SUBCUTANEOUS EVERY EVENING
Qty: 21 ML | Refills: 1 | Status: SHIPPED | OUTPATIENT
Start: 2022-06-21 | End: 2022-11-07 | Stop reason: SDUPTHER

## 2022-06-21 ASSESSMENT — FIBROSIS 4 INDEX: FIB4 SCORE: 1.24

## 2022-06-21 NOTE — ASSESSMENT & PLAN NOTE
Chronic condition, stable.  Patient takes empagliflozin 25 mg, Lantus 21 units, liraglutide 1.8, and pioglitazone 30 mg daily.  Patient's last A1c was 6.8 on 5/16/2022.  She states she has had education about dietary control of her diabetes, and mostly does well with this.  She will continue on her current regimen.

## 2022-06-21 NOTE — PROGRESS NOTES
Subjective:     CC: The encounter diagnosis was DM-2, Type 2 diabetes mellitus with microalbuminuria, with long-term current use of insulin (HCC).      HPI:   Tara is a new patient to me today wanting to establish care.  We discussed the following problems:    1. DM-2, Type 2 diabetes mellitus with microalbuminuria, with long-term current use of insulin (HCC)  Chronic condition, stable. Patient is here for evaluation today.       Past Medical History:   Diagnosis Date   • COVID-19    • Diabetes 2011   • Edema 2011   • Elevated liver enzymes 2011   • Gall bladder polyp 2011   • GERD (gastroesophageal reflux disease) 2011   • Hyperlipidemia 2011   • Hypertension 2011   • Post-menopausal atrophic vaginitis 2020   • Skin lesion of face 2011   • Vitamin d deficiency 2011       Social History     Tobacco Use   • Smoking status: Former Smoker     Packs/day: 1.00     Years: 20.00     Pack years: 20.00     Types: Cigarettes     Quit date: 1990     Years since quittin.4   • Smokeless tobacco: Never Used   • Tobacco comment: Quit   (Hx 1 ppd x 20 yrs).    Vaping Use   • Vaping Use: Never used   Substance Use Topics   • Alcohol use: Not Currently     Comment: couple per year   • Drug use: No       Current Outpatient Medications Ordered in Epic   Medication Sig Dispense Refill   • Multiple Vitamins-Minerals (OCUVITE ADULT 50+ PO) Take 1 Capsule by mouth every day.     • insulin glargine (LANTUS SOLOSTAR) 100 UNIT/ML Solution Pen-injector injection Inject 21 Units under the skin every evening for 100 days. 21 mL 1   • pioglitazone (ACTOS) 30 MG Tab Take 1 Tablet by mouth every day. 100 Tablet 3   • liraglutide (VICTOZA) 18 MG/3ML Solution Pen-injector Inject 1.8 mg under the skin every evening. 9 mL 3   • atorvastatin (LIPITOR) 80 MG tablet Take 1 Tablet by mouth every evening. TAKE 1 TABLET BY MOUTH EVERY EVENING 100 Tablet 4   • Empagliflozin (JARDIANCE) 25 MG Tab  "Take 1 Tablet by mouth every day. 100 Tablet 3   • omeprazole (PRILOSEC) 20 MG delayed-release capsule Take 1 Capsule by mouth every day. 90 Capsule 3   • Cholecalciferol (VITAMIN D) 400 UNIT TABS Take 400 Units by mouth every evening.     • aspirin EC (ECOTRIN) 81 MG TBEC Take 81 mg by mouth every evening.       No current Epic-ordered facility-administered medications on file.       Allergies:  Sulfa drugs    Health Maintenance: Completed    ROS:  Gen: no fevers/chills, no changes in weight  Eyes: no changes in vision  ENT: no sore throat, no hearing loss, no bloody nose  Pulm: no sob, no cough  CV: no chest pain, no palpitations  GI: no nausea/vomiting, no diarrhea  : no dysuria  MSk: no myalgias  Skin: no rash  Neuro: no headaches, no numbness/tingling  Heme/Lymph: no easy bruising      Objective:       Exam:  /70 (BP Location: Left arm, Patient Position: Sitting, BP Cuff Size: Adult long)   Pulse 71   Temp 36.7 °C (98.1 °F) (Temporal)   Resp 16   Ht 1.676 m (5' 6\")   Wt 84.9 kg (187 lb 3.2 oz)   LMP 12/16/2006   SpO2 96%   BMI 30.21 kg/m²  Body mass index is 30.21 kg/m².    Gen: Alert and oriented, No apparent distress.  Neck: Neck is supple without lymphadenopathy.  No carotid bruits.  Lungs: Normal effort, CTA bilaterally, no wheezes, rhonchi, or rales  CV: Regular rate and rhythm. No murmurs, rubs, or gallops.  Ext: No clubbing, cyanosis, edema.    Labs: None    Assessment & Plan:     67 y.o. female with the following -     DM-2, Type 2 diabetes mellitus with microalbuminuria, with long-term current use of insulin ([facility])  Chronic condition, stable.  Patient takes empagliflozin 25 mg, Lantus 21 units, liraglutide 1.8, and pioglitazone 30 mg daily.  Patient's last A1c was 6.8 on 5/16/2022.  She states she has had education about dietary control of her diabetes, and mostly does well with this.  She will continue on her current regimen.      Orders Placed This Encounter   • HEMOGLOBIN A1C   • Comp " Metabolic Panel   • POCT Retinal Eye Exam   • Multiple Vitamins-Minerals (OCUVITE ADULT 50+ PO)   • insulin glargine (LANTUS SOLOSTAR) 100 UNIT/ML Solution Pen-injector injection   • pioglitazone (ACTOS) 30 MG Tab          Return in about 6 months (around 12/21/2022).    I have placed the below orders and discussed them with an approved delegating provider.  The MA is performing the below orders under the direction of Neva Parry MD.    Please note that this dictation was created using voice recognition software. I have made every reasonable attempt to correct obvious errors, but I expect that there are errors of grammar and possibly content that I did not discover before finalizing the note.

## 2022-06-28 LAB — RETINAL SCREEN: NORMAL

## 2022-10-03 ENCOUNTER — PATIENT MESSAGE (OUTPATIENT)
Dept: MEDICAL GROUP | Facility: PHYSICIAN GROUP | Age: 67
End: 2022-10-03
Payer: MEDICARE

## 2022-10-03 DIAGNOSIS — R80.9 TYPE 2 DIABETES MELLITUS WITH MICROALBUMINURIA, WITH LONG-TERM CURRENT USE OF INSULIN (HCC): ICD-10-CM

## 2022-10-03 DIAGNOSIS — Z79.4 TYPE 2 DIABETES MELLITUS WITH MICROALBUMINURIA, WITH LONG-TERM CURRENT USE OF INSULIN (HCC): ICD-10-CM

## 2022-10-03 DIAGNOSIS — E11.29 TYPE 2 DIABETES MELLITUS WITH MICROALBUMINURIA, WITH LONG-TERM CURRENT USE OF INSULIN (HCC): ICD-10-CM

## 2022-10-03 RX ORDER — LIRAGLUTIDE 6 MG/ML
1.8 INJECTION SUBCUTANEOUS EVERY EVENING
Qty: 9 ML | Refills: 3 | Status: SHIPPED | OUTPATIENT
Start: 2022-10-03 | End: 2023-02-10 | Stop reason: SDUPTHER

## 2022-10-04 ENCOUNTER — PATIENT MESSAGE (OUTPATIENT)
Dept: MEDICAL GROUP | Facility: PHYSICIAN GROUP | Age: 67
End: 2022-10-04
Payer: MEDICARE

## 2022-10-04 NOTE — MR AVS SNAPSHOT
"        Tara Cueva   2017 8:30 AM   Office Visit   MRN: [id number]    Department:  Endocrinology [facility]   Dept Phone:  [phone]    Description:  Female : 1955   Provider:  Anson Lee PA-C           Reason for Visit     Follow-Up           Allergies as of 2017     Allergen Noted Reactions    Sulfa Drugs 2012   Vomiting      You were diagnosed with     Uncontrolled type 2 diabetes mellitus without complication, with long-term current use of insulin (CMS-HCC)   [5194161]       Encounter for long-term (current) use of insulin (CMS-HCC)   [V58.67.ICD-9-CM]         Vital Signs     Blood Pressure Pulse Height Weight Body Mass Index Oxygen Saturation    126/80 mmHg 82 1.651 m (5' 5\") 78.019 kg (172 lb) 28.62 kg/m2 93%    Last Menstrual Period Smoking Status                2006 Former Smoker          Basic Information     Date Of Birth Sex Race Ethnicity Preferred Language    1955 Female White Non- English      Your appointments     May 23, 2017  8:10 AM   Established Patient with Anson Lee PA-C   [facility]    [address], [address]  [place] [facility] [facility] [address] [phone]           You will be receiving a confirmation call a few days before your appointment from our automated call confirmation system.            2017  3:20 PM   Established Patient with Christian Mota M.D.   [facility] ([place]    [address] [address] [phone]           You will be receiving a confirmation call a few days before your appointment from our automated call confirmation system.              Problem List              ICD-10-CM Priority Class Noted - Resolved    Hyperlipidemia (Chronic) E78.5   2011 - Present    Vitamin D deficiency E55.9   2011 - Present    Hypertension (Chronic) I10   2011 - Present    GERD (gastroesophageal reflux disease) K21.9   2011 - Present    Gall " bladder polyp K82.4   8/12/2011 - Present    Uncontrolled type 2 diabetes mellitus without complication, with long-term current use of insulin (CMS-HCC) E11.65, Z79.4   3/30/2012 - Present    Hypokalemia E87.6   1/3/2014 - Present    Thumb pain M79.646   12/23/2016 - Present    Encounter for long-term (current) use of insulin (CMS-HCC) Z79.4   4/25/2017 - Present      Health Maintenance        Date Due Completion Dates    IMM ZOSTER VACCINE 5/9/2014 ---    RETINAL SCREENING 7/10/2015 7/10/2014 (N/S), 4/21/2012 (N/S)    Override on 7/10/2014: (N/S)    Override on 4/21/2012: (N/S)    PAP SMEAR 2/7/2017 2/7/2014, 1/17/2014    A1C SCREENING 8/23/2017 2/23/2017, 12/23/2016, 4/8/2016, 12/18/2015, 9/18/2015, 6/19/2015, 3/6/2015, 12/5/2014, 7/25/2014, 12/27/2013, 6/7/2013, 11/16/2012, 8/10/2012, 5/4/2012, 12/23/2011, 10/7/2011    FASTING LIPID PROFILE 12/23/2017 12/23/2016, 9/17/2015, 2/4/2015, 7/25/2014, 6/7/2013, 8/10/2012, 12/23/2011, 10/7/2011, 5/13/2011    DIABETES MONOFILAMENT / LE EXAM 12/23/2017 12/23/2016, 7/25/2014 (N/S), 8/11/2011 (N/S)    Override on 7/25/2014: (N/S)    Override on 8/11/2011: (N/S)    MAMMOGRAM 3/1/2018 3/1/2017, 1/24/2014    URINE ACR / MICROALBUMIN 4/25/2018 4/25/2017, 12/23/2016, 2/4/2015, 7/25/2014, 6/7/2013, 12/23/2011, 5/13/2011    SERUM CREATININE 4/25/2018 4/25/2017, 12/23/2016, 9/17/2015, 2/4/2015, 12/5/2014, 7/25/2014, 2/22/2014, 12/27/2013, 6/7/2013, 11/16/2012, 8/10/2012, 5/4/2012, 12/23/2011, 10/7/2011, 5/13/2011    COLONOSCOPY 11/6/2019 11/6/2014    IMM DTaP/Tdap/Td Vaccine (2 - Td) 7/25/2024 7/25/2014            Current Immunizations     Influenza TIV (IM) 10/4/2013, 11/4/2011    Influenza Vaccine Quad Inj (Pf) 12/23/2016  8:12 AM    Influenza Vaccine Quad Inj (Preserved) 12/18/2015    Pneumococcal polysaccharide vaccine (PPSV-23) 1/3/2014    Tdap Vaccine 7/25/2014      Below and/or attached are the medications your provider expects you to take. Review all of your home medications  and newly ordered medications with your provider and/or pharmacist. Follow medication instructions as directed by your provider and/or pharmacist. Please keep your medication list with you and share with your provider. Update the information when medications are discontinued, doses are changed, or new medications (including over-the-counter products) are added; and carry medication information at all times in the event of emergency situations     Allergies:  SULFA DRUGS - Vomiting               Medications  Valid as of: May 09, 2017 -  8:48 AM    Generic Name Brand Name Tablet Size Instructions for use    Aspirin (Tablet Delayed Response) ECOTRIN 81 MG Take 81 mg by mouth every day.        Atorvastatin Calcium (Tab) LIPITOR 80 MG Take 1 Tab by mouth every evening.        Blood Glucose Monitoring Suppl (Misc) Blood Glucose Monitoring Suppl SUPPLIES Test strips order: Test strips for glucometer. Sig: use it once daily and prn ssx high or low sugar #100 RF x 3        Cholecalciferol (Tab) VITAMIN D 400 UNIT Take 400 Units by mouth every day.        Empagliflozin (Tab) JARDIANCE 25 MG Take 1 tablet by mouth every day.        Enalapril Maleate (Tab) VASOTEC 5 MG Take 1 Tab by mouth every day.        HydroCHLOROthiazide (Tab) HYDRODIURIL 25 MG Take 1 Tab by mouth every day.        Insulin Glargine (Solution Pen-injector) LANTUS 100 UNIT/ML Inject 30 Units as instructed 2 times a day.        Insulin Pen Needle (Misc) Insulin Pen Needle 32G X 4 MM 1 Applicator by Does not apply route every day. Using one needle tip with victoza per day        Liraglutide (Solution Pen-injector) VICTOZA 18 MG/3ML Inject 0.3 mL as instructed every day.        Omeprazole (CAPSULE DELAYED RELEASE) PRILOSEC 20 MG Take 1 Cap by mouth every day. TAKE 1 CAP BY MOUTH EVERY DAY.        Pioglitazone HCl (Tab) ACTOS 30 MG Take 1 Tab by mouth every day.        Spironolactone (Tab) ALDACTONE 50 MG Take 1 Tab by mouth every day.        .                    Medicines prescribed today were sent to:     [id number] - [place], [place] - [address]    [address] [place] [address]    Phone: [phone] Fax: [phone]    Open 24 Hours?: No    [facility] - [place], [place] - [address]    [address] [address]    Phone: [phone] Fax: [phone]    Open 24 Hours?: No      Medication refill instructions:       If your prescription bottle indicates you have medication refills left, it is not necessary to call your provider’s office. Please contact your pharmacy and they will refill your medication.    If your prescription bottle indicates you do not have any refills left, you may request refills at any time through one of the following ways: The online Docitt system (except Urgent Care), by calling your provider’s office, or by asking your pharmacy to contact your provider’s office with a refill request. Medication refills are processed only during regular business hours and may not be available until the next business day. Your provider may request additional information or to have a follow-up visit with you prior to refilling your medication.   *Please Note: Medication refills are assigned a new Rx number when refilled electronically. Your pharmacy may indicate that no refills were authorized even though a new prescription for the same medication is available at the pharmacy. Please request the medicine by name with the pharmacy before contacting your provider for a refill.        Instructions      I had her start:  Jardiance 25mg one a day  Actos 30 mg one a day.   Victoza 1.2 at night (INCREASE to 1.8)  Lantus at night 10units.    If morning numbers are  in the morning stop Lantus              Editlitehart Access Code: Activation code not generated  Current Docitt Status: Active           no

## 2022-10-19 ENCOUNTER — DOCUMENTATION (OUTPATIENT)
Dept: HEALTH INFORMATION MANAGEMENT | Facility: OTHER | Age: 67
End: 2022-10-19
Payer: MEDICARE

## 2022-10-25 ENCOUNTER — PATIENT MESSAGE (OUTPATIENT)
Dept: MEDICAL GROUP | Facility: PHYSICIAN GROUP | Age: 67
End: 2022-10-25
Payer: MEDICARE

## 2022-10-25 DIAGNOSIS — K21.9 GASTROESOPHAGEAL REFLUX DISEASE WITHOUT ESOPHAGITIS: ICD-10-CM

## 2022-10-26 RX ORDER — OMEPRAZOLE 20 MG/1
20 CAPSULE, DELAYED RELEASE ORAL
Qty: 90 CAPSULE | Refills: 0 | Status: SHIPPED | OUTPATIENT
Start: 2022-10-26 | End: 2023-01-23 | Stop reason: SDUPTHER

## 2022-11-04 PROBLEM — E66.811 OBESITY (BMI 30.0-34.9): Status: ACTIVE | Noted: 2022-11-04

## 2022-11-04 PROBLEM — E66.9 OBESITY (BMI 30.0-34.9): Status: ACTIVE | Noted: 2022-11-04

## 2022-11-07 DIAGNOSIS — E11.29 TYPE 2 DIABETES MELLITUS WITH MICROALBUMINURIA, WITH LONG-TERM CURRENT USE OF INSULIN (HCC): ICD-10-CM

## 2022-11-07 DIAGNOSIS — R80.9 TYPE 2 DIABETES MELLITUS WITH MICROALBUMINURIA, WITH LONG-TERM CURRENT USE OF INSULIN (HCC): ICD-10-CM

## 2022-11-07 DIAGNOSIS — Z79.4 TYPE 2 DIABETES MELLITUS WITH MICROALBUMINURIA, WITH LONG-TERM CURRENT USE OF INSULIN (HCC): ICD-10-CM

## 2022-11-07 RX ORDER — INSULIN GLARGINE 100 [IU]/ML
21 INJECTION, SOLUTION SUBCUTANEOUS EVERY EVENING
Qty: 3 EACH | Refills: 0 | Status: SHIPPED | OUTPATIENT
Start: 2022-11-07 | End: 2023-02-15

## 2022-11-08 ENCOUNTER — HOSPITAL ENCOUNTER (OUTPATIENT)
Dept: LAB | Facility: MEDICAL CENTER | Age: 67
End: 2022-11-08
Attending: NURSE PRACTITIONER
Payer: MEDICARE

## 2022-11-08 DIAGNOSIS — R80.9 TYPE 2 DIABETES MELLITUS WITH MICROALBUMINURIA, WITH LONG-TERM CURRENT USE OF INSULIN (HCC): ICD-10-CM

## 2022-11-08 DIAGNOSIS — Z79.4 TYPE 2 DIABETES MELLITUS WITH MICROALBUMINURIA, WITH LONG-TERM CURRENT USE OF INSULIN (HCC): ICD-10-CM

## 2022-11-08 DIAGNOSIS — E11.29 TYPE 2 DIABETES MELLITUS WITH MICROALBUMINURIA, WITH LONG-TERM CURRENT USE OF INSULIN (HCC): ICD-10-CM

## 2022-11-08 LAB
ALBUMIN SERPL BCP-MCNC: 4 G/DL (ref 3.2–4.9)
ALBUMIN/GLOB SERPL: 1.3 G/DL
ALP SERPL-CCNC: 83 U/L (ref 30–99)
ALT SERPL-CCNC: 17 U/L (ref 2–50)
ANION GAP SERPL CALC-SCNC: 10 MMOL/L (ref 7–16)
AST SERPL-CCNC: 18 U/L (ref 12–45)
BILIRUB SERPL-MCNC: 0.6 MG/DL (ref 0.1–1.5)
BUN SERPL-MCNC: 12 MG/DL (ref 8–22)
CALCIUM SERPL-MCNC: 9.2 MG/DL (ref 8.5–10.5)
CHLORIDE SERPL-SCNC: 109 MMOL/L (ref 96–112)
CO2 SERPL-SCNC: 26 MMOL/L (ref 20–33)
CREAT SERPL-MCNC: 0.68 MG/DL (ref 0.5–1.4)
EST. AVERAGE GLUCOSE BLD GHB EST-MCNC: 146 MG/DL
FASTING STATUS PATIENT QL REPORTED: NORMAL
GFR SERPLBLD CREATININE-BSD FMLA CKD-EPI: 95 ML/MIN/1.73 M 2
GLOBULIN SER CALC-MCNC: 3.2 G/DL (ref 1.9–3.5)
GLUCOSE SERPL-MCNC: 87 MG/DL (ref 65–99)
HBA1C MFR BLD: 6.7 % (ref 4–5.6)
POTASSIUM SERPL-SCNC: 3.8 MMOL/L (ref 3.6–5.5)
PROT SERPL-MCNC: 7.2 G/DL (ref 6–8.2)
SODIUM SERPL-SCNC: 145 MMOL/L (ref 135–145)

## 2022-11-08 PROCEDURE — 36415 COLL VENOUS BLD VENIPUNCTURE: CPT

## 2022-11-08 PROCEDURE — 80053 COMPREHEN METABOLIC PANEL: CPT

## 2022-11-08 PROCEDURE — 83036 HEMOGLOBIN GLYCOSYLATED A1C: CPT

## 2022-11-14 ENCOUNTER — OFFICE VISIT (OUTPATIENT)
Dept: MEDICAL GROUP | Facility: PHYSICIAN GROUP | Age: 67
End: 2022-11-14
Payer: MEDICARE

## 2022-11-14 VITALS
SYSTOLIC BLOOD PRESSURE: 128 MMHG | BODY MASS INDEX: 29.57 KG/M2 | TEMPERATURE: 97.8 F | WEIGHT: 184 LBS | DIASTOLIC BLOOD PRESSURE: 74 MMHG | RESPIRATION RATE: 18 BRPM | HEIGHT: 66 IN | HEART RATE: 64 BPM | OXYGEN SATURATION: 96 %

## 2022-11-14 DIAGNOSIS — L98.9 SKIN LESION: ICD-10-CM

## 2022-11-14 DIAGNOSIS — M15.9 PRIMARY OSTEOARTHRITIS INVOLVING MULTIPLE JOINTS: ICD-10-CM

## 2022-11-14 DIAGNOSIS — R80.9 TYPE 2 DIABETES MELLITUS WITH MICROALBUMINURIA, UNSPECIFIED WHETHER LONG TERM INSULIN USE: ICD-10-CM

## 2022-11-14 DIAGNOSIS — E11.29 TYPE 2 DIABETES MELLITUS WITH MICROALBUMINURIA, UNSPECIFIED WHETHER LONG TERM INSULIN USE: ICD-10-CM

## 2022-11-14 DIAGNOSIS — Z76.89 ENCOUNTER TO ESTABLISH CARE: ICD-10-CM

## 2022-11-14 PROBLEM — I10 PRIMARY HYPERTENSION: Status: RESOLVED | Noted: 2022-02-24 | Resolved: 2022-11-14

## 2022-11-14 PROBLEM — M15.0 PRIMARY OSTEOARTHRITIS INVOLVING MULTIPLE JOINTS: Status: ACTIVE | Noted: 2022-11-14

## 2022-11-14 PROBLEM — M25.542 ARTHRALGIA OF LEFT HAND: Status: RESOLVED | Noted: 2020-02-28 | Resolved: 2022-11-14

## 2022-11-14 PROCEDURE — 99213 OFFICE O/P EST LOW 20 MIN: CPT | Performed by: FAMILY MEDICINE

## 2022-11-14 ASSESSMENT — FIBROSIS 4 INDEX: FIB4 SCORE: 1.08

## 2022-11-14 NOTE — TELEPHONE ENCOUNTER
Pt can proceed with plan to increase lantus dose based on fasting glucose as we described at last clinic appointment. This will provide better control of her diabetes anyhow. Please let me know if there are any other questions    Christian Mota M.D.     [facility]    Received letter of compliance back in the mail.  Phone and emergency contacts unsuccessful in reaching pt.  D/C due to noncompliance.    Shamar Noble, PharmD

## 2022-11-14 NOTE — PROGRESS NOTES
Subjective:     CC: Patient is here today to establish care.    HPI:   Tara presents today with the following medical concerns:    Encounter to establish care  Patient is here today to establish care.  She recently had labs that shows that her diabetes remains under good control.  She has no complaints on today's visit.  Her physical exam is current.    Primary osteoarthritis involving multiple joints  This is a chronic problem.  Patient has troubles with arthritis particularly in her hands.  She states she uses over-the-counter Voltaren gel seems to work well for her.  She is told she could use NSAIDs orally if she wanted to in the future as her kidney test are normal.    Type 2 diabetes mellitus with microalbuminuria (HCC)  These are both chronic problems.  Patient states she is had troubles with microalbuminuria for years.  On review of her chart her numbers are up and down.  She is really not interested in any medication for at this is a 10 to drive her blood pressure too low.  We will continue to follow.    Past Medical History:   Diagnosis Date    Arthritis     COVID-19     Diabetes 2011    Edema 2011    Elevated liver enzymes 2011    Gall bladder polyp 2011    GERD (gastroesophageal reflux disease) 2011    Hyperlipidemia 2011    Hypertension 2011    Post-menopausal atrophic vaginitis 2020    Skin lesion of face 2011    Vitamin d deficiency 2011       Social History     Tobacco Use    Smoking status: Former     Packs/day: 1.00     Years: 20.00     Pack years: 20.00     Types: Cigarettes     Quit date: 1990     Years since quittin.8    Smokeless tobacco: Never    Tobacco comments:     Quit   (Hx 1 ppd x 20 yrs).    Vaping Use    Vaping Use: Never used   Substance Use Topics    Alcohol use: Not Currently     Comment: couple per year    Drug use: No       Current Outpatient Medications Ordered in Epic   Medication Sig Dispense Refill     "insulin glargine (LANTUS SOLOSTAR) 100 UNIT/ML Solution Pen-injector injection Inject 21 Units under the skin every evening for 100 days. 3 Each 0    omeprazole (PRILOSEC) 20 MG delayed-release capsule Take 1 Capsule by mouth every day. 90 Capsule 0    liraglutide (VICTOZA) 18 MG/3ML Solution Pen-injector Inject 1.8 mg under the skin every evening. 9 mL 3    Multiple Vitamins-Minerals (OCUVITE ADULT 50+ PO) Take 1 Capsule by mouth every day.      pioglitazone (ACTOS) 30 MG Tab Take 1 Tablet by mouth every day. 100 Tablet 3    atorvastatin (LIPITOR) 80 MG tablet Take 1 Tablet by mouth every evening. TAKE 1 TABLET BY MOUTH EVERY EVENING 100 Tablet 4    Empagliflozin (JARDIANCE) 25 MG Tab Take 1 Tablet by mouth every day. 100 Tablet 3    Cholecalciferol (VITAMIN D) 400 UNIT TABS Take 1 Tablet by mouth every evening.      aspirin EC (ECOTRIN) 81 MG TBEC Take 1 Tablet by mouth every evening.       No current Epic-ordered facility-administered medications on file.       Allergies:  Sulfa drugs    Health Maintenance: Completed    ROS:  Gen: no fevers/chills, no changes in weight  Eyes: no changes in vision  ENT: no sore throat, no hearing loss, no bloody nose  Pulm: no sob, no cough  CV: no chest pain, no palpitations  GI: no nausea/vomiting, no diarrhea  : no dysuria  MSk: no myalgias  Skin: no rash  Neuro: no headaches, no numbness/tingling  Heme/Lymph: no easy bruising      Objective:       Exam:  /74 (BP Location: Right arm, Patient Position: Sitting, BP Cuff Size: Adult)   Pulse 64   Temp 36.6 °C (97.8 °F) (Temporal)   Resp 18   Ht 1.676 m (5' 6\")   Wt 83.5 kg (184 lb)   LMP 12/16/2006   SpO2 96%   BMI 29.70 kg/m²  Body mass index is 29.7 kg/m².    Gen: Alert and oriented, No apparent distress.  Ext: No clubbing, cyanosis, edema.      Labs: Reviewed    Assessment & Plan:     67 y.o. female with the following -     1. Type 2 diabetes mellitus with microalbuminuria, unspecified whether long term insulin " use (HCC)  This is a chronic problem.  Continue to monitor.  We will recheck her hemoglobin A1c again in 3 months and then annual exams in 6 months.  - HEMOGLOBIN A1C; Future    2. Encounter to establish care  Patient establish care with me today.  We went over her health history in chart.  Everything is current.    3. Skin lesion  This is a chronic problem.  Patient states she has a lot of skin lesions that she like to see a dermatologist for.  Not a been particularly worrisome as of late.  Referral made.  - Referral to Dermatology    4. Primary osteoarthritis involving multiple joints  This is a chronic problem.  Continue to use diclofenac gel as needed.      Return in about 6 months (around 5/14/2023) for Long.    Please note that this dictation was created using voice recognition software. I have made every reasonable attempt to correct obvious errors, but I expect that there are errors of grammar and possibly content that I did not discover before finalizing the note.

## 2022-12-11 ENCOUNTER — PATIENT MESSAGE (OUTPATIENT)
Dept: MEDICAL GROUP | Facility: PHYSICIAN GROUP | Age: 67
End: 2022-12-11
Payer: MEDICARE

## 2022-12-11 DIAGNOSIS — R80.9 TYPE 2 DIABETES MELLITUS WITH MICROALBUMINURIA, WITH LONG-TERM CURRENT USE OF INSULIN (HCC): ICD-10-CM

## 2022-12-11 DIAGNOSIS — E11.29 TYPE 2 DIABETES MELLITUS WITH MICROALBUMINURIA, WITH LONG-TERM CURRENT USE OF INSULIN (HCC): ICD-10-CM

## 2022-12-11 DIAGNOSIS — Z79.4 TYPE 2 DIABETES MELLITUS WITH MICROALBUMINURIA, WITH LONG-TERM CURRENT USE OF INSULIN (HCC): ICD-10-CM

## 2022-12-12 RX ORDER — EMPAGLIFLOZIN 25 MG/1
1 TABLET, FILM COATED ORAL
Qty: 100 TABLET | Refills: 3 | Status: SHIPPED | OUTPATIENT
Start: 2022-12-12 | End: 2024-02-06 | Stop reason: SDUPTHER

## 2022-12-29 PROBLEM — I73.9 PERIPHERAL VASCULAR DISEASE, UNSPECIFIED (HCC): Status: ACTIVE | Noted: 2022-12-29

## 2023-01-13 ENCOUNTER — PATIENT MESSAGE (OUTPATIENT)
Dept: MEDICAL GROUP | Facility: PHYSICIAN GROUP | Age: 68
End: 2023-01-13
Payer: MEDICARE

## 2023-01-23 ENCOUNTER — PATIENT MESSAGE (OUTPATIENT)
Dept: MEDICAL GROUP | Facility: PHYSICIAN GROUP | Age: 68
End: 2023-01-23
Payer: MEDICARE

## 2023-01-23 DIAGNOSIS — K21.9 GASTROESOPHAGEAL REFLUX DISEASE WITHOUT ESOPHAGITIS: ICD-10-CM

## 2023-01-23 RX ORDER — OMEPRAZOLE 20 MG/1
20 CAPSULE, DELAYED RELEASE ORAL
Qty: 90 CAPSULE | Refills: 1 | Status: SHIPPED | OUTPATIENT
Start: 2023-01-23 | End: 2023-07-31 | Stop reason: SDUPTHER

## 2023-01-30 DIAGNOSIS — E78.00 PURE HYPERCHOLESTEROLEMIA: Chronic | ICD-10-CM

## 2023-01-30 RX ORDER — ATORVASTATIN CALCIUM 80 MG/1
80 TABLET, FILM COATED ORAL EVERY EVENING
Qty: 100 TABLET | Refills: 0 | Status: SHIPPED | OUTPATIENT
Start: 2023-01-30 | End: 2023-05-22 | Stop reason: SDUPTHER

## 2023-01-30 NOTE — TELEPHONE ENCOUNTER
Received request via: Patient    Was the patient seen in the last year in this department? Yes    Does the patient have an active prescription (recently filled or refills available) for medication(s) requested? No    Does the patient have [facility] Plus and need 100 day supply (blood pressure, diabetes and cholesterol meds only)? Yes, quantity updated to 100 days

## 2023-02-01 ENCOUNTER — OFFICE VISIT (OUTPATIENT)
Dept: DERMATOLOGY | Facility: IMAGING CENTER | Age: 68
End: 2023-02-01
Payer: MEDICARE

## 2023-02-01 DIAGNOSIS — Z12.83 SKIN CANCER SCREENING: ICD-10-CM

## 2023-02-01 DIAGNOSIS — D18.01 CHERRY ANGIOMA: ICD-10-CM

## 2023-02-01 DIAGNOSIS — D22.9 MULTIPLE NEVI: ICD-10-CM

## 2023-02-01 DIAGNOSIS — L91.8 ACROCHORDON: ICD-10-CM

## 2023-02-01 DIAGNOSIS — L82.1 SK (SEBORRHEIC KERATOSIS): ICD-10-CM

## 2023-02-01 DIAGNOSIS — L81.4 LENTIGINES: ICD-10-CM

## 2023-02-01 DIAGNOSIS — L81.3 CAFE AU LAIT SPOTS: ICD-10-CM

## 2023-02-01 DIAGNOSIS — L73.8 SEBACEOUS HYPERPLASIA: ICD-10-CM

## 2023-02-01 PROCEDURE — 99213 OFFICE O/P EST LOW 20 MIN: CPT | Performed by: NURSE PRACTITIONER

## 2023-02-01 RX ORDER — OMEGA-3S/DHA/EPA/FISH OIL/D3 300MG-1000
400 CAPSULE ORAL DAILY
COMMUNITY

## 2023-02-01 RX ORDER — ASPIRIN 81 MG/1
81 TABLET ORAL DAILY
COMMUNITY

## 2023-02-01 RX ORDER — OMEPRAZOLE 20 MG/1
1 CAPSULE, DELAYED RELEASE ORAL DAILY
COMMUNITY
Start: 2022-10-26 | End: 2023-06-02

## 2023-02-01 NOTE — PROGRESS NOTES
"DERMATOLOGY NOTE  NEW VISIT       Chief complaint: Establish Care (Initial Skin Exam )     Pt reports lots of lesion \"age spots\" that are itchy     History of skin cancer: No  History of precancers/actinic keratoses: No  History of biopsies:Yes, Details: nose benign   History of blistering/severe sunburns:Yes, Details: adult  Family history of skin cancer:Yes, Details: mom melanoma   Family history of atypical moles:No    Allergies   Allergen Reactions    Sulfa Drugs Vomiting     MEDICATIONS:  Medications relevant to specialty reviewed.     REVIEW OF SYSTEMS:   Positive for skin (see HPI)  Negative for fevers and chills     EXAM:  LMP 12/16/2006   Constitutional: Well-developed, well-nourished, and in no distress.     A total body skin exam was performed excluding the genitals per patient preference and including the following areas: head (including face), neck, chest, abdomen, groin/buttocks, back, bilateral upper extremities, and bilateral lower extremities with the following pertinent findings listed below and/or in assessment/plan.     -sun exposed skin of trunk and b/l upper, lower extremities and face with scattered clinically benign light brown reticulated macules all of which were morphologically similar and none of which were suspicious for skin cancer today on exam  -Several scattered 1-3mm bright red macules and thin papules on the trunk and extremities  -Multiple tan medium and dark brown skin-colored macules papules scattered over the trunk >> extremities--All with benign-appearing pigment network patterns on dermoscopy  -Several tan light brown skin-colored stuck-on waxy papules scattered on the trunk and extremities  - large Cafe au lait on Rt Lower Buttock   -Few Scattered SH on face   -skin tags neck line    IMPRESSION / PLAN:    1. Lentigines  - Benign-appearing nature of lesions discussed during exam.   - Advised to continue to monitor for any return to clinic for new or concerning " changes.      2. Cherry angioma  - Benign-appearing nature of lesions discussed during exam.   - Advised to continue to monitor for any return to clinic for new or concerning changes.      3. Multiple nevi  - Benign-appearing nature of lesions discussed during exam.   - Advised to continue to monitor for any return to clinic for new or concerning changes.  - ABCDE's of melanoma discussed      4. SK (seborrheic keratosis)  - Benign-appearing nature of lesions discussed during exam.   - Advised to continue to monitor for any return to clinic for new or concerning changes.      5. Cafe au lait spots  - Benign-appearing nature of lesions discussed during exam.   - Advised to continue to monitor for any return to clinic for new or concerning changes.      6. Sebaceous hyperplasia  - Benign-appearing nature of lesions discussed during exam.   - Advised to continue to monitor for any return to clinic for new or concerning changes.      7. Acrochordon  - Benign-appearing nature of lesions discussed during exam.   - Advised to continue to monitor for any return to clinic for new or concerning changes.      8. Skin cancer screening  Skin cancer education  discussed importance of sun protective clothing, eyewear in addition to the use of broad spectrum sunscreen with SPF 30 or greater, as well as need for reapplication ~every 2 hours when exposed to UVR  discussed importance following up for any new or changing lesions as noted in handout given, but every 12 months exams in clinic in the setting of dermatologic history  ABCDE's of melanoma discussed/handout given          Please note that this dictation was created using voice recognition software. I have made every reasonable attempt to correct obvious errors, but I expect that there are errors of grammar and possibly content that I did not discover before finalizing the note.      Return to clinic in: Return in about 1 year (around 2/1/2024) for MAX. and as needed for any  new or changing skin lesions.

## 2023-02-10 ENCOUNTER — PATIENT MESSAGE (OUTPATIENT)
Dept: MEDICAL GROUP | Facility: PHYSICIAN GROUP | Age: 68
End: 2023-02-10
Payer: MEDICARE

## 2023-02-10 DIAGNOSIS — E11.29 TYPE 2 DIABETES MELLITUS WITH MICROALBUMINURIA, WITH LONG-TERM CURRENT USE OF INSULIN (HCC): ICD-10-CM

## 2023-02-10 DIAGNOSIS — R80.9 TYPE 2 DIABETES MELLITUS WITH MICROALBUMINURIA, WITH LONG-TERM CURRENT USE OF INSULIN (HCC): ICD-10-CM

## 2023-02-10 DIAGNOSIS — Z79.4 TYPE 2 DIABETES MELLITUS WITH MICROALBUMINURIA, WITH LONG-TERM CURRENT USE OF INSULIN (HCC): ICD-10-CM

## 2023-02-10 RX ORDER — LIRAGLUTIDE 6 MG/ML
1.8 INJECTION SUBCUTANEOUS EVERY EVENING
Qty: 27 ML | Refills: 3 | Status: SHIPPED | OUTPATIENT
Start: 2023-02-10

## 2023-03-02 ENCOUNTER — HOSPITAL ENCOUNTER (OUTPATIENT)
Dept: LAB | Facility: MEDICAL CENTER | Age: 68
End: 2023-03-02
Attending: FAMILY MEDICINE
Payer: MEDICARE

## 2023-03-02 DIAGNOSIS — R80.9 TYPE 2 DIABETES MELLITUS WITH MICROALBUMINURIA, UNSPECIFIED WHETHER LONG TERM INSULIN USE: ICD-10-CM

## 2023-03-02 DIAGNOSIS — E11.29 TYPE 2 DIABETES MELLITUS WITH MICROALBUMINURIA, UNSPECIFIED WHETHER LONG TERM INSULIN USE: ICD-10-CM

## 2023-03-02 LAB
EST. AVERAGE GLUCOSE BLD GHB EST-MCNC: 137 MG/DL
HBA1C MFR BLD: 6.4 % (ref 4–5.6)

## 2023-03-02 PROCEDURE — 36415 COLL VENOUS BLD VENIPUNCTURE: CPT

## 2023-03-02 PROCEDURE — 83036 HEMOGLOBIN GLYCOSYLATED A1C: CPT

## 2023-04-12 ENCOUNTER — TELEPHONE (OUTPATIENT)
Dept: HEALTH INFORMATION MANAGEMENT | Facility: OTHER | Age: 68
End: 2023-04-12
Payer: MEDICARE

## 2023-05-22 DIAGNOSIS — E78.00 PURE HYPERCHOLESTEROLEMIA: Chronic | ICD-10-CM

## 2023-05-22 RX ORDER — ATORVASTATIN CALCIUM 80 MG/1
80 TABLET, FILM COATED ORAL EVERY EVENING
Qty: 100 TABLET | Refills: 0 | Status: SHIPPED | OUTPATIENT
Start: 2023-05-22 | End: 2023-08-31 | Stop reason: SDUPTHER

## 2023-05-31 ENCOUNTER — PATIENT MESSAGE (OUTPATIENT)
Dept: HEALTH INFORMATION MANAGEMENT | Facility: OTHER | Age: 68
End: 2023-05-31

## 2023-05-31 ENCOUNTER — DOCUMENTATION (OUTPATIENT)
Dept: HEALTH INFORMATION MANAGEMENT | Facility: OTHER | Age: 68
End: 2023-05-31
Payer: MEDICARE

## 2023-06-02 ENCOUNTER — OFFICE VISIT (OUTPATIENT)
Dept: MEDICAL GROUP | Facility: PHYSICIAN GROUP | Age: 68
End: 2023-06-02
Payer: MEDICARE

## 2023-06-02 VITALS
OXYGEN SATURATION: 94 % | RESPIRATION RATE: 16 BRPM | DIASTOLIC BLOOD PRESSURE: 76 MMHG | WEIGHT: 180 LBS | HEIGHT: 66 IN | BODY MASS INDEX: 28.93 KG/M2 | TEMPERATURE: 97.9 F | HEART RATE: 74 BPM | SYSTOLIC BLOOD PRESSURE: 124 MMHG

## 2023-06-02 DIAGNOSIS — E11.29 TYPE 2 DIABETES MELLITUS WITH MICROALBUMINURIA, UNSPECIFIED WHETHER LONG TERM INSULIN USE: ICD-10-CM

## 2023-06-02 DIAGNOSIS — Z79.4 TYPE 2 DIABETES MELLITUS WITH MICROALBUMINURIA, WITH LONG-TERM CURRENT USE OF INSULIN (HCC): Chronic | ICD-10-CM

## 2023-06-02 DIAGNOSIS — Z12.31 ENCOUNTER FOR SCREENING MAMMOGRAM FOR MALIGNANT NEOPLASM OF BREAST: ICD-10-CM

## 2023-06-02 DIAGNOSIS — Z00.00 ADULT GENERAL MEDICAL EXAM: ICD-10-CM

## 2023-06-02 DIAGNOSIS — R80.9 TYPE 2 DIABETES MELLITUS WITH MICROALBUMINURIA, UNSPECIFIED WHETHER LONG TERM INSULIN USE: ICD-10-CM

## 2023-06-02 DIAGNOSIS — I73.9 PERIPHERAL VASCULAR DISEASE, UNSPECIFIED (HCC): ICD-10-CM

## 2023-06-02 DIAGNOSIS — E55.9 VITAMIN D DEFICIENCY: ICD-10-CM

## 2023-06-02 DIAGNOSIS — E78.00 PURE HYPERCHOLESTEROLEMIA: Chronic | ICD-10-CM

## 2023-06-02 DIAGNOSIS — R80.9 TYPE 2 DIABETES MELLITUS WITH MICROALBUMINURIA, WITH LONG-TERM CURRENT USE OF INSULIN (HCC): Chronic | ICD-10-CM

## 2023-06-02 DIAGNOSIS — E11.29 TYPE 2 DIABETES MELLITUS WITH MICROALBUMINURIA, WITH LONG-TERM CURRENT USE OF INSULIN (HCC): Chronic | ICD-10-CM

## 2023-06-02 PROBLEM — Z76.89 ENCOUNTER TO ESTABLISH CARE: Status: RESOLVED | Noted: 2022-11-14 | Resolved: 2023-06-02

## 2023-06-02 PROCEDURE — 3078F DIAST BP <80 MM HG: CPT | Performed by: FAMILY MEDICINE

## 2023-06-02 PROCEDURE — 99214 OFFICE O/P EST MOD 30 MIN: CPT | Performed by: FAMILY MEDICINE

## 2023-06-02 PROCEDURE — 3074F SYST BP LT 130 MM HG: CPT | Performed by: FAMILY MEDICINE

## 2023-06-02 RX ORDER — INSULIN GLARGINE 100 [IU]/ML
INJECTION, SOLUTION SUBCUTANEOUS
COMMUNITY
Start: 2023-03-30 | End: 2023-06-21 | Stop reason: SDUPTHER

## 2023-06-02 ASSESSMENT — PATIENT HEALTH QUESTIONNAIRE - PHQ9: CLINICAL INTERPRETATION OF PHQ2 SCORE: 0

## 2023-06-02 ASSESSMENT — FIBROSIS 4 INDEX: FIB4 SCORE: 1.091410312663498381

## 2023-06-02 NOTE — PROGRESS NOTES
Subjective:     CC: Here for annual exam and follow-up.    HPI:   Tara presents today with the following medical concerns:    Adult general medical exam  Patient is here today for annual medical exam.  States she is feeling very well and having no particular concerns.  She is due for labs and those will be ordered.  Also due for her mammogram.    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  Labs ordered.  We will continue to follow.  Condition is stable.    Hyperlipidemia  This is a chronic problem.  Patient is on a statin.  Lab ordered.    Peripheral vascular disease, unspecified (HCC)  This is a chronic stable condition.  Continue to follow.    Vitamin D deficiency  This is a chronic stable condition.  Lab ordered.    Past Medical History:   Diagnosis Date    Arthritis     COVID-19     Diabetes 2011    Edema 2011    Elevated liver enzymes 2011    Gall bladder polyp 2011    GERD (gastroesophageal reflux disease) 2011    Hyperlipidemia 2011    Hypertension 2011    Post-menopausal atrophic vaginitis 2020    Skin lesion of face 2011    Vitamin d deficiency 2011       Social History     Tobacco Use    Smoking status: Former     Packs/day: 1.00     Years: 20.00     Pack years: 20.00     Types: Cigarettes     Quit date: 1990     Years since quittin.4    Smokeless tobacco: Never    Tobacco comments:     Quit   (Hx 1 ppd x 20 yrs).    Vaping Use    Vaping Use: Never used   Substance Use Topics    Alcohol use: Not Currently     Comment: couple per year    Drug use: No       Current Outpatient Medications Ordered in Epic   Medication Sig Dispense Refill    atorvastatin (LIPITOR) 80 MG tablet Take 1 Tablet by mouth every evening. TAKE 1 TABLET BY MOUTH EVERY EVENING 100 Tablet 0    liraglutide (VICTOZA) 18 MG/3ML Solution Pen-injector Inject 1.8 mg under the skin every  "evening. 27 mL 3    aspirin 81 MG EC tablet Take 81 mg by mouth every day.      vitamin D3, cholecalciferol, 400 UNIT Tab tablet Take 400 Units by mouth every day.      omeprazole (PRILOSEC) 20 MG delayed-release capsule Take 1 Capsule by mouth every day. 90 Capsule 1    Empagliflozin (JARDIANCE) 25 MG Tab Take 1 Tablet by mouth every day. 100 Tablet 3    Multiple Vitamins-Minerals (OCUVITE ADULT 50+ PO) Take 1 Capsule by mouth every day.      pioglitazone (ACTOS) 30 MG Tab Take 1 Tablet by mouth every day. 100 Tablet 3    Cholecalciferol (VITAMIN D) 400 UNIT TABS Take 1 Tablet by mouth every evening.      aspirin EC (ECOTRIN) 81 MG TBEC Take 1 Tablet by mouth every evening.      LANTUS SOLOSTAR 100 UNIT/ML Solution Pen-injector injection        No current Epic-ordered facility-administered medications on file.       Allergies:  Sulfa drugs    Health Maintenance: Completed    ROS:  Gen: no fevers/chills, no changes in weight  Eyes: no changes in vision  ENT: no sore throat, no hearing loss, no bloody nose  Pulm: no sob, no cough  CV: no chest pain, no palpitations  GI: no nausea/vomiting, no diarrhea  : no dysuria  MSk: no myalgias  Skin: no rash  Neuro: no headaches, no numbness/tingling  Heme/Lymph: no easy bruising      Objective:       Exam:  /76 (BP Location: Right arm, Patient Position: Sitting, BP Cuff Size: Adult)   Pulse 74   Temp 36.6 °C (97.9 °F) (Temporal)   Resp 16   Ht 1.676 m (5' 6\")   Wt 81.6 kg (180 lb)   LMP 12/16/2006   SpO2 94%   BMI 29.05 kg/m²  Body mass index is 29.05 kg/m².    Gen: Alert and oriented, No apparent distress.  Eyes:   Extraocular motions intact.  No scleral icterus seen.  Ears:    Ear canals and TMs are clear.  Neck: Neck is supple without lymphadenopathy.  Thyroid exam is normal.  Lungs: Normal effort, CTA bilaterally, no wheezes, rhonchi, or rales  CV: Regular rate and rhythm. No  rubs, or gallops.  2/6 systolic murmur unchanged.  Abdomen: Soft, nontender, no " organomegaly or masses.  Normal bowel sounds.  Ext: No clubbing, cyanosis, edema.  Neuro: Cranial nerves II through VIII are grossly intact.  No lateralized signs are seen.  Gait is normal.    Labs: Ordered    Assessment & Plan:     68 y.o. female with the following -     1. 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  Lab ordered.  - Comp Metabolic Panel; Future  - URINALYSIS,CULTURE IF INDICATED; Future  - CBC WITH DIFFERENTIAL; Future  - ESTIMATED GFR; Future  - MICROALBUMIN CREAT RATIO URINE; Future    2. Pure hypercholesterolemia  This chronic problem.  Lab ordered.  She is on a statin.    - Lipid Profile; Future    3. Vitamin D deficiency  This is a chronic problem.  Lab ordered.  She is on supplementation.  - VITAMIN D,25 HYDROXY (DEFICIENCY); Future    4. Adult general medical exam  Patient's exam today appears unremarkable.  Continue to follow.  - CBC WITH DIFFERENTIAL; Future    5. Encounter for screening mammogram for malignant neoplasm of breast  This is a screening issue.  Lab ordered.  - MA-SCREENING MAMMO BILAT W/TOMOSYNTHESIS W/CAD; Future    6. Peripheral vascular disease, unspecified (HCC)  This a chronic problem.  Continue to follow.      HCC Gap Form    Diagnosis to address: I73.9 - Peripheral vascular disease, unspecified (HCC)  Assessment and plan: Chronic, stable. Continue with current defined treatment plan: Chronic problem.  She is on a statin.. Follow-up at least annually.  Diagnosis: E11.29, R80.9 - Type 2 diabetes mellitus with microalbuminuria, unspecified whether long term insulin use (HCC)  Assessment and plan: Chronic, stable. Continue with current defined treatment plan: Stable condition.  Labs ordered.. Follow-up at least annually.  Diagnosis: E11.29, R80.9, Z79.4 - Type 2 diabetes mellitus with microalbuminuria, with long-term current use of insulin (HCC)  Assessment and plan: Chronic, stable.  Continue with current defined treatment plan: Stable condition.. Follow-up at least annually.  Last edited 06/02/23 10:14 PDT by Walter Robertson III, M.D.         Return in about 6 months (around 12/2/2023) for Long.    Please note that this dictation was created using voice recognition software. I have made every reasonable attempt to correct obvious errors, but I expect that there are errors of grammar and possibly content that I did not discover before finalizing the note.

## 2023-06-02 NOTE — ASSESSMENT & PLAN NOTE
Patient is here today for annual medical exam.  States she is feeling very well and having no particular concerns.  She is due for labs and those will be ordered.  Also due for her mammogram.

## 2023-06-09 ENCOUNTER — HOSPITAL ENCOUNTER (OUTPATIENT)
Dept: RADIOLOGY | Facility: MEDICAL CENTER | Age: 68
End: 2023-06-09
Attending: FAMILY MEDICINE
Payer: MEDICARE

## 2023-06-09 DIAGNOSIS — Z12.31 ENCOUNTER FOR SCREENING MAMMOGRAM FOR MALIGNANT NEOPLASM OF BREAST: ICD-10-CM

## 2023-06-09 PROCEDURE — 77063 BREAST TOMOSYNTHESIS BI: CPT

## 2023-06-15 ENCOUNTER — HOSPITAL ENCOUNTER (OUTPATIENT)
Dept: LAB | Facility: MEDICAL CENTER | Age: 68
End: 2023-06-15
Attending: FAMILY MEDICINE
Payer: MEDICARE

## 2023-06-15 DIAGNOSIS — R80.9 TYPE 2 DIABETES MELLITUS WITH MICROALBUMINURIA, WITH LONG-TERM CURRENT USE OF INSULIN (HCC): Chronic | ICD-10-CM

## 2023-06-15 DIAGNOSIS — Z00.00 ADULT GENERAL MEDICAL EXAM: ICD-10-CM

## 2023-06-15 DIAGNOSIS — E11.29 TYPE 2 DIABETES MELLITUS WITH MICROALBUMINURIA, WITH LONG-TERM CURRENT USE OF INSULIN (HCC): Chronic | ICD-10-CM

## 2023-06-15 DIAGNOSIS — E55.9 VITAMIN D DEFICIENCY: ICD-10-CM

## 2023-06-15 DIAGNOSIS — E78.00 PURE HYPERCHOLESTEROLEMIA: Chronic | ICD-10-CM

## 2023-06-15 DIAGNOSIS — Z79.4 TYPE 2 DIABETES MELLITUS WITH MICROALBUMINURIA, WITH LONG-TERM CURRENT USE OF INSULIN (HCC): Chronic | ICD-10-CM

## 2023-06-15 LAB
25(OH)D3 SERPL-MCNC: 39 NG/ML (ref 30–100)
ALBUMIN SERPL BCP-MCNC: 4.3 G/DL (ref 3.2–4.9)
ALBUMIN/GLOB SERPL: 1.3 G/DL
ALP SERPL-CCNC: 80 U/L (ref 30–99)
ALT SERPL-CCNC: 22 U/L (ref 2–50)
ANION GAP SERPL CALC-SCNC: 11 MMOL/L (ref 7–16)
APPEARANCE UR: CLEAR
AST SERPL-CCNC: 16 U/L (ref 12–45)
BASOPHILS # BLD AUTO: 0.3 % (ref 0–1.8)
BASOPHILS # BLD: 0.02 K/UL (ref 0–0.12)
BILIRUB SERPL-MCNC: 0.6 MG/DL (ref 0.1–1.5)
BILIRUB UR QL STRIP.AUTO: NEGATIVE
BUN SERPL-MCNC: 17 MG/DL (ref 8–22)
CALCIUM ALBUM COR SERPL-MCNC: 9.3 MG/DL (ref 8.5–10.5)
CALCIUM SERPL-MCNC: 9.5 MG/DL (ref 8.5–10.5)
CHLORIDE SERPL-SCNC: 104 MMOL/L (ref 96–112)
CHOLEST SERPL-MCNC: 161 MG/DL (ref 100–199)
CO2 SERPL-SCNC: 27 MMOL/L (ref 20–33)
COLOR UR: YELLOW
CREAT SERPL-MCNC: 0.67 MG/DL (ref 0.5–1.4)
CREAT UR-MCNC: 72.1 MG/DL
EOSINOPHIL # BLD AUTO: 0.13 K/UL (ref 0–0.51)
EOSINOPHIL NFR BLD: 2.2 % (ref 0–6.9)
ERYTHROCYTE [DISTWIDTH] IN BLOOD BY AUTOMATED COUNT: 45.7 FL (ref 35.9–50)
GFR SERPLBLD CREATININE-BSD FMLA CKD-EPI: 94 ML/MIN/1.73 M 2
GLOBULIN SER CALC-MCNC: 3.2 G/DL (ref 1.9–3.5)
GLUCOSE SERPL-MCNC: 97 MG/DL (ref 65–99)
GLUCOSE UR STRIP.AUTO-MCNC: >=1000 MG/DL
HCT VFR BLD AUTO: 47.4 % (ref 37–47)
HDLC SERPL-MCNC: 60 MG/DL
HGB BLD-MCNC: 14.9 G/DL (ref 12–16)
IMM GRANULOCYTES # BLD AUTO: 0.01 K/UL (ref 0–0.11)
IMM GRANULOCYTES NFR BLD AUTO: 0.2 % (ref 0–0.9)
KETONES UR STRIP.AUTO-MCNC: NEGATIVE MG/DL
LDLC SERPL CALC-MCNC: 70 MG/DL
LEUKOCYTE ESTERASE UR QL STRIP.AUTO: NEGATIVE
LYMPHOCYTES # BLD AUTO: 2.64 K/UL (ref 1–4.8)
LYMPHOCYTES NFR BLD: 44.6 % (ref 22–41)
MCH RBC QN AUTO: 28.3 PG (ref 27–33)
MCHC RBC AUTO-ENTMCNC: 31.4 G/DL (ref 32.2–35.5)
MCV RBC AUTO: 90.1 FL (ref 81.4–97.8)
MICRO URNS: ABNORMAL
MICROALBUMIN UR-MCNC: 6.9 MG/DL
MICROALBUMIN/CREAT UR: 96 MG/G (ref 0–30)
MONOCYTES # BLD AUTO: 0.41 K/UL (ref 0–0.85)
MONOCYTES NFR BLD AUTO: 6.9 % (ref 0–13.4)
NEUTROPHILS # BLD AUTO: 2.71 K/UL (ref 1.82–7.42)
NEUTROPHILS NFR BLD: 45.8 % (ref 44–72)
NITRITE UR QL STRIP.AUTO: NEGATIVE
NRBC # BLD AUTO: 0 K/UL
NRBC BLD-RTO: 0 /100 WBC (ref 0–0.2)
PH UR STRIP.AUTO: 5.5 [PH] (ref 5–8)
PLATELET # BLD AUTO: 278 K/UL (ref 164–446)
PMV BLD AUTO: 9.7 FL (ref 9–12.9)
POTASSIUM SERPL-SCNC: 3.6 MMOL/L (ref 3.6–5.5)
PROT SERPL-MCNC: 7.5 G/DL (ref 6–8.2)
PROT UR QL STRIP: NEGATIVE MG/DL
RBC # BLD AUTO: 5.26 M/UL (ref 4.2–5.4)
RBC UR QL AUTO: NEGATIVE
SODIUM SERPL-SCNC: 142 MMOL/L (ref 135–145)
SP GR UR STRIP.AUTO: 1.03
TRIGL SERPL-MCNC: 154 MG/DL (ref 0–149)
UROBILINOGEN UR STRIP.AUTO-MCNC: 0.2 MG/DL
WBC # BLD AUTO: 5.9 K/UL (ref 4.8–10.8)

## 2023-06-15 PROCEDURE — 80053 COMPREHEN METABOLIC PANEL: CPT

## 2023-06-15 PROCEDURE — 85025 COMPLETE CBC W/AUTO DIFF WBC: CPT

## 2023-06-15 PROCEDURE — 36415 COLL VENOUS BLD VENIPUNCTURE: CPT

## 2023-06-15 PROCEDURE — 82306 VITAMIN D 25 HYDROXY: CPT

## 2023-06-15 PROCEDURE — 82570 ASSAY OF URINE CREATININE: CPT

## 2023-06-15 PROCEDURE — 80061 LIPID PANEL: CPT

## 2023-06-15 PROCEDURE — 81003 URINALYSIS AUTO W/O SCOPE: CPT

## 2023-06-15 PROCEDURE — 82043 UR ALBUMIN QUANTITATIVE: CPT

## 2023-06-16 DIAGNOSIS — R80.9 TYPE 2 DIABETES MELLITUS WITH MICROALBUMINURIA, WITH LONG-TERM CURRENT USE OF INSULIN (HCC): Chronic | ICD-10-CM

## 2023-06-16 DIAGNOSIS — Z79.4 TYPE 2 DIABETES MELLITUS WITH MICROALBUMINURIA, WITH LONG-TERM CURRENT USE OF INSULIN (HCC): Chronic | ICD-10-CM

## 2023-06-16 DIAGNOSIS — E11.29 TYPE 2 DIABETES MELLITUS WITH MICROALBUMINURIA, WITH LONG-TERM CURRENT USE OF INSULIN (HCC): Chronic | ICD-10-CM

## 2023-06-21 ENCOUNTER — PATIENT MESSAGE (OUTPATIENT)
Dept: MEDICAL GROUP | Facility: PHYSICIAN GROUP | Age: 68
End: 2023-06-21
Payer: MEDICARE

## 2023-06-21 RX ORDER — INSULIN GLARGINE 100 [IU]/ML
21 INJECTION, SOLUTION SUBCUTANEOUS EVERY EVENING
Qty: 7 EACH | Refills: 3 | Status: SHIPPED | OUTPATIENT
Start: 2023-06-21

## 2023-06-21 NOTE — PATIENT COMMUNICATION
Received request via: Patient    Was the patient seen in the last year in this department? Yes    Does the patient have an active prescription (recently filled or refills available) for medication(s) requested? No    Does the patient have [facility] Plus and need 100 day supply (blood pressure, diabetes and cholesterol meds only)? Yes, quantity updated to 100 days   
show

## 2023-07-31 ENCOUNTER — PATIENT MESSAGE (OUTPATIENT)
Dept: MEDICAL GROUP | Facility: PHYSICIAN GROUP | Age: 68
End: 2023-07-31
Payer: MEDICARE

## 2023-07-31 DIAGNOSIS — K21.9 GASTROESOPHAGEAL REFLUX DISEASE WITHOUT ESOPHAGITIS: ICD-10-CM

## 2023-07-31 RX ORDER — OMEPRAZOLE 20 MG/1
20 CAPSULE, DELAYED RELEASE ORAL
Qty: 90 CAPSULE | Refills: 3 | Status: SHIPPED | OUTPATIENT
Start: 2023-07-31

## 2023-08-01 ENCOUNTER — HOSPITAL ENCOUNTER (OUTPATIENT)
Dept: LAB | Facility: MEDICAL CENTER | Age: 68
End: 2023-08-01
Attending: FAMILY MEDICINE
Payer: MEDICARE

## 2023-08-01 LAB
EST. AVERAGE GLUCOSE BLD GHB EST-MCNC: 148 MG/DL
HBA1C MFR BLD: 6.8 % (ref 4–5.6)

## 2023-08-01 PROCEDURE — 83036 HEMOGLOBIN GLYCOSYLATED A1C: CPT

## 2023-08-01 PROCEDURE — 36415 COLL VENOUS BLD VENIPUNCTURE: CPT

## 2023-08-02 DIAGNOSIS — E11.29 TYPE 2 DIABETES MELLITUS WITH MICROALBUMINURIA, WITH LONG-TERM CURRENT USE OF INSULIN (HCC): Chronic | ICD-10-CM

## 2023-08-02 DIAGNOSIS — R80.9 TYPE 2 DIABETES MELLITUS WITH MICROALBUMINURIA, WITH LONG-TERM CURRENT USE OF INSULIN (HCC): Chronic | ICD-10-CM

## 2023-08-02 DIAGNOSIS — Z79.4 TYPE 2 DIABETES MELLITUS WITH MICROALBUMINURIA, WITH LONG-TERM CURRENT USE OF INSULIN (HCC): Chronic | ICD-10-CM

## 2023-08-31 DIAGNOSIS — E78.00 PURE HYPERCHOLESTEROLEMIA: Chronic | ICD-10-CM

## 2023-08-31 RX ORDER — ATORVASTATIN CALCIUM 80 MG/1
80 TABLET, FILM COATED ORAL EVERY EVENING
Qty: 100 TABLET | Refills: 3 | Status: SHIPPED | OUTPATIENT
Start: 2023-08-31

## 2023-09-20 ENCOUNTER — PATIENT MESSAGE (OUTPATIENT)
Dept: MEDICAL GROUP | Facility: PHYSICIAN GROUP | Age: 68
End: 2023-09-20
Payer: MEDICARE

## 2023-09-20 DIAGNOSIS — Z79.4 TYPE 2 DIABETES MELLITUS WITH MICROALBUMINURIA, WITH LONG-TERM CURRENT USE OF INSULIN (HCC): ICD-10-CM

## 2023-09-20 DIAGNOSIS — R80.9 TYPE 2 DIABETES MELLITUS WITH MICROALBUMINURIA, WITH LONG-TERM CURRENT USE OF INSULIN (HCC): ICD-10-CM

## 2023-09-20 DIAGNOSIS — E11.29 TYPE 2 DIABETES MELLITUS WITH MICROALBUMINURIA, WITH LONG-TERM CURRENT USE OF INSULIN (HCC): ICD-10-CM

## 2023-09-20 RX ORDER — PIOGLITAZONEHYDROCHLORIDE 30 MG/1
30 TABLET ORAL
Qty: 100 TABLET | Refills: 3 | Status: SHIPPED | OUTPATIENT
Start: 2023-09-20

## 2023-11-06 DIAGNOSIS — H53.9 VISION DISTURBANCE: ICD-10-CM

## 2024-01-15 ENCOUNTER — OFFICE VISIT (OUTPATIENT)
Dept: FAMILY PLANNING/WOMEN'S HEALTH CLINIC | Facility: PHYSICIAN GROUP | Age: 69
End: 2024-01-15
Attending: FAMILY MEDICINE
Payer: MEDICARE

## 2024-01-15 ENCOUNTER — HOSPITAL ENCOUNTER (OUTPATIENT)
Facility: MEDICAL CENTER | Age: 69
End: 2024-01-15
Payer: MEDICARE

## 2024-01-15 ENCOUNTER — HOSPITAL ENCOUNTER (OUTPATIENT)
Dept: LAB | Facility: MEDICAL CENTER | Age: 69
End: 2024-01-15
Attending: FAMILY MEDICINE
Payer: MEDICARE

## 2024-01-15 VITALS
HEIGHT: 66 IN | WEIGHT: 192 LBS | SYSTOLIC BLOOD PRESSURE: 124 MMHG | DIASTOLIC BLOOD PRESSURE: 76 MMHG | BODY MASS INDEX: 30.86 KG/M2

## 2024-01-15 DIAGNOSIS — Z79.4 TYPE 2 DIABETES MELLITUS WITH MICROALBUMINURIA, WITH LONG-TERM CURRENT USE OF INSULIN (HCC): Chronic | ICD-10-CM

## 2024-01-15 DIAGNOSIS — Z87.891 FORMER SMOKER: ICD-10-CM

## 2024-01-15 DIAGNOSIS — E11.29 TYPE 2 DIABETES MELLITUS WITH MICROALBUMINURIA, WITH LONG-TERM CURRENT USE OF INSULIN (HCC): Chronic | ICD-10-CM

## 2024-01-15 DIAGNOSIS — E78.00 PURE HYPERCHOLESTEROLEMIA: Chronic | ICD-10-CM

## 2024-01-15 DIAGNOSIS — R80.9 TYPE 2 DIABETES MELLITUS WITH MICROALBUMINURIA, WITH LONG-TERM CURRENT USE OF INSULIN (HCC): Chronic | ICD-10-CM

## 2024-01-15 DIAGNOSIS — E55.9 VITAMIN D DEFICIENCY: ICD-10-CM

## 2024-01-15 DIAGNOSIS — K21.9 GASTROESOPHAGEAL REFLUX DISEASE WITHOUT ESOPHAGITIS: ICD-10-CM

## 2024-01-15 PROBLEM — Z00.00 ADULT GENERAL MEDICAL EXAM: Status: RESOLVED | Noted: 2023-06-02 | Resolved: 2024-01-15

## 2024-01-15 PROBLEM — I73.9 PERIPHERAL VASCULAR DISEASE, UNSPECIFIED (HCC): Status: RESOLVED | Noted: 2022-12-29 | Resolved: 2024-01-15

## 2024-01-15 LAB
AMBIGUOUS DTTM AMBI4: NORMAL
CREAT UR-MCNC: 67.07 MG/DL
EST. AVERAGE GLUCOSE BLD GHB EST-MCNC: 148 MG/DL
HBA1C MFR BLD: 6.8 % (ref 4–5.6)
MICROALBUMIN UR-MCNC: 21.7 MG/DL
MICROALBUMIN/CREAT UR: 324 MG/G (ref 0–30)

## 2024-01-15 PROCEDURE — 82570 ASSAY OF URINE CREATININE: CPT

## 2024-01-15 PROCEDURE — 1125F AMNT PAIN NOTED PAIN PRSNT: CPT

## 2024-01-15 PROCEDURE — 3074F SYST BP LT 130 MM HG: CPT

## 2024-01-15 PROCEDURE — G0439 PPPS, SUBSEQ VISIT: HCPCS

## 2024-01-15 PROCEDURE — 82043 UR ALBUMIN QUANTITATIVE: CPT

## 2024-01-15 PROCEDURE — 3078F DIAST BP <80 MM HG: CPT

## 2024-01-15 PROCEDURE — 83036 HEMOGLOBIN GLYCOSYLATED A1C: CPT

## 2024-01-15 PROCEDURE — 36415 COLL VENOUS BLD VENIPUNCTURE: CPT

## 2024-01-15 SDOH — ECONOMIC STABILITY: INCOME INSECURITY: IN THE LAST 12 MONTHS, WAS THERE A TIME WHEN YOU WERE NOT ABLE TO PAY THE MORTGAGE OR RENT ON TIME?: NO

## 2024-01-15 SDOH — ECONOMIC STABILITY: FOOD INSECURITY: WITHIN THE PAST 12 MONTHS, THE FOOD YOU BOUGHT JUST DIDN'T LAST AND YOU DIDN'T HAVE MONEY TO GET MORE.: NEVER TRUE

## 2024-01-15 SDOH — ECONOMIC STABILITY: INCOME INSECURITY: IN THE PAST 12 MONTHS, HAS THE ELECTRIC, GAS, OIL, OR WATER COMPANY THREATENED TO SHUT OFF SERVICE IN YOUR HOME?: NO

## 2024-01-15 SDOH — ECONOMIC STABILITY: FOOD INSECURITY: WITHIN THE PAST 12 MONTHS, YOU WORRIED THAT YOUR FOOD WOULD RUN OUT BEFORE YOU GOT MONEY TO BUY MORE.: NEVER TRUE

## 2024-01-15 SDOH — ECONOMIC STABILITY: HOUSING INSECURITY: IN THE LAST 12 MONTHS, HOW MANY PLACES HAVE YOU LIVED?: 1

## 2024-01-15 SDOH — ECONOMIC STABILITY: INCOME INSECURITY: HOW HARD IS IT FOR YOU TO PAY FOR THE VERY BASICS LIKE FOOD, HOUSING, MEDICAL CARE, AND HEATING?: NOT HARD AT ALL

## 2024-01-15 ASSESSMENT — PAIN SCALES - GENERAL: PAINLEVEL: 5=MODERATE PAIN

## 2024-01-15 ASSESSMENT — PATIENT HEALTH QUESTIONNAIRE - PHQ9
1. LITTLE INTEREST OR PLEASURE IN DOING THINGS: NOT AT ALL
CLINICAL INTERPRETATION OF PHQ2 SCORE: 0
2. FEELING DOWN, DEPRESSED, IRRITABLE, OR HOPELESS: NOT AT ALL

## 2024-01-15 ASSESSMENT — ENCOUNTER SYMPTOMS: GENERAL WELL-BEING: GOOD

## 2024-01-15 ASSESSMENT — ACTIVITIES OF DAILY LIVING (ADL): BATHING_REQUIRES_ASSISTANCE: 0

## 2024-01-15 ASSESSMENT — FIBROSIS 4 INDEX: FIB4 SCORE: 0.85

## 2024-01-15 NOTE — ASSESSMENT & PLAN NOTE
Chronic, stable. Continue with current defined treatment plan: vitamin D3, cholecalciferol, 400 UNIT Tab tablet . Follow-up at least annually.

## 2024-01-15 NOTE — ASSESSMENT & PLAN NOTE
Chronic, stable. Continue with current defined treatment plan: atorvastatin (LIPITOR) 80 MG tablet . Follow-up at least annually.

## 2024-01-15 NOTE — PROGRESS NOTES
Comprehensive Health Assessment Program     Tara Cueva is a 68 y.o. here for her comprehensive health assessment.    Patient Active Problem List    Diagnosis Date Noted    Primary osteoarthritis involving multiple joints 11/14/2022    Obesity (BMI 30.0-34.9) 11/04/2022    Murmur, cardiac 01/28/2022    Former smoker 07/26/2021    Post-menopausal atrophic vaginitis 04/17/2020    DM-2, Type 2 diabetes mellitus with microalbuminuria, with long-term current use of insulin ([facility]) 02/02/2018    Vitamin B12 deficiency 08/24/2017    Gall bladder polyp 08/12/2011    Hyperlipidemia 05/12/2011    Vitamin D deficiency 05/12/2011    GERD (gastroesophageal reflux disease) 05/12/2011       Current Outpatient Medications   Medication Sig Dispense Refill    pioglitazone (ACTOS) 30 MG Tab Take 1 Tablet by mouth every day. 100 Tablet 3    atorvastatin (LIPITOR) 80 MG tablet Take 1 Tablet by mouth every evening. TAKE 1 TABLET BY MOUTH EVERY EVENING 100 Tablet 3    omeprazole (PRILOSEC) 20 MG delayed-release capsule Take 1 Capsule by mouth every day. 90 Capsule 3    LANTUS SOLOSTAR 100 UNIT/ML Solution Pen-injector injection Inject 21 Units under the skin every evening. 7 Each 3    liraglutide (VICTOZA) 18 MG/3ML Solution Pen-injector Inject 1.8 mg under the skin every evening. 27 mL 3    aspirin 81 MG EC tablet Take 81 mg by mouth every day.      vitamin D3, cholecalciferol, 400 UNIT Tab tablet Take 400 Units by mouth every day.      Empagliflozin (JARDIANCE) 25 MG Tab Take 1 Tablet by mouth every day. 100 Tablet 3    Multiple Vitamins-Minerals (OCUVITE ADULT 50+ PO) Take 1 Capsule by mouth every day.      Cholecalciferol (VITAMIN D) 400 UNIT TABS Take 1 Tablet by mouth every evening.      aspirin EC (ECOTRIN) 81 MG TBEC Take 1 Tablet by mouth every evening.       No current facility-administered medications for this visit.          Current supplements as per medication list.     Allergies:   Sulfa drugs  Social History      Tobacco Use    Smoking status: Former     Current packs/day: 0.00     Average packs/day: 1 pack/day for 20.0 years (20.0 ttl pk-yrs)     Types: Cigarettes     Start date: 1970     Quit date: 1990     Years since quittin.0    Smokeless tobacco: Never    Tobacco comments:     Quit   (Hx 1 ppd x 20 yrs).    Vaping Use    Vaping Use: Never used   Substance Use Topics    Alcohol use: Not Currently     Comment: couple per year    Drug use: No     Family History   Problem Relation Age of Onset    Cancer Maternal Grandmother         lung     Cancer Mother         Breast Cancer twice & Skin cancers    Other Father         urinary issues    GI Disease Sister     Heart Disease Brother     Cancer Brother         Prostate cancer    Cancer Maternal Grandfather         Lung Cancer     Tara  has a past medical history of Arthritis, COVID-19, Diabetes (2011), Edema (2011), Elevated liver enzymes (2011), Gall bladder polyp (2011), GERD (gastroesophageal reflux disease) (2011), Hyperlipidemia (2011), Hypertension (2011), Post-menopausal atrophic vaginitis (2020), Skin lesion of face (2011), and Vitamin d deficiency (2011).   Past Surgical History:   Procedure Laterality Date    OPEN REDUCTION      OTHER ORTHOPEDIC SURGERY      right arm ORIF    PRIMARY C SECTION      TUBAL COAGULATION LAPAROSCOPIC BILATERAL         Screening:  In the last six months have you experienced any leakage of urine? Yes. No concerns.    Depression Screening  Little interest or pleasure in doing things?  0 - not at all  Feeling down, depressed , or hopeless? 0 - not at all  Patient Health Questionnaire Score: 0     If depressive symptoms identified deferred to follow up visit unless specifically addressed in assessment and plan.    Interpretation of PHQ-9 Total Score   Score Severity   1-4 No Depression   5-9 Mild Depression   10-14 Moderate Depression   15-19 Moderately Severe  Depression   20-27 Severe Depression    Screening for Cognitive Impairment  Do you or any of your friends or family members have any concern about your memory? No  Three Minute Recall (Banana, Sunrise, Chair) 3/3    Jatinder clock face with all 12 numbers and set the hands to show 20 past 8.  Yes 5/5  Cognitive concerns identified deferred for follow up unless specifically addressed in assessment and plan.    Fall Risk Assessment  Has the patient had two or more falls in the last year or any fall with injury in the last year?  No    Safety Assessment  Do you always wear your seatbelt?  No  Any changes to home needed to function safely? No  Difficulty hearing.  Yes  Patient counseled about all safety risks that were identified.    Functional Assessment ADLs  Are there any barriers preventing you from cooking for yourself or meeting nutritional needs?  No.    Are there any barriers preventing you from driving safely or obtaining transportation?  No.    Are there any barriers preventing you from using a telephone or calling for help?  No    Are there any barriers preventing you from shopping?  No.    Are there any barriers preventing you from taking care of your own finances?  No    Are there any barriers preventing you from managing your medications?  No    Are there any barriers preventing you from showering, bathing or dressing yourself? No    Are there any barriers preventing you from doing housework or laundry? No  Are there any barriers preventing you from using the toilet?No  Are you currently engaging in any exercise or physical activity?  Yes.      Self-Assessment of Health  What is your perception of your health? Good  Do you sleep more than six hours a night? Yes  In the past 7 days, how much did pain keep you from doing your normal work? Some  Do you spend quality time with family or friends (virtually or in person)? Yes  Do you usually eat a heart healthy diet that constists of a variety of fruits, vegetables,  whole grains and fiber? Yes  Do you eat foods high in fat and/or Fast Food more than three times per week? No    Advance Care Planning  Do you have an Advance Directive, Living Will, Durable Power of , or POLST? No                 Health Maintenance Summary            Overdue - Diabetes: Monofilament / LE Exam (Yearly) Overdue since 2/24/2023 02/24/2022  Diabetic Monofilament LE Exam    02/24/2022  [employer]: WORKFLOW - DIABETES - DIABETIC FOOT EXAM PERFORMED    01/20/2021  Diabetic Monofilament LE Exam    05/07/2019  Diabetic Monofilament LE Exam    12/23/2016  Diabetic Monofilament Lower Extremity Exam    Only the first 5 history entries have been loaded, but more history exists.              Overdue - COVID-19 Vaccine (4 - 2023-24 season) Overdue since 9/1/2023 11/08/2021  Imm Admin: PFIZER PURPLE CAP SARS-COV-2 VACCINATION (12+)    04/01/2021  Imm Admin: PFIZER PURPLE CAP SARS-COV-2 VACCINATION (12+)    03/11/2021  Imm Admin: PFIZER PURPLE CAP SARS-COV-2 VACCINATION (12+)              Fasting Lipid Profile (Yearly) Tentatively due on 6/15/2024      06/15/2023  Lipid Profile    05/16/2022  Lipid Profile    04/26/2021  Lipid Profile    05/20/2019  Lipid Profile    05/03/2018  LIPID PROFILE    Only the first 5 history entries have been loaded, but more history exists.              Ordered - Diabetes: Urine Protein Screening (Yearly) Ordered on 1/15/2024      06/15/2023  MICROALBUMIN CREAT RATIO URINE    05/16/2022  MICROALBUMIN CREAT RATIO URINE    04/26/2021  MICROALBUMIN CREAT RATIO URINE    10/24/2019  MICROALBUMIN CREAT RATIO URINE    06/11/2018  MICROALBUMIN CREAT RATIO URINE    Only the first 5 history entries have been loaded, but more history exists.              SERUM CREATININE (Yearly) Next due on 6/15/2024      06/15/2023  Comp Metabolic Panel    11/08/2022  Comp Metabolic Panel    05/16/2022  Comp Metabolic Panel    01/25/2022  Basic Metabolic Panel    04/26/2021  Comp Metabolic  Panel    Only the first 5 history entries have been loaded, but more history exists.              A1c Screening (Every 6 Months) Next due on 7/15/2024      01/15/2024  HEMOGLOBIN A1C    08/01/2023  HEMOGLOBIN A1C    03/02/2023  HEMOGLOBIN A1C    11/08/2022  HEMOGLOBIN A1C    05/16/2022  HEMOGLOBIN A1C    Only the first 5 history entries have been loaded, but more history exists.              IMM DTaP/Tdap/Td Vaccine (2 - Td or Tdap) Next due on 7/25/2024 07/25/2014  Imm Admin: Tdap Vaccine              Bone Density Scan (Every 5 Years) Tentatively due on 10/31/2024      10/31/2019  DS-BONE DENSITY STUDY (DEXA)              Diabetes: Retinopathy Screening (Yearly) Next due on 12/7/2024 12/07/2023  AMB EXTERNAL RETINAL SCREENING RESULTS    06/21/2022  POCT Retinal Eye Exam    10/29/2020  REFERRAL FOR RETINAL SCREENING EXAM    10/24/2019  POCT Retinal Eye Exam    11/20/2018  REFERRAL FOR RETINAL SCREENING EXAM    Only the first 5 history entries have been loaded, but more history exists.              Annual Wellness Visit (Every 366 Days) Next due on 1/15/2025      01/15/2024  Level of Service: MD ANNUAL WELLNESS VISIT-INCLUDES PPPS SUBSEQUE*    11/04/2022  Level of Service: MD ANNUAL WELLNESS VISIT-INCLUDES PPPS SUBSEQUE*    01/28/2022  Subsequent Annual Wellness Visit - Includes PPPS ()    01/28/2022  Visit Dx: Encounter for Medicare annual wellness exam    01/20/2021  Initial Annual Wellness Visit - Includes PPPS ()              Mammogram (Every 2 Years) Next due on 6/9/2025 06/09/2023  MA-SCREENING MAMMO BILAT W/TOMOSYNTHESIS W/CAD    06/14/2021  MA-SCREENING MAMMO BILAT W/TOMOSYNTHESIS W/CAD    05/13/2019  MA-SCREENING MAMMO BILAT W/TOMOSYNTHESIS W/CAD    03/01/2017  MA-SCREEN MAMMO W/CAD-BILAT    01/24/2014  MA-SCREENING MAMMOGRAM W/ CAD              Colorectal Cancer Screening (Colonoscopy - Every 10 Years) Tentatively due on 12/31/2029 12/31/2019  REFERRAL TO GI FOR COLONOSCOPY     11/06/2014  AMB REFERRAL TO GI FOR COLONOSCOPY              Hepatitis C Screening  Completed      05/13/2011  Hepatitis C Antibody component of HEP C VIRUS ANTIBODY              Zoster (Shingles) Vaccines (Series Information) Completed      10/31/2020  Imm Admin: Zoster Vaccine Recombinant (RZV) (SHINGRIX)    07/16/2020  Imm Admin: Zoster Vaccine Recombinant (RZV) (SHINGRIX)              Pneumococcal Vaccine: 65+ Years (Series Information) Completed      12/05/2020  Imm Admin: Pneumococcal polysaccharide vaccine (PPSV-23)    10/24/2019  Imm Admin: Pneumococcal Conjugate Vaccine (Prevnar/PCV-13)    01/03/2014  Imm Admin: Pneumococcal polysaccharide vaccine (PPSV-23)              Influenza Vaccine (Series Information) Completed      11/09/2023  Outside Immunization: Fluzone High-Dose Quad    09/19/2023  Outside Immunization: Fluzone High-Dose Quad    10/13/2022  Imm Admin: Influenza Vaccine Adult HD    12/06/2021  Imm Admin: Influenza Vaccine Adult HD    12/05/2020  Imm Admin: Influenza Vaccine Adult HD    Only the first 5 history entries have been loaded, but more history exists.              Hepatitis A Vaccine (Hep A) (Series Information) Aged Out      No completion history exists for this topic.              HPV Vaccines (Series Information) Aged Out      No completion history exists for this topic.              Polio Vaccine (Inactivated Polio) (Series Information) Aged Out      No completion history exists for this topic.              Meningococcal Immunization (Series Information) Aged Out      No completion history exists for this topic.              Discontinued - Cervical Cancer Screening  Discontinued        Frequency changed to Never automatically (Topic No Longer Applies)    04/10/2020  THINPREP PAP WITH HPV    04/10/2020  Pathology Gynecology Specimen    02/07/2014  PAP IG (IMAGE GUIDED)    01/17/2014  PAP IG, HPV-HR              Discontinued - Hepatitis B Vaccine (Hep B)  Discontinued      No completion  "history exists for this topic.                    Patient Care Team:  Walter Robertson III, M.D. as PCP - General (Family Medicine)  Edy Lee P.A.-C. as Consulting Physician (Endocrinology)      Financial Resource Strain: Low Risk  (1/15/2024)    Overall Financial Resource Strain (CARDIA)     Difficulty of Paying Living Expenses: Not hard at all      Transportation Needs: No Transportation Needs (1/15/2024)    PRAPARE - Transportation     Lack of Transportation (Medical): No     Lack of Transportation (Non-Medical): No      Food Insecurity: No Food Insecurity (1/15/2024)    Hunger Vital Sign     Worried About Running Out of Food in the Last Year: Never true     Ran Out of Food in the Last Year: Never true        Encounter Vitals  Blood Pressure : 124/76  Weight: 87.1 kg (192 lb)  Height: 167.6 cm (5' 6\")  BMI (Calculated): 30.99  Pain Score: 5=Moderate Pain     Alert, oriented in no acute distress.  Eye contact is good, speech goal directed, affect calm.    Assessment and Plan. The following treatment and monitoring plan is recommended:  Hyperlipidemia  Chronic, stable. Continue with current defined treatment plan: atorvastatin (LIPITOR) 80 MG tablet . Follow-up at least annually.      DM-2, Type 2 diabetes mellitus with microalbuminuria, with long-term current use of insulin (HCC)  Chronic, stable. A1c today was 6.8. The patient is being followed by endocrinologist Edy Lee. Continue with current defined treatment plan: Empagliflozin (JARDIANCE) 25 MG Tab, LANTUS SOLOSTAR 100 UNIT/ML Solution Pen-injector injection, liraglutide (VICTOZA) 18 MG/3ML Solution Pen-injector and pioglitazone (ACTOS) 30 MG Tab. Follow-up at least annually.      Vitamin D deficiency  Chronic, stable. Continue with current defined treatment plan: vitamin D3, cholecalciferol, 400 UNIT Tab tablet . Follow-up at least annually.      GERD (gastroesophageal reflux disease)  Chronic, stable. Continue with current defined treatment plan: " omeprazole (PRILOSEC) 20 MG delayed-release capsule . Follow-up at least annually.      Former smoker  Chronic, stable. The patient no longer smokes.  Follow-up at least annually.      Services suggested: No services needed at this time  Health Care Screening: Age-appropriate preventive services recommended by USPTF and ACIP covered by Medicare were discussed today. Services ordered if indicated and agreed upon by the patient.  Referrals offered: Community-based lifestyle interventions to reduce health risks and promote self-management and wellness, fall prevention, nutrition, physical activity, tobacco-use cessation, weight loss, and mental health services as per orders if indicated.    Discussion today about general wellness and lifestyle habits:    Prevent falls and reduce trip hazards; Cautioned about securing or removing rugs.  Have a working fire alarm and carbon monoxide detector.  Engage in regular physical activity and social activities.    Follow-up: Return for appointment with Primary Care Provider as needed.

## 2024-01-15 NOTE — ASSESSMENT & PLAN NOTE
Chronic, stable. A1c today was 6.8. The patient is being followed by endocrinologist Edy Lee. Continue with current defined treatment plan: Empagliflozin (JARDIANCE) 25 MG Tab, LANTUS SOLOSTAR 100 UNIT/ML Solution Pen-injector injection, liraglutide (VICTOZA) 18 MG/3ML Solution Pen-injector and pioglitazone (ACTOS) 30 MG Tab. Follow-up at least annually.

## 2024-01-16 NOTE — ASSESSMENT & PLAN NOTE
Chronic, stable. Continue with current defined treatment plan: omeprazole (PRILOSEC) 20 MG delayed-release capsule . Follow-up at least annually.

## 2024-02-02 ENCOUNTER — OFFICE VISIT (OUTPATIENT)
Dept: MEDICAL GROUP | Facility: PHYSICIAN GROUP | Age: 69
End: 2024-02-02
Payer: MEDICARE

## 2024-02-02 VITALS
TEMPERATURE: 98 F | DIASTOLIC BLOOD PRESSURE: 94 MMHG | HEIGHT: 66 IN | SYSTOLIC BLOOD PRESSURE: 148 MMHG | HEART RATE: 71 BPM | BODY MASS INDEX: 24.08 KG/M2 | OXYGEN SATURATION: 96 % | WEIGHT: 149.8 LBS | RESPIRATION RATE: 16 BRPM

## 2024-02-02 DIAGNOSIS — R80.9 TYPE 2 DIABETES MELLITUS WITH MICROALBUMINURIA, WITH LONG-TERM CURRENT USE OF INSULIN (HCC): ICD-10-CM

## 2024-02-02 DIAGNOSIS — Z79.4 TYPE 2 DIABETES MELLITUS WITH MICROALBUMINURIA, WITH LONG-TERM CURRENT USE OF INSULIN (HCC): ICD-10-CM

## 2024-02-02 DIAGNOSIS — R03.0 ELEVATED BP WITHOUT DIAGNOSIS OF HYPERTENSION: ICD-10-CM

## 2024-02-02 DIAGNOSIS — E55.9 VITAMIN D DEFICIENCY: ICD-10-CM

## 2024-02-02 DIAGNOSIS — E11.29 TYPE 2 DIABETES MELLITUS WITH MICROALBUMINURIA, WITH LONG-TERM CURRENT USE OF INSULIN (HCC): ICD-10-CM

## 2024-02-02 DIAGNOSIS — E53.8 VITAMIN B12 DEFICIENCY: ICD-10-CM

## 2024-02-02 PROCEDURE — 3077F SYST BP >= 140 MM HG: CPT | Performed by: FAMILY MEDICINE

## 2024-02-02 PROCEDURE — 99213 OFFICE O/P EST LOW 20 MIN: CPT | Performed by: FAMILY MEDICINE

## 2024-02-02 PROCEDURE — 3080F DIAST BP >= 90 MM HG: CPT | Performed by: FAMILY MEDICINE

## 2024-02-02 ASSESSMENT — FIBROSIS 4 INDEX: FIB4 SCORE: 0.85

## 2024-02-02 NOTE — PROGRESS NOTES
Subjective:     CC: Here to discuss her diabetes and recent lab results.    HPI:   Tara presents today with the following medical concerns:    DM-2, Type 2 diabetes mellitus with microalbuminuria, with long-term current use of insulin (HCC)  This is a chronic problem.  Patient is here for follow-up.  Her Victoza is no longer covered under formulary since he is asking if we need to change to something different.  Her hemoglobin A1c was very good at 6.8% so we will just stop the Victoza and have her adjust her insulin accordingly.  If she finds she has to take escalating doses then we can put her on Ozempic or Mounjaro.  Recheck labs again in 3 months.    Vitamin D deficiency  This is a chronic problem.  Lab ordered for 3 months from now for follow-up.    Vitamin B12 deficiency  This is a chronic problem.  Continue on supplementation.  Lab ordered.    Elevated BP without diagnosis of hypertension  This is a new issue.  Patient blood pressure is mildly elevated today.  Will have her come back in a week and recheck it.  If it still elevated then we could consider starting her on lisinopril.    Past Medical History:   Diagnosis Date    Arthritis     COVID-19     Diabetes 2011    Edema 2011    Elevated liver enzymes 2011    Gall bladder polyp 2011    GERD (gastroesophageal reflux disease) 2011    Hyperlipidemia 2011    Hypertension 2011    Post-menopausal atrophic vaginitis 2020    Skin lesion of face 2011    Vitamin d deficiency 2011       Social History     Tobacco Use    Smoking status: Former     Current packs/day: 0.00     Average packs/day: 1 pack/day for 20.0 years (20.0 ttl pk-yrs)     Types: Cigarettes     Start date: 1970     Quit date: 1990     Years since quittin.1    Smokeless tobacco: Never    Tobacco comments:     Quit   (Hx 1 ppd x 20 yrs).    Vaping Use    Vaping Use: Never used   Substance Use Topics    Alcohol use: Not  "Currently     Comment: couple per year    Drug use: No       Current Outpatient Medications Ordered in Epic   Medication Sig Dispense Refill    pioglitazone (ACTOS) 30 MG Tab Take 1 Tablet by mouth every day. 100 Tablet 3    atorvastatin (LIPITOR) 80 MG tablet Take 1 Tablet by mouth every evening. TAKE 1 TABLET BY MOUTH EVERY EVENING 100 Tablet 3    omeprazole (PRILOSEC) 20 MG delayed-release capsule Take 1 Capsule by mouth every day. 90 Capsule 3    LANTUS SOLOSTAR 100 UNIT/ML Solution Pen-injector injection Inject 21 Units under the skin every evening. 7 Each 3    liraglutide (VICTOZA) 18 MG/3ML Solution Pen-injector Inject 1.8 mg under the skin every evening. 27 mL 3    aspirin 81 MG EC tablet Take 81 mg by mouth every day.      vitamin D3, cholecalciferol, 400 UNIT Tab tablet Take 400 Units by mouth every day.      Empagliflozin (JARDIANCE) 25 MG Tab Take 1 Tablet by mouth every day. 100 Tablet 3    Multiple Vitamins-Minerals (OCUVITE ADULT 50+ PO) Take 1 Capsule by mouth every day.       No current [facility]-ordered facility-administered medications on file.       Allergies:  Sulfa drugs    Health Maintenance: Completed    ROS:  Gen: no fevers/chills, no changes in weight  Eyes: no changes in vision  ENT: no sore throat, no hearing loss, no bloody nose  Pulm: no sob, no cough  CV: no chest pain, no palpitations  GI: no nausea/vomiting, no diarrhea  : no dysuria  MSk: no myalgias  Skin: no rash  Neuro: no headaches, no numbness/tingling  Heme/Lymph: no easy bruising      Objective:       Exam:  BP (!) 148/94   Pulse 71   Temp 36.7 °C (98 °F) (Temporal)   Resp 16   Ht 1.676 m (5' 6\")   Wt 67.9 kg (149 lb 12.8 oz)   LMP 12/16/2006   SpO2 96%   BMI 24.18 kg/m²  Body mass index is 24.18 kg/m².    Gen: Alert and oriented, No apparent distress.  Neck: Neck is supple without lymphadenopathy.  Lungs: Normal effort, CTA bilaterally, no wheezes, rhonchi, or rales  CV: Regular rate and rhythm. No murmurs, rubs, or " gallops.  Ext: No clubbing, cyanosis, edema.    Monofilament testing with a 10 gram force: sensation intact: intact bilaterally  Visual Inspection: Feet without maceration, ulcers, fissures.  Pedal pulses: intact bilaterally    Labs: Reviewed with patient    Assessment & Plan:     68 y.o. female with the following -     1. Type 2 diabetes mellitus with microalbuminuria, with long-term current use of insulin (HCC)  This is a chronic problem.  Will recheck her lab in 3 months.  Will hold the Victoza and see how she does off of it.  - Diabetic Monofilament LE Exam  - Comp Metabolic Panel; Future  - HEMOGLOBIN A1C; Future  - Lipid Profile; Future  - URINALYSIS,CULTURE IF INDICATED; Future  - CBC WITH DIFFERENTIAL; Future  - ESTIMATED GFR; Future  - MICROALBUMIN CREAT RATIO URINE; Future    2. Vitamin B12 deficiency  This is a chronic problem.  Lab ordered.  - VITAMIN B12; Future    3. Vitamin D deficiency  This is a chronic problem.  Lab ordered.  - VITAMIN D,25 HYDROXY (DEFICIENCY); Future    4. Elevated BP without diagnosis of hypertension  This is a new issue.  Will recheck her blood pressure next week and if the average is over 145/90 then started on blood pressure medications.    HCC Gap Form    Last edited 02/02/24 08:18 PST by Walter Robertson III, M.D.         Return in about 3 months (around 5/2/2024) for Long.    Please note that this dictation was created using voice recognition software. I have made every reasonable attempt to correct obvious errors, but I expect that there are errors of grammar and possibly content that I did not discover before finalizing the note.

## 2024-02-02 NOTE — ASSESSMENT & PLAN NOTE
This is a chronic problem.  Patient is here for follow-up.  Her Victoza is no longer covered under formulary since he is asking if we need to change to something different.  Her hemoglobin A1c was very good at 6.8% so we will just stop the Victoza and have her adjust her insulin accordingly.  If she finds she has to take escalating doses then we can put her on Ozempic or Mounjaro.  Recheck labs again in 3 months.

## 2024-02-02 NOTE — ASSESSMENT & PLAN NOTE
This is a new issue.  Patient blood pressure is mildly elevated today.  Will have her come back in a week and recheck it.  If it still elevated then we could consider starting her on lisinopril.

## 2024-02-06 DIAGNOSIS — E11.29 TYPE 2 DIABETES MELLITUS WITH MICROALBUMINURIA, WITH LONG-TERM CURRENT USE OF INSULIN (HCC): ICD-10-CM

## 2024-02-06 DIAGNOSIS — Z79.4 TYPE 2 DIABETES MELLITUS WITH MICROALBUMINURIA, WITH LONG-TERM CURRENT USE OF INSULIN (HCC): ICD-10-CM

## 2024-02-06 DIAGNOSIS — R80.9 TYPE 2 DIABETES MELLITUS WITH MICROALBUMINURIA, WITH LONG-TERM CURRENT USE OF INSULIN (HCC): ICD-10-CM

## 2024-02-06 RX ORDER — EMPAGLIFLOZIN 25 MG/1
1 TABLET, FILM COATED ORAL
Qty: 100 TABLET | Refills: 0 | Status: SHIPPED | OUTPATIENT
Start: 2024-02-06

## 2024-02-06 NOTE — TELEPHONE ENCOUNTER
Received request via: Patient    Was the patient seen in the last year in this department? Yes    Does the patient have an active prescription (recently filled or refills available) for medication(s) requested? No    Pharmacy Name: [facility]    Does the patient have group home Plus and need 100 day supply (blood pressure, diabetes and cholesterol meds only)? Yes, quantity updated to 100 days

## 2024-03-05 ENCOUNTER — OFFICE VISIT (OUTPATIENT)
Dept: URGENT CARE | Facility: PHYSICIAN GROUP | Age: 69
End: 2024-03-05
Payer: MEDICARE

## 2024-03-05 VITALS
DIASTOLIC BLOOD PRESSURE: 88 MMHG | BODY MASS INDEX: 30.7 KG/M2 | RESPIRATION RATE: 16 BRPM | OXYGEN SATURATION: 98 % | WEIGHT: 191 LBS | SYSTOLIC BLOOD PRESSURE: 138 MMHG | HEART RATE: 58 BPM | HEIGHT: 66 IN | TEMPERATURE: 96.1 F

## 2024-03-05 DIAGNOSIS — T78.3XXA ANGIOEDEMA, INITIAL ENCOUNTER: ICD-10-CM

## 2024-03-05 DIAGNOSIS — L50.9 URTICARIA: Primary | ICD-10-CM

## 2024-03-05 DIAGNOSIS — J00 ACUTE RHINITIS: ICD-10-CM

## 2024-03-05 LAB
FLUAV RNA SPEC QL NAA+PROBE: NEGATIVE
FLUBV RNA SPEC QL NAA+PROBE: NEGATIVE
RSV RNA SPEC QL NAA+PROBE: NEGATIVE
SARS-COV-2 RNA RESP QL NAA+PROBE: NEGATIVE

## 2024-03-05 PROCEDURE — 0241U POCT CEPHEID COV-2, FLU A/B, RSV - PCR: CPT

## 2024-03-05 PROCEDURE — 3079F DIAST BP 80-89 MM HG: CPT

## 2024-03-05 PROCEDURE — 3075F SYST BP GE 130 - 139MM HG: CPT

## 2024-03-05 PROCEDURE — 99213 OFFICE O/P EST LOW 20 MIN: CPT

## 2024-03-05 RX ORDER — FAMOTIDINE 20 MG/1
20 TABLET, FILM COATED ORAL NIGHTLY
Qty: 30 TABLET | Refills: 0 | Status: SHIPPED | OUTPATIENT
Start: 2024-03-05 | End: 2024-04-04

## 2024-03-05 RX ORDER — CETIRIZINE HYDROCHLORIDE 10 MG/1
10 TABLET ORAL DAILY
Qty: 30 TABLET | Refills: 0 | Status: SHIPPED | OUTPATIENT
Start: 2024-03-05

## 2024-03-05 RX ORDER — CETIRIZINE HYDROCHLORIDE 10 MG/1
10 TABLET ORAL DAILY
Status: DISCONTINUED | OUTPATIENT
Start: 2024-03-05 | End: 2024-03-05

## 2024-03-05 RX ORDER — PREDNISONE 10 MG/1
40 TABLET ORAL DAILY
Qty: 20 TABLET | Refills: 0 | Status: SHIPPED | OUTPATIENT
Start: 2024-03-05 | End: 2024-03-10

## 2024-03-05 ASSESSMENT — ENCOUNTER SYMPTOMS
ABDOMINAL PAIN: 0
HEADACHES: 0
DIARRHEA: 0
EYE PAIN: 0
CHILLS: 0
VOMITING: 0
FEVER: 0
STRIDOR: 0
EYE DISCHARGE: 0
WHEEZING: 0
NAUSEA: 0
COUGH: 0
PALPITATIONS: 0
EYE REDNESS: 0
DIZZINESS: 0
MYALGIAS: 0
SORE THROAT: 0
SPUTUM PRODUCTION: 0
SHORTNESS OF BREATH: 0

## 2024-03-05 ASSESSMENT — FIBROSIS 4 INDEX: FIB4 SCORE: 0.85

## 2024-03-05 NOTE — PROGRESS NOTES
Subjective:   Tara Cueva is a 68 y.o. female who presents for Rash (X1 week a few spots on the abdomen, is now on the arms, thighs,  face  and in the mouth)          I introduced myself to the patient and informed them that I am a family nurse practitioner.    HPI:Luli is a 68-year-old diabetic female who comes in today c/o rash on her trunk upper chest, upper arms and thighs.  She also has mild swelling of her bottom lip with very mild swelling of her right face cheek adjacent to the lip.  Onset was 1 week ago.  Patient describes symptoms as intermittent. They describe the rash as itchy. Aggravating factors include none. Relieving factors include none. Treatments tried at home include Benadryl with good effect. They describe their symptoms as mild.  She states the hives are mostly gone, and the swelling of her lower lip and right testicle barely noticeable now.  States she did message her PCP who as could be because of a viral infection.  She denies any change in skin care products such as soap, shampoo, body wash, laundry detergent.  Denies any contact with new pets, or any new plant material, denies changing her diet or eating any new foods, no new medications.  States her last FBS was 118, generally runs below 120.      Review of Systems   Constitutional:  Negative for chills, fever and malaise/fatigue.   HENT:  Negative for congestion, ear pain and sore throat.    Eyes:  Negative for pain, discharge and redness.   Respiratory:  Negative for cough, sputum production, shortness of breath, wheezing and stridor.    Cardiovascular:  Negative for chest pain and palpitations.   Gastrointestinal:  Negative for abdominal pain, diarrhea, nausea and vomiting.   Genitourinary:  Negative for dysuria.   Musculoskeletal:  Negative for myalgias.   Skin:  Positive for rash.        Pos for hives and mild swelling of her bottom lip and R face cheek   Neurological:  Negative for dizziness and headaches.       Medications:  "aspirin  atorvastatin  Jardiance Tabs  Lantus SoloStar Sopn  OCUVITE ADULT 50+ PO  omeprazole  pioglitazone Tabs  Victoza Sopn  vitamin D3 (cholecalciferol) Tabs     Allergies: Sulfa drugs    Problem List: does not have any pertinent problems on file.    Surgical History:  Past Surgical History:   Procedure Laterality Date    OPEN REDUCTION      OTHER ORTHOPEDIC SURGERY      right arm ORIF    PRIMARY C SECTION      TUBAL COAGULATION LAPAROSCOPIC BILATERAL         Past Social Hx:   reports that she quit smoking about 34 years ago. Her smoking use included cigarettes. She started smoking about 54 years ago. She has a 20 pack-year smoking history. She has never used smokeless tobacco. She reports that she does not currently use alcohol. She reports that she does not use drugs.     Past Family Hx:   family history includes Cancer in her brother, maternal grandfather, maternal grandmother, and mother; GI Disease in her sister; Heart Disease in her brother; Other in her father.     Problem list, medications, and allergies reviewed by myself today in Epic.   I have documented what I find to be significant in regards to past medical, social, family and surgical history  in my HPI or under PMH/PSH/FH review section, otherwise it is noncontributory     Objective:     /88 (BP Location: Right arm, Patient Position: Sitting, BP Cuff Size: Large adult)   Pulse (!) 58   Temp (!) 35.6 °C (96.1 °F)   Resp 16   Ht 1.676 m (5' 6\")   Wt 86.6 kg (191 lb)   LMP 12/16/2006   SpO2 98%   BMI 30.83 kg/m²     During this visit, appropriate PPE was worn, and hand hygiene was performed.    Physical Exam  Vitals reviewed.   Constitutional:       General: She is not in acute distress.     Appearance: Normal appearance. She is not ill-appearing or toxic-appearing.   HENT:      Head: Normocephalic and atraumatic.      Right Ear: External ear normal.      Left Ear: External ear normal.      Nose: Rhinorrhea present. No congestion.      " Mouth/Throat:      Pharynx: Oropharynx is clear. No oropharyngeal exudate or posterior oropharyngeal erythema.      Comments: There is very mild and barely noticeable swelling of patient's right bottom lip and the right face cheek adjacent to the lip.  No tonsillar swelling, bilaterally.  No soft tissue swelling of the sublingual mucosa, no petechia or swelling of the soft or hard palate, no unilarteral oropharynx swelling, no sign of tonsillar stone, epiglottitis, or abscess.  Airway is patent and there is no stridor.  Patient is managing oral secretions appropriately.  Uvula is midline and appropriate size with no erythema or edema.    Eyes:      General: No scleral icterus.        Right eye: No discharge.         Left eye: No discharge.      Extraocular Movements: Extraocular movements intact.      Conjunctiva/sclera: Conjunctivae normal.      Pupils: Pupils are equal, round, and reactive to light.   Cardiovascular:      Rate and Rhythm: Normal rate and regular rhythm.      Heart sounds: Normal heart sounds. No murmur heard.     No friction rub. No gallop.   Pulmonary:      Effort: Pulmonary effort is normal. No respiratory distress.      Breath sounds: Normal breath sounds. No stridor. No wheezing, rhonchi or rales.   Abdominal:      General: There is no distension.   Musculoskeletal:         General: Normal range of motion.      Cervical back: Normal range of motion. No rigidity.      Right lower leg: No edema.      Left lower leg: No edema.   Lymphadenopathy:      Cervical: No cervical adenopathy.   Skin:     General: Skin is warm and dry.      Coloration: Skin is not jaundiced.      Comments: There are multiple raised erythematous wheal-like lesions consistent with urticaria present on patient's upper arms, upper chest, lower back and hips.  Most of them appear to be fading.  There are no vesicles or drainage present.  There is no associated cellulitis   Neurological:      General: No focal deficit present.       Mental Status: She is alert and oriented to person, place, and time. Mental status is at baseline.   Psychiatric:         Mood and Affect: Mood normal.         Behavior: Behavior normal.         Thought Content: Thought content normal.         Judgment: Judgment normal.       Lab Results/POC Test Results   Results for orders placed or performed in visit on 03/05/24   POCT CoV-2, Flu A/B, RSV by PCR   Result Value Ref Range    SARS-CoV-2 by PCR Negative Negative, Invalid    Influenza virus A RNA Negative Negative, Invalid    Influenza virus B, PCR Negative Negative, Invalid    RSV, PCR Negative Negative, Invalid             Assessment/Plan:     Diagnosis and associated orders:     1. Urticaria  predniSONE (DELTASONE) 10 MG Tab    cetirizine (ZYRTEC ALLERGY) 10 MG Tab    famotidine (PEPCID) 20 MG Tab    DISCONTINUED: cetirizine (ZyrTEC) tablet 10 mg      2. Angioedema, initial encounter  predniSONE (DELTASONE) 10 MG Tab    cetirizine (ZYRTEC ALLERGY) 10 MG Tab    famotidine (PEPCID) 20 MG Tab    DISCONTINUED: cetirizine (ZyrTEC) tablet 10 mg      3. Acute rhinitis  POCT CoV-2, Flu A/B, RSV by PCR    cetirizine (ZYRTEC ALLERGY) 10 MG Tab         Comments/MDM:     1. Urticaria  Discussed DDx, management options (risks,benefits, and alternatives to planned treatment), natural progression.   Supportive care measures were discussed.   Questions were encouraged and answered.  Written information was provided and I did go over this with the patient in clinic today.  Instructed patient regarding red flags and to return to urgent care prn if new or worsening sx or if there is no improvement in condition prn.    Advised the patient to follow-up with the primary care physician for recheck, reevaluation, and consideration of further management.  I did instruct patient regarding medications prescribed, purpose, side effects, cautions.  Instructed patient to get a pharmacy consult when picking up any prescribed  medications.  Instructed patient to avoid sweets and check blood sugars more frequently while taking prednisone, check 3-4 times a day, if notices high or blood sugar greater than 400 go to the emergency room.  Strict ER precautions discussed for any increased swelling of the lips, tongue, face, throat any difficulty breathing chest pain wheezing drooling inability to swallow-call 911   Patient states they have good understanding and they are agreeable with the plan of care.     - predniSONE (DELTASONE) 10 MG Tab; Take 4 Tablets by mouth every day for 5 days.  Dispense: 20 Tablet; Refill: 0  - cetirizine (ZYRTEC ALLERGY) 10 MG Tab; Take 1 Tablet by mouth every day.  Dispense: 30 Tablet; Refill: 0  - famotidine (PEPCID) 20 MG Tab; Take 1 Tablet by mouth every evening for 30 days.  Dispense: 30 Tablet; Refill: 0    2. Angioedema, initial encounter  Very mild swelling of her bottom lip and right face cheek adjacent to lip, mostly resolved at this point.  - predniSONE (DELTASONE) 10 MG Tab; Take 4 Tablets by mouth every day for 5 days.  Dispense: 20 Tablet; Refill: 0  - cetirizine (ZYRTEC ALLERGY) 10 MG Tab; Take 1 Tablet by mouth every day.  Dispense: 30 Tablet; Refill: 0  - famotidine (PEPCID) 20 MG Tab; Take 1 Tablet by mouth every evening for 30 days.  Dispense: 30 Tablet; Refill: 0    3. Acute rhinitis  Instructed patient we will get a COVID flu RSV swab to rule out these viral illnesses as a cause of her acute rhinitis and urticaria.  Instructed I will call her to discuss if any of these are positive otherwise she can see her results on MyChart.  She states she understands and is agreeable    - POCT CoV-2, Flu A/B, RSV by PCR  - cetirizine (ZYRTEC ALLERGY) 10 MG Tab; Take 1 Tablet by mouth every day.  Dispense: 30 Tablet; Refill: 0         Pt is clinically stable at today's acute urgent care visit. Vital signs are normal and reassuring.  No acute distress noted. Appropriate for outpatient management at this time.         I personally reviewed prior external notes and test results pertinent to today's visit.  I have independently reviewed and interpreted all diagnostics ordered during this urgent care acute visit.        Please note that this dictation was created using voice recognition software. I have made a reasonable attempt to correct obvious errors, but I expect that there are errors of grammar and possibly content that I did not discover before finalizing the note.    This note was electronically signed by Samuel DOTY, MAXX, JANNIE, VALENTIN

## 2024-03-19 ENCOUNTER — NON-PROVIDER VISIT (OUTPATIENT)
Dept: MEDICAL GROUP | Facility: PHYSICIAN GROUP | Age: 69
End: 2024-03-19
Payer: MEDICARE

## 2024-03-19 VITALS
HEART RATE: 68 BPM | DIASTOLIC BLOOD PRESSURE: 84 MMHG | OXYGEN SATURATION: 96 % | TEMPERATURE: 98.3 F | SYSTOLIC BLOOD PRESSURE: 146 MMHG | RESPIRATION RATE: 18 BRPM

## 2024-03-19 PROCEDURE — 99999 PR NO CHARGE: CPT | Performed by: FAMILY MEDICINE

## 2024-03-19 NOTE — PROGRESS NOTES
Luli Cueva is a 68 y.o. female here for a non-provider visit for Blood Pressure Check    Encounter Vitals  Temperature: 36.8 °C (98.3 °F)  Temp src: Temporal  Blood Pressure : (!) 146/84  Pulse: 68  Respiration: 18  Pulse Oximetry: 96 %    If abnormal, was the Registered Nurse (office provider if RN is unavailable) notified today? Yes    Routed to PCP/Requested Provider? Yes    Spoke with Dr. Walter Robertson and stated we could start her on a blood pressure medication now or have her come back in 1 week and evaluate then.     Patient would like to recheck her blood pressure in 1 week and make a decision then.

## 2024-03-22 ENCOUNTER — OFFICE VISIT (OUTPATIENT)
Dept: URGENT CARE | Facility: PHYSICIAN GROUP | Age: 69
End: 2024-03-22
Payer: MEDICARE

## 2024-03-22 VITALS
SYSTOLIC BLOOD PRESSURE: 140 MMHG | WEIGHT: 191 LBS | RESPIRATION RATE: 18 BRPM | TEMPERATURE: 97.5 F | DIASTOLIC BLOOD PRESSURE: 90 MMHG | HEIGHT: 66 IN | BODY MASS INDEX: 30.7 KG/M2 | HEART RATE: 59 BPM | OXYGEN SATURATION: 98 %

## 2024-03-22 DIAGNOSIS — T78.40XA ALLERGIC REACTION, INITIAL ENCOUNTER: ICD-10-CM

## 2024-03-22 DIAGNOSIS — R80.9 TYPE 2 DIABETES MELLITUS WITH MICROALBUMINURIA, WITH LONG-TERM CURRENT USE OF INSULIN (HCC): Chronic | ICD-10-CM

## 2024-03-22 DIAGNOSIS — L50.9 HIVES: ICD-10-CM

## 2024-03-22 DIAGNOSIS — R22.0 FACIAL SWELLING: ICD-10-CM

## 2024-03-22 DIAGNOSIS — Z79.4 TYPE 2 DIABETES MELLITUS WITH MICROALBUMINURIA, WITH LONG-TERM CURRENT USE OF INSULIN (HCC): Chronic | ICD-10-CM

## 2024-03-22 DIAGNOSIS — R03.0 ELEVATED BLOOD PRESSURE READING: ICD-10-CM

## 2024-03-22 DIAGNOSIS — E11.29 TYPE 2 DIABETES MELLITUS WITH MICROALBUMINURIA, WITH LONG-TERM CURRENT USE OF INSULIN (HCC): Chronic | ICD-10-CM

## 2024-03-22 PROCEDURE — 3077F SYST BP >= 140 MM HG: CPT | Performed by: NURSE PRACTITIONER

## 2024-03-22 PROCEDURE — 3080F DIAST BP >= 90 MM HG: CPT | Performed by: NURSE PRACTITIONER

## 2024-03-22 PROCEDURE — 99214 OFFICE O/P EST MOD 30 MIN: CPT | Performed by: NURSE PRACTITIONER

## 2024-03-22 RX ORDER — DEXAMETHASONE SODIUM PHOSPHATE 4 MG/ML
8 INJECTION, SOLUTION INTRA-ARTICULAR; INTRALESIONAL; INTRAMUSCULAR; INTRAVENOUS; SOFT TISSUE ONCE
Status: COMPLETED | OUTPATIENT
Start: 2024-03-22 | End: 2024-03-22

## 2024-03-22 RX ORDER — PREDNISONE 20 MG/1
TABLET ORAL
Qty: 10 TABLET | Refills: 0 | Status: SHIPPED | OUTPATIENT
Start: 2024-03-22

## 2024-03-22 RX ADMIN — DEXAMETHASONE SODIUM PHOSPHATE 8 MG: 4 INJECTION, SOLUTION INTRA-ARTICULAR; INTRALESIONAL; INTRAMUSCULAR; INTRAVENOUS; SOFT TISSUE at 10:42

## 2024-03-22 ASSESSMENT — FIBROSIS 4 INDEX: FIB4 SCORE: 0.85

## 2024-03-22 NOTE — PROGRESS NOTES
Subjective:   Tara Cueva is a 68 y.o. female who presents for Rash (Lower abdomin and upper thighs. Swollen lips and bumps  X today  )       HPI  Patient is a pleasant 68 y.o. who presents for evaluation of waking up this morning with urticarial type of rash and swelling involving her lips, cheeks, forehead, abdomen, and medial thigh.  Patient reports she had a similar rash approximately 3 weeks ago, continues to take Zyrtec and omeprazole on a daily basis.  Unaware of any new contacts, exposures, new foods, medications, etc.  Denies shortness of breath or wheezing.    ROS  All other systems are negative except as documented above within [facility].    MEDS:   Current Outpatient Medications:     cetirizine (ZYRTEC ALLERGY) 10 MG Tab, Take 1 Tablet by mouth every day., Disp: 30 Tablet, Rfl: 0    famotidine (PEPCID) 20 MG Tab, Take 1 Tablet by mouth every evening for 30 days., Disp: 30 Tablet, Rfl: 0    Empagliflozin (JARDIANCE) 25 MG Tab, Take 1 Tablet by mouth every day., Disp: 100 Tablet, Rfl: 0    pioglitazone (ACTOS) 30 MG Tab, Take 1 Tablet by mouth every day., Disp: 100 Tablet, Rfl: 3    atorvastatin (LIPITOR) 80 MG tablet, Take 1 Tablet by mouth every evening. TAKE 1 TABLET BY MOUTH EVERY EVENING, Disp: 100 Tablet, Rfl: 3    omeprazole (PRILOSEC) 20 MG delayed-release capsule, Take 1 Capsule by mouth every day., Disp: 90 Capsule, Rfl: 3    LANTUS SOLOSTAR 100 UNIT/ML Solution Pen-injector injection, Inject 21 Units under the skin every evening., Disp: 7 Each, Rfl: 3    aspirin 81 MG EC tablet, Take 81 mg by mouth every day., Disp: , Rfl:     vitamin D3, cholecalciferol, 400 UNIT Tab tablet, Take 400 Units by mouth every day., Disp: , Rfl:     Multiple Vitamins-Minerals (OCUVITE ADULT 50+ PO), Take 1 Capsule by mouth every day., Disp: , Rfl:     liraglutide (VICTOZA) 18 MG/3ML Solution Pen-injector, Inject 1.8 mg under the skin every evening., Disp: 27 mL, Rfl: 3  ALLERGIES:   Allergies   Allergen Reactions     "Sulfa Drugs Vomiting       Patient's PMH, SocHx, SurgHx, FamHx, Drug allergies and medications were reviewed.     Objective:   BP (!) 140/90   Pulse (!) 59   Temp 36.4 °C (97.5 °F) (Temporal)   Resp 18   Ht 1.676 m (5' 6\")   Wt 86.6 kg (191 lb)   LMP 12/16/2006   SpO2 98%   BMI 30.83 kg/m²     Physical Exam  Vitals and nursing note reviewed.   Constitutional:       General: She is awake.      Appearance: Normal appearance. She is well-developed.   HENT:      Head: Normocephalic and atraumatic.      Right Ear: External ear normal.      Left Ear: External ear normal.      Nose: Nose normal.      Mouth/Throat:      Mouth: Mucous membranes are moist.      Pharynx: Oropharynx is clear.   Eyes:      Extraocular Movements: Extraocular movements intact.      Conjunctiva/sclera: Conjunctivae normal.   Cardiovascular:      Rate and Rhythm: Normal rate and regular rhythm.   Pulmonary:      Effort: Pulmonary effort is normal.      Breath sounds: Normal breath sounds.   Musculoskeletal:         General: Normal range of motion.      Cervical back: Normal range of motion and neck supple.   Skin:     General: Skin is warm and dry.      Findings: Rash present. Rash is urticarial.             Comments: Associated lip swelling   Neurological:      Mental Status: She is alert and oriented to person, place, and time.   Psychiatric:         Mood and Affect: Mood normal.         Behavior: Behavior normal.         Thought Content: Thought content normal.         Assessment/Plan:   Assessment    1. Allergic reaction, initial encounter  - predniSONE (DELTASONE) 20 MG Tab; Take 2 tablets once daily x5 days  Dispense: 10 Tablet; Refill: 0  - Referral to Allergy  - dexamethasone (Decadron) injection 8 mg    2. Hives  - predniSONE (DELTASONE) 20 MG Tab; Take 2 tablets once daily x5 days  Dispense: 10 Tablet; Refill: 0  - Referral to Allergy  - dexamethasone (Decadron) injection 8 mg    3. Facial swelling  - dexamethasone (Decadron) " injection 8 mg    4. Elevated blood pressure reading    5. DM-2, Type 2 diabetes mellitus with microalbuminuria, with long-term current use of insulin (HCC)        Vital signs stable at today's acute urgent care visit.  Decadron given in clinic.  Patient advised not to begin prednisone until tomorrow.  She is a type II diabetic, recommend to closely monitor blood sugars while on steroids.  Referral placed to allergist due to recurrent urticarial rash, which has worsened significantly from prior exposure 2 weeks ago.  Begin medications as listed. Recommend continuation of Zyrtec and omeprazole.    Advised the patient to follow-up with the primary care provider if symptoms persist.  Strict red flags discussed and indications to immediately call 911 or present to the ED. All questions were encouraged and answered to the patient's satisfaction and understanding, and they agree to the plan of care.     This is an acute problem with uncertain prognosis, medication management and instructions as well as management options were provided.  I personally reviewed prior external notes and test results pertinent to today and independently reviewed and interpreted all diagnostics, to include POCT testing. Time spent evaluating this patient includes preparing for visit, counseling/education, exam, evaluation, obtaining history, and ordering lab/test/procedures.      Please note that this dictation was created using voice recognition software. I have made a reasonable attempt to correct obvious errors, but I expect that there are errors of grammar and possibly content that I did not discover before finalizing the note.

## 2024-04-01 ENCOUNTER — HOSPITAL ENCOUNTER (OUTPATIENT)
Dept: LAB | Facility: MEDICAL CENTER | Age: 69
End: 2024-04-01
Attending: NURSE PRACTITIONER
Payer: MEDICARE

## 2024-04-01 LAB
BASOPHILS # BLD AUTO: 0.1 % (ref 0–1.8)
BASOPHILS # BLD: 0.01 K/UL (ref 0–0.12)
CRP SERPL HS-MCNC: 0.84 MG/DL (ref 0–0.75)
EOSINOPHIL # BLD AUTO: 0.22 K/UL (ref 0–0.51)
EOSINOPHIL NFR BLD: 3.2 % (ref 0–6.9)
ERYTHROCYTE [DISTWIDTH] IN BLOOD BY AUTOMATED COUNT: 44.6 FL (ref 35.9–50)
HCT VFR BLD AUTO: 46.4 % (ref 37–47)
HGB BLD-MCNC: 14.8 G/DL (ref 12–16)
IMM GRANULOCYTES # BLD AUTO: 0.03 K/UL (ref 0–0.11)
IMM GRANULOCYTES NFR BLD AUTO: 0.4 % (ref 0–0.9)
LYMPHOCYTES # BLD AUTO: 2.44 K/UL (ref 1–4.8)
LYMPHOCYTES NFR BLD: 35.7 % (ref 22–41)
MCH RBC QN AUTO: 28.1 PG (ref 27–33)
MCHC RBC AUTO-ENTMCNC: 31.9 G/DL (ref 32.2–35.5)
MCV RBC AUTO: 88 FL (ref 81.4–97.8)
MONOCYTES # BLD AUTO: 0.58 K/UL (ref 0–0.85)
MONOCYTES NFR BLD AUTO: 8.5 % (ref 0–13.4)
NEUTROPHILS # BLD AUTO: 3.55 K/UL (ref 1.82–7.42)
NEUTROPHILS NFR BLD: 52.1 % (ref 44–72)
NRBC # BLD AUTO: 0 K/UL
NRBC BLD-RTO: 0 /100 WBC (ref 0–0.2)
PLATELET # BLD AUTO: 315 K/UL (ref 164–446)
PMV BLD AUTO: 9.8 FL (ref 9–12.9)
RBC # BLD AUTO: 5.27 M/UL (ref 4.2–5.4)
T4 SERPL-MCNC: 7.6 UG/DL (ref 4–12)
TSH SERPL DL<=0.005 MIU/L-ACNC: 2.97 UIU/ML (ref 0.38–5.33)
WBC # BLD AUTO: 6.8 K/UL (ref 4.8–10.8)

## 2024-04-01 PROCEDURE — 86225 DNA ANTIBODY NATIVE: CPT

## 2024-04-01 PROCEDURE — 83516 IMMUNOASSAY NONANTIBODY: CPT

## 2024-04-01 PROCEDURE — 86003 ALLG SPEC IGE CRUDE XTRC EA: CPT | Mod: 91

## 2024-04-01 PROCEDURE — 86140 C-REACTIVE PROTEIN: CPT

## 2024-04-01 PROCEDURE — 86376 MICROSOMAL ANTIBODY EACH: CPT

## 2024-04-01 PROCEDURE — 85025 COMPLETE CBC W/AUTO DIFF WBC: CPT

## 2024-04-01 PROCEDURE — 36415 COLL VENOUS BLD VENIPUNCTURE: CPT

## 2024-04-01 PROCEDURE — 86800 THYROGLOBULIN ANTIBODY: CPT

## 2024-04-01 PROCEDURE — 83520 IMMUNOASSAY QUANT NOS NONAB: CPT

## 2024-04-01 PROCEDURE — 83088 ASSAY OF HISTAMINE: CPT

## 2024-04-01 PROCEDURE — 82785 ASSAY OF IGE: CPT

## 2024-04-01 PROCEDURE — 86235 NUCLEAR ANTIGEN ANTIBODY: CPT

## 2024-04-01 PROCEDURE — 84443 ASSAY THYROID STIM HORMONE: CPT

## 2024-04-01 PROCEDURE — 84436 ASSAY OF TOTAL THYROXINE: CPT

## 2024-04-02 LAB — U1 SNRNP IGG SER QL: 8 UNITS (ref 0–19)

## 2024-04-03 LAB
ALMOND IGE QN: <0.1 KU/L
AVOCADO IGE QN: <0.1 KU/L
BANANA IGE QN: <0.1 KU/L
CELERY IGE QN: <0.1 KU/L
CENTROMERE IGG TITR SER IF: 0 AU/ML (ref 0–40)
CHESTNUT IGE QN: <0.1 KU/L
CHROMATIN IGG SERPL-ACNC: 3 UNITS (ref 0–19)
COCONUT IGE QN: <0.1 KU/L
COW MILK IGE QN: <0.1 KU/L
DEPRECATED MISC ALLERGEN IGE RAST QL: NORMAL
DSDNA AB TITR SER CLIF: NORMAL {TITER}
EGG WHITE IGE QN: <0.1 KU/L
ENA JO1 AB TITR SER: 0 AU/ML (ref 0–40)
ENA SCL70 IGG SER QL: 1 AU/ML (ref 0–40)
ENA SM IGG SER-ACNC: 0 AU/ML (ref 0–40)
ENA SS-A 60KD AB SER-ACNC: 0 AU/ML (ref 0–40)
ENA SS-A IGG SER QL: 3 AU/ML (ref 0–40)
ENA SS-B IGG SER IA-ACNC: 0 AU/ML (ref 0–40)
GRAPE IGE QN: <0.1 KU/L
HISTAMINE SERPL-SCNC: <8 NMOL/L (ref 0–8)
IGE SERPL-ACNC: 24 KU/L
KIWIFRUIT IGE QN: <0.1 KU/L
OAT IGE QN: <0.1 KU/L
PAPAYA IGE QN: <0.1 KU/L
PEANUT IGE QN: <0.1 KU/L
PECAN/HICK NUT IGE QN: <0.1 KU/L
POTATO IGE QN: <0.1 KU/L
SESAME SEED IGE QN: <0.1 KU/L
SOYBEAN IGE QN: <0.1 KU/L
THYROGLOB AB SERPL-ACNC: <0.9 IU/ML (ref 0–4)
THYROPEROXIDASE AB SERPL-ACNC: 3 IU/ML (ref 0–9)
TOMATO IGE QN: <0.1 KU/L
TRYPTASE SERPL-MCNC: 10.4 UG/L
WATERMELON IGE QN: <0.1 KU/L
WHEAT IGE QN: <0.1 KU/L

## 2024-04-17 DIAGNOSIS — L50.1 CHRONIC IDIOPATHIC URTICARIA: ICD-10-CM

## 2024-04-29 RX ORDER — INSULIN GLARGINE 100 [IU]/ML
21 INJECTION, SOLUTION SUBCUTANEOUS EVERY EVENING
Qty: 7 EACH | Refills: 3 | Status: SHIPPED | OUTPATIENT
Start: 2024-04-29

## 2024-04-29 NOTE — TELEPHONE ENCOUNTER
Received request via: Patient    Was the patient seen in the last year in this department? Yes    Does the patient have an active prescription (recently filled or refills available) for medication(s) requested? No    Pharmacy Name: [id number] - [place], [place] - [address]     Does the patient have [facility] and need 100 day supply (blood pressure, diabetes and cholesterol meds only)? Yes, quantity updated to 100 days

## 2024-04-30 ENCOUNTER — OUTPATIENT INFUSION SERVICES (OUTPATIENT)
Dept: ONCOLOGY | Facility: MEDICAL CENTER | Age: 69
End: 2024-04-30
Attending: NURSE PRACTITIONER
Payer: MEDICARE

## 2024-04-30 VITALS
BODY MASS INDEX: 31.14 KG/M2 | DIASTOLIC BLOOD PRESSURE: 84 MMHG | WEIGHT: 193.78 LBS | HEIGHT: 66 IN | OXYGEN SATURATION: 97 % | TEMPERATURE: 98.3 F | HEART RATE: 73 BPM | SYSTOLIC BLOOD PRESSURE: 163 MMHG | RESPIRATION RATE: 18 BRPM

## 2024-04-30 DIAGNOSIS — L50.1 CHRONIC IDIOPATHIC URTICARIA: ICD-10-CM

## 2024-04-30 PROCEDURE — 96372 THER/PROPH/DIAG INJ SC/IM: CPT

## 2024-04-30 PROCEDURE — 700111 HCHG RX REV CODE 636 W/ 250 OVERRIDE (IP): Mod: JZ | Performed by: NURSE PRACTITIONER

## 2024-04-30 RX ORDER — FAMOTIDINE 20 MG/1
20 TABLET, FILM COATED ORAL 2 TIMES DAILY
COMMUNITY

## 2024-04-30 RX ADMIN — OMALIZUMAB 150 MG: 150 INJECTION, SOLUTION SUBCUTANEOUS at 14:43

## 2024-04-30 ASSESSMENT — FIBROSIS 4 INDEX: FIB4 SCORE: 0.75

## 2024-04-30 NOTE — PROGRESS NOTES
Pt arrives to [facility] for Xolair for chronic idiopathic urticaria.  Pt denies s/sx of infection.  Pt has scattered redness and itchiness to skin.  Pt presents with Epi pen and observed expiration date of 4/2025.  Xolair given SC to back of LUE.  90 minute observation completed without adverse reaction.  Confirmed next appt on 5/28/2024 with pt.  Pt dc home in stable condition.

## 2024-05-16 ENCOUNTER — HOSPITAL ENCOUNTER (OUTPATIENT)
Dept: LAB | Facility: MEDICAL CENTER | Age: 69
End: 2024-05-16
Attending: FAMILY MEDICINE
Payer: MEDICARE

## 2024-05-16 DIAGNOSIS — E11.29 TYPE 2 DIABETES MELLITUS WITH MICROALBUMINURIA, WITH LONG-TERM CURRENT USE OF INSULIN (HCC): ICD-10-CM

## 2024-05-16 DIAGNOSIS — R80.9 TYPE 2 DIABETES MELLITUS WITH MICROALBUMINURIA, WITH LONG-TERM CURRENT USE OF INSULIN (HCC): ICD-10-CM

## 2024-05-16 DIAGNOSIS — E53.8 VITAMIN B12 DEFICIENCY: ICD-10-CM

## 2024-05-16 DIAGNOSIS — E55.9 VITAMIN D DEFICIENCY: ICD-10-CM

## 2024-05-16 DIAGNOSIS — Z79.4 TYPE 2 DIABETES MELLITUS WITH MICROALBUMINURIA, WITH LONG-TERM CURRENT USE OF INSULIN (HCC): ICD-10-CM

## 2024-05-16 LAB
25(OH)D3 SERPL-MCNC: 49 NG/ML (ref 30–100)
ALBUMIN SERPL BCP-MCNC: 4.1 G/DL (ref 3.2–4.9)
ALBUMIN/GLOB SERPL: 1.4 G/DL
ALP SERPL-CCNC: 86 U/L (ref 30–99)
ALT SERPL-CCNC: 16 U/L (ref 2–50)
ANION GAP SERPL CALC-SCNC: 13 MMOL/L (ref 7–16)
APPEARANCE UR: CLEAR
AST SERPL-CCNC: 18 U/L (ref 12–45)
BACTERIA #/AREA URNS HPF: ABNORMAL /HPF
BASOPHILS # BLD AUTO: 0 % (ref 0–1.8)
BASOPHILS # BLD: 0 K/UL (ref 0–0.12)
BILIRUB SERPL-MCNC: 0.4 MG/DL (ref 0.1–1.5)
BILIRUB UR QL STRIP.AUTO: NEGATIVE
BUN SERPL-MCNC: 14 MG/DL (ref 8–22)
CALCIUM ALBUM COR SERPL-MCNC: 9.4 MG/DL (ref 8.5–10.5)
CALCIUM SERPL-MCNC: 9.5 MG/DL (ref 8.5–10.5)
CHLORIDE SERPL-SCNC: 106 MMOL/L (ref 96–112)
CHOLEST SERPL-MCNC: 184 MG/DL (ref 100–199)
CO2 SERPL-SCNC: 24 MMOL/L (ref 20–33)
COLOR UR: YELLOW
CREAT SERPL-MCNC: 0.66 MG/DL (ref 0.5–1.4)
EOSINOPHIL # BLD AUTO: 0.03 K/UL (ref 0–0.51)
EOSINOPHIL NFR BLD: 0.4 % (ref 0–6.9)
EPI CELLS #/AREA URNS HPF: NEGATIVE /HPF
ERYTHROCYTE [DISTWIDTH] IN BLOOD BY AUTOMATED COUNT: 43.7 FL (ref 35.9–50)
EST. AVERAGE GLUCOSE BLD GHB EST-MCNC: 192 MG/DL
FASTING STATUS PATIENT QL REPORTED: NORMAL
GFR SERPLBLD CREATININE-BSD FMLA CKD-EPI: 94 ML/MIN/1.73 M 2
GLOBULIN SER CALC-MCNC: 3 G/DL (ref 1.9–3.5)
GLUCOSE SERPL-MCNC: 144 MG/DL (ref 65–99)
GLUCOSE UR STRIP.AUTO-MCNC: >=1000 MG/DL
HBA1C MFR BLD: 8.3 % (ref 4–5.6)
HCT VFR BLD AUTO: 47.6 % (ref 37–47)
HDLC SERPL-MCNC: 59 MG/DL
HGB BLD-MCNC: 14.7 G/DL (ref 12–16)
HYALINE CASTS #/AREA URNS LPF: ABNORMAL /LPF
IMM GRANULOCYTES # BLD AUTO: 0.02 K/UL (ref 0–0.11)
IMM GRANULOCYTES NFR BLD AUTO: 0.2 % (ref 0–0.9)
KETONES UR STRIP.AUTO-MCNC: NEGATIVE MG/DL
LDLC SERPL CALC-MCNC: 89 MG/DL
LEUKOCYTE ESTERASE UR QL STRIP.AUTO: ABNORMAL
LYMPHOCYTES # BLD AUTO: 3.1 K/UL (ref 1–4.8)
LYMPHOCYTES NFR BLD: 36.5 % (ref 22–41)
MCH RBC QN AUTO: 27.5 PG (ref 27–33)
MCHC RBC AUTO-ENTMCNC: 30.9 G/DL (ref 32.2–35.5)
MCV RBC AUTO: 89.1 FL (ref 81.4–97.8)
MICRO URNS: ABNORMAL
MONOCYTES # BLD AUTO: 0.35 K/UL (ref 0–0.85)
MONOCYTES NFR BLD AUTO: 4.1 % (ref 0–13.4)
NEUTROPHILS # BLD AUTO: 5 K/UL (ref 1.82–7.42)
NEUTROPHILS NFR BLD: 58.8 % (ref 44–72)
NITRITE UR QL STRIP.AUTO: POSITIVE
NRBC # BLD AUTO: 0 K/UL
NRBC BLD-RTO: 0 /100 WBC (ref 0–0.2)
PH UR STRIP.AUTO: 6 [PH] (ref 5–8)
PLATELET # BLD AUTO: 306 K/UL (ref 164–446)
PMV BLD AUTO: 10.1 FL (ref 9–12.9)
POTASSIUM SERPL-SCNC: 3.9 MMOL/L (ref 3.6–5.5)
PROT SERPL-MCNC: 7.1 G/DL (ref 6–8.2)
PROT UR QL STRIP: 30 MG/DL
RBC # BLD AUTO: 5.34 M/UL (ref 4.2–5.4)
RBC # URNS HPF: ABNORMAL /HPF
RBC UR QL AUTO: ABNORMAL
SODIUM SERPL-SCNC: 143 MMOL/L (ref 135–145)
SP GR UR STRIP.AUTO: 1.02
TRIGL SERPL-MCNC: 180 MG/DL (ref 0–149)
UROBILINOGEN UR STRIP.AUTO-MCNC: 0.2 MG/DL
VIT B12 SERPL-MCNC: 352 PG/ML (ref 211–911)
WBC # BLD AUTO: 8.5 K/UL (ref 4.8–10.8)
WBC #/AREA URNS HPF: ABNORMAL /HPF

## 2024-05-17 ENCOUNTER — OFFICE VISIT (OUTPATIENT)
Dept: MEDICAL GROUP | Facility: PHYSICIAN GROUP | Age: 69
End: 2024-05-17
Payer: MEDICARE

## 2024-05-17 VITALS
DIASTOLIC BLOOD PRESSURE: 78 MMHG | RESPIRATION RATE: 16 BRPM | TEMPERATURE: 97.7 F | OXYGEN SATURATION: 97 % | HEART RATE: 60 BPM | BODY MASS INDEX: 32.87 KG/M2 | HEIGHT: 65 IN | WEIGHT: 197.3 LBS | SYSTOLIC BLOOD PRESSURE: 154 MMHG

## 2024-05-17 DIAGNOSIS — E11.29 TYPE 2 DIABETES MELLITUS WITH MICROALBUMINURIA, WITH LONG-TERM CURRENT USE OF INSULIN (HCC): Chronic | ICD-10-CM

## 2024-05-17 DIAGNOSIS — N30.00 ACUTE CYSTITIS WITHOUT HEMATURIA: ICD-10-CM

## 2024-05-17 DIAGNOSIS — L50.1 CHRONIC IDIOPATHIC URTICARIA: ICD-10-CM

## 2024-05-17 DIAGNOSIS — R80.9 TYPE 2 DIABETES MELLITUS WITH MICROALBUMINURIA, WITH LONG-TERM CURRENT USE OF INSULIN (HCC): Chronic | ICD-10-CM

## 2024-05-17 DIAGNOSIS — Z79.4 TYPE 2 DIABETES MELLITUS WITH MICROALBUMINURIA, WITH LONG-TERM CURRENT USE OF INSULIN (HCC): Chronic | ICD-10-CM

## 2024-05-17 DIAGNOSIS — R03.0 ELEVATED BP WITHOUT DIAGNOSIS OF HYPERTENSION: ICD-10-CM

## 2024-05-17 LAB
CREAT UR-MCNC: 54.02 MG/DL
MICROALBUMIN UR-MCNC: 26 MG/DL
MICROALBUMIN/CREAT UR: 481 MG/G (ref 0–30)

## 2024-05-17 PROCEDURE — 99213 OFFICE O/P EST LOW 20 MIN: CPT | Performed by: FAMILY MEDICINE

## 2024-05-17 PROCEDURE — 3077F SYST BP >= 140 MM HG: CPT | Performed by: FAMILY MEDICINE

## 2024-05-17 PROCEDURE — 3078F DIAST BP <80 MM HG: CPT | Performed by: FAMILY MEDICINE

## 2024-05-17 RX ORDER — FLASH GLUCOSE SENSOR
KIT MISCELLANEOUS
Qty: 1 EACH | Refills: 0 | Status: SHIPPED | OUTPATIENT
Start: 2024-05-17

## 2024-05-17 ASSESSMENT — FIBROSIS 4 INDEX: FIB4 SCORE: 1.029411764705882353

## 2024-05-17 NOTE — ASSESSMENT & PLAN NOTE
This is a chronic problem.  Patient is here for follow-up on her lab test.  Her hemoglobin A1c is higher at 8.2%.  Last time we had stopped the Victoza due to a lot of issues.  She only went up a couple units on her insulin.  She was not really sure how high she could should go.  I discussed that with her.  She is also having troubles with chronic idiopathic urticaria and her dermatologist concerned it might be the Actos.  I will have her stop that 1 as well as her Jardiance and told her to check her blood sugars regularly.  If her fasting blood sugars above 140 she is to continue to escalate the dose of her insulin until it is under control.  Will also put in a order for the [employer] system to see if that is available under her insurance.

## 2024-05-17 NOTE — PROGRESS NOTES
Subjective:     CC: Here to review her lab work and to go over several issues.    HPI:   Tara presents today with the following medical concern:    Elevated BP without diagnosis of hypertension  This is a chronic problem.  Patient is here for follow-up.  Over the last several months she has had troubles with idiopathic urticaria and has been on a variety of antihistamines and steroids without significant success.  During this time she has had mild elevation of her systolic pressure.  She really did not want start a medication at this point.  I told her we will continue to monitor it and once everything settles down if her blood pressure remains elevated I would recommend that she start on 1.    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  Patient is here for follow-up on her lab test.  Her hemoglobin A1c is higher at 8.2%.  Last time we had stopped the Victoza due to a lot of issues.  She only went up a couple units on her insulin.  She was not really sure how high she could should go.  I discussed that with her.  She is also having troubles with chronic idiopathic urticaria and her dermatologist concerned it might be the Actos.  I will have her stop that 1 as well as her Jardiance and told her to check her blood sugars regularly.  If her fasting blood sugars above 140 she is to continue to escalate the dose of her insulin until it is under control.  Will also put in a order for the [employer] system to see if that is available under her insurance.    Acute cystitis without hematuria  This is a new problem.  Her urine test does appear to be growing out bacteria.  The result is not yet.  She was to wait until results are back on Monday and then I will call in an antibiotic for her.  She is not having any symptoms.    Chronic idiopathic urticaria  This is a chronic problem.  Patient has been placed on H1 and H2 blockers as well as steroids.   She will get temporary relief of her urticaria and then it comes back.  She has seen the dermatologist who ordered additional tests for allergies and connective tissue disorders.  All of those were negative.  At this point she is also getting infusions of Xolair are but that does not give long-lasting relief either.  The dermatologist was concerned that perhaps her Actos could be doing this so we will stop that.  As noted in the other note at home patient also stopped her Jardiance so that we can eliminate as many medicines as possible that could be causing this issue.    Past Medical History:   Diagnosis Date    Arthritis     COVID-19     Diabetes 2011    Edema 2011    Elevated liver enzymes 2011    Gall bladder polyp 2011    GERD (gastroesophageal reflux disease) 2011    Hyperlipidemia 2011    Hypertension 2011    Post-menopausal atrophic vaginitis 2020    Skin lesion of face 2011    Vitamin d deficiency 2011       Social History     Tobacco Use    Smoking status: Former     Current packs/day: 0.00     Average packs/day: 1 pack/day for 20.0 years (20.0 ttl pk-yrs)     Types: Cigarettes     Start date: 1970     Quit date: 1990     Years since quittin.3    Smokeless tobacco: Never    Tobacco comments:     Quit   (Hx 1 ppd x 20 yrs).    Vaping Use    Vaping status: Never Used   Substance Use Topics    Alcohol use: Not Currently     Comment: couple per year    Drug use: No       Current Outpatient Medications Ordered in Epic   Medication Sig Dispense Refill    Continuous Glucose Sensor (FREESTYLE JANET SENSOR SYSTEM) [facility] Give 1 kit to use sensor every 14 days 1 Each 0    EPINEPHRINE, ANAPHYLAXIS, INJ Inject  as directed as needed (anaphlaxis reaction).      famotidine (PEPCID) 20 MG Tab Take 20 mg by mouth 2 times a day. 40 mg in morning and 20mg at night      LANTUS SOLOSTAR 100 UNIT/ML Solution Pen-injector injection Inject 21 Units under  "the skin every evening. 7 Each 3    cetirizine (ZYRTEC ALLERGY) 10 MG Tab Take 1 Tablet by mouth every day. (Patient taking differently: Take 10 mg by mouth every day. 20mg in the morning and 10mg at night) 30 Tablet 0    Empagliflozin (JARDIANCE) 25 MG Tab Take 1 Tablet by mouth every day. 100 Tablet 0    pioglitazone (ACTOS) 30 MG Tab Take 1 Tablet by mouth every day. 100 Tablet 3    atorvastatin (LIPITOR) 80 MG tablet Take 1 Tablet by mouth every evening. TAKE 1 TABLET BY MOUTH EVERY EVENING 100 Tablet 3    omeprazole (PRILOSEC) 20 MG delayed-release capsule Take 1 Capsule by mouth every day. 90 Capsule 3    aspirin 81 MG EC tablet Take 81 mg by mouth every day.      vitamin D3, cholecalciferol, 400 UNIT Tab tablet Take 400 Units by mouth every day.      Multiple Vitamins-Minerals (OCUVITE ADULT 50+ PO) Take 1 Capsule by mouth every day.       No current Epic-ordered facility-administered medications on file.       Allergies:  Sulfa drugs    Health Maintenance: Completed    ROS:  Gen: no fevers/chills, no changes in weight  Eyes: no changes in vision  ENT: no sore throat, no hearing loss, no bloody nose  Pulm: no sob, no cough  CV: no chest pain, no palpitations  GI: no nausea/vomiting, no diarrhea  : no dysuria  MSk: no myalgias  Neuro: no headaches, no numbness/tingling  Heme/Lymph: no easy bruising      Objective:       Exam:  BP (!) 154/78 (BP Location: Left arm, Patient Position: Sitting, BP Cuff Size: Adult)   Pulse 60   Temp 36.5 °C (97.7 °F) (Temporal)   Resp 16   Ht 1.651 m (5' 5\")   Wt 89.5 kg (197 lb 4.8 oz)   LMP 12/16/2006   SpO2 97%   BMI 32.83 kg/m²  Body mass index is 32.83 kg/m².    Gen: Alert and oriented, No apparent distress.  Lungs: Normal effort,   Ext: No clubbing, cyanosis, edema.  Skin:    Patient does have scattered urticarial lesions on her body.    Labs: Lab work ordered by her dermatologist reviewed.    Assessment & Plan:     69 y.o. female with the following -     1. " Chronic idiopathic urticaria  This is now chronic problem.  Will stop both her Jardiance and Actos.  Continue on her other medications as prescribed.  I told her if this was a problem we should see the resolution of it in 1 to 2 weeks.    2. 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  The continuous glucose monitor system was faxed and we will see what is covered under insurance.  She is to stop her Jardiance and Actos.  She is to check her fasting blood sugar every day and continue to increase her insulin to maintain fasting blood sugar less than 140.  - Continuous Glucose Sensor (FREESTYLE JANET SENSOR SYSTEM) [facility]; Give 1 kit to use sensor every 14 days  Dispense: 1 Each; Refill: 0    3. Acute cystitis without hematuria  This is a new issue.  Will contact her Monday on the results of her culture and sensitivity.    4. Elevated BP without diagnosis of hypertension  This is a ongoing problem.  Will recheck her blood pressure again in a month.  Some of the mild elevation could be due to all the medications and urticaria that she is having at the present time.    36 minutes spent with the patient.  Return in about 4 weeks (around 6/14/2024) for Long.    Please note that this dictation was created using voice recognition software. I have made every reasonable attempt to correct obvious errors, but I expect that there are errors of grammar and possibly content that I did not discover before finalizing the note.

## 2024-05-17 NOTE — ASSESSMENT & PLAN NOTE
This is a chronic problem.  Patient has been placed on H1 and H2 blockers as well as steroids.  She will get temporary relief of her urticaria and then it comes back.  She has seen the dermatologist who ordered additional tests for allergies and connective tissue disorders.  All of those were negative.  At this point she is also getting infusions of Xolair are but that does not give long-lasting relief either.  The dermatologist was concerned that perhaps her Actos could be doing this so we will stop that.  As noted in the other note at home patient also stopped her Jardiance so that we can eliminate as many medicines as possible that could be causing this issue.

## 2024-05-17 NOTE — ASSESSMENT & PLAN NOTE
This is a chronic problem.  Patient is here for follow-up.  Over the last several months she has had troubles with idiopathic urticaria and has been on a variety of antihistamines and steroids without significant success.  During this time she has had mild elevation of her systolic pressure.  She really did not want start a medication at this point.  I told her we will continue to monitor it and once everything settles down if her blood pressure remains elevated I would recommend that she start on 1.

## 2024-05-17 NOTE — ASSESSMENT & PLAN NOTE
This is a new problem.  Her urine test does appear to be growing out bacteria.  The result is not yet.  She was to wait until results are back on Monday and then I will call in an antibiotic for her.  She is not having any symptoms.

## 2024-05-19 RX ORDER — NITROFURANTOIN 25; 75 MG/1; MG/1
100 CAPSULE ORAL 2 TIMES DAILY
Qty: 20 CAPSULE | Refills: 0 | Status: SHIPPED | OUTPATIENT
Start: 2024-05-19

## 2024-05-20 RX ORDER — FLASH GLUCOSE SENSOR
1 KIT MISCELLANEOUS
Qty: 6 EACH | Refills: 3 | Status: SHIPPED | OUTPATIENT
Start: 2024-05-20

## 2024-05-29 ENCOUNTER — HOSPITAL ENCOUNTER (OUTPATIENT)
Facility: MEDICAL CENTER | Age: 69
End: 2024-05-29
Attending: FAMILY MEDICINE
Payer: MEDICARE

## 2024-05-29 DIAGNOSIS — N30.90 CYSTITIS: ICD-10-CM

## 2024-05-30 LAB
APPEARANCE UR: CLEAR
BACTERIA #/AREA URNS HPF: NEGATIVE /HPF
BILIRUB UR QL STRIP.AUTO: NEGATIVE
COLOR UR: ABNORMAL
EPI CELLS #/AREA URNS HPF: ABNORMAL /HPF
GLUCOSE UR STRIP.AUTO-MCNC: >=1000 MG/DL
HYALINE CASTS #/AREA URNS LPF: ABNORMAL /LPF
KETONES UR STRIP.AUTO-MCNC: ABNORMAL MG/DL
LEUKOCYTE ESTERASE UR QL STRIP.AUTO: NEGATIVE
MICRO URNS: ABNORMAL
NITRITE UR QL STRIP.AUTO: NEGATIVE
PH UR STRIP.AUTO: 5.5 [PH] (ref 5–8)
PROT UR QL STRIP: 100 MG/DL
RBC # URNS HPF: ABNORMAL /HPF
RBC UR QL AUTO: NEGATIVE
SP GR UR STRIP.AUTO: 1.02
UROBILINOGEN UR STRIP.AUTO-MCNC: 1 MG/DL
WBC #/AREA URNS HPF: ABNORMAL /HPF

## 2024-05-31 ENCOUNTER — HOSPITAL ENCOUNTER (OUTPATIENT)
Dept: LAB | Facility: MEDICAL CENTER | Age: 69
End: 2024-05-31
Attending: NURSE PRACTITIONER
Payer: MEDICARE

## 2024-05-31 LAB
ALBUMIN SERPL BCP-MCNC: 3.3 G/DL (ref 3.2–4.9)
ALBUMIN/GLOB SERPL: 1.1 G/DL
ALP SERPL-CCNC: 72 U/L (ref 30–99)
ALT SERPL-CCNC: 14 U/L (ref 2–50)
ANION GAP SERPL CALC-SCNC: 12 MMOL/L (ref 7–16)
AST SERPL-CCNC: 17 U/L (ref 12–45)
BASOPHILS # BLD AUTO: 0.2 % (ref 0–1.8)
BASOPHILS # BLD: 0.01 K/UL (ref 0–0.12)
BILIRUB SERPL-MCNC: 0.4 MG/DL (ref 0.1–1.5)
BUN SERPL-MCNC: 6 MG/DL (ref 8–22)
CALCIUM ALBUM COR SERPL-MCNC: 9.4 MG/DL (ref 8.5–10.5)
CALCIUM SERPL-MCNC: 8.8 MG/DL (ref 8.5–10.5)
CHLORIDE SERPL-SCNC: 104 MMOL/L (ref 96–112)
CO2 SERPL-SCNC: 28 MMOL/L (ref 20–33)
CREAT SERPL-MCNC: 0.68 MG/DL (ref 0.5–1.4)
CRP SERPL HS-MCNC: 3.82 MG/DL (ref 0–0.75)
EOSINOPHIL # BLD AUTO: 0.21 K/UL (ref 0–0.51)
EOSINOPHIL NFR BLD: 3.3 % (ref 0–6.9)
ERYTHROCYTE [DISTWIDTH] IN BLOOD BY AUTOMATED COUNT: 42 FL (ref 35.9–50)
GFR SERPLBLD CREATININE-BSD FMLA CKD-EPI: 94 ML/MIN/1.73 M 2
GLOBULIN SER CALC-MCNC: 3.1 G/DL (ref 1.9–3.5)
GLUCOSE SERPL-MCNC: 159 MG/DL (ref 65–99)
HCT VFR BLD AUTO: 39.9 % (ref 37–47)
HGB BLD-MCNC: 12.8 G/DL (ref 12–16)
IMM GRANULOCYTES # BLD AUTO: 0.03 K/UL (ref 0–0.11)
IMM GRANULOCYTES NFR BLD AUTO: 0.5 % (ref 0–0.9)
LYMPHOCYTES # BLD AUTO: 1.9 K/UL (ref 1–4.8)
LYMPHOCYTES NFR BLD: 29.4 % (ref 22–41)
MCH RBC QN AUTO: 27.7 PG (ref 27–33)
MCHC RBC AUTO-ENTMCNC: 32.1 G/DL (ref 32.2–35.5)
MCV RBC AUTO: 86.4 FL (ref 81.4–97.8)
MONOCYTES # BLD AUTO: 0.32 K/UL (ref 0–0.85)
MONOCYTES NFR BLD AUTO: 5 % (ref 0–13.4)
NEUTROPHILS # BLD AUTO: 3.99 K/UL (ref 1.82–7.42)
NEUTROPHILS NFR BLD: 61.6 % (ref 44–72)
NRBC # BLD AUTO: 0 K/UL
NRBC BLD-RTO: 0 /100 WBC (ref 0–0.2)
PLATELET # BLD AUTO: 361 K/UL (ref 164–446)
PMV BLD AUTO: 9.1 FL (ref 9–12.9)
POTASSIUM SERPL-SCNC: 3.1 MMOL/L (ref 3.6–5.5)
PROT SERPL-MCNC: 6.4 G/DL (ref 6–8.2)
RBC # BLD AUTO: 4.62 M/UL (ref 4.2–5.4)
SODIUM SERPL-SCNC: 144 MMOL/L (ref 135–145)
WBC # BLD AUTO: 6.5 K/UL (ref 4.8–10.8)

## 2024-05-31 PROCEDURE — 86140 C-REACTIVE PROTEIN: CPT

## 2024-05-31 PROCEDURE — 86038 ANTINUCLEAR ANTIBODIES: CPT

## 2024-05-31 PROCEDURE — 36415 COLL VENOUS BLD VENIPUNCTURE: CPT

## 2024-05-31 PROCEDURE — 80053 COMPREHEN METABOLIC PANEL: CPT

## 2024-05-31 PROCEDURE — 85025 COMPLETE CBC W/AUTO DIFF WBC: CPT

## 2024-05-31 PROCEDURE — 83520 IMMUNOASSAY QUANT NOS NONAB: CPT

## 2024-06-02 LAB
NUCLEAR IGG SER QL IA: NORMAL
TRYPTASE SERPL-MCNC: 19.5 UG/L

## 2024-06-05 ENCOUNTER — APPOINTMENT (OUTPATIENT)
Dept: MEDICAL GROUP | Facility: PHYSICIAN GROUP | Age: 69
End: 2024-06-05
Payer: MEDICARE

## 2024-06-05 VITALS
HEIGHT: 65 IN | BODY MASS INDEX: 32.44 KG/M2 | TEMPERATURE: 97.9 F | RESPIRATION RATE: 16 BRPM | HEART RATE: 68 BPM | OXYGEN SATURATION: 94 % | SYSTOLIC BLOOD PRESSURE: 132 MMHG | WEIGHT: 194.7 LBS | DIASTOLIC BLOOD PRESSURE: 78 MMHG

## 2024-06-05 DIAGNOSIS — R05.3 CHRONIC COUGH: ICD-10-CM

## 2024-06-05 DIAGNOSIS — E11.29 TYPE 2 DIABETES MELLITUS WITH MICROALBUMINURIA, WITH LONG-TERM CURRENT USE OF INSULIN (HCC): Chronic | ICD-10-CM

## 2024-06-05 DIAGNOSIS — Z79.4 TYPE 2 DIABETES MELLITUS WITH MICROALBUMINURIA, WITH LONG-TERM CURRENT USE OF INSULIN (HCC): Chronic | ICD-10-CM

## 2024-06-05 DIAGNOSIS — L50.1 CHRONIC IDIOPATHIC URTICARIA: ICD-10-CM

## 2024-06-05 DIAGNOSIS — R80.9 TYPE 2 DIABETES MELLITUS WITH MICROALBUMINURIA, WITH LONG-TERM CURRENT USE OF INSULIN (HCC): Chronic | ICD-10-CM

## 2024-06-05 DIAGNOSIS — R03.0 ELEVATED BP WITHOUT DIAGNOSIS OF HYPERTENSION: ICD-10-CM

## 2024-06-05 DIAGNOSIS — R60.0 PERIPHERAL EDEMA: ICD-10-CM

## 2024-06-05 PROBLEM — N30.00 ACUTE CYSTITIS WITHOUT HEMATURIA: Status: RESOLVED | Noted: 2024-05-17 | Resolved: 2024-06-05

## 2024-06-05 PROCEDURE — G2211 COMPLEX E/M VISIT ADD ON: HCPCS | Performed by: FAMILY MEDICINE

## 2024-06-05 PROCEDURE — 99214 OFFICE O/P EST MOD 30 MIN: CPT | Performed by: FAMILY MEDICINE

## 2024-06-05 PROCEDURE — 3075F SYST BP GE 130 - 139MM HG: CPT | Performed by: FAMILY MEDICINE

## 2024-06-05 PROCEDURE — 3078F DIAST BP <80 MM HG: CPT | Performed by: FAMILY MEDICINE

## 2024-06-05 RX ORDER — SPIRONOLACTONE 25 MG/1
25 TABLET ORAL DAILY
Qty: 90 TABLET | Refills: 3 | Status: SHIPPED | OUTPATIENT
Start: 2024-06-05

## 2024-06-05 ASSESSMENT — FIBROSIS 4 INDEX: FIB4 SCORE: 0.88

## 2024-06-05 NOTE — ASSESSMENT & PLAN NOTE
This is an ongoing issue since she developed urticaria.  She states she had breathing test with and without bronchodilator done at the dermatology office and they determined that she had asthma.  She states she is never had asthma in the past.  They have her on what sounds like a quick acting bronchodilator and also Trelegy.

## 2024-06-05 NOTE — ASSESSMENT & PLAN NOTE
This is a chronic problem.  Patient went to the dermatology office and had an elevated blood pressure there.  I was called by the nurse practitioner last week.  She is here to follow-up on that.  She states her blood pressure was taken manually.  Today her blood pressure is normal.  She has been on a variety of different medications to try to treat her urticaria and it may be a side effect from those.  She is off several of those at the present time and trying something different.

## 2024-06-05 NOTE — PROGRESS NOTES
Subjective:     CC: Here for several issues.    HPI:   Tara presents today with the following medical concerns:    Elevated BP without diagnosis of hypertension  This is a chronic problem.  Patient went to the dermatology office and had an elevated blood pressure there.  I was called by the nurse practitioner last week.  She is here to follow-up on that.  She states her blood pressure was taken manually.  Today her blood pressure is normal.  She has been on a variety of different medications to try to treat her urticaria and it may be a side effect from those.  She is off several of those at the present time and trying something different.    Peripheral edema  This is a new problem.  Patient states is her urticaria went away with the new medication given by the dermatology office she has developed swelling in her lower legs.  She does eat a lot of salt in her diet.  Her recent renal and kidney test have been normal.  She is also been having a cough along with all these other issues that developed over the last several months.    Chronic idiopathic urticaria  This is a chronic problem.  Recent workup with labs through the dermatology office did show an elevated tryptase and her C-reactive protein was elevated.  I went over these results with patient and told her it could just be due to the urticaria and not to something like mastocytosis since her tryptase was less than 20.  But I suspect she should be getting a call from the dermatology office about that.  Her urticaria are now resolved so I suspect this most likely due to some allergy that is undetermined at this time.    Chronic cough  This is an ongoing issue since she developed urticaria.  She states she had breathing test with and without bronchodilator done at the dermatology office and they determined that she had asthma.  She states she is never had asthma in the past.  They have her on what sounds like a quick acting bronchodilator and also  Trelegy.    DM-2, Type 2 diabetes mellitus with microalbuminuria, with long-term current use of insulin ([facility])  This is a chronic problem.  She states that her insulin doses up to 30 units right now.  Her morning fasting seems to be around 140.  Later in the day it does go up.  She is asking if she needs to go any higher.  I told her she might try to get the morning fasting around 130 and then we will see what her next hemoglobin A1c shows in about 10 weeks.  If she needs a renewal on her medications and she may run out early she can let me know and I will do that for her.    Past Medical History:   Diagnosis Date    Arthritis     COVID-19     Diabetes 2011    Edema 2011    Elevated liver enzymes 2011    Gall bladder polyp 2011    GERD (gastroesophageal reflux disease) 2011    Hyperlipidemia 2011    Hypertension 2011    Post-menopausal atrophic vaginitis 2020    Skin lesion of face 2011    Vitamin d deficiency 2011       Social History     Tobacco Use    Smoking status: Former     Current packs/day: 0.00     Average packs/day: 1 pack/day for 20.0 years (20.0 ttl pk-yrs)     Types: Cigarettes     Start date: 1970     Quit date: 1990     Years since quittin.4    Smokeless tobacco: Never    Tobacco comments:     Quit   (Hx 1 ppd x 20 yrs).    Vaping Use    Vaping status: Never Used   Substance Use Topics    Alcohol use: Not Currently     Comment: couple per year    Drug use: No       Current Outpatient Medications Ordered in Epic   Medication Sig Dispense Refill    spironolactone (ALDACTONE) 25 MG Tab Take 1 Tablet by mouth every day. 90 Tablet 3    Continuous Glucose Sensor (FREESTYLE JANET 14 DAY SENSOR) [facility] 1 Application every 14 days. 6 Each 3    Continuous Glucose Sensor (FREESTYLE JANET SENSOR SYSTEM) [facility] Give 1 kit to use sensor every 14 days 1 Each 0    famotidine (PEPCID) 20 MG Tab Take 20 mg by mouth 2 times a day. 40 mg in morning  "and 20mg at night      LANTUS SOLOSTAR 100 UNIT/ML Solution Pen-injector injection Inject 21 Units under the skin every evening. 7 Each 3    cetirizine (ZYRTEC ALLERGY) 10 MG Tab Take 1 Tablet by mouth every day. (Patient taking differently: Take 10 mg by mouth every day. 20mg in the morning and 10mg at night) 30 Tablet 0    atorvastatin (LIPITOR) 80 MG tablet Take 1 Tablet by mouth every evening. TAKE 1 TABLET BY MOUTH EVERY EVENING 100 Tablet 3    omeprazole (PRILOSEC) 20 MG delayed-release capsule Take 1 Capsule by mouth every day. 90 Capsule 3    aspirin 81 MG EC tablet Take 81 mg by mouth every day.      vitamin D3, cholecalciferol, 400 UNIT Tab tablet Take 400 Units by mouth every day.      Multiple Vitamins-Minerals (OCUVITE ADULT 50+ PO) Take 1 Capsule by mouth every day.      EPINEPHRINE, ANAPHYLAXIS, INJ Inject  as directed as needed (anaphlaxis reaction).       No current [facility]-ordered facility-administered medications on file.       Allergies:  Sulfa drugs    Health Maintenance: Completed    ROS:  Gen: no fevers/chills, no changes in weight  Eyes: no changes in vision  ENT: no sore throat, no hearing loss, no bloody nose  Pulm: no sob,   CV: no chest pain, no palpitations  GI: no nausea/vomiting, no diarrhea  : no dysuria  MSk: no myalgias  Skin: no rash  Neuro: no headaches, no numbness/tingling  Heme/Lymph: no easy bruising      Objective:       Exam:  /78 (BP Location: Right arm, Patient Position: Sitting, BP Cuff Size: Adult)   Pulse 68   Temp 36.6 °C (97.9 °F) (Temporal)   Resp 16   Ht 1.651 m (5' 5\")   Wt 88.3 kg (194 lb 11.2 oz)   LMP 12/16/2006   SpO2 94%   BMI 32.40 kg/m²  Body mass index is 32.4 kg/m².    Gen: Alert and oriented, No apparent distress.  Lungs: Normal effort, does have an occasional cough during visit  Ext: No clubbing, cyanosis.  Patient does have +1 to almost +2 pitting edema to the lower extremities      Labs: Recent results from dermatology office reviewed with " patient.    Assessment & Plan:     69 y.o. female with the following -     1. Chronic idiopathic urticaria  This is a chronic problem that is improved.  She no longer has urticaria today.  Will continue to follow through dermatology.    2. 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  We discussed adjusting her insulin dose based on her morning blood sugars.  Next hemoglobin A1c is due in 10 weeks.    3. Peripheral edema  This is a new issue.  I told her it could be due to her excessive salt intake or were all the different medication she has been tried on.  For now we will go ahead and put her on spironolactone at 25 mg a day.  In 3 to 4 days if she is not noticing a decrease in her weight or the edema she can double the dose.  Then she is to send me a [employer] message in 1 to 2 weeks on the response.  I do not think she is having CHF issues.    4. Elevated BP without diagnosis of hypertension  This is a new issue.  Will continue to monitor.  Today her blood pressure is normal.  She was told she can come in anytime for blood pressure check and if it does tend to be higher we will put her on appropriate medications.    5. Chronic cough  This is a chronic problem.  Most likely is related to her underlying issues causing the urticaria.  She is on inhaler so we will monitor that for the present time.      Return if symptoms worsen or fail to improve.    Please note that this dictation was created using voice recognition software. I have made every reasonable attempt to correct obvious errors, but I expect that there are errors of grammar and possibly content that I did not discover before finalizing the note.

## 2024-06-05 NOTE — ASSESSMENT & PLAN NOTE
This is a chronic problem.  She states that her insulin doses up to 30 units right now.  Her morning fasting seems to be around 140.  Later in the day it does go up.  She is asking if she needs to go any higher.  I told her she might try to get the morning fasting around 130 and then we will see what her next hemoglobin A1c shows in about 10 weeks.  If she needs a renewal on her medications and she may run out early she can let me know and I will do that for her.

## 2024-06-05 NOTE — ASSESSMENT & PLAN NOTE
This is a chronic problem.  Recent workup with labs through the dermatology office did show an elevated tryptase and her C-reactive protein was elevated.  I went over these results with patient and told her it could just be due to the urticaria and not to something like mastocytosis since her tryptase was less than 20.  But I suspect she should be getting a call from the dermatology office about that.  Her urticaria are now resolved so I suspect this most likely due to some allergy that is undetermined at this time.

## 2024-06-05 NOTE — ASSESSMENT & PLAN NOTE
This is a new problem.  Patient states is her urticaria went away with the new medication given by the dermatology office she has developed swelling in her lower legs.  She does eat a lot of salt in her diet.  Her recent renal and kidney test have been normal.  She is also been having a cough along with all these other issues that developed over the last several months.

## 2024-06-18 RX ORDER — INSULIN GLARGINE 100 [IU]/ML
45 INJECTION, SOLUTION SUBCUTANEOUS EVERY EVENING
Qty: 12 EACH | Refills: 3 | Status: SHIPPED | OUTPATIENT
Start: 2024-06-18

## 2024-07-29 ENCOUNTER — PATIENT MESSAGE (OUTPATIENT)
Dept: MEDICAL GROUP | Facility: PHYSICIAN GROUP | Age: 69
End: 2024-07-29
Payer: MEDICARE

## 2024-07-29 RX ORDER — SPIRONOLACTONE 25 MG/1
50 TABLET ORAL DAILY
Qty: 90 TABLET | Refills: 0 | Status: SHIPPED | OUTPATIENT
Start: 2024-07-29

## 2024-08-12 ENCOUNTER — PATIENT MESSAGE (OUTPATIENT)
Dept: MEDICAL GROUP | Facility: PHYSICIAN GROUP | Age: 69
End: 2024-08-12
Payer: MEDICARE

## 2024-08-12 DIAGNOSIS — K21.9 GASTROESOPHAGEAL REFLUX DISEASE WITHOUT ESOPHAGITIS: ICD-10-CM

## 2024-09-04 DIAGNOSIS — E11.29 TYPE 2 DIABETES MELLITUS WITH MICROALBUMINURIA, WITH LONG-TERM CURRENT USE OF INSULIN (HCC): Chronic | ICD-10-CM

## 2024-09-04 DIAGNOSIS — Z79.4 TYPE 2 DIABETES MELLITUS WITH MICROALBUMINURIA, WITH LONG-TERM CURRENT USE OF INSULIN (HCC): Chronic | ICD-10-CM

## 2024-09-04 DIAGNOSIS — R80.9 TYPE 2 DIABETES MELLITUS WITH MICROALBUMINURIA, WITH LONG-TERM CURRENT USE OF INSULIN (HCC): Chronic | ICD-10-CM

## 2024-09-05 ENCOUNTER — HOSPITAL ENCOUNTER (OUTPATIENT)
Dept: LAB | Facility: MEDICAL CENTER | Age: 69
End: 2024-09-05
Attending: FAMILY MEDICINE
Payer: MEDICARE

## 2024-09-05 DIAGNOSIS — R80.9 TYPE 2 DIABETES MELLITUS WITH MICROALBUMINURIA, WITH LONG-TERM CURRENT USE OF INSULIN (HCC): Chronic | ICD-10-CM

## 2024-09-05 DIAGNOSIS — Z79.4 TYPE 2 DIABETES MELLITUS WITH MICROALBUMINURIA, WITH LONG-TERM CURRENT USE OF INSULIN (HCC): Chronic | ICD-10-CM

## 2024-09-05 DIAGNOSIS — E11.29 TYPE 2 DIABETES MELLITUS WITH MICROALBUMINURIA, WITH LONG-TERM CURRENT USE OF INSULIN (HCC): Chronic | ICD-10-CM

## 2024-09-05 LAB
EST. AVERAGE GLUCOSE BLD GHB EST-MCNC: 275 MG/DL
HBA1C MFR BLD: 11.2 % (ref 4–5.6)

## 2024-09-05 PROCEDURE — 36415 COLL VENOUS BLD VENIPUNCTURE: CPT

## 2024-09-05 PROCEDURE — 83036 HEMOGLOBIN GLYCOSYLATED A1C: CPT

## 2024-09-06 ENCOUNTER — OFFICE VISIT (OUTPATIENT)
Dept: MEDICAL GROUP | Facility: PHYSICIAN GROUP | Age: 69
End: 2024-09-06
Payer: MEDICARE

## 2024-09-06 VITALS
OXYGEN SATURATION: 95 % | BODY MASS INDEX: 32.65 KG/M2 | DIASTOLIC BLOOD PRESSURE: 74 MMHG | HEIGHT: 65 IN | WEIGHT: 196 LBS | HEART RATE: 68 BPM | TEMPERATURE: 98 F | SYSTOLIC BLOOD PRESSURE: 132 MMHG

## 2024-09-06 DIAGNOSIS — E11.29 TYPE 2 DIABETES MELLITUS WITH MICROALBUMINURIA, WITH LONG-TERM CURRENT USE OF INSULIN (HCC): Chronic | ICD-10-CM

## 2024-09-06 DIAGNOSIS — Z79.4 TYPE 2 DIABETES MELLITUS WITH MICROALBUMINURIA, WITH LONG-TERM CURRENT USE OF INSULIN (HCC): Chronic | ICD-10-CM

## 2024-09-06 DIAGNOSIS — R80.9 TYPE 2 DIABETES MELLITUS WITH MICROALBUMINURIA, WITH LONG-TERM CURRENT USE OF INSULIN (HCC): Chronic | ICD-10-CM

## 2024-09-06 PROCEDURE — 3075F SYST BP GE 130 - 139MM HG: CPT | Performed by: FAMILY MEDICINE

## 2024-09-06 PROCEDURE — 99213 OFFICE O/P EST LOW 20 MIN: CPT | Performed by: FAMILY MEDICINE

## 2024-09-06 PROCEDURE — 3078F DIAST BP <80 MM HG: CPT | Performed by: FAMILY MEDICINE

## 2024-09-06 RX ORDER — INSULIN GLARGINE 100 [IU]/ML
75 INJECTION, SOLUTION SUBCUTANEOUS EVERY EVENING
Qty: 21 EACH | Refills: 3 | Status: SHIPPED | OUTPATIENT
Start: 2024-09-06

## 2024-09-06 RX ORDER — FLUTICASONE PROPIONATE AND SALMETEROL XINAFOATE 230; 21 UG/1; UG/1
AEROSOL, METERED RESPIRATORY (INHALATION)
COMMUNITY
Start: 2024-08-09

## 2024-09-06 RX ORDER — PIOGLITAZONEHYDROCHLORIDE 30 MG/1
30 TABLET ORAL DAILY
Qty: 100 TABLET | Refills: 3 | Status: SHIPPED | OUTPATIENT
Start: 2024-09-06

## 2024-09-06 RX ORDER — SPIRONOLACTONE 100 MG/1
100 TABLET, FILM COATED ORAL DAILY
Qty: 90 TABLET | Refills: 1 | Status: SHIPPED | OUTPATIENT
Start: 2024-09-06

## 2024-09-06 ASSESSMENT — FIBROSIS 4 INDEX: FIB4 SCORE: 0.88

## 2024-09-06 NOTE — PROGRESS NOTES
Subjective:     CC: Here to discuss her diabetes.    HPI:   Tara presents today with the following medical concerns:    DM-2, Type 2 diabetes mellitus with microalbuminuria, with long-term current use of insulin (HCC)  This is a chronic problem.  Patient last hemoglobin A1c is higher at 11.2.  She is here to discuss that.  Previously she had been under good control but on multiple other agents to treat her diabetes.  Those were discontinued because of a sudden onset of urticaria along with some insurance issues and cost.  She has increased her insulin up to 75 units a day now.  She states her blood sugar this morning was 112.  Yesterday it was 190.    Past Medical History:   Diagnosis Date    Arthritis     COVID-19     Diabetes 2011    Edema 2011    Elevated liver enzymes 2011    Gall bladder polyp 2011    GERD (gastroesophageal reflux disease) 2011    Hyperlipidemia 2011    Hypertension 2011    Post-menopausal atrophic vaginitis 2020    Skin lesion of face 2011    Vitamin d deficiency 2011       Social History     Tobacco Use    Smoking status: Former     Current packs/day: 0.00     Average packs/day: 1 pack/day for 20.0 years (20.0 ttl pk-yrs)     Types: Cigarettes     Start date: 1970     Quit date: 1990     Years since quittin.7    Smokeless tobacco: Never    Tobacco comments:     Quit   (Hx 1 ppd x 20 yrs).    Vaping Use    Vaping status: Never Used   Substance Use Topics    Alcohol use: Not Currently     Comment: couple per year    Drug use: No       Current Outpatient Medications Ordered in Epic   Medication Sig Dispense Refill    fluticasone-salmeterol (ADVAIR HFA) 230-21 MCG/ACT inhaler       LANTUS SOLOSTAR 100 UNIT/ML Solution Pen-injector injection Inject 75 Units under the skin every evening. 21 Each 3    pioglitazone (ACTOS) 30 MG Tab Take 1 Tablet by mouth every day. 100 Tablet 3    spironolactone (ALDACTONE) 100 MG Tab Take  "1 Tablet by mouth every day. 90 Tablet 1    omeprazole (PRILOSEC) 20 MG delayed-release capsule Take 1 Capsule by mouth every day. 90 Capsule 3    atorvastatin (LIPITOR) 80 MG tablet Take 1 Tablet by mouth every evening. TAKE 1 TABLET BY MOUTH EVERY EVENING 100 Tablet 3    vitamin D3, cholecalciferol, 400 UNIT Tab tablet Take 400 Units by mouth every day.      Multiple Vitamins-Minerals (OCUVITE ADULT 50+ PO) Take 1 Capsule by mouth every day.      Continuous Glucose Sensor (FREESTYLE JANET 14 DAY SENSOR) [facility] 1 Application every 14 days. 6 Each 3    Continuous Glucose Sensor (FREESTYLE JANET SENSOR SYSTEM) [facility] Give 1 kit to use sensor every 14 days 1 Each 0    EPINEPHRINE, ANAPHYLAXIS, INJ Inject  as directed as needed (anaphlaxis reaction).      aspirin 81 MG EC tablet Take 81 mg by mouth every day.       No current [facility]-ordered facility-administered medications on file.       Allergies:  Sulfa drugs    Health Maintenance: Completed    ROS:  Gen: no fevers/chills, no changes in weight  Eyes: no changes in vision  ENT: no sore throat, no hearing loss, no bloody nose  Pulm: no sob, no cough  CV: no chest pain, no palpitations  GI: no nausea/vomiting, no diarrhea  : no dysuria  MSk: no myalgias  Skin: no rash  Neuro: no headaches, no numbness/tingling  Heme/Lymph: no easy bruising      Objective:       Exam:  /74 (BP Location: Right arm, Patient Position: Sitting, BP Cuff Size: Adult)   Pulse 68   Temp 36.7 °C (98 °F) (Temporal)   Ht 1.651 m (5' 5\")   Wt 88.9 kg (196 lb)   LMP 12/16/2006   SpO2 95%   BMI 32.62 kg/m²  Body mass index is 32.62 kg/m².    Gen: Alert and oriented, No apparent distress.  Lungs: Normal effort,   Ext: No clubbing, cyanosis, edema.      Labs: Results discussed with patient    Assessment & Plan:     69 y.o. female with the following -     1. 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  After " discussion we will go ahead and add back in the Actos and see if she tolerates that.  She is to continue to adjust her insulin based on her fasting blood sugar readings.  Will also do an urgent referral to endocrinology to get her back under their care.  Recheck hemoglobin A1c otherwise in 3 months.  - Referral to Endocrinology  - HEMOGLOBIN A1C; Future      Return if symptoms worsen or fail to improve.    Please note that this dictation was created using voice recognition software. I have made every reasonable attempt to correct obvious errors, but I expect that there are errors of grammar and possibly content that I did not discover before finalizing the note.

## 2024-09-06 NOTE — ASSESSMENT & PLAN NOTE
This is a chronic problem.  Patient last hemoglobin A1c is higher at 11.2.  She is here to discuss that.  Previously she had been under good control but on multiple other agents to treat her diabetes.  Those were discontinued because of a sudden onset of urticaria along with some insurance issues and cost.  She has increased her insulin up to 75 units a day now.  She states her blood sugar this morning was 112.  Yesterday it was 190.

## 2024-10-08 ENCOUNTER — OFFICE VISIT (OUTPATIENT)
Dept: ENDOCRINOLOGY | Facility: MEDICAL CENTER | Age: 69
End: 2024-10-08
Payer: MEDICARE

## 2024-10-08 ENCOUNTER — PHARMACY VISIT (OUTPATIENT)
Dept: PHARMACY | Facility: MEDICAL CENTER | Age: 69
End: 2024-10-08
Payer: COMMERCIAL

## 2024-10-08 VITALS
HEIGHT: 66 IN | BODY MASS INDEX: 32.78 KG/M2 | HEART RATE: 64 BPM | SYSTOLIC BLOOD PRESSURE: 118 MMHG | DIASTOLIC BLOOD PRESSURE: 80 MMHG | OXYGEN SATURATION: 96 % | WEIGHT: 204 LBS

## 2024-10-08 DIAGNOSIS — E78.00 PURE HYPERCHOLESTEROLEMIA: Chronic | ICD-10-CM

## 2024-10-08 DIAGNOSIS — E11.29 TYPE 2 DIABETES MELLITUS WITH MICROALBUMINURIA, WITH LONG-TERM CURRENT USE OF INSULIN (HCC): Chronic | ICD-10-CM

## 2024-10-08 DIAGNOSIS — R80.9 ALBUMINURIA: ICD-10-CM

## 2024-10-08 DIAGNOSIS — R80.9 TYPE 2 DIABETES MELLITUS WITH MICROALBUMINURIA, WITH LONG-TERM CURRENT USE OF INSULIN (HCC): Chronic | ICD-10-CM

## 2024-10-08 DIAGNOSIS — E55.9 VITAMIN D DEFICIENCY: ICD-10-CM

## 2024-10-08 DIAGNOSIS — Z79.4 TYPE 2 DIABETES MELLITUS WITH MICROALBUMINURIA, WITH LONG-TERM CURRENT USE OF INSULIN (HCC): Chronic | ICD-10-CM

## 2024-10-08 PROCEDURE — 95251 CONT GLUC MNTR ANALYSIS I&R: CPT

## 2024-10-08 PROCEDURE — 95249 CONT GLUC MNTR PT PROV EQP: CPT

## 2024-10-08 PROCEDURE — 99214 OFFICE O/P EST MOD 30 MIN: CPT

## 2024-10-08 PROCEDURE — RXMED WILLOW AMBULATORY MEDICATION CHARGE

## 2024-10-08 PROCEDURE — 99212 OFFICE O/P EST SF 10 MIN: CPT | Mod: 25

## 2024-10-08 PROCEDURE — 3074F SYST BP LT 130 MM HG: CPT

## 2024-10-08 PROCEDURE — 3079F DIAST BP 80-89 MM HG: CPT

## 2024-10-08 RX ORDER — SEMAGLUTIDE 0.68 MG/ML
0.5 INJECTION, SOLUTION SUBCUTANEOUS
Qty: 3 ML | Refills: 0 | COMMUNITY
Start: 2024-10-08

## 2024-10-08 RX ORDER — BLOOD-GLUCOSE SENSOR
1 EACH MISCELLANEOUS
Qty: 6 EACH | Refills: 3 | Status: SHIPPED | OUTPATIENT
Start: 2024-10-08

## 2024-10-08 ASSESSMENT — FIBROSIS 4 INDEX: FIB4 SCORE: 0.88

## 2024-10-10 ENCOUNTER — HOSPITAL ENCOUNTER (OUTPATIENT)
Dept: LAB | Facility: MEDICAL CENTER | Age: 69
End: 2024-10-10
Payer: MEDICARE

## 2024-10-10 DIAGNOSIS — E11.29 TYPE 2 DIABETES MELLITUS WITH MICROALBUMINURIA, WITH LONG-TERM CURRENT USE OF INSULIN (HCC): Chronic | ICD-10-CM

## 2024-10-10 DIAGNOSIS — E55.9 VITAMIN D DEFICIENCY: ICD-10-CM

## 2024-10-10 DIAGNOSIS — R80.9 ALBUMINURIA: ICD-10-CM

## 2024-10-10 DIAGNOSIS — R80.9 TYPE 2 DIABETES MELLITUS WITH MICROALBUMINURIA, WITH LONG-TERM CURRENT USE OF INSULIN (HCC): Chronic | ICD-10-CM

## 2024-10-10 DIAGNOSIS — Z79.4 TYPE 2 DIABETES MELLITUS WITH MICROALBUMINURIA, WITH LONG-TERM CURRENT USE OF INSULIN (HCC): Chronic | ICD-10-CM

## 2024-10-10 LAB
25(OH)D3 SERPL-MCNC: 49 NG/ML (ref 30–100)
ALBUMIN SERPL BCP-MCNC: 4 G/DL (ref 3.2–4.9)
ALBUMIN/GLOB SERPL: 1.3 G/DL
ALP SERPL-CCNC: 60 U/L (ref 30–99)
ALT SERPL-CCNC: 29 U/L (ref 2–50)
ANION GAP SERPL CALC-SCNC: 8 MMOL/L (ref 7–16)
AST SERPL-CCNC: 27 U/L (ref 12–45)
BILIRUB SERPL-MCNC: 0.6 MG/DL (ref 0.1–1.5)
BUN SERPL-MCNC: 16 MG/DL (ref 8–22)
CALCIUM ALBUM COR SERPL-MCNC: 9.7 MG/DL (ref 8.5–10.5)
CALCIUM SERPL-MCNC: 9.7 MG/DL (ref 8.5–10.5)
CHLORIDE SERPL-SCNC: 107 MMOL/L (ref 96–112)
CO2 SERPL-SCNC: 27 MMOL/L (ref 20–33)
CREAT SERPL-MCNC: 0.79 MG/DL (ref 0.5–1.4)
GFR SERPLBLD CREATININE-BSD FMLA CKD-EPI: 80 ML/MIN/1.73 M 2
GLOBULIN SER CALC-MCNC: 3 G/DL (ref 1.9–3.5)
GLUCOSE SERPL-MCNC: 82 MG/DL (ref 65–99)
POTASSIUM SERPL-SCNC: 3.9 MMOL/L (ref 3.6–5.5)
PROT SERPL-MCNC: 7 G/DL (ref 6–8.2)
SODIUM SERPL-SCNC: 142 MMOL/L (ref 135–145)
T4 FREE SERPL-MCNC: 1.07 NG/DL (ref 0.93–1.7)
TSH SERPL-ACNC: 3.13 UIU/ML (ref 0.35–5.5)

## 2024-10-10 PROCEDURE — 82306 VITAMIN D 25 HYDROXY: CPT

## 2024-10-10 PROCEDURE — 82570 ASSAY OF URINE CREATININE: CPT

## 2024-10-10 PROCEDURE — 86341 ISLET CELL ANTIBODY: CPT

## 2024-10-10 PROCEDURE — 84443 ASSAY THYROID STIM HORMONE: CPT

## 2024-10-10 PROCEDURE — 84681 ASSAY OF C-PEPTIDE: CPT

## 2024-10-10 PROCEDURE — 80053 COMPREHEN METABOLIC PANEL: CPT

## 2024-10-10 PROCEDURE — 36415 COLL VENOUS BLD VENIPUNCTURE: CPT

## 2024-10-10 PROCEDURE — 82043 UR ALBUMIN QUANTITATIVE: CPT

## 2024-10-10 PROCEDURE — 84439 ASSAY OF FREE THYROXINE: CPT

## 2024-10-11 LAB
C PEPTIDE SERPL-MCNC: 1.3 NG/ML (ref 0.5–3.3)
CREAT UR-MCNC: 228.69 MG/DL
MICROALBUMIN UR-MCNC: 106.1 MG/DL
MICROALBUMIN/CREAT UR: 464 MG/G (ref 0–30)

## 2024-10-13 LAB — GAD65 AB SER IA-ACNC: <5 IU/ML (ref 0–5)

## 2024-10-18 ENCOUNTER — HOSPITAL ENCOUNTER (OUTPATIENT)
Dept: LAB | Facility: MEDICAL CENTER | Age: 69
End: 2024-10-18
Attending: NURSE PRACTITIONER
Payer: MEDICARE

## 2024-10-18 PROCEDURE — 83520 IMMUNOASSAY QUANT NOS NONAB: CPT

## 2024-10-18 PROCEDURE — 36415 COLL VENOUS BLD VENIPUNCTURE: CPT

## 2024-10-18 PROCEDURE — 83088 ASSAY OF HISTAMINE: CPT

## 2024-10-21 LAB — TRYPTASE SERPL-MCNC: 8.5 UG/L

## 2024-10-24 LAB — HISTAMINE SERPL-SCNC: <8 NMOL/L (ref 0–8)

## 2024-10-28 DIAGNOSIS — E78.00 PURE HYPERCHOLESTEROLEMIA: Chronic | ICD-10-CM

## 2024-10-29 RX ORDER — ATORVASTATIN CALCIUM 80 MG/1
80 TABLET, FILM COATED ORAL EVERY EVENING
Qty: 100 TABLET | Refills: 1 | Status: SHIPPED | OUTPATIENT
Start: 2024-10-29

## 2024-11-04 DIAGNOSIS — Z79.4 TYPE 2 DIABETES MELLITUS WITH MICROALBUMINURIA, WITH LONG-TERM CURRENT USE OF INSULIN (HCC): ICD-10-CM

## 2024-11-04 DIAGNOSIS — E11.29 TYPE 2 DIABETES MELLITUS WITH MICROALBUMINURIA, WITH LONG-TERM CURRENT USE OF INSULIN (HCC): ICD-10-CM

## 2024-11-04 DIAGNOSIS — R80.9 TYPE 2 DIABETES MELLITUS WITH MICROALBUMINURIA, WITH LONG-TERM CURRENT USE OF INSULIN (HCC): ICD-10-CM

## 2024-11-05 ENCOUNTER — OFFICE VISIT (OUTPATIENT)
Dept: ENDOCRINOLOGY | Facility: MEDICAL CENTER | Age: 69
End: 2024-11-05
Payer: MEDICARE

## 2024-11-05 VITALS
DIASTOLIC BLOOD PRESSURE: 62 MMHG | OXYGEN SATURATION: 98 % | HEART RATE: 71 BPM | SYSTOLIC BLOOD PRESSURE: 132 MMHG | WEIGHT: 202 LBS | BODY MASS INDEX: 32.6 KG/M2

## 2024-11-05 DIAGNOSIS — R80.9 GRADE A3 ALBUMINURIA: ICD-10-CM

## 2024-11-05 DIAGNOSIS — E55.9 VITAMIN D DEFICIENCY: ICD-10-CM

## 2024-11-05 DIAGNOSIS — E78.00 PURE HYPERCHOLESTEROLEMIA: ICD-10-CM

## 2024-11-05 DIAGNOSIS — R80.9 TYPE 2 DIABETES MELLITUS WITH MICROALBUMINURIA, WITH LONG-TERM CURRENT USE OF INSULIN (HCC): ICD-10-CM

## 2024-11-05 DIAGNOSIS — Z79.4 TYPE 2 DIABETES MELLITUS WITH MICROALBUMINURIA, WITH LONG-TERM CURRENT USE OF INSULIN (HCC): ICD-10-CM

## 2024-11-05 DIAGNOSIS — E11.29 TYPE 2 DIABETES MELLITUS WITH MICROALBUMINURIA, WITH LONG-TERM CURRENT USE OF INSULIN (HCC): ICD-10-CM

## 2024-11-05 LAB
HBA1C MFR BLD: 7.9 % (ref ?–5.8)
POCT INT CON NEG: NEGATIVE
POCT INT CON POS: POSITIVE

## 2024-11-05 PROCEDURE — 3075F SYST BP GE 130 - 139MM HG: CPT

## 2024-11-05 PROCEDURE — 83036 HEMOGLOBIN GLYCOSYLATED A1C: CPT

## 2024-11-05 PROCEDURE — 95249 CONT GLUC MNTR PT PROV EQP: CPT

## 2024-11-05 PROCEDURE — 95251 CONT GLUC MNTR ANALYSIS I&R: CPT

## 2024-11-05 PROCEDURE — 99213 OFFICE O/P EST LOW 20 MIN: CPT | Mod: 25

## 2024-11-05 PROCEDURE — 99214 OFFICE O/P EST MOD 30 MIN: CPT

## 2024-11-05 PROCEDURE — 3078F DIAST BP <80 MM HG: CPT

## 2024-11-05 RX ORDER — SEMAGLUTIDE 0.68 MG/ML
0.5 INJECTION, SOLUTION SUBCUTANEOUS
Qty: 9 ML | Refills: 3 | Status: SHIPPED | OUTPATIENT
Start: 2024-11-05

## 2024-11-05 ASSESSMENT — FIBROSIS 4 INDEX: FIB4 SCORE: 0.97

## 2024-11-05 NOTE — PROGRESS NOTES
RN-CDE Note    Subjective:   Endocrinology Clinic Progress Note  PCP: Walter Robertson III, M.D.    HPI:  Tara Cueva is a 69 y.o. old patient who is seen today by the Diabetes Nurse Specialist for review of her uncontrolled type 2 diabetes with long term use of insulin.    Recent changes in health:   DM:   Last A1c:   Lab Results   Component Value Date/Time    HBA1C 11.2 (H) 09/05/2024 06:04 AM      Previous A1c was 8.3 on 5/16/204  A1C GOAL: < 7    Diabetes Medications:   Lantus units per day  Pioglitazone 30 mg daily  Ozempic 0.5 mg weekly         Exercise:   Diet:   Patient's body mass index is unknown because there is no height or weight on file. Exercise and nutrition counseling were performed at this visit.    Glucose monitoring frequency:     Hypoglycemic episodes:      Last Retinal Exam: on file and up-to-date  Daily Foot Exam:    Foot Exam:  Monofilament: current.   Lab Results   Component Value Date/Time    MALBCRT 464 (H) 10/10/2024 06:17 AM    MICROALBUR 106.1 10/10/2024 06:17 AM        ACR Albumin/Creatinine Ratio goal <30     HTN:   Blood pressure goal <130/<80   Currently Rx ACE/ARB:  no    Dyslipidemia:    Lab Results   Component Value Date/Time    CHOLSTRLTOT 184 05/16/2024 06:41 AM    LDL 89 05/16/2024 06:41 AM    HDL 59 05/16/2024 06:41 AM    TRIGLYCERIDE 180 (H) 05/16/2024 06:41 AM         Currently Rx Statin:   Atorvastatin 80 mg daily    She  reports that she quit smoking about 34 years ago. Her smoking use included cigarettes. She started smoking about 54 years ago. She has a 20 pack-year smoking history. She has never used smokeless tobacco.    Plan:     Discussed and educated on:       Recommended medication changes:

## 2024-11-05 NOTE — PROGRESS NOTES
Chief Complaint:  Consult requested by Walter Robertson III, M.D. for initial evaluation of Type 2 Diabetes Mellitus    HPI:   Tara Cueva is a 69 y.o. female with Type 2 Diabetes Mellitus diagnosed in 30 years ago.  She denies hospitalizations for DKA in the past.    1.Diabetes Type 2  POC A1c on 11/5/24 is 7.9  POC A1c on 9/5/24 was 11.2  POC A1c on 5/16/24 was 8.3   Latest Reference Range & Units 10/10/24 06:17   C-Peptide 0.5 - 3.3 ng/mL 1.3      Latest Reference Range & Units 10/10/24 06:17   CALVIN Antibody 0.0 - 5.0 IU/mL <5.0     Current Medication:  Lantus 60 units   Actos 30 mg daily  Ozempic 0.25 mg weekly    She was previously on Jardiance glipizide and victoza. She was told to stop all of them when she developed hives. It is unclear which one of these medications caused the hives.     She was on metformin years ago and did not tolerate due to GI side effects.       She reports having multiple episodes of hypoglycemia during night and so she self decreased her insulin to 60 units.   She  denies hypoglycemic unawareness.     Weight has been stable    Diet: mabry and eggs, salads, .  Activity: walking    Diabetes Complications   She  denies history of retinopathy.    She denies laser eye surgery.   Last eye exam: December 2023.    She denies history of peripheral sensory neuropathy.    She denies numbness, tingling in both feet.    She denies history of foot sores.     2. Albuminuria   She is not on ACE/ARB   Latest Reference Range & Units 10/10/24 06:17   Micro Alb Creat Ratio 0 - 30 mg/g 464 (H)   Creatinine, Urine mg/dL 228.69      Latest Reference Range & Units 10/10/24 06:17   Potassium 3.6 - 5.5 mmol/L 3.9      Latest Reference Range & Units 10/10/24 06:17   Bun 8 - 22 mg/dL 16   Creatinine 0.50 - 1.40 mg/dL 0.79   GFR (CKD-EPI) >60 mL/min/1.73 m 2 80     3. Dyslipidemia  She is currently taking atorvastatin 80 mg every evening   Latest Reference Range & Units 05/16/24 06:41   Cholesterol,Tot 100 -  199 mg/dL 184   Triglycerides 0 - 149 mg/dL 180 (H)   HDL >=40 mg/dL 59   LDL <100 mg/dL 89      Latest Reference Range & Units 10/10/24 06:17   Free T-4 0.93 - 1.70 ng/dL 1.07   TSH 0.350 - 5.500 uIU/mL 3.130     4. Vitamin D deficiency  Currently taking vitamin D 3 5000 IU daily    Latest Reference Range & Units 10/10/24 06:17   25-Hydroxy   Vitamin D 25 30 - 100 ng/mL 49     ROS:     CONS:     No fever, no chills, no weight loss, no fatigue   EYES:      No diplopia, no blurry vision, no redness of eyes, no swelling of eyelids   ENT:    No hearing loss, No ear pain, No sore throat, no dysphagia, no neck swelling   CV:     No chest pain, no palpitations, no claudication, no orthopnea, no PND   PULM:    No SOB, no cough, no hemoptysis, no wheezing    GI:   No nausea, no vomiting, no diarrhea, no constipation, no bloody stools   :  Passing urine well, no dysuria, no hematuria   ENDO:   No polyuria, no polydipsia, no heat intolerance, no cold intolerance   NEURO: No headaches, no dizziness, no convulsions, no tremors   MUSC:  No joint swellings, no arthralgias, no myalgias, no weakness   SKIN:   No rash, no ulcers, no dry skin   PSYCH:   No depression, no anxiety, no difficulty sleeping       Past Medical History:  Patient Active Problem List    Diagnosis Date Noted    Peripheral edema 06/05/2024    Chronic cough 06/05/2024    Chronic idiopathic urticaria 04/17/2024    Elevated BP without diagnosis of hypertension 02/02/2024    Primary osteoarthritis involving multiple joints 11/14/2022    Obesity (BMI 30.0-34.9) 11/04/2022    Murmur, cardiac 01/28/2022    Former smoker 07/26/2021    Post-menopausal atrophic vaginitis 04/17/2020    DM-2, Type 2 diabetes mellitus with microalbuminuria, with long-term current use of insulin (HCC) 02/02/2018    Vitamin B12 deficiency 08/24/2017    Gall bladder polyp 08/12/2011    Hyperlipidemia 05/12/2011    Vitamin D deficiency 05/12/2011    GERD (gastroesophageal reflux disease)  05/12/2011       Past Surgical History:  Past Surgical History:   Procedure Laterality Date    OPEN REDUCTION      OTHER ORTHOPEDIC SURGERY      right arm ORIF    PRIMARY C SECTION      TUBAL COAGULATION LAPAROSCOPIC BILATERAL          Allergies:  Sulfa drugs     Current Medications:    Current Outpatient Medications:     Semaglutide,0.25 or 0.5MG/DOS, (OZEMPIC, 0.25 OR 0.5 MG/DOSE,) 2 MG/3ML Solution Pen-injector, Inject 0.5 mg under the skin every 7 days., Disp: 9 mL, Rfl: 3    atorvastatin (LIPITOR) 80 MG tablet, Take 1 Tablet by mouth every evening. TAKE 1 TABLET BY MOUTH EVERY EVENING, Disp: 100 Tablet, Rfl: 1    Continuous Glucose Sensor (FREESTYLE JANET 3 SENSOR) Misc, 1 Each every 14 days., Disp: 6 Each, Rfl: 3    fluticasone-salmeterol (ADVAIR HFA) 230-21 MCG/ACT inhaler, , Disp: , Rfl:     LANTUS SOLOSTAR 100 UNIT/ML Solution Pen-injector injection, Inject 75 Units under the skin every evening., Disp: 21 Each, Rfl: 3    pioglitazone (ACTOS) 30 MG Tab, Take 1 Tablet by mouth every day., Disp: 100 Tablet, Rfl: 3    spironolactone (ALDACTONE) 100 MG Tab, Take 1 Tablet by mouth every day., Disp: 90 Tablet, Rfl: 1    omeprazole (PRILOSEC) 20 MG delayed-release capsule, Take 1 Capsule by mouth every day., Disp: 90 Capsule, Rfl: 3    EPINEPHRINE, ANAPHYLAXIS, INJ, Inject  as directed as needed (anaphlaxis reaction)., Disp: , Rfl:     aspirin 81 MG EC tablet, Take 81 mg by mouth every day., Disp: , Rfl:     vitamin D3, cholecalciferol, 400 UNIT Tab tablet, Take 400 Units by mouth every day., Disp: , Rfl:     Multiple Vitamins-Minerals (OCUVITE ADULT 50+ PO), Take 1 Capsule by mouth every day., Disp: , Rfl:     Social History:  Social History     Socioeconomic History    Marital status:      Spouse name: Not on file    Number of children: Not on file    Years of education: Not on file    Highest education level: Some college, no degree   Occupational History    Not on file   Tobacco Use    Smoking status:  Former     Current packs/day: 0.00     Average packs/day: 1 pack/day for 20.0 years (20.0 ttl pk-yrs)     Types: Cigarettes     Start date: 1970     Quit date: 1990     Years since quittin.8    Smokeless tobacco: Never    Tobacco comments:     Quit   (Hx 1 ppd x 20 yrs).    Vaping Use    Vaping status: Never Used   Substance and Sexual Activity    Alcohol use: Not Currently     Comment: couple per year    Drug use: No    Sexual activity: Not Currently     Comment:    Other Topics Concern    Not on file   Social History Narrative    Not on file     Social Drivers of Health     Financial Resource Strain: Low Risk  (1/15/2024)    Overall Financial Resource Strain (CARDIA)     Difficulty of Paying Living Expenses: Not hard at all   Food Insecurity: No Food Insecurity (1/15/2024)    Hunger Vital Sign     Worried About Running Out of Food in the Last Year: Never true     Ran Out of Food in the Last Year: Never true   Transportation Needs: No Transportation Needs (1/15/2024)    PRAPARE - Transportation     Lack of Transportation (Medical): No     Lack of Transportation (Non-Medical): No   Physical Activity: Insufficiently Active (2022)    Exercise Vital Sign     Days of Exercise per Week: 3 days     Minutes of Exercise per Session: 30 min   Stress: No Stress Concern Present (2022)    [employer] - Occupational Stress Questionnaire     Feeling of Stress : Not at all   Social Connections: Socially Isolated (2022)    Social Connection and Isolation Panel [NHANES]     Frequency of Communication with Friends and Family: More than three times a week     Frequency of Social Gatherings with Friends and Family: More than three times a week     Attends Yazidi Services: Never     Active Member of Clubs or Organizations: No     Attends Club or Organization Meetings: Never     Marital Status:    Intimate Partner Violence: Not on file   Housing Stability: Low  Risk  (1/15/2024)    Housing Stability Vital Sign     Unable to Pay for Housing in the Last Year: No     Number of Places Lived in the Last Year: 1     Unstable Housing in the Last Year: No        Family History:   Family History   Problem Relation Age of Onset    Cancer Maternal Grandmother         lung     Cancer Mother         Breast Cancer twice & Skin cancers    Other Father         urinary issues    GI Disease Sister     Heart Disease Brother     Cancer Brother         Prostate cancer    Cancer Maternal Grandfather         Lung Cancer       PHYSICAL EXAM:   Vital signs: /62 (BP Location: Left arm, Patient Position: Sitting, BP Cuff Size: Adult)   Pulse 71   Wt 91.6 kg (202 lb)   LMP 12/16/2006   SpO2 98%   BMI 32.60 kg/m²   GENERAL: Well-developed, well-nourished  in no apparent distress.   EYE: No ocular and eyelid asymmetry, Anicteric sclerae,  PERRL, No exophthalmos or lidlag  HENT: Hearing grossly intact, Normocephalic, atraumatic. Pink, moist mucous membranes, No exudate  NECK: Supple. Trachea midline.   CARDIOVASCULAR: Regular rate and rhythm. No murmurs, rubs, or gallops.   LUNGS: Clear to auscultation bilaterally   ABDOMEN: Soft, nontender with positive bowel sounds.   EXTREMITIES: No clubbing, cyanosis, or edema.   NEUROLOGICAL: Cranial nerves II-XII are grossly intact   Symmetric reflexes at the patella no proximal muscle weakness, No visible tremor with both outstretched hands  LYMPH: No cervical, supraclavicular,  adenopathy palpated.   SKIN: No rashes, lesions. Turgor is normal.  FOOT: Normal sensation to monofilament testing, normal pulses, no ulcers.  Normal Vibration quantitative sensation test.    Labs:  Lab Results   Component Value Date/Time    HBA1C 11.2 (H) 09/05/2024 0604    AVGLUC 275 09/05/2024 0604       Lab Results   Component Value Date/Time    WBC 6.5 05/31/2024 07:16 AM    RBC 4.62 05/31/2024 07:16 AM    HEMOGLOBIN 12.8 05/31/2024 07:16 AM    MCV 86.4 05/31/2024 07:16 AM  "   MCH 27.7 05/31/2024 07:16 AM    MCHC 32.1 (L) 05/31/2024 07:16 AM    RDW 42.0 05/31/2024 07:16 AM    MPV 9.1 05/31/2024 07:16 AM       Lab Results   Component Value Date/Time    SODIUM 142 10/10/2024 06:17 AM    POTASSIUM 3.9 10/10/2024 06:17 AM    CHLORIDE 107 10/10/2024 06:17 AM    CO2 27 10/10/2024 06:17 AM    ANION 8.0 10/10/2024 06:17 AM    GLUCOSE 82 10/10/2024 06:17 AM    BUN 16 10/10/2024 06:17 AM    CREATININE 0.79 10/10/2024 06:17 AM    CALCIUM 9.7 10/10/2024 06:17 AM    ASTSGOT 27 10/10/2024 06:17 AM    ALTSGPT 29 10/10/2024 06:17 AM    TBILIRUBIN 0.6 10/10/2024 06:17 AM    ALBUMIN 4.0 10/10/2024 06:17 AM    TOTPROTEIN 7.0 10/10/2024 06:17 AM    GLOBULIN 3.0 10/10/2024 06:17 AM    AGRATIO 1.3 10/10/2024 06:17 AM       Lab Results   Component Value Date/Time    CHOLSTRLTOT 184 05/16/2024 0641    TRIGLYCERIDE 180 (H) 05/16/2024 0641    HDL 59 05/16/2024 0641    LDL 89 05/16/2024 0641       Lab Results   Component Value Date/Time    MALBCRT 464 (H) 10/10/2024 06:17 AM    MICROALBUR 106.1 10/10/2024 06:17 AM        Lab Results   Component Value Date/Time    TSHULTRASEN 2.970 04/01/2024 0710     No results found for: \"FREEDIR\"  No results found for: \"FREET3\"  No results found for: \"THYSTIMIG\"      ASSESSMENT/PLAN:   1. Type 2 diabetes mellitus with microalbuminuria, with long-term current use of insulin (HCC)  Unstable  A1c 11.2  Medication:  Ozempic 0.25 mg weekly - change to ozempic 0.5 mg weekly  Lantus 60 units at bedtime - continue  Actos 30 mg daily - continue  Cgm downloaded and reviewed  We discussed possibility to reintroducing Jardiance for better control of her blood sugars.   Recommend diet low in carbs and fats  Recommend 30 mins of activity daily  Stay well hydrated.   - POCT Hemoglobin A1C  - POCT Hemoglobin A1C  - Semaglutide,0.25 or 0.5MG/DOS, (OZEMPIC, 0.25 OR 0.5 MG/DOSE,) 2 MG/3ML Solution Pen-injector; Inject 0.5 mg under the skin every 7 days.  Dispense: 9 mL; Refill: 3  - TSH; " Future  - FREE THYROXINE; Future    2. Grade A3 albuminuria  Unstable  We discussed starting kerenida, however she is on spirolactone. She will reach out to her pcp to see if she can have an alternative as she was taking it for swelling.   - Comp Metabolic Panel; Future  - MICROALBUMIN CREAT RATIO URINE; Future    3. Vitamin D deficiency  Stable  Continue current regimen- see HPI  - VITAMIN D,25 HYDROXY (DEFICIENCY); Future    4. Pure hypercholesterolemia  Stable  Continue current regimen- see HPI      Return in about 4 months (around 3/5/2025). Patient will have blood work done prior to follow up in 4 months       This patient during there office visit was started on new medication.  Side effects of new medications were discussed with the patient today in the office. The patient was supplied paperwork on this new medication.    Thank you kindly for allowing me to participate in the diabetes care plan for this patient.    Amrit Harris, APRN   11/5/24    CC:   Walter Robertson III, M.D.

## 2024-11-06 DIAGNOSIS — R80.9 GRADE A3 ALBUMINURIA: ICD-10-CM

## 2024-11-06 RX ORDER — FINERENONE 20 MG/1
10 TABLET, FILM COATED ORAL DAILY
Qty: 15 TABLET | Refills: 0 | COMMUNITY
Start: 2024-11-06

## 2024-11-11 DIAGNOSIS — R80.9 GRADE A3 ALBUMINURIA: ICD-10-CM

## 2024-11-11 DIAGNOSIS — R80.9 ALBUMINURIA: ICD-10-CM

## 2024-11-11 RX ORDER — FINERENONE 10 MG/1
10 TABLET, FILM COATED ORAL DAILY
Qty: 90 TABLET | Refills: 3 | Status: SHIPPED | OUTPATIENT
Start: 2024-11-11

## 2024-11-12 ENCOUNTER — PHARMACY VISIT (OUTPATIENT)
Dept: PHARMACY | Facility: MEDICAL CENTER | Age: 69
End: 2024-11-12
Payer: COMMERCIAL

## 2024-11-12 PROCEDURE — RXMED WILLOW AMBULATORY MEDICATION CHARGE

## 2024-11-14 ENCOUNTER — TELEPHONE (OUTPATIENT)
Dept: ENDOCRINOLOGY | Facility: MEDICAL CENTER | Age: 69
End: 2024-11-14
Payer: MEDICARE

## 2024-11-14 NOTE — TELEPHONE ENCOUNTER
Received Refill PA request via  [facility]  for Kerendia 10mg Tablet. (Quantity:30, Day Supply:30)  Insurance: SCP/[employer] D  Member ID: [id number]  BIN: [id number]  PCN: [id number]  Group: [facility]   Ran Test claim via [facility] & medication Rejects stating prior authorization is required.    Prior Authorization has been submitted via Cover My Meds: Key ([id number])  Will follow up in 24-48 business hours.     Thank you,  Janel Deshpande, [facility]  Endocrinology Pharmacy Coordinator

## 2024-11-14 NOTE — TELEPHONE ENCOUNTER
Prior Authorization for Kerendia 10mg Tablet  has been APPROVED  Prior Authorization reference number: [id number]  Effective dates: 11/14/2024-11/14/2025  Copay: $162.31/30-day supply  Is patient eligible to fill with [facility]? Yes  Next Steps: I called patient @ [phone] and advised her of the approval. Patient is currently filling prescription at preferred pharmacy: [id number].    Thank you,  Janel Deshpande, [facility]  Endocrinology Pharmacy Coordinator

## 2024-12-18 ENCOUNTER — TELEPHONE (OUTPATIENT)
Dept: HEALTH INFORMATION MANAGEMENT | Facility: OTHER | Age: 69
End: 2024-12-18
Payer: MEDICARE

## 2025-03-05 ENCOUNTER — TELEPHONE (OUTPATIENT)
Dept: ENDOCRINOLOGY | Facility: MEDICAL CENTER | Age: 70
End: 2025-03-05
Payer: MEDICARE

## 2025-03-07 ENCOUNTER — HOSPITAL ENCOUNTER (OUTPATIENT)
Dept: LAB | Facility: MEDICAL CENTER | Age: 70
End: 2025-03-07
Payer: MEDICARE

## 2025-03-07 DIAGNOSIS — Z79.4 TYPE 2 DIABETES MELLITUS WITH MICROALBUMINURIA, WITH LONG-TERM CURRENT USE OF INSULIN (HCC): ICD-10-CM

## 2025-03-07 DIAGNOSIS — R80.9 GRADE A3 ALBUMINURIA: ICD-10-CM

## 2025-03-07 DIAGNOSIS — E11.29 TYPE 2 DIABETES MELLITUS WITH MICROALBUMINURIA, WITH LONG-TERM CURRENT USE OF INSULIN (HCC): ICD-10-CM

## 2025-03-07 DIAGNOSIS — R80.9 TYPE 2 DIABETES MELLITUS WITH MICROALBUMINURIA, WITH LONG-TERM CURRENT USE OF INSULIN (HCC): ICD-10-CM

## 2025-03-07 DIAGNOSIS — E55.9 VITAMIN D DEFICIENCY: ICD-10-CM

## 2025-03-07 LAB
25(OH)D3 SERPL-MCNC: 49 NG/ML (ref 30–100)
ALBUMIN SERPL BCP-MCNC: 3.9 G/DL (ref 3.2–4.9)
ALBUMIN/GLOB SERPL: 1.3 G/DL
ALP SERPL-CCNC: 66 U/L (ref 30–99)
ALT SERPL-CCNC: 26 U/L (ref 2–50)
ANION GAP SERPL CALC-SCNC: 10 MMOL/L (ref 7–16)
AST SERPL-CCNC: 31 U/L (ref 12–45)
BILIRUB SERPL-MCNC: 0.6 MG/DL (ref 0.1–1.5)
BUN SERPL-MCNC: 9 MG/DL (ref 8–22)
CALCIUM ALBUM COR SERPL-MCNC: 9.5 MG/DL (ref 8.5–10.5)
CALCIUM SERPL-MCNC: 9.4 MG/DL (ref 8.5–10.5)
CHLORIDE SERPL-SCNC: 106 MMOL/L (ref 96–112)
CO2 SERPL-SCNC: 27 MMOL/L (ref 20–33)
CREAT SERPL-MCNC: 0.82 MG/DL (ref 0.5–1.4)
CREAT UR-MCNC: 94.3 MG/DL
FASTING STATUS PATIENT QL REPORTED: NORMAL
GFR SERPLBLD CREATININE-BSD FMLA CKD-EPI: 77 ML/MIN/1.73 M 2
GLOBULIN SER CALC-MCNC: 3.1 G/DL (ref 1.9–3.5)
GLUCOSE SERPL-MCNC: 76 MG/DL (ref 65–99)
MICROALBUMIN UR-MCNC: 77.3 MG/DL
MICROALBUMIN/CREAT UR: 820 MG/G (ref 0–30)
POTASSIUM SERPL-SCNC: 3.3 MMOL/L (ref 3.6–5.5)
PROT SERPL-MCNC: 7 G/DL (ref 6–8.2)
SODIUM SERPL-SCNC: 143 MMOL/L (ref 135–145)
T4 FREE SERPL-MCNC: 1.01 NG/DL (ref 0.93–1.7)
TSH SERPL-ACNC: 2.35 UIU/ML (ref 0.35–5.5)

## 2025-03-07 PROCEDURE — 80053 COMPREHEN METABOLIC PANEL: CPT

## 2025-03-07 PROCEDURE — 84443 ASSAY THYROID STIM HORMONE: CPT

## 2025-03-07 PROCEDURE — 36415 COLL VENOUS BLD VENIPUNCTURE: CPT

## 2025-03-07 PROCEDURE — 82306 VITAMIN D 25 HYDROXY: CPT

## 2025-03-07 PROCEDURE — 82043 UR ALBUMIN QUANTITATIVE: CPT

## 2025-03-07 PROCEDURE — 82570 ASSAY OF URINE CREATININE: CPT

## 2025-03-07 PROCEDURE — 84439 ASSAY OF FREE THYROXINE: CPT

## 2025-03-10 NOTE — PROGRESS NOTES
RN-CDE Note    Subjective:   Endocrinology Clinic Progress Note  PCP: Walter Robertson III, M.D.    HPI:  Tara Cueva is a 69 y.o. old patient who is seen today by the Diabetes Nurse Specialist for review of her type 2 diabetes with long term use of insulin.    Recent changes in health:  feels as if she has UTI, complains of frequency and a little hematuria.    DM:   Last A1c:   Lab Results   Component Value Date/Time    HBA1C 6.2 (A) 03/11/2025 09:04 AM      Previous A1c was 7.9 on 11/5/2024  A1C GOAL: < 7    Diabetes Medications:   Lantus 50 units per day  Pioglitazone 30 mg daily  Ozempic 0.5 mg weekly         Exercise: not much, does state she just moved and now has stairs.   Diet: states she eats mostly protein and not many carbs.   Patient's body mass index is 31.83 kg/m². Exercise and nutrition counseling were performed at this visit.    Glucose monitoring frequency:  using Freestyle Lexx     Hypoglycemic episodes: no     Last Retinal Exam: on file and up-to-date  Daily Foot Exam: Yes   Foot Exam:  Monofilament testing with a 10 gram force: sensation intact: intact bilaterally  Visual Inspection: Feet without maceration, ulcers, fissures.  Pedal pulses: intact bilaterally    Lab Results   Component Value Date/Time    MALBCRT 820 (H) 03/07/2025 06:50 AM    MICROALBUR 77.3 03/07/2025 06:50 AM        ACR Albumin/Creatinine Ratio goal <30     HTN:   Blood pressure goal <130/<80   Currently Rx ACE/ARB:  no    Dyslipidemia:    Lab Results   Component Value Date/Time    CHOLSTRLTOT 184 05/16/2024 06:41 AM    LDL 89 05/16/2024 06:41 AM    HDL 59 05/16/2024 06:41 AM    TRIGLYCERIDE 180 (H) 05/16/2024 06:41 AM         Currently Rx Statin:  Atorvastatin 80 mg daily    She  reports that she quit smoking about 35 years ago. Her smoking use included cigarettes. She started smoking about 55 years ago. She has a 20 pack-year smoking history. She has never used smokeless tobacco.    Plan:     Discussed and educated on:   -  All medications, side effects and compliance (discussed carefully)  - Foot Care:   - HbA1C: target  - Home glucose monitoring emphasized  - Weight control and daily exercise    Recommended medication changes: increase Ozempic to 1 mg

## 2025-03-11 ENCOUNTER — OFFICE VISIT (OUTPATIENT)
Dept: ENDOCRINOLOGY | Facility: MEDICAL CENTER | Age: 70
End: 2025-03-11
Payer: MEDICARE

## 2025-03-11 ENCOUNTER — PHARMACY VISIT (OUTPATIENT)
Dept: PHARMACY | Facility: MEDICAL CENTER | Age: 70
End: 2025-03-11
Payer: COMMERCIAL

## 2025-03-11 VITALS
WEIGHT: 197.2 LBS | HEIGHT: 66 IN | HEART RATE: 93 BPM | OXYGEN SATURATION: 93 % | DIASTOLIC BLOOD PRESSURE: 74 MMHG | BODY MASS INDEX: 31.69 KG/M2 | SYSTOLIC BLOOD PRESSURE: 116 MMHG

## 2025-03-11 DIAGNOSIS — R80.9 GRADE A3 ALBUMINURIA: ICD-10-CM

## 2025-03-11 DIAGNOSIS — E11.29 TYPE 2 DIABETES MELLITUS WITH MICROALBUMINURIA, WITH LONG-TERM CURRENT USE OF INSULIN (HCC): ICD-10-CM

## 2025-03-11 DIAGNOSIS — E55.9 VITAMIN D DEFICIENCY: ICD-10-CM

## 2025-03-11 DIAGNOSIS — R80.9 TYPE 2 DIABETES MELLITUS WITH MICROALBUMINURIA, WITH LONG-TERM CURRENT USE OF INSULIN (HCC): ICD-10-CM

## 2025-03-11 DIAGNOSIS — E78.00 PURE HYPERCHOLESTEROLEMIA: ICD-10-CM

## 2025-03-11 DIAGNOSIS — N30.01 ACUTE CYSTITIS WITH HEMATURIA: ICD-10-CM

## 2025-03-11 DIAGNOSIS — Z79.4 TYPE 2 DIABETES MELLITUS WITH MICROALBUMINURIA, WITH LONG-TERM CURRENT USE OF INSULIN (HCC): ICD-10-CM

## 2025-03-11 LAB
HBA1C MFR BLD: 6.2 % (ref ?–5.8)
POCT INT CON NEG: NEGATIVE
POCT INT CON POS: POSITIVE

## 2025-03-11 PROCEDURE — 99212 OFFICE O/P EST SF 10 MIN: CPT

## 2025-03-11 PROCEDURE — 99214 OFFICE O/P EST MOD 30 MIN: CPT

## 2025-03-11 PROCEDURE — 83036 HEMOGLOBIN GLYCOSYLATED A1C: CPT

## 2025-03-11 PROCEDURE — 95249 CONT GLUC MNTR PT PROV EQP: CPT

## 2025-03-11 PROCEDURE — 95251 CONT GLUC MNTR ANALYSIS I&R: CPT

## 2025-03-11 PROCEDURE — RXMED WILLOW AMBULATORY MEDICATION CHARGE

## 2025-03-11 PROCEDURE — 3074F SYST BP LT 130 MM HG: CPT

## 2025-03-11 PROCEDURE — 3078F DIAST BP <80 MM HG: CPT

## 2025-03-11 RX ORDER — NITROFURANTOIN 25; 75 MG/1; MG/1
100 CAPSULE ORAL 2 TIMES DAILY
Qty: 10 CAPSULE | Refills: 0 | Status: SHIPPED | OUTPATIENT
Start: 2025-03-11 | End: 2025-03-16

## 2025-03-11 RX ORDER — FINERENONE 20 MG/1
20 TABLET, FILM COATED ORAL DAILY
Qty: 60 TABLET | Refills: 0 | COMMUNITY
Start: 2025-03-11

## 2025-03-11 RX ORDER — SEMAGLUTIDE 1.34 MG/ML
1 INJECTION, SOLUTION SUBCUTANEOUS
Qty: 9 ML | Refills: 2 | Status: SHIPPED | OUTPATIENT
Start: 2025-03-11

## 2025-03-11 ASSESSMENT — FIBROSIS 4 INDEX: FIB4 SCORE: 1.18

## 2025-03-11 NOTE — PROGRESS NOTES
Chief Complaint:  Consult requested by Walter Robertson III, M.D. for initial evaluation of Type 2 Diabetes Mellitus    HPI:   Tara Cueva is a 69 y.o. female with Type 2 Diabetes Mellitus diagnosed in 30 years ago.  She denies hospitalizations for DKA in the past.    1.Diabetes Type 2  POC A1c on 3/11/25 is 6.2  POC A1c on 11/5/24 was 7.9  POC A1c on 9/5/24 was 11.2  POC A1c on 5/16/24 was 8.3   Latest Reference Range & Units 10/10/24 06:17   C-Peptide 0.5 - 3.3 ng/mL 1.3      Latest Reference Range & Units 10/10/24 06:17   CALVIN Antibody 0.0 - 5.0 IU/mL <5.0     Current Medication:  Lantus 50 units   Actos 30 mg daily  Ozempic 0.5 mg weekly    She was previously on Jardiance glipizide and victoza. She was told to stop all of them when she developed hives. It is unclear which one of these medications caused the hives.     She was on metformin years ago and did not tolerate due to GI side effects.       She reports having two times a week  She  denies hypoglycemic unawareness.     Weight has been stable    Diet: mabry and eggs, salads, .  Activity: walking    Diabetes Complications   She  denies history of retinopathy.    She denies laser eye surgery.   Last eye exam: Nov 2024    She denies history of peripheral sensory neuropathy.    She denies numbness, tingling in both feet.    She denies history of foot sores.     2. Albuminuria   Currently taking kerendia 10 mg daily  She was on kerendia for 3-4 months than stopped taking it due to expenses. She states her insurance charged her 400 dollars for the first delivery and 700 for second she stopped.    Latest Reference Range & Units 03/07/25 06:50   Micro Alb Creat Ratio 0 - 30 mg/g 820 (H)   Creatinine, Urine mg/dL 94.30   Microalbumin, Urine Random mg/dL 77.3        Latest Reference Range & Units 03/07/25 06:51   Potassium 3.6 - 5.5 mmol/L 3.3 (L)        Latest Reference Range & Units 03/07/25 06:51   Bun 8 - 22 mg/dL 9   Creatinine 0.50 - 1.40 mg/dL 0.82   GFR  (CKD-EPI) >60 mL/min/1.73 m 2 77     3. Dyslipidemia  She is currently taking atorvastatin 80 mg every evening   Latest Reference Range & Units 05/16/24 06:41   Cholesterol,Tot 100 - 199 mg/dL 184   Triglycerides 0 - 149 mg/dL 180 (H)   HDL >=40 mg/dL 59   LDL <100 mg/dL 89      Latest Reference Range & Units 03/07/25 06:51   TSH 0.350 - 5.500 uIU/mL 2.350   Free T-4 0.93 - 1.70 ng/dL 1.01     4. Vitamin D deficiency  Currently taking vitamin D 3 5000 IU daily    Latest Reference Range & Units 03/07/25 06:51   25-Hydroxy   Vitamin D 25 30 - 100 ng/mL 49     ROS:     CONS:     No fever, no chills, no weight loss, no fatigue   EYES:      No diplopia, no blurry vision, no redness of eyes, no swelling of eyelids   ENT:    No hearing loss, No ear pain, No sore throat, no dysphagia, no neck swelling   CV:     No chest pain, no palpitations, no claudication, no orthopnea, no PND   PULM:    No SOB, no cough, no hemoptysis, no wheezing    GI:   No nausea, no vomiting, no diarrhea, no constipation, no bloody stools   :  Passing urine well, no dysuria, no hematuria   ENDO:   No polyuria, no polydipsia, no heat intolerance, no cold intolerance   NEURO: No headaches, no dizziness, no convulsions, no tremors   MUSC:  No joint swellings, no arthralgias, no myalgias, no weakness   SKIN:   No rash, no ulcers, no dry skin   PSYCH:   No depression, no anxiety, no difficulty sleeping       Past Medical History:  Patient Active Problem List    Diagnosis Date Noted    Peripheral edema 06/05/2024    Chronic cough 06/05/2024    Chronic idiopathic urticaria 04/17/2024    Elevated BP without diagnosis of hypertension 02/02/2024    Primary osteoarthritis involving multiple joints 11/14/2022    Obesity (BMI 30.0-34.9) 11/04/2022    Murmur, cardiac 01/28/2022    Former smoker 07/26/2021    Post-menopausal atrophic vaginitis 04/17/2020    DM-2, Type 2 diabetes mellitus with microalbuminuria, with long-term current use of insulin (HCC)  02/02/2018    Vitamin B12 deficiency 08/24/2017    Gall bladder polyp 08/12/2011    Hyperlipidemia 05/12/2011    Vitamin D deficiency 05/12/2011    GERD (gastroesophageal reflux disease) 05/12/2011       Past Surgical History:  Past Surgical History:   Procedure Laterality Date    OPEN REDUCTION      OTHER ORTHOPEDIC SURGERY      right arm ORIF    PRIMARY C SECTION      TUBAL COAGULATION LAPAROSCOPIC BILATERAL          Allergies:  Sulfa drugs     Current Medications:    Current Outpatient Medications:     Semaglutide, 1 MG/DOSE, (OZEMPIC, 1 MG/DOSE,) 4 MG/3ML Solution Pen-injector, Inject 1 mg under the skin every 7 days., Disp: 9 mL, Rfl: 2    Finerenone (KERENDIA) 20 MG Tab, Take 20 mg by mouth every day., Disp: 60 Tablet, Rfl: 0    nitrofurantoin (MACROBID) 100 MG Cap, Take 1 Capsule by mouth 2 times a day for 5 days., Disp: 10 Capsule, Rfl: 0    atorvastatin (LIPITOR) 80 MG tablet, Take 1 Tablet by mouth every evening. TAKE 1 TABLET BY MOUTH EVERY EVENING, Disp: 100 Tablet, Rfl: 1    fluticasone-salmeterol (ADVAIR HFA) 230-21 MCG/ACT inhaler, , Disp: , Rfl:     LANTUS SOLOSTAR 100 UNIT/ML Solution Pen-injector injection, Inject 75 Units under the skin every evening. (Patient taking differently: Inject 50 Units under the skin every evening.), Disp: 21 Each, Rfl: 3    pioglitazone (ACTOS) 30 MG Tab, Take 1 Tablet by mouth every day., Disp: 100 Tablet, Rfl: 3    omeprazole (PRILOSEC) 20 MG delayed-release capsule, Take 1 Capsule by mouth every day., Disp: 90 Capsule, Rfl: 3    aspirin 81 MG EC tablet, Take 81 mg by mouth every day., Disp: , Rfl:     vitamin D3, cholecalciferol, 400 UNIT Tab tablet, Take 400 Units by mouth every day., Disp: , Rfl:     Multiple Vitamins-Minerals (OCUVITE ADULT 50+ PO), Take 1 Capsule by mouth every day., Disp: , Rfl:     Continuous Glucose Sensor (FREESTYLE JANET 3 SENSOR) Misc, 1 Each every 14 days., Disp: 6 Each, Rfl: 3    EPINEPHRINE, ANAPHYLAXIS, INJ, Inject  as directed as  needed (anaphlaxis reaction)., Disp: , Rfl:     Social History:  Social History     Socioeconomic History    Marital status:      Spouse name: Not on file    Number of children: Not on file    Years of education: Not on file    Highest education level: Some college, no degree   Occupational History    Not on file   Tobacco Use    Smoking status: Former     Current packs/day: 0.00     Average packs/day: 1 pack/day for 20.0 years (20.0 ttl pk-yrs)     Types: Cigarettes     Start date: 1970     Quit date: 1990     Years since quittin.2    Smokeless tobacco: Never    Tobacco comments:     Quit   (Hx 1 ppd x 20 yrs).    Vaping Use    Vaping status: Never Used   Substance and Sexual Activity    Alcohol use: Not Currently     Comment: couple per year    Drug use: No    Sexual activity: Not Currently     Comment:    Other Topics Concern    Not on file   Social History Narrative    Not on file     Social Drivers of Health     Financial Resource Strain: Low Risk  (1/15/2024)    Overall Financial Resource Strain (CARDIA)     Difficulty of Paying Living Expenses: Not hard at all   Food Insecurity: No Food Insecurity (1/15/2024)    Hunger Vital Sign     Worried About Running Out of Food in the Last Year: Never true     Ran Out of Food in the Last Year: Never true   Transportation Needs: No Transportation Needs (1/15/2024)    PRAPARE - Transportation     Lack of Transportation (Medical): No     Lack of Transportation (Non-Medical): No   Physical Activity: Insufficiently Active (2022)    Exercise Vital Sign     Days of Exercise per Week: 3 days     Minutes of Exercise per Session: 30 min   Stress: No Stress Concern Present (2022)    [place] - Occupational Stress Questionnaire     Feeling of Stress : Not at all   Social Connections: Socially Isolated (2022)    Social Connection and Isolation Panel [NHANES]     Frequency of Communication with Friends and  "Family: More than three times a week     Frequency of Social Gatherings with Friends and Family: More than three times a week     Attends Yarsani Services: Never     Active Member of Clubs or Organizations: No     Attends Club or Organization Meetings: Never     Marital Status:    Intimate Partner Violence: Not on file   Housing Stability: Low Risk  (1/15/2024)    Housing Stability Vital Sign     Unable to Pay for Housing in the Last Year: No     Number of Places Lived in the Last Year: 1     Unstable Housing in the Last Year: No        Family History:   Family History   Problem Relation Age of Onset    Cancer Maternal Grandmother         lung     Cancer Mother         Breast Cancer twice & Skin cancers    Other Father         urinary issues    GI Disease Sister     Heart Disease Brother     Cancer Brother         Prostate cancer    Cancer Maternal Grandfather         Lung Cancer       PHYSICAL EXAM:   Vital signs: /74 (BP Location: Right arm, Patient Position: Sitting, BP Cuff Size: Large adult)   Pulse 93   Ht 1.676 m (5' 6\")   Wt 89.4 kg (197 lb 3.2 oz)   LMP 12/16/2006   SpO2 93%   BMI 31.83 kg/m²   GENERAL: Well-developed, well-nourished  in no apparent distress.   EYE: No ocular and eyelid asymmetry, Anicteric sclerae,  PERRL, No exophthalmos or lidlag  HENT: Hearing grossly intact, Normocephalic, atraumatic. Pink, moist mucous membranes, No exudate  NECK: Supple. Trachea midline.   CARDIOVASCULAR: Regular rate and rhythm. No murmurs, rubs, or gallops.   LUNGS: Clear to auscultation bilaterally   ABDOMEN: Soft, nontender with positive bowel sounds.   EXTREMITIES: No clubbing, cyanosis, or edema.   NEUROLOGICAL: Cranial nerves II-XII are grossly intact   Symmetric reflexes at the patella no proximal muscle weakness, No visible tremor with both outstretched hands  LYMPH: No cervical, supraclavicular,  adenopathy palpated.   SKIN: No rashes, lesions. Turgor is normal.  FOOT: Normal sensation " "to monofilament testing, normal pulses, no ulcers.  Normal Vibration quantitative sensation test.    Labs:  Lab Results   Component Value Date/Time    HBA1C 11.2 (H) 09/05/2024 0604    AVGLUC 275 09/05/2024 0604       Lab Results   Component Value Date/Time    WBC 6.5 05/31/2024 07:16 AM    RBC 4.62 05/31/2024 07:16 AM    HEMOGLOBIN 12.8 05/31/2024 07:16 AM    MCV 86.4 05/31/2024 07:16 AM    MCH 27.7 05/31/2024 07:16 AM    MCHC 32.1 (L) 05/31/2024 07:16 AM    RDW 42.0 05/31/2024 07:16 AM    MPV 9.1 05/31/2024 07:16 AM       Lab Results   Component Value Date/Time    SODIUM 143 03/07/2025 06:51 AM    POTASSIUM 3.3 (L) 03/07/2025 06:51 AM    CHLORIDE 106 03/07/2025 06:51 AM    CO2 27 03/07/2025 06:51 AM    ANION 10.0 03/07/2025 06:51 AM    GLUCOSE 76 03/07/2025 06:51 AM    BUN 9 03/07/2025 06:51 AM    CREATININE 0.82 03/07/2025 06:51 AM    CALCIUM 9.4 03/07/2025 06:51 AM    ASTSGOT 31 03/07/2025 06:51 AM    ALTSGPT 26 03/07/2025 06:51 AM    TBILIRUBIN 0.6 03/07/2025 06:51 AM    ALBUMIN 3.9 03/07/2025 06:51 AM    TOTPROTEIN 7.0 03/07/2025 06:51 AM    GLOBULIN 3.1 03/07/2025 06:51 AM    AGRATIO 1.3 03/07/2025 06:51 AM       Lab Results   Component Value Date/Time    CHOLSTRLTOT 184 05/16/2024 0641    TRIGLYCERIDE 180 (H) 05/16/2024 0641    HDL 59 05/16/2024 0641    LDL 89 05/16/2024 0641       Lab Results   Component Value Date/Time    MALBCRT 820 (H) 03/07/2025 06:50 AM    MICROALBUR 77.3 03/07/2025 06:50 AM        Lab Results   Component Value Date/Time    KIMBERLY 2.970 04/01/2024 0710     No results found for: \"FREEDIR\"  No results found for: \"FREET3\"  No results found for: \"THYSTIMIG\"      ASSESSMENT/PLAN:   1. Type 2 diabetes mellitus with microalbuminuria, with long-term current use of insulin ([facility])  Stable  A1c 6.2  Medication:  Ozempic 0.5 mg weekly - change to ozempic 1 mg weekly  Lantus 50 units at bedtime - titatrate down by 1 units for blood sugars less than 80 3ams consecutively  Actos 30 mg daily - " continue  Cgm downloaded and reviewed  We discussed possibility to reintroducing Jardiance for better control of her blood sugars.   Recommend diet low in carbs and fats  Recommend 30 mins of activity daily  Stay well hydrated.   - POCT Hemoglobin A1C  - Semaglutide, 1 MG/DOSE, (OZEMPIC, 1 MG/DOSE,) 4 MG/3ML Solution Pen-injector; Inject 1 mg under the skin every 7 days.  Dispense: 9 mL; Refill: 2  - Comp Metabolic Panel; Future  - TSH; Future  - FREE THYROXINE; Future    2. Grade A3 albuminuria  Usntable  Medication:  Kerendia 10 mg daily - stop  Kerendia 20 mg daily - start  Patient will  kerendia sample at [facility] [address] [facility]  - Finerenone (KERENDIA) 20 MG Tab; Take 20 mg by mouth every day.  Dispense: 60 Tablet; Refill: 0  - MICROALBUMIN CREAT RATIO URINE; Future    3. Acute cystitis with hematuria  Unstable  Medication:  Macrobid 100 mg BID - start  - nitrofurantoin (MACROBID) 100 MG Cap; Take 1 Capsule by mouth 2 times a day for 5 days.  Dispense: 10 Capsule; Refill: 0    4. Pure hypercholesterolemia  Stable  Continue current regimen- see HPI   - Lipid Profile; Future    5. Vitamin D deficiency  Stable  Continue current regimen- see HPI  - VITAMIN D,25 HYDROXY (DEFICIENCY); Future       Return in about 3 months (around 6/11/2025). Patient will have blood work done prior to follow up in 3 months       This patient during there office visit was started on new medication.  Side effects of new medications were discussed with the patient today in the office. The patient was supplied paperwork on this new medication.    Thank you kindly for allowing me to participate in the diabetes care plan for this patient.    Amrit Harris, APRN   3/11/25    CC:   Walter Robertson III, M.D.

## 2025-03-12 PROCEDURE — RXMED WILLOW AMBULATORY MEDICATION CHARGE

## 2025-03-14 ENCOUNTER — PHARMACY VISIT (OUTPATIENT)
Dept: PHARMACY | Facility: MEDICAL CENTER | Age: 70
End: 2025-03-14
Payer: COMMERCIAL

## 2025-04-16 PROCEDURE — RXMED WILLOW AMBULATORY MEDICATION CHARGE

## 2025-04-17 ENCOUNTER — PHARMACY VISIT (OUTPATIENT)
Dept: PHARMACY | Facility: MEDICAL CENTER | Age: 70
End: 2025-04-17
Payer: COMMERCIAL

## 2025-05-11 DIAGNOSIS — E78.00 PURE HYPERCHOLESTEROLEMIA: Chronic | ICD-10-CM

## 2025-05-11 DIAGNOSIS — R80.9 GRADE A3 ALBUMINURIA: ICD-10-CM

## 2025-05-12 DIAGNOSIS — R80.9 GRADE A3 ALBUMINURIA: ICD-10-CM

## 2025-05-12 PROCEDURE — RXMED WILLOW AMBULATORY MEDICATION CHARGE

## 2025-05-12 RX ORDER — FINERENONE 20 MG/1
20 TABLET, FILM COATED ORAL DAILY
Qty: 90 TABLET | Refills: 3 | Status: SHIPPED | OUTPATIENT
Start: 2025-05-12

## 2025-05-12 RX ORDER — FINERENONE 20 MG/1
20 TABLET, FILM COATED ORAL DAILY
Qty: 60 TABLET | Refills: 0 | Status: SHIPPED | OUTPATIENT
Start: 2025-05-12

## 2025-05-12 NOTE — TELEPHONE ENCOUNTER
Received request via: Pharmacy    Was the patient seen in the last year in this department? Yes    Does the patient have an active prescription (recently filled or refills available) for medication(s) requested? No    Pharmacy Name: [id number] - [place], [place] - [address] [id number]      Does the patient have detention Plus and need 100-day supply? (This applies to ALL medications) Yes, quantity updated to 100 days

## 2025-05-13 ENCOUNTER — PHARMACY VISIT (OUTPATIENT)
Dept: PHARMACY | Facility: MEDICAL CENTER | Age: 70
End: 2025-05-13
Payer: COMMERCIAL

## 2025-05-13 RX ORDER — ATORVASTATIN CALCIUM 80 MG/1
80 TABLET, FILM COATED ORAL EVERY EVENING
Qty: 100 TABLET | Refills: 0 | Status: SHIPPED | OUTPATIENT
Start: 2025-05-13

## 2025-06-13 ENCOUNTER — OFFICE VISIT (OUTPATIENT)
Dept: MEDICAL GROUP | Facility: PHYSICIAN GROUP | Age: 70
End: 2025-06-13
Payer: MEDICARE

## 2025-06-13 VITALS
WEIGHT: 196.7 LBS | SYSTOLIC BLOOD PRESSURE: 126 MMHG | HEIGHT: 66 IN | BODY MASS INDEX: 31.61 KG/M2 | TEMPERATURE: 97.9 F | OXYGEN SATURATION: 95 % | DIASTOLIC BLOOD PRESSURE: 72 MMHG | HEART RATE: 86 BPM | RESPIRATION RATE: 16 BRPM

## 2025-06-13 DIAGNOSIS — R01.1 MURMUR, CARDIAC: ICD-10-CM

## 2025-06-13 DIAGNOSIS — E11.29 TYPE 2 DIABETES MELLITUS WITH MICROALBUMINURIA, WITH LONG-TERM CURRENT USE OF INSULIN (HCC): Chronic | ICD-10-CM

## 2025-06-13 DIAGNOSIS — Z00.00 ADULT GENERAL MEDICAL EXAM: ICD-10-CM

## 2025-06-13 DIAGNOSIS — R80.9 TYPE 2 DIABETES MELLITUS WITH MICROALBUMINURIA, WITH LONG-TERM CURRENT USE OF INSULIN (HCC): Chronic | ICD-10-CM

## 2025-06-13 DIAGNOSIS — Z79.4 TYPE 2 DIABETES MELLITUS WITH MICROALBUMINURIA, WITH LONG-TERM CURRENT USE OF INSULIN (HCC): Chronic | ICD-10-CM

## 2025-06-13 DIAGNOSIS — Z12.31 ENCOUNTER FOR SCREENING MAMMOGRAM FOR MALIGNANT NEOPLASM OF BREAST: ICD-10-CM

## 2025-06-13 PROBLEM — R03.0 ELEVATED BP WITHOUT DIAGNOSIS OF HYPERTENSION: Status: RESOLVED | Noted: 2024-02-02 | Resolved: 2025-06-13

## 2025-06-13 PROCEDURE — RXMED WILLOW AMBULATORY MEDICATION CHARGE

## 2025-06-13 PROCEDURE — 3078F DIAST BP <80 MM HG: CPT | Performed by: FAMILY MEDICINE

## 2025-06-13 PROCEDURE — 99214 OFFICE O/P EST MOD 30 MIN: CPT | Performed by: FAMILY MEDICINE

## 2025-06-13 PROCEDURE — 3074F SYST BP LT 130 MM HG: CPT | Performed by: FAMILY MEDICINE

## 2025-06-13 ASSESSMENT — PATIENT HEALTH QUESTIONNAIRE - PHQ9: CLINICAL INTERPRETATION OF PHQ2 SCORE: 0

## 2025-06-13 ASSESSMENT — FIBROSIS 4 INDEX: FIB4 SCORE: 1.2

## 2025-06-13 NOTE — ASSESSMENT & PLAN NOTE
Patient is here for her physical exam.  She gets her labs done through endocrinology clinic.  She is having no complaints on today's visit.

## 2025-06-13 NOTE — PROGRESS NOTES
"Subjective:     CC: Patient is here for her annual follow-up.    HPI:   Tara presents today with the following medical concerns:    Adult general medical exam  Patient is here for her physical exam.  She gets her labs done through endocrinology clinic.  She is having no complaints on today's visit.    DM-2, Type 2 diabetes mellitus with microalbuminuria, with long-term current use of insulin (HCC)  This chronic problem.  Well-controlled through endocrinology.    Murmur, cardiac  Chronic problem.  No Current symptoms    Past Medical History[1]    Social History[2]    Current Medications and Prescriptions Ordered in Epic[3]    Allergies:  Sulfa drugs    Health Maintenance: Completed    ROS:  Gen: no fevers/chills, no changes in weight  Eyes: no changes in vision  ENT: no sore throat, no hearing loss, no bloody nose  Pulm: no sob, no cough  CV: no chest pain, no palpitations  GI: no nausea/vomiting, no diarrhea  : no dysuria  MSk: no myalgias  Skin: no rash  Neuro: no headaches, no numbness/tingling  Heme/Lymph: no easy bruising      Objective:       Exam:  /72 (BP Location: Right arm, Patient Position: Sitting, BP Cuff Size: Adult)   Pulse 86   Temp 36.6 °C (97.9 °F) (Temporal)   Resp 16   Ht 1.676 m (5' 6\")   Wt 89.2 kg (196 lb 11.2 oz)   LMP 12/16/2006   SpO2 95%   BMI 31.75 kg/m²  Body mass index is 31.75 kg/m².    Gen: Alert and oriented, No apparent distress.  Eyes: Extraocular motions intact.  No scleral icterus seen.  Ears:   Ear canals and TMs are clear.  Neck: Neck is supple without lymphadenopathy.  Thyroid exam is normal.  Lungs: Normal effort, CTA bilaterally, no wheezes, rhonchi, or rales  CV: Regular rate and rhythm. No  rubs, or gallops.  No carotid bruits heard.  2/6 to 3/6 systolic murmur loudest in aortic area.  Abdomen: Soft, nontender, no again megaly or masses.  Ext: No clubbing, cyanosis, edema.  Neuro: Cranial nerves II through VIII are gross intact.  No lateralized signs are " seen.  Gait is normal.    Labs: Labs done in March reviewed    Assessment & Plan:     70 y.o. female with the following -     1. Adult general medical exam  Patient is exam was performed.  She declined referral for her DEXA scan.  She did agree to a mammogram and that will be ordered.  General health issues addressed.  Previous labs reviewed.    2. Murmur, cardiac  This is a chronic problem.  Continue to monitor for symptoms.  Discussed with patient.    3. Encounter for screening mammogram for malignant neoplasm of breast  The screening nature.  Mammogram ordered.    - MA-SCREENING MAMMO BILAT W/CAD; Future    4. DM-2, Type 2 diabetes mellitus with microalbuminuria, with long-term current use of insulin (HCC) (Primary)  This is a chronic stable condition under good control.  Continue follow-up with endocrinology.        Return in about 6 months (around 2025) for Long.    Please note that this dictation was created using voice recognition software. I have made every reasonable attempt to correct obvious errors, but I expect that there are errors of grammar and possibly content that I did not discover before finalizing the note.             [1]   Past Medical History:  Diagnosis Date    Arthritis     COVID-19     Diabetes 2011    Edema 2011    Elevated liver enzymes 2011    Gall bladder polyp 2011    GERD (gastroesophageal reflux disease) 2011    Hyperlipidemia 2011    Hypertension 2011    Post-menopausal atrophic vaginitis 2020    Skin lesion of face 2011    Vitamin d deficiency 2011   [2]   Social History  Tobacco Use    Smoking status: Former     Current packs/day: 0.00     Average packs/day: 1 pack/day for 20.0 years (20.0 ttl pk-yrs)     Types: Cigarettes     Start date: 1970     Quit date: 1990     Years since quittin.4    Smokeless tobacco: Never    Tobacco comments:     Quit   (Hx 1 ppd x 20 yrs).    Vaping Use    Vaping status:  Never Used   Substance Use Topics    Alcohol use: Not Currently     Comment: couple per year    Drug use: No   [3]   Current Outpatient Medications Ordered in Epic   Medication Sig Dispense Refill    Non Formulary Request KETOTIFEN 2MG   Gets at compounded pharmacy      atorvastatin (LIPITOR) 80 MG tablet Take 1 Tablet by mouth every evening. TAKE 1 TABLET BY MOUTH EVERY EVENING 100 Tablet 0    Finerenone (KERENDIA) 20 MG Tab Take 20 mg by mouth every day. 90 Tablet 3    Semaglutide, 1 MG/DOSE, (OZEMPIC, 1 MG/DOSE,) 4 MG/3ML Solution Pen-injector Inject 1 mg under the skin every 7 days. 9 mL 2    Continuous Glucose Sensor (FREESTYLE JANET 3 SENSOR) Misc 1 Each every 14 days. 6 Each 3    LANTUS SOLOSTAR 100 UNIT/ML Solution Pen-injector injection Inject 75 Units under the skin every evening. (Patient taking differently: Inject 50 Units under the skin every evening.) 21 Each 3    pioglitazone (ACTOS) 30 MG Tab Take 1 Tablet by mouth every day. 100 Tablet 3    omeprazole (PRILOSEC) 20 MG delayed-release capsule Take 1 Capsule by mouth every day. 90 Capsule 3    aspirin 81 MG EC tablet Take 81 mg by mouth every day.      vitamin D3, cholecalciferol, 400 UNIT Tab tablet Take 400 Units by mouth every day.      Multiple Vitamins-Minerals (OCUVITE ADULT 50+ PO) Take 1 Capsule by mouth every day.       No current [facility]-ordered facility-administered medications on file.

## 2025-06-16 ENCOUNTER — PHARMACY VISIT (OUTPATIENT)
Dept: PHARMACY | Facility: MEDICAL CENTER | Age: 70
End: 2025-06-16
Payer: COMMERCIAL

## 2025-06-16 ENCOUNTER — HOSPITAL ENCOUNTER (OUTPATIENT)
Dept: LAB | Facility: MEDICAL CENTER | Age: 70
End: 2025-06-16
Payer: MEDICARE

## 2025-06-16 DIAGNOSIS — E55.9 VITAMIN D DEFICIENCY: ICD-10-CM

## 2025-06-16 DIAGNOSIS — E11.29 TYPE 2 DIABETES MELLITUS WITH MICROALBUMINURIA, WITH LONG-TERM CURRENT USE OF INSULIN (HCC): ICD-10-CM

## 2025-06-16 DIAGNOSIS — E78.00 PURE HYPERCHOLESTEROLEMIA: ICD-10-CM

## 2025-06-16 DIAGNOSIS — Z79.4 TYPE 2 DIABETES MELLITUS WITH MICROALBUMINURIA, WITH LONG-TERM CURRENT USE OF INSULIN (HCC): ICD-10-CM

## 2025-06-16 DIAGNOSIS — R80.9 GRADE A3 ALBUMINURIA: ICD-10-CM

## 2025-06-16 DIAGNOSIS — R80.9 TYPE 2 DIABETES MELLITUS WITH MICROALBUMINURIA, WITH LONG-TERM CURRENT USE OF INSULIN (HCC): ICD-10-CM

## 2025-06-16 LAB
25(OH)D3 SERPL-MCNC: 57 NG/ML (ref 30–100)
ALBUMIN SERPL BCP-MCNC: 4 G/DL (ref 3.2–4.9)
ALBUMIN/GLOB SERPL: 1.1 G/DL
ALP SERPL-CCNC: 69 U/L (ref 30–99)
ALT SERPL-CCNC: 20 U/L (ref 2–50)
ANION GAP SERPL CALC-SCNC: 12 MMOL/L (ref 7–16)
AST SERPL-CCNC: 24 U/L (ref 12–45)
BILIRUB SERPL-MCNC: 0.4 MG/DL (ref 0.1–1.5)
BUN SERPL-MCNC: 11 MG/DL (ref 8–22)
CALCIUM ALBUM COR SERPL-MCNC: 9.9 MG/DL (ref 8.5–10.5)
CALCIUM SERPL-MCNC: 9.9 MG/DL (ref 8.5–10.5)
CHLORIDE SERPL-SCNC: 105 MMOL/L (ref 96–112)
CHOLEST SERPL-MCNC: 128 MG/DL (ref 100–199)
CO2 SERPL-SCNC: 26 MMOL/L (ref 20–33)
CREAT SERPL-MCNC: 0.89 MG/DL (ref 0.5–1.4)
CREAT UR-MCNC: 141 MG/DL
GFR SERPLBLD CREATININE-BSD FMLA CKD-EPI: 69 ML/MIN/1.73 M 2
GLOBULIN SER CALC-MCNC: 3.6 G/DL (ref 1.9–3.5)
GLUCOSE SERPL-MCNC: 84 MG/DL (ref 65–99)
HDLC SERPL-MCNC: 43 MG/DL
LDLC SERPL CALC-MCNC: 40 MG/DL
MICROALBUMIN UR-MCNC: 16.5 MG/DL
MICROALBUMIN/CREAT UR: 117 MG/G (ref 0–30)
POTASSIUM SERPL-SCNC: 3.9 MMOL/L (ref 3.6–5.5)
PROT SERPL-MCNC: 7.6 G/DL (ref 6–8.2)
SODIUM SERPL-SCNC: 143 MMOL/L (ref 135–145)
T4 FREE SERPL-MCNC: 1.05 NG/DL (ref 0.93–1.7)
TRIGL SERPL-MCNC: 224 MG/DL (ref 0–149)
TSH SERPL-ACNC: 3.17 UIU/ML (ref 0.38–5.33)

## 2025-06-16 PROCEDURE — 80053 COMPREHEN METABOLIC PANEL: CPT

## 2025-06-16 PROCEDURE — 80061 LIPID PANEL: CPT

## 2025-06-16 PROCEDURE — 82306 VITAMIN D 25 HYDROXY: CPT

## 2025-06-16 PROCEDURE — 82043 UR ALBUMIN QUANTITATIVE: CPT

## 2025-06-16 PROCEDURE — 84439 ASSAY OF FREE THYROXINE: CPT

## 2025-06-16 PROCEDURE — 82570 ASSAY OF URINE CREATININE: CPT

## 2025-06-16 PROCEDURE — 36415 COLL VENOUS BLD VENIPUNCTURE: CPT

## 2025-06-16 PROCEDURE — 84443 ASSAY THYROID STIM HORMONE: CPT

## 2025-06-17 ENCOUNTER — OFFICE VISIT (OUTPATIENT)
Dept: ENDOCRINOLOGY | Facility: MEDICAL CENTER | Age: 70
End: 2025-06-17
Payer: MEDICARE

## 2025-06-17 VITALS
DIASTOLIC BLOOD PRESSURE: 80 MMHG | HEART RATE: 81 BPM | SYSTOLIC BLOOD PRESSURE: 126 MMHG | HEIGHT: 66 IN | WEIGHT: 184.5 LBS | OXYGEN SATURATION: 94 % | BODY MASS INDEX: 29.65 KG/M2

## 2025-06-17 DIAGNOSIS — E11.29 TYPE 2 DIABETES MELLITUS WITH MICROALBUMINURIA, WITH LONG-TERM CURRENT USE OF INSULIN (HCC): Primary | ICD-10-CM

## 2025-06-17 DIAGNOSIS — R80.9 TYPE 2 DIABETES MELLITUS WITH MICROALBUMINURIA, WITH LONG-TERM CURRENT USE OF INSULIN (HCC): Primary | ICD-10-CM

## 2025-06-17 DIAGNOSIS — E78.5 DYSLIPIDEMIA: ICD-10-CM

## 2025-06-17 DIAGNOSIS — Z79.4 TYPE 2 DIABETES MELLITUS WITH MICROALBUMINURIA, WITH LONG-TERM CURRENT USE OF INSULIN (HCC): Primary | ICD-10-CM

## 2025-06-17 DIAGNOSIS — R80.9 GRADE A2 ALBUMINURIA: ICD-10-CM

## 2025-06-17 DIAGNOSIS — E55.9 VITAMIN D DEFICIENCY: ICD-10-CM

## 2025-06-17 LAB
HBA1C MFR BLD: 6.2 % (ref ?–5.8)
POCT INT CON NEG: NEGATIVE
POCT INT CON POS: POSITIVE

## 2025-06-17 PROCEDURE — 83036 HEMOGLOBIN GLYCOSYLATED A1C: CPT

## 2025-06-17 PROCEDURE — 99213 OFFICE O/P EST LOW 20 MIN: CPT

## 2025-06-17 PROCEDURE — 3074F SYST BP LT 130 MM HG: CPT

## 2025-06-17 PROCEDURE — 99214 OFFICE O/P EST MOD 30 MIN: CPT

## 2025-06-17 PROCEDURE — 3079F DIAST BP 80-89 MM HG: CPT

## 2025-06-17 PROCEDURE — 95251 CONT GLUC MNTR ANALYSIS I&R: CPT

## 2025-06-17 PROCEDURE — RXMED WILLOW AMBULATORY MEDICATION CHARGE

## 2025-06-17 RX ORDER — FINERENONE 20 MG/1
20 TABLET, FILM COATED ORAL DAILY
Qty: 90 TABLET | Refills: 3 | Status: SHIPPED | OUTPATIENT
Start: 2025-06-17

## 2025-06-17 ASSESSMENT — FIBROSIS 4 INDEX: FIB4 SCORE: 1.06

## 2025-06-17 NOTE — PROGRESS NOTES
Chief Complaint:  Consult requested by Walter Robertson III, M.D. for initial evaluation of Type 2 Diabetes Mellitus    HPI:   Tara Cueva is a 69 y.o. female with Type 2 Diabetes Mellitus diagnosed in 30 years ago.  She denies hospitalizations for DKA in the past.    1.Diabetes Type 2  POC A1c on 6/17/25 is 6.2  POC A1c on 3/11/25 was 6.2  POC A1c on 11/5/24 was 7.9  POC A1c on 9/5/24 was 11.2  POC A1c on 5/16/24 was 8.3   Latest Reference Range & Units 10/10/24 06:17   C-Peptide 0.5 - 3.3 ng/mL 1.3      Latest Reference Range & Units 10/10/24 06:17   CALVIN Antibody 0.0 - 5.0 IU/mL <5.0     Current Medication:  Lantus  35 units   Actos 30 mg daily  Ozempic 1 mg weekly    She was previously on Jardiance glipizide and victoza. She was told to stop all of them when she developed hives. It is unclear which one of these medications caused the hives.     She was on metformin years ago and did not tolerate due to GI side effects.       She reports having hypoglycemia for the past two weeks, since decreasing lantus her hypoglycemia is stable  She  denies hypoglycemic unawareness.     Weight has been stable    Diet: mabry and eggs, salads, .  Activity: walking    Diabetes Complications   She  denies history of retinopathy.    She denies laser eye surgery.   Last eye exam: Nov 2024    She denies history of peripheral sensory neuropathy.    She denies numbness, tingling in both feet.    She denies history of foot sores.     2. Albuminuria   Currently taking kerendia 20 mg daily  She was on kerendia for 3-4 months than stopped taking it due to expenses. She states her insurance charged her 400 dollars for the first delivery and 700 for second she stopped.    Latest Reference Range & Units 06/16/25 06:57   Micro Alb Creat Ratio 0 - 30 mg/g 117 (H)   Creatinine, Urine mg/dL 141.00   Microalbumin, Urine Random mg/dL 16.5      Latest Reference Range & Units 06/16/25 06:57   Potassium 3.6 - 5.5 mmol/L 3.9      Latest Reference  Range & Units 06/16/25 06:57   Bun 8 - 22 mg/dL 11   Creatinine 0.50 - 1.40 mg/dL 0.89   GFR (CKD-EPI) >60 mL/min/1.73 m 2 69     3. Dyslipidemia  She is currently taking atorvastatin 80 mg every evening   Latest Reference Range & Units 06/16/25 06:57   Cholesterol,Tot 100 - 199 mg/dL 128   Triglycerides 0 - 149 mg/dL 224 (H)   HDL >=40 mg/dL 43   LDL <100 mg/dL 40      Latest Reference Range & Units 06/16/25 06:57   TSH 0.380 - 5.330 uIU/mL 3.170   Free T-4 0.93 - 1.70 ng/dL 1.05     4. Vitamin D deficiency  Currently taking vitamin D 3 5000 IU daily    Latest Reference Range & Units 06/16/25 06:57   25-Hydroxy   Vitamin D 25 30 - 100 ng/mL 57     ROS:     CONS:     No fever, no chills, no weight loss, no fatigue   EYES:      No diplopia, no blurry vision, no redness of eyes, no swelling of eyelids   ENT:    No hearing loss, No ear pain, No sore throat, no dysphagia, no neck swelling   CV:     No chest pain, no palpitations, no claudication, no orthopnea, no PND   PULM:    No SOB, no cough, no hemoptysis, no wheezing    GI:   No nausea, no vomiting, no diarrhea, no constipation, no bloody stools   :  Passing urine well, no dysuria, no hematuria   ENDO:   No polyuria, no polydipsia, no heat intolerance, no cold intolerance   NEURO: No headaches, no dizziness, no convulsions, no tremors   MUSC:  No joint swellings, no arthralgias, no myalgias, no weakness   SKIN:   No rash, no ulcers, no dry skin   PSYCH:   No depression, no anxiety, no difficulty sleeping       Past Medical History:  Patient Active Problem List    Diagnosis Date Noted    Grade A3 albuminuria 06/17/2025    Adult general medical exam 06/13/2025    Peripheral edema 06/05/2024    Chronic cough 06/05/2024    Chronic idiopathic urticaria 04/17/2024    Primary osteoarthritis involving multiple joints 11/14/2022    Obesity (BMI 30.0-34.9) 11/04/2022    Murmur, cardiac 01/28/2022    Former smoker 07/26/2021    Post-menopausal atrophic vaginitis 04/17/2020     DM-2, Type 2 diabetes mellitus with microalbuminuria, with long-term current use of insulin (HCC) 02/02/2018    Vitamin B12 deficiency 08/24/2017    Gall bladder polyp 08/12/2011    Hyperlipidemia 05/12/2011    Vitamin D deficiency 05/12/2011    GERD (gastroesophageal reflux disease) 05/12/2011       Past Surgical History:  Past Surgical History:   Procedure Laterality Date    OPEN REDUCTION      OTHER ORTHOPEDIC SURGERY      right arm ORIF    PRIMARY C SECTION      TUBAL COAGULATION LAPAROSCOPIC BILATERAL          Allergies:  Sulfa drugs     Current Medications:    Current Outpatient Medications:     Finerenone (KERENDIA) 20 MG Tab, Take 20 mg by mouth every day., Disp: 90 Tablet, Rfl: 3    Non Formulary Request, KETOTIFEN 2MG  Gets at compounded pharmacy, Disp: , Rfl:     atorvastatin (LIPITOR) 80 MG tablet, Take 1 Tablet by mouth every evening. TAKE 1 TABLET BY MOUTH EVERY EVENING, Disp: 100 Tablet, Rfl: 0    Semaglutide, 1 MG/DOSE, (OZEMPIC, 1 MG/DOSE,) 4 MG/3ML Solution Pen-injector, Inject 1 mg under the skin every 7 days., Disp: 9 mL, Rfl: 2    Continuous Glucose Sensor (FREESTYLE JANET 3 SENSOR) Misc, 1 Each every 14 days., Disp: 6 Each, Rfl: 3    LANTUS SOLOSTAR 100 UNIT/ML Solution Pen-injector injection, Inject 75 Units under the skin every evening. (Patient taking differently: Inject 35 Units under the skin every evening.), Disp: 21 Each, Rfl: 3    pioglitazone (ACTOS) 30 MG Tab, Take 1 Tablet by mouth every day., Disp: 100 Tablet, Rfl: 3    omeprazole (PRILOSEC) 20 MG delayed-release capsule, Take 1 Capsule by mouth every day., Disp: 90 Capsule, Rfl: 3    aspirin 81 MG EC tablet, Take 81 mg by mouth every day., Disp: , Rfl:     vitamin D3, cholecalciferol, 400 UNIT Tab tablet, Take 400 Units by mouth every day., Disp: , Rfl:     Multiple Vitamins-Minerals (OCUVITE ADULT 50+ PO), Take 1 Capsule by mouth every day., Disp: , Rfl:     Social History:  Social History     Socioeconomic History    Marital  status:      Spouse name: Not on file    Number of children: Not on file    Years of education: Not on file    Highest education level: Some college, no degree   Occupational History    Not on file   Tobacco Use    Smoking status: Former     Current packs/day: 0.00     Average packs/day: 1 pack/day for 20.0 years (20.0 ttl pk-yrs)     Types: Cigarettes     Start date: 1970     Quit date: 1990     Years since quittin.4    Smokeless tobacco: Never    Tobacco comments:     Quit   (Hx 1 ppd x 20 yrs).    Vaping Use    Vaping status: Never Used   Substance and Sexual Activity    Alcohol use: Not Currently     Comment: couple per year    Drug use: No    Sexual activity: Not Currently     Comment:    Other Topics Concern    Not on file   Social History Narrative    Not on file     Social Drivers of Health     Financial Resource Strain: Low Risk  (1/15/2024)    Overall Financial Resource Strain (CARDIA)     Difficulty of Paying Living Expenses: Not hard at all   Food Insecurity: No Food Insecurity (1/15/2024)    Hunger Vital Sign     Worried About Running Out of Food in the Last Year: Never true     Ran Out of Food in the Last Year: Never true   Transportation Needs: No Transportation Needs (1/15/2024)    PRAPARE - Transportation     Lack of Transportation (Medical): No     Lack of Transportation (Non-Medical): No   Physical Activity: Insufficiently Active (2022)    Exercise Vital Sign     Days of Exercise per Week: 3 days     Minutes of Exercise per Session: 30 min   Stress: No Stress Concern Present (2022)    [employer] - Occupational Stress Questionnaire     Feeling of Stress : Not at all   Social Connections: Socially Isolated (2022)    Social Connection and Isolation Panel [NHANES]     Frequency of Communication with Friends and Family: More than three times a week     Frequency of Social Gatherings with Friends and Family: More than three times  "a week     Attends Sikhism Services: Never     Active Member of Clubs or Organizations: No     Attends Club or Organization Meetings: Never     Marital Status:    Intimate Partner Violence: Not on file   Housing Stability: Low Risk  (1/15/2024)    Housing Stability Vital Sign     Unable to Pay for Housing in the Last Year: No     Number of Places Lived in the Last Year: 1     Unstable Housing in the Last Year: No        Family History:   Family History   Problem Relation Age of Onset    Cancer Maternal Grandmother         lung     Cancer Mother         Breast Cancer twice & Skin cancers    Other Father         urinary issues    GI Disease Sister     Heart Disease Brother     Cancer Brother         Prostate cancer    Cancer Maternal Grandfather         Lung Cancer       PHYSICAL EXAM:   Vital signs: /80 (BP Location: Left arm, Patient Position: Sitting, BP Cuff Size: Adult)   Pulse 81   Ht 1.676 m (5' 6\")   Wt 83.7 kg (184 lb 8 oz)   LMP 12/16/2006   SpO2 94%   BMI 29.78 kg/m²   GENERAL: Well-developed, well-nourished  in no apparent distress.   EYE: No ocular and eyelid asymmetry, Anicteric sclerae,  PERRL, No exophthalmos or lidlag  HENT: Hearing grossly intact, Normocephalic, atraumatic. Pink, moist mucous membranes, No exudate  NECK: Supple. Trachea midline.   CARDIOVASCULAR: Regular rate and rhythm. No murmurs, rubs, or gallops.   LUNGS: Clear to auscultation bilaterally   ABDOMEN: Soft, nontender with positive bowel sounds.   EXTREMITIES: No clubbing, cyanosis, or edema.   NEUROLOGICAL: Cranial nerves II-XII are grossly intact   Symmetric reflexes at the patella no proximal muscle weakness, No visible tremor with both outstretched hands  LYMPH: No cervical, supraclavicular,  adenopathy palpated.   SKIN: No rashes, lesions. Turgor is normal.  FOOT: Normal sensation to monofilament testing, normal pulses, no ulcers.  Normal Vibration quantitative sensation test.    Labs:  Lab Results " "  Component Value Date/Time    HBA1C 11.2 (H) 09/05/2024 0604    AVGLUC 275 09/05/2024 0604       Lab Results   Component Value Date/Time    WBC 6.5 05/31/2024 07:16 AM    RBC 4.62 05/31/2024 07:16 AM    HEMOGLOBIN 12.8 05/31/2024 07:16 AM    MCV 86.4 05/31/2024 07:16 AM    MCH 27.7 05/31/2024 07:16 AM    MCHC 32.1 (L) 05/31/2024 07:16 AM    RDW 42.0 05/31/2024 07:16 AM    MPV 9.1 05/31/2024 07:16 AM       Lab Results   Component Value Date/Time    SODIUM 143 06/16/2025 06:57 AM    POTASSIUM 3.9 06/16/2025 06:57 AM    CHLORIDE 105 06/16/2025 06:57 AM    CO2 26 06/16/2025 06:57 AM    ANION 12.0 06/16/2025 06:57 AM    GLUCOSE 84 06/16/2025 06:57 AM    BUN 11 06/16/2025 06:57 AM    CREATININE 0.89 06/16/2025 06:57 AM    CALCIUM 9.9 06/16/2025 06:57 AM    ASTSGOT 24 06/16/2025 06:57 AM    ALTSGPT 20 06/16/2025 06:57 AM    TBILIRUBIN 0.4 06/16/2025 06:57 AM    ALBUMIN 4.0 06/16/2025 06:57 AM    TOTPROTEIN 7.6 06/16/2025 06:57 AM    GLOBULIN 3.6 (H) 06/16/2025 06:57 AM    AGRATIO 1.1 06/16/2025 06:57 AM       Lab Results   Component Value Date/Time    CHOLSTRLTOT 184 05/16/2024 0641    TRIGLYCERIDE 180 (H) 05/16/2024 0641    HDL 59 05/16/2024 0641    LDL 89 05/16/2024 0641       Lab Results   Component Value Date/Time    MALBCRT 117 (H) 06/16/2025 06:57 AM    MICROALBUR 16.5 06/16/2025 06:57 AM        Lab Results   Component Value Date/Time    TSHULTRASEN 2.970 04/01/2024 0710     No results found for: \"FREEDIR\"  No results found for: \"FREET3\"  No results found for: \"THYSTIMIG\"      ASSESSMENT/PLAN:   1. Type 2 diabetes mellitus with microalbuminuria, with long-term current use of insulin ([facility]) (Primary)  Stable  A1c 6.2  Medication:  Ozempic 1 mg weekly - continue  Lantus 35 units at bedtime - titatrate down by 1 units for blood sugars less than 80 3ams consecutively  Actos 30 mg daily - continue  Cgm downloaded and reviewed  We discussed possibility to reintroducing Jardiance for better control of her blood sugars. "   Recommend diet low in carbs and fats  Recommend 30 mins of activity daily  Stay well hydrated.   - POCT Hemoglobin A1C  - Comp Metabolic Panel; Future    2. Grade A2 albuminuria  Unstable  Improving  Medication:  Kerendia 20 mg daily - continue  We will continue to monitor potassium.   - Finerenone (KERENDIA) 20 MG Tab; Take 20 mg by mouth every day.  Dispense: 90 Tablet; Refill: 3  - MICROALBUMIN CREAT RATIO URINE; Future    3. Dyslipidemia  unstable  Continue current regimen- see HPI   Discussed adding zetia for increased triglycerides, however patient would like to focus on cutting down fats like mabry to bring down her triglycerides. We will repeat in 6 months.     4. Vitamin D deficiency  Stable  Continue current regimen- see HPI  - VITAMIN D,25 HYDROXY (DEFICIENCY); Future     Return in about 5 months (around 11/17/2025). Patient will have blood work done prior to follow up in 5 months      Thank you kindly for allowing me to participate in the diabetes care plan for this patient.    Amrit Harris, LALITHA   6/17/25    CC:   Walter Robertson III, M.D.

## 2025-06-18 ENCOUNTER — PHARMACY VISIT (OUTPATIENT)
Dept: PHARMACY | Facility: MEDICAL CENTER | Age: 70
End: 2025-06-18
Payer: COMMERCIAL

## 2025-08-19 DIAGNOSIS — K21.9 GASTROESOPHAGEAL REFLUX DISEASE WITHOUT ESOPHAGITIS: ICD-10-CM

## 2025-08-20 RX ORDER — OMEPRAZOLE 20 MG/1
20 CAPSULE, DELAYED RELEASE ORAL
Qty: 100 CAPSULE | Refills: 3 | Status: SHIPPED | OUTPATIENT
Start: 2025-08-20

## 2025-08-21 DIAGNOSIS — E11.29 TYPE 2 DIABETES MELLITUS WITH MICROALBUMINURIA, WITH LONG-TERM CURRENT USE OF INSULIN (HCC): Chronic | ICD-10-CM

## 2025-08-21 DIAGNOSIS — Z79.4 TYPE 2 DIABETES MELLITUS WITH MICROALBUMINURIA, WITH LONG-TERM CURRENT USE OF INSULIN (HCC): Chronic | ICD-10-CM

## 2025-08-21 DIAGNOSIS — R80.9 TYPE 2 DIABETES MELLITUS WITH MICROALBUMINURIA, WITH LONG-TERM CURRENT USE OF INSULIN (HCC): Chronic | ICD-10-CM

## 2025-08-22 ENCOUNTER — TELEPHONE (OUTPATIENT)
Dept: ENDOCRINOLOGY | Facility: MEDICAL CENTER | Age: 70
End: 2025-08-22
Payer: MEDICARE

## 2025-08-24 RX ORDER — ACYCLOVIR 800 MG/1
1 TABLET ORAL
Qty: 6 EACH | Refills: 3 | Status: SHIPPED | OUTPATIENT
Start: 2025-08-24